# Patient Record
Sex: MALE | Race: BLACK OR AFRICAN AMERICAN | NOT HISPANIC OR LATINO | Employment: OTHER | URBAN - METROPOLITAN AREA
[De-identification: names, ages, dates, MRNs, and addresses within clinical notes are randomized per-mention and may not be internally consistent; named-entity substitution may affect disease eponyms.]

---

## 2018-07-25 ENCOUNTER — OFFICE VISIT (OUTPATIENT)
Dept: DERMATOLOGY | Facility: CLINIC | Age: 28
End: 2018-07-25
Payer: COMMERCIAL

## 2018-07-25 DIAGNOSIS — L21.9 SEBORRHEIC DERMATITIS: Primary | ICD-10-CM

## 2018-07-25 PROCEDURE — 99999 PR PBB SHADOW E&M-EST. PATIENT-LVL II: CPT | Mod: PBBFAC,,, | Performed by: DERMATOLOGY

## 2018-07-25 PROCEDURE — 99202 OFFICE O/P NEW SF 15 MIN: CPT | Mod: S$GLB,,, | Performed by: DERMATOLOGY

## 2018-07-25 RX ORDER — KETOCONAZOLE 20 MG/G
CREAM TOPICAL
Qty: 60 G | Refills: 3 | Status: SHIPPED | OUTPATIENT
Start: 2018-07-25 | End: 2020-09-15 | Stop reason: SDUPTHER

## 2018-07-25 RX ORDER — TRIAMCINOLONE ACETONIDE 0.25 MG/G
CREAM TOPICAL
Qty: 80 G | Refills: 1 | Status: SHIPPED | OUTPATIENT
Start: 2018-07-25 | End: 2020-09-15 | Stop reason: SDUPTHER

## 2018-07-25 RX ORDER — KETOCONAZOLE 20 MG/ML
SHAMPOO, SUSPENSION TOPICAL
Qty: 120 ML | Refills: 5 | Status: SHIPPED | OUTPATIENT
Start: 2018-07-25 | End: 2020-09-15 | Stop reason: SDUPTHER

## 2018-07-25 NOTE — PATIENT INSTRUCTIONS
Instructions for seborrheic dermatitis:    · Use ketoconazole cream twice a day every day to the affected areas for maintenance treatment  · For flares (scaliness, redness, itching), use triamcinolone 0.025% cream (steroid cream) twice a day to the affected areas. Mix ketoconazole cream with triamcinolone 0.025% cream and use twice a day as needed for flares only.  Triamcinolone 0.025% cream is a mild steroid and should not be used for periods longer than 2-4 weeks at a time.  · Use ketoconazole shampoo 2 times/week on scalp, lather, let sit 5-10 minutes, then rinse. Use daily on face and beard as face wash.

## 2018-07-25 NOTE — PROGRESS NOTES
Subjective:       Patient ID:  Kate Lazo is a 28 y.o. male who presents for   Chief Complaint   Patient presents with    Dry Skin     c/o dry skin to body x several months,,,tx with t-tree oil,,little relief,,,dry and itchy    Hair/Scalp Problem     dry scalp x several months,,tx with selsum blue,,head and shoulders,,,no relief,,itchy     History of Present Illness: The patient presents with chief complaint of rash.  Location: scalp  Duration: several years  Signs/Symptoms: pruritus    Prior treatments: tea tree oil, head and shoulders, selsen blue          Review of Systems   Constitutional: Negative for fever and chills.   Gastrointestinal: Negative for nausea and vomiting.   Skin: Positive for itching and rash. Negative for daily sunscreen use, activity-related sunscreen use and recent sunburn.   Hematologic/Lymphatic: Does not bruise/bleed easily.        Objective:    Physical Exam   Constitutional: He appears well-developed and well-nourished. No distress.   Neurological: He is alert and oriented to person, place, and time. He is not disoriented.   Psychiatric: He has a normal mood and affect.   Skin:   Areas Examined (abnormalities noted in diagram):   Scalp / Hair Palpated and Inspected  Head / Face Inspection Performed  Neck Inspection Performed  Chest / Axilla Inspection Performed  Abdomen Inspection Performed  RUE Inspected  LUE Inspection Performed  Nails and Digits Inspection Performed                Assessment / Plan:        Seborrheic dermatitis  -     ketoconazole (NIZORAL) 2 % cream; AAA bid. Non-steroid cream.  Dispense: 60 g; Refill: 3  -     triamcinolone acetonide 0.025% (KENALOG) 0.025 % cream; AAA bid as needed for flares.  Mild steroid.  Dispense: 80 g; Refill: 1  -     ketoconazole (NIZORAL) 2 % shampoo; Wash hair with medicated shampoo at least 2x/week - let sit on scalp at least 5 minutes prior to rinsing. Use daily as face wash.  Dispense: 120 mL; Refill: 5  -     Discussed  diagnosis. Will start ketoconazole cream BID for maintenance with TAC 0.025% cream BID prn flares for seb derm of face, will add ketoconazole shampoo 2 times/week for seb derm of scalp.                 Follow-up in about 2 months (around 9/25/2018) for THEE Castellano.

## 2018-09-10 ENCOUNTER — LAB VISIT (OUTPATIENT)
Dept: LAB | Facility: HOSPITAL | Age: 28
End: 2018-09-10
Attending: STUDENT IN AN ORGANIZED HEALTH CARE EDUCATION/TRAINING PROGRAM
Payer: COMMERCIAL

## 2018-09-10 DIAGNOSIS — N39.0 URINARY TRACT INFECTION, SITE NOT SPECIFIED: ICD-10-CM

## 2018-09-10 DIAGNOSIS — T81.44XA POSTOPERATIVE SEPTICEMIA: Primary | ICD-10-CM

## 2018-09-10 LAB
BACTERIA #/AREA URNS HPF: ABNORMAL /HPF
BASOPHILS # BLD AUTO: 0.01 K/UL
BASOPHILS NFR BLD: 0.1 %
BILIRUB UR QL STRIP: NEGATIVE
CLARITY UR: CLEAR
COLOR UR: YELLOW
DIFFERENTIAL METHOD: ABNORMAL
EOSINOPHIL # BLD AUTO: 0.1 K/UL
EOSINOPHIL NFR BLD: 1.9 %
ERYTHROCYTE [DISTWIDTH] IN BLOOD BY AUTOMATED COUNT: 14.3 %
GLUCOSE UR QL STRIP: NEGATIVE
HCT VFR BLD AUTO: 41 %
HGB BLD-MCNC: 13.8 G/DL
HGB UR QL STRIP: ABNORMAL
HYALINE CASTS #/AREA URNS LPF: 0 /LPF
KETONES UR QL STRIP: NEGATIVE
LEUKOCYTE ESTERASE UR QL STRIP: ABNORMAL
LYMPHOCYTES # BLD AUTO: 2.5 K/UL
LYMPHOCYTES NFR BLD: 33.5 %
MCH RBC QN AUTO: 29.1 PG
MCHC RBC AUTO-ENTMCNC: 33.7 G/DL
MCV RBC AUTO: 86 FL
MICROSCOPIC COMMENT: ABNORMAL
MONOCYTES # BLD AUTO: 0.7 K/UL
MONOCYTES NFR BLD: 9.7 %
NEUTROPHILS # BLD AUTO: 4.1 K/UL
NEUTROPHILS NFR BLD: 54.8 %
NITRITE UR QL STRIP: POSITIVE
PH UR STRIP: 6.5 [PH] (ref 5–8)
PLATELET # BLD AUTO: 264 K/UL
PMV BLD AUTO: 11.3 FL
PROT UR QL STRIP: ABNORMAL
RBC # BLD AUTO: 4.75 M/UL
RBC #/AREA URNS HPF: 40 /HPF (ref 0–4)
SP GR UR STRIP: 1.02 (ref 1–1.03)
URN SPEC COLLECT METH UR: ABNORMAL
UROBILINOGEN UR STRIP-ACNC: NEGATIVE EU/DL
WBC # BLD AUTO: 7.53 K/UL
WBC #/AREA URNS HPF: 50 /HPF (ref 0–5)
WBC CLUMPS URNS QL MICRO: ABNORMAL

## 2018-09-10 PROCEDURE — 87186 SC STD MICRODIL/AGAR DIL: CPT | Mod: 59

## 2018-09-10 PROCEDURE — 85025 COMPLETE CBC W/AUTO DIFF WBC: CPT

## 2018-09-10 PROCEDURE — 81000 URINALYSIS NONAUTO W/SCOPE: CPT

## 2018-09-10 PROCEDURE — 87040 BLOOD CULTURE FOR BACTERIA: CPT

## 2018-09-10 PROCEDURE — 87088 URINE BACTERIA CULTURE: CPT

## 2018-09-10 PROCEDURE — 87086 URINE CULTURE/COLONY COUNT: CPT

## 2018-09-10 PROCEDURE — 87077 CULTURE AEROBIC IDENTIFY: CPT

## 2018-09-15 LAB
BACTERIA BLD CULT: NORMAL
BACTERIA UR CULT: NORMAL
BACTERIA UR CULT: NORMAL

## 2019-01-03 ENCOUNTER — LAB VISIT (OUTPATIENT)
Dept: LAB | Facility: HOSPITAL | Age: 29
End: 2019-01-03
Attending: FAMILY MEDICINE
Payer: COMMERCIAL

## 2019-01-03 DIAGNOSIS — N39.0 URINARY TRACT INFECTION, SITE NOT SPECIFIED: Primary | ICD-10-CM

## 2019-01-03 LAB
BACTERIA #/AREA URNS HPF: ABNORMAL /HPF
BILIRUB UR QL STRIP: NEGATIVE
CLARITY UR: CLEAR
COLOR UR: YELLOW
GLUCOSE UR QL STRIP: NEGATIVE
HGB UR QL STRIP: ABNORMAL
KETONES UR QL STRIP: ABNORMAL
LEUKOCYTE ESTERASE UR QL STRIP: ABNORMAL
MICROSCOPIC COMMENT: ABNORMAL
NITRITE UR QL STRIP: NEGATIVE
PH UR STRIP: 8 [PH] (ref 5–8)
PROT UR QL STRIP: ABNORMAL
RBC #/AREA URNS HPF: >100 /HPF (ref 0–4)
SP GR UR STRIP: 1.02 (ref 1–1.03)
URN SPEC COLLECT METH UR: ABNORMAL
UROBILINOGEN UR STRIP-ACNC: NEGATIVE EU/DL
WBC #/AREA URNS HPF: 5 /HPF (ref 0–5)

## 2019-01-03 PROCEDURE — 81000 URINALYSIS NONAUTO W/SCOPE: CPT

## 2019-01-03 PROCEDURE — 87086 URINE CULTURE/COLONY COUNT: CPT

## 2019-01-05 LAB
BACTERIA UR CULT: NORMAL
BACTERIA UR CULT: NORMAL

## 2019-11-21 LAB — HIV 1+2 AB+HIV1 P24 AG SERPL QL IA: NON REACTIVE

## 2020-09-15 ENCOUNTER — OFFICE VISIT (OUTPATIENT)
Dept: INTERNAL MEDICINE | Facility: CLINIC | Age: 30
End: 2020-09-15
Payer: MEDICARE

## 2020-09-15 VITALS
DIASTOLIC BLOOD PRESSURE: 84 MMHG | SYSTOLIC BLOOD PRESSURE: 112 MMHG | WEIGHT: 200 LBS | RESPIRATION RATE: 18 BRPM | HEART RATE: 64 BPM | TEMPERATURE: 97 F | OXYGEN SATURATION: 97 %

## 2020-09-15 DIAGNOSIS — K21.9 GASTROESOPHAGEAL REFLUX DISEASE, ESOPHAGITIS PRESENCE NOT SPECIFIED: ICD-10-CM

## 2020-09-15 DIAGNOSIS — R21 SKIN RASH: Primary | ICD-10-CM

## 2020-09-15 DIAGNOSIS — R10.30 LOWER ABDOMINAL PAIN: ICD-10-CM

## 2020-09-15 DIAGNOSIS — L21.9 SEBORRHEIC DERMATITIS: ICD-10-CM

## 2020-09-15 DIAGNOSIS — G89.29 OTHER CHRONIC PAIN: ICD-10-CM

## 2020-09-15 PROCEDURE — 99999 PR PBB SHADOW E&M-EST. PATIENT-LVL III: CPT | Mod: PBBFAC,,, | Performed by: FAMILY MEDICINE

## 2020-09-15 PROCEDURE — 99203 PR OFFICE/OUTPT VISIT, NEW, LEVL III, 30-44 MIN: ICD-10-PCS | Mod: S$PBB,,, | Performed by: FAMILY MEDICINE

## 2020-09-15 PROCEDURE — 99203 OFFICE O/P NEW LOW 30 MIN: CPT | Mod: S$PBB,,, | Performed by: FAMILY MEDICINE

## 2020-09-15 PROCEDURE — 99213 OFFICE O/P EST LOW 20 MIN: CPT | Mod: PBBFAC | Performed by: FAMILY MEDICINE

## 2020-09-15 PROCEDURE — 99999 PR PBB SHADOW E&M-EST. PATIENT-LVL III: ICD-10-PCS | Mod: PBBFAC,,, | Performed by: FAMILY MEDICINE

## 2020-09-15 RX ORDER — KETOCONAZOLE 20 MG/G
CREAM TOPICAL
Qty: 60 G | Refills: 3 | Status: SHIPPED | OUTPATIENT
Start: 2020-09-15 | End: 2021-03-11

## 2020-09-15 RX ORDER — KETOCONAZOLE 20 MG/ML
SHAMPOO, SUSPENSION TOPICAL
Qty: 120 ML | Refills: 5 | Status: SHIPPED | OUTPATIENT
Start: 2020-09-15 | End: 2021-03-11 | Stop reason: SDUPTHER

## 2020-09-15 RX ORDER — FUROSEMIDE 20 MG/1
TABLET ORAL
COMMUNITY
Start: 2020-08-08 | End: 2021-03-11 | Stop reason: SDUPTHER

## 2020-09-15 RX ORDER — TRIAMCINOLONE ACETONIDE 0.25 MG/G
CREAM TOPICAL
Qty: 80 G | Refills: 1 | Status: SHIPPED | OUTPATIENT
Start: 2020-09-15 | End: 2021-12-15

## 2020-09-15 RX ORDER — DULOXETIN HYDROCHLORIDE 30 MG/1
30 CAPSULE, DELAYED RELEASE ORAL DAILY
Qty: 90 CAPSULE | Refills: 1 | Status: SHIPPED | OUTPATIENT
Start: 2020-09-15 | End: 2020-10-15

## 2020-09-15 RX ORDER — PANTOPRAZOLE SODIUM 40 MG/1
40 TABLET, DELAYED RELEASE ORAL DAILY
Qty: 90 TABLET | Refills: 1 | Status: SHIPPED | OUTPATIENT
Start: 2020-09-15 | End: 2021-03-11

## 2020-09-15 RX ORDER — SENNOSIDES 8.6 MG/1
1 TABLET ORAL
COMMUNITY

## 2020-09-15 RX ORDER — OMEPRAZOLE 40 MG/1
40 CAPSULE, DELAYED RELEASE ORAL
COMMUNITY
End: 2020-09-15
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
## 2020-09-21 NOTE — TELEPHONE ENCOUNTER
Patient c/o have withdrawals from stopping the amitriptyline.  He stated that he has upper body pain, insomnia, and sweats.  Want to know can he restart the medication.

## 2020-09-21 NOTE — TELEPHONE ENCOUNTER
----- Message from Beth Lazo sent at 9/21/2020 10:27 AM CDT -----  Regarding: pt  Would like a call back in regards to medication that was prescribed by  . Please call back at [phone].       Thank you,   Beth Lazo

## 2020-09-21 NOTE — TELEPHONE ENCOUNTER
----- Message from Michelle Dunbar sent at 9/21/2020  9:03 AM CDT -----  Pt calling regarding his medication he want to find out about a medication that he was taken off of. Please give him a call to discuss. [phone]        Thanks   Michelle Dunbar

## 2020-09-21 NOTE — TELEPHONE ENCOUNTER
Patient called in stating he would like to be put back on amitriptyline 25 mg he was on it before and the medication was working for him. He believe the medication that he's on now(cymbalta) is giving him so problems. Please call the pt to assist in his medication request.

## 2020-09-22 RX ORDER — AMITRIPTYLINE HYDROCHLORIDE 25 MG/1
25 TABLET, FILM COATED ORAL NIGHTLY PRN
Qty: 7 TABLET | Refills: 0 | Status: SHIPPED | OUTPATIENT
Start: 2020-09-22 | End: 2020-09-23 | Stop reason: SDUPTHER

## 2020-09-22 NOTE — TELEPHONE ENCOUNTER
Gideon Burton wants the patient to restart this medication can you please refill for the patient.  I spoke with Dr Traore on yesterday.

## 2020-09-22 NOTE — TELEPHONE ENCOUNTER
----- Message from Dorene Gan sent at 9/22/2020  2:17 PM CDT -----  Regarding: Medication  Contact: pt mother  Pt mother stated she has been calling for the past few days about pt medication (Amitriptyline) being filled, she can be reached at [phone] Thanks      [facility] [place] - [address]  [address] [address]  Phone: [phone] Fax: [phone]

## 2020-09-22 NOTE — TELEPHONE ENCOUNTER
----- Message from Dorene Gan sent at 9/22/2020  2:17 PM CDT -----  Regarding: Medication  Contact: pt mother  Pt mother stated she has been calling for the past few days about pt medication (Amitriptyline) being filled, she can be reached at [phone] Thanks      [facility] - [place] - [address]  [address] [place] [address]  Phone: [phone] Fax: [phone]

## 2020-09-22 NOTE — TELEPHONE ENCOUNTER
I do not see any notes regarding Elavil from previous clinic visit.  I'm not sure who was prescribing previously.  Sent 1wk supply and can discuss with Dr. Burton his recommendations upon his return tomorrow and send additional Rx.  Hold cymbalta for now.

## 2020-09-22 NOTE — TELEPHONE ENCOUNTER
----- Message from Dorene Gan sent at 9/22/2020  2:17 PM CDT -----  Regarding: Medication  Contact: pt mother  Pt mother stated she has been calling for the past few days about pt medication (Amitriptyline) being filled, she can be reached at [phone] Thanks      [facility] - [place] - [address]  [address] [address]  Phone: [phone] Fax: [phone]

## 2020-09-22 NOTE — TELEPHONE ENCOUNTER
----- Message from Courtney Sigala sent at 9/21/2020  5:22 PM CDT -----  Regarding: Refill  Contact: [phone]  Pt mother calling to see if Amitriptyline 25MG can be called into the pharmacy. Please call the pt mother regarding the refill .           [facility] [address] [place] [address] - [address]   [phone] (Phone)  [phone] (Fax)

## 2020-09-22 NOTE — TELEPHONE ENCOUNTER
Pt is having withdraw and need medication called in. Pt had only been off of medication 4 day prior to visit.

## 2020-09-22 NOTE — PROGRESS NOTES
Refill Routing Note   Medication(s) are not appropriate for processing by [facility]:       - Pt requesting to restart medication         Medication Therapy Plan: Pt requesting to restart. Defer to PCP   Medication reconciliation completed: No      Automatic Epic Generated Protocol Data:        Requested Prescriptions   Pending Prescriptions Disp Refills    amitriptyline (ELAVIL) 25 MG tablet 30 tablet 1     Sig: Take 1 tablet (25 mg total) by mouth nightly as needed for Insomnia.       Tricyclic Antidepressants Failed - 9/22/2020  2:30 PM        Failed - Medication is active on med list        Passed - No positive pregnancy test in past 12 months         Passed - Patient has a recent visit in past 12 months or next 90 days              Appointments  past 12m or future 3m with PCP    Date Provider   Last Visit   9/15/2020 Troy Burton MD   Next Visit   10/5/2020 Troy Burton MD   ED visits in past 90 days: 0     Note composed:2:44 PM 09/22/2020

## 2020-09-23 RX ORDER — AMITRIPTYLINE HYDROCHLORIDE 25 MG/1
25 TABLET, FILM COATED ORAL NIGHTLY PRN
Qty: 90 TABLET | Refills: 0 | Status: SHIPPED | OUTPATIENT
Start: 2020-09-23 | End: 2020-12-23

## 2020-09-24 ENCOUNTER — PATIENT MESSAGE (OUTPATIENT)
Dept: INTERNAL MEDICINE | Facility: CLINIC | Age: 30
End: 2020-09-24

## 2020-09-24 DIAGNOSIS — Z79.899 ENCOUNTER FOR LONG-TERM CURRENT USE OF MEDICATION: Primary | ICD-10-CM

## 2020-09-24 DIAGNOSIS — N39.0 URINARY TRACT INFECTION WITHOUT HEMATURIA, SITE UNSPECIFIED: Primary | ICD-10-CM

## 2020-09-25 ENCOUNTER — LAB VISIT (OUTPATIENT)
Dept: LAB | Facility: HOSPITAL | Age: 30
End: 2020-09-25
Attending: FAMILY MEDICINE
Payer: MEDICARE

## 2020-09-25 DIAGNOSIS — Z79.899 ENCOUNTER FOR LONG-TERM CURRENT USE OF MEDICATION: ICD-10-CM

## 2020-09-25 DIAGNOSIS — N39.0 URINARY TRACT INFECTION WITHOUT HEMATURIA, SITE UNSPECIFIED: ICD-10-CM

## 2020-09-25 LAB
BACTERIA #/AREA URNS HPF: ABNORMAL /HPF
BILIRUB UR QL STRIP: NEGATIVE
CLARITY UR: ABNORMAL
COLOR UR: YELLOW
GLUCOSE UR QL STRIP: NEGATIVE
HGB UR QL STRIP: ABNORMAL
KETONES UR QL STRIP: NEGATIVE
LEUKOCYTE ESTERASE UR QL STRIP: ABNORMAL
MICROSCOPIC COMMENT: ABNORMAL
NITRITE UR QL STRIP: NEGATIVE
NON-SQ EPI CELLS #/AREA URNS HPF: <1 /HPF
PH UR STRIP: 7 [PH] (ref 5–8)
PROT UR QL STRIP: NEGATIVE
RBC #/AREA URNS HPF: 30 /HPF (ref 0–4)
SP GR UR STRIP: 1.01 (ref 1–1.03)
SQUAMOUS #/AREA URNS HPF: 1 /HPF
URN SPEC COLLECT METH UR: ABNORMAL
WBC #/AREA URNS HPF: 45 /HPF (ref 0–5)

## 2020-09-25 PROCEDURE — 84443 ASSAY THYROID STIM HORMONE: CPT

## 2020-09-25 PROCEDURE — 85025 COMPLETE CBC W/AUTO DIFF WBC: CPT

## 2020-09-25 PROCEDURE — 80053 COMPREHEN METABOLIC PANEL: CPT

## 2020-09-25 PROCEDURE — 36415 COLL VENOUS BLD VENIPUNCTURE: CPT

## 2020-09-25 PROCEDURE — 80061 LIPID PANEL: CPT

## 2020-09-25 PROCEDURE — 81000 URINALYSIS NONAUTO W/SCOPE: CPT

## 2020-09-25 PROCEDURE — 83036 HEMOGLOBIN GLYCOSYLATED A1C: CPT

## 2020-09-25 PROCEDURE — 82306 VITAMIN D 25 HYDROXY: CPT

## 2020-09-25 PROCEDURE — 87086 URINE CULTURE/COLONY COUNT: CPT

## 2020-09-25 PROCEDURE — 87088 URINE BACTERIA CULTURE: CPT

## 2020-09-25 PROCEDURE — 87077 CULTURE AEROBIC IDENTIFY: CPT

## 2020-09-25 PROCEDURE — 87186 SC STD MICRODIL/AGAR DIL: CPT

## 2020-09-25 RX ORDER — CIPROFLOXACIN 500 MG/1
500 TABLET ORAL EVERY 12 HOURS
Qty: 14 TABLET | Refills: 0 | Status: SHIPPED | OUTPATIENT
Start: 2020-09-25 | End: 2020-09-28

## 2020-09-26 LAB
25(OH)D3+25(OH)D2 SERPL-MCNC: 14 NG/ML (ref 30–96)
ALBUMIN SERPL BCP-MCNC: 3.9 G/DL (ref 3.5–5.2)
ALP SERPL-CCNC: 100 U/L (ref 55–135)
ALT SERPL W/O P-5'-P-CCNC: 24 U/L (ref 10–44)
ANION GAP SERPL CALC-SCNC: 11 MMOL/L (ref 8–16)
AST SERPL-CCNC: 15 U/L (ref 10–40)
BASOPHILS # BLD AUTO: 0.02 K/UL (ref 0–0.2)
BASOPHILS NFR BLD: 0.4 % (ref 0–1.9)
BILIRUB SERPL-MCNC: 0.5 MG/DL (ref 0.1–1)
BUN SERPL-MCNC: 10 MG/DL (ref 6–20)
CALCIUM SERPL-MCNC: 9.2 MG/DL (ref 8.7–10.5)
CHLORIDE SERPL-SCNC: 106 MMOL/L (ref 95–110)
CHOLEST SERPL-MCNC: 122 MG/DL (ref 120–199)
CHOLEST/HDLC SERPL: 2.8 {RATIO} (ref 2–5)
CO2 SERPL-SCNC: 26 MMOL/L (ref 23–29)
CREAT SERPL-MCNC: 0.9 MG/DL (ref 0.5–1.4)
DIFFERENTIAL METHOD: ABNORMAL
EOSINOPHIL # BLD AUTO: 0.1 K/UL (ref 0–0.5)
EOSINOPHIL NFR BLD: 2 % (ref 0–8)
ERYTHROCYTE [DISTWIDTH] IN BLOOD BY AUTOMATED COUNT: 13.2 % (ref 11.5–14.5)
EST. GFR  (AFRICAN AMERICAN): >60 ML/MIN/1.73 M^2
EST. GFR  (NON AFRICAN AMERICAN): >60 ML/MIN/1.73 M^2
ESTIMATED AVG GLUCOSE: 100 MG/DL (ref 68–131)
GLUCOSE SERPL-MCNC: 70 MG/DL (ref 70–110)
HBA1C MFR BLD HPLC: 5.1 % (ref 4–5.6)
HCT VFR BLD AUTO: 46.6 % (ref 40–54)
HDLC SERPL-MCNC: 44 MG/DL (ref 40–75)
HDLC SERPL: 36.1 % (ref 20–50)
HGB BLD-MCNC: 14.7 G/DL (ref 14–18)
IMM GRANULOCYTES # BLD AUTO: 0.03 K/UL (ref 0–0.04)
IMM GRANULOCYTES NFR BLD AUTO: 0.6 % (ref 0–0.5)
LDLC SERPL CALC-MCNC: 66.4 MG/DL (ref 63–159)
LYMPHOCYTES # BLD AUTO: 2.3 K/UL (ref 1–4.8)
LYMPHOCYTES NFR BLD: 46.5 % (ref 18–48)
MCH RBC QN AUTO: 28.2 PG (ref 27–31)
MCHC RBC AUTO-ENTMCNC: 31.5 G/DL (ref 32–36)
MCV RBC AUTO: 89 FL (ref 82–98)
MONOCYTES # BLD AUTO: 0.3 K/UL (ref 0.3–1)
MONOCYTES NFR BLD: 6.6 % (ref 4–15)
NEUTROPHILS # BLD AUTO: 2.2 K/UL (ref 1.8–7.7)
NEUTROPHILS NFR BLD: 43.9 % (ref 38–73)
NONHDLC SERPL-MCNC: 78 MG/DL
NRBC BLD-RTO: 0 /100 WBC
PLATELET # BLD AUTO: 235 K/UL (ref 150–350)
PMV BLD AUTO: 11.7 FL (ref 9.2–12.9)
POTASSIUM SERPL-SCNC: 4 MMOL/L (ref 3.5–5.1)
PROT SERPL-MCNC: 7.2 G/DL (ref 6–8.4)
RBC # BLD AUTO: 5.21 M/UL (ref 4.6–6.2)
SODIUM SERPL-SCNC: 143 MMOL/L (ref 136–145)
TRIGL SERPL-MCNC: 58 MG/DL (ref 30–150)
TSH SERPL DL<=0.005 MIU/L-ACNC: 0.59 UIU/ML (ref 0.4–4)
WBC # BLD AUTO: 4.97 K/UL (ref 3.9–12.7)

## 2020-09-28 ENCOUNTER — TELEPHONE (OUTPATIENT)
Dept: INTERNAL MEDICINE | Facility: CLINIC | Age: 30
End: 2020-09-28

## 2020-09-28 LAB — BACTERIA UR CULT: ABNORMAL

## 2020-09-28 RX ORDER — NITROFURANTOIN (MACROCRYSTALS) 100 MG/1
100 CAPSULE ORAL EVERY 12 HOURS
Qty: 16 CAPSULE | Refills: 0 | Status: SHIPPED | OUTPATIENT
Start: 2020-09-28 | End: 2020-10-06

## 2020-09-28 NOTE — TELEPHONE ENCOUNTER
Pt called to informed that pt will stop medication given 09/21/2020 and will start news antibiotics.

## 2020-10-11 ENCOUNTER — PATIENT MESSAGE (OUTPATIENT)
Dept: INTERNAL MEDICINE | Facility: CLINIC | Age: 30
End: 2020-10-11

## 2020-10-12 ENCOUNTER — PATIENT MESSAGE (OUTPATIENT)
Dept: INTERNAL MEDICINE | Facility: CLINIC | Age: 30
End: 2020-10-12

## 2020-10-15 ENCOUNTER — OFFICE VISIT (OUTPATIENT)
Dept: INTERNAL MEDICINE | Facility: CLINIC | Age: 30
End: 2020-10-15
Payer: MEDICARE

## 2020-10-15 DIAGNOSIS — T83.511D URINARY TRACT INFECTION ASSOCIATED WITH CATHETERIZATION OF URINARY TRACT, UNSPECIFIED INDWELLING URINARY CATHETER TYPE, SUBSEQUENT ENCOUNTER: ICD-10-CM

## 2020-10-15 DIAGNOSIS — N39.0 URINARY TRACT INFECTION ASSOCIATED WITH CATHETERIZATION OF URINARY TRACT, UNSPECIFIED INDWELLING URINARY CATHETER TYPE, SUBSEQUENT ENCOUNTER: ICD-10-CM

## 2020-10-15 DIAGNOSIS — K59.00 CONSTIPATION, UNSPECIFIED CONSTIPATION TYPE: Primary | ICD-10-CM

## 2020-10-15 PROCEDURE — 99213 OFFICE O/P EST LOW 20 MIN: CPT | Mod: 95,,, | Performed by: FAMILY MEDICINE

## 2020-10-15 PROCEDURE — 99213 PR OFFICE/OUTPT VISIT, EST, LEVL III, 20-29 MIN: ICD-10-PCS | Mod: 95,,, | Performed by: FAMILY MEDICINE

## 2020-10-15 RX ORDER — NITROFURANTOIN (MACROCRYSTALS) 100 MG/1
100 CAPSULE ORAL EVERY 12 HOURS
Qty: 28 CAPSULE | Refills: 0 | Status: SHIPPED | OUTPATIENT
Start: 2020-10-15 | End: 2020-12-11
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
## 2020-10-21 ENCOUNTER — PATIENT MESSAGE (OUTPATIENT)
Dept: INTERNAL MEDICINE | Facility: CLINIC | Age: 30
End: 2020-10-21

## 2020-10-22 ENCOUNTER — OFFICE VISIT (OUTPATIENT)
Dept: INTERNAL MEDICINE | Facility: CLINIC | Age: 30
End: 2020-10-22
Payer: MEDICARE

## 2020-10-22 DIAGNOSIS — R10.9 ABDOMINAL DISCOMFORT: Primary | ICD-10-CM

## 2020-10-22 DIAGNOSIS — K59.00 CONSTIPATION, UNSPECIFIED CONSTIPATION TYPE: ICD-10-CM

## 2020-10-22 DIAGNOSIS — R14.0 BLOATING: ICD-10-CM

## 2020-10-22 PROCEDURE — 99213 OFFICE O/P EST LOW 20 MIN: CPT | Mod: 95,,, | Performed by: FAMILY MEDICINE

## 2020-10-22 PROCEDURE — 99213 PR OFFICE/OUTPT VISIT, EST, LEVL III, 20-29 MIN: ICD-10-PCS | Mod: 95,,, | Performed by: FAMILY MEDICINE

## 2020-10-22 RX ORDER — DICYCLOMINE HYDROCHLORIDE 20 MG/1
20 TABLET ORAL 3 TIMES DAILY
Qty: 60 TABLET | Refills: 0 | Status: SHIPPED | OUTPATIENT
Start: 2020-10-22 | End: 2020-11-21
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
## 2020-12-01 ENCOUNTER — PATIENT MESSAGE (OUTPATIENT)
Dept: INTERNAL MEDICINE | Facility: CLINIC | Age: 30
End: 2020-12-01

## 2020-12-01 NOTE — TELEPHONE ENCOUNTER
The patient has been diagnosed with an infection that he was being treated by Dr Raymond.  Has a PICC line that is due to be removed today.  Patient was referred to infectious disease, but wanted to know if it eas something that Dr Burton could treat while he is waiting to be seen.  Advised the patient that we will have to request his medical records as let Dr Burton review them and get back with him /sl/

## 2020-12-05 ENCOUNTER — DOCUMENTATION ONLY (OUTPATIENT)
Dept: ADMINISTRATIVE | Facility: HOSPITAL | Age: 30
End: 2020-12-05

## 2020-12-07 ENCOUNTER — OFFICE VISIT (OUTPATIENT)
Dept: INTERNAL MEDICINE | Facility: CLINIC | Age: 30
End: 2020-12-07
Payer: COMMERCIAL

## 2020-12-07 VITALS
OXYGEN SATURATION: 99 % | SYSTOLIC BLOOD PRESSURE: 114 MMHG | DIASTOLIC BLOOD PRESSURE: 78 MMHG | HEART RATE: 93 BPM | TEMPERATURE: 97 F

## 2020-12-07 DIAGNOSIS — G82.50 QUADRIPLEGIA, POST-TRAUMATIC: ICD-10-CM

## 2020-12-07 DIAGNOSIS — I10 ESSENTIAL HYPERTENSION: ICD-10-CM

## 2020-12-07 DIAGNOSIS — S14.109S QUADRIPLEGIA, POST-TRAUMATIC: ICD-10-CM

## 2020-12-07 DIAGNOSIS — S00.93XA CONTUSION OF HEAD, UNSPECIFIED PART OF HEAD, INITIAL ENCOUNTER: Primary | ICD-10-CM

## 2020-12-07 PROCEDURE — 99999 PR PBB SHADOW E&M-EST. PATIENT-LVL III: CPT | Mod: PBBFAC,,, | Performed by: PHYSICIAN ASSISTANT

## 2020-12-07 PROCEDURE — 99213 OFFICE O/P EST LOW 20 MIN: CPT | Mod: PBBFAC | Performed by: PHYSICIAN ASSISTANT

## 2020-12-07 PROCEDURE — 99213 PR OFFICE/OUTPT VISIT, EST, LEVL III, 20-29 MIN: ICD-10-PCS | Mod: S$PBB,,, | Performed by: PHYSICIAN ASSISTANT

## 2020-12-07 PROCEDURE — 99213 OFFICE O/P EST LOW 20 MIN: CPT | Mod: S$PBB,,, | Performed by: PHYSICIAN ASSISTANT

## 2020-12-07 PROCEDURE — 99999 PR PBB SHADOW E&M-EST. PATIENT-LVL III: ICD-10-PCS | Mod: PBBFAC,,, | Performed by: PHYSICIAN ASSISTANT
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
## 2020-12-10 ENCOUNTER — PATIENT MESSAGE (OUTPATIENT)
Dept: INTERNAL MEDICINE | Facility: CLINIC | Age: 30
End: 2020-12-10

## 2020-12-10 NOTE — TELEPHONE ENCOUNTER
Patient would like another round of antibiotics until he see his infectious disease doctor on the 17th.  The patient states that he finished with the abx and still having fever and chills and strong smelling urine. /s

## 2020-12-11 ENCOUNTER — TELEPHONE (OUTPATIENT)
Dept: INTERNAL MEDICINE | Facility: CLINIC | Age: 30
End: 2020-12-11

## 2020-12-11 ENCOUNTER — LAB VISIT (OUTPATIENT)
Dept: LAB | Facility: HOSPITAL | Age: 30
End: 2020-12-11
Attending: FAMILY MEDICINE
Payer: COMMERCIAL

## 2020-12-11 DIAGNOSIS — N39.0 URINARY TRACT INFECTION WITHOUT HEMATURIA, SITE UNSPECIFIED: Primary | ICD-10-CM

## 2020-12-11 DIAGNOSIS — R82.90 BAD ODOR OF URINE: ICD-10-CM

## 2020-12-11 LAB
BACTERIA #/AREA URNS HPF: ABNORMAL /HPF
BILIRUB UR QL STRIP: NEGATIVE
CLARITY UR: ABNORMAL
COLOR UR: ABNORMAL
GLUCOSE UR QL STRIP: NEGATIVE
HGB UR QL STRIP: ABNORMAL
KETONES UR QL STRIP: NEGATIVE
LEUKOCYTE ESTERASE UR QL STRIP: ABNORMAL
MICROSCOPIC COMMENT: ABNORMAL
NITRITE UR QL STRIP: POSITIVE
PH UR STRIP: 7 [PH] (ref 5–8)
PROT UR QL STRIP: NEGATIVE
RBC #/AREA URNS HPF: 4 /HPF (ref 0–4)
SP GR UR STRIP: 1 (ref 1–1.03)
SQUAMOUS #/AREA URNS HPF: 2 /HPF
URN SPEC COLLECT METH UR: ABNORMAL
WBC #/AREA URNS HPF: 27 /HPF (ref 0–5)

## 2020-12-11 PROCEDURE — 81000 URINALYSIS NONAUTO W/SCOPE: CPT

## 2020-12-11 PROCEDURE — 87088 URINE BACTERIA CULTURE: CPT

## 2020-12-11 PROCEDURE — 87186 SC STD MICRODIL/AGAR DIL: CPT | Mod: 59

## 2020-12-11 PROCEDURE — 87077 CULTURE AEROBIC IDENTIFY: CPT | Mod: 59

## 2020-12-11 PROCEDURE — 87086 URINE CULTURE/COLONY COUNT: CPT

## 2020-12-11 RX ORDER — NITROFURANTOIN (MACROCRYSTALS) 100 MG/1
100 CAPSULE ORAL 2 TIMES DAILY
Qty: 14 CAPSULE | Refills: 0 | Status: SHIPPED | OUTPATIENT
Start: 2020-12-11 | End: 2020-12-18

## 2020-12-11 NOTE — TELEPHONE ENCOUNTER
The patient dropped off urine of orders are needed.  Patient wanted ABX until he can see infectious disease.  Which he was referred to by his urologist.  Patient is having chills with strong smelling urine.  He is not sure if he has fever never checked his temp.

## 2020-12-11 NOTE — TELEPHONE ENCOUNTER
Pt called stated that he's having cold sweats on and off and smell of urine. Pt wants to know do you think he should go to the er to get iv antibodies or what do you suggest.

## 2020-12-11 NOTE — TELEPHONE ENCOUNTER
----- Message from Isaura Henriquez sent at 12/11/2020  3:37 PM CST -----  Contact: Kate Mcbride is requesting a call. Please call him back at [phone].      Thanks  DD

## 2020-12-15 ENCOUNTER — PATIENT MESSAGE (OUTPATIENT)
Dept: INTERNAL MEDICINE | Facility: CLINIC | Age: 30
End: 2020-12-15

## 2020-12-15 ENCOUNTER — HOSPITAL ENCOUNTER (EMERGENCY)
Facility: HOSPITAL | Age: 30
Discharge: HOME | End: 2020-12-15
Attending: EMERGENCY MEDICINE
Payer: COMMERCIAL

## 2020-12-15 ENCOUNTER — TELEPHONE (OUTPATIENT)
Dept: INTERNAL MEDICINE | Facility: CLINIC | Age: 30
End: 2020-12-15

## 2020-12-15 VITALS
RESPIRATION RATE: 18 BRPM | BODY MASS INDEX: 29.53 KG/M2 | OXYGEN SATURATION: 99 % | HEART RATE: 60 BPM | DIASTOLIC BLOOD PRESSURE: 90 MMHG | TEMPERATURE: 98 F | SYSTOLIC BLOOD PRESSURE: 131 MMHG | HEIGHT: 69 IN

## 2020-12-15 DIAGNOSIS — R82.71 BACTERIURIA: ICD-10-CM

## 2020-12-15 DIAGNOSIS — N39.0 LOWER URINARY TRACT INFECTIOUS DISEASE: Primary | ICD-10-CM

## 2020-12-15 LAB
ALBUMIN SERPL BCP-MCNC: 3.6 G/DL (ref 3.5–5.2)
ALP SERPL-CCNC: 151 U/L (ref 55–135)
ALT SERPL W/O P-5'-P-CCNC: 43 U/L (ref 10–44)
ANION GAP SERPL CALC-SCNC: 12 MMOL/L (ref 8–16)
AST SERPL-CCNC: 19 U/L (ref 10–40)
BACTERIA #/AREA URNS HPF: ABNORMAL /HPF
BACTERIA UR CULT: ABNORMAL
BACTERIA UR CULT: ABNORMAL
BASOPHILS # BLD AUTO: 0.02 K/UL (ref 0–0.2)
BASOPHILS NFR BLD: 0.3 % (ref 0–1.9)
BILIRUB SERPL-MCNC: 0.5 MG/DL (ref 0.1–1)
BILIRUB UR QL STRIP: NEGATIVE
BUN SERPL-MCNC: 5 MG/DL (ref 6–20)
CALCIUM SERPL-MCNC: 8.6 MG/DL (ref 8.7–10.5)
CHLORIDE SERPL-SCNC: 104 MMOL/L (ref 95–110)
CLARITY UR: CLEAR
CO2 SERPL-SCNC: 25 MMOL/L (ref 23–29)
COLOR UR: YELLOW
CREAT SERPL-MCNC: 0.8 MG/DL (ref 0.5–1.4)
DIFFERENTIAL METHOD: ABNORMAL
EOSINOPHIL # BLD AUTO: 0.2 K/UL (ref 0–0.5)
EOSINOPHIL NFR BLD: 3.3 % (ref 0–8)
ERYTHROCYTE [DISTWIDTH] IN BLOOD BY AUTOMATED COUNT: 13.2 % (ref 11.5–14.5)
EST. GFR  (AFRICAN AMERICAN): >60 ML/MIN/1.73 M^2
EST. GFR  (NON AFRICAN AMERICAN): >60 ML/MIN/1.73 M^2
GLUCOSE SERPL-MCNC: 116 MG/DL (ref 70–110)
GLUCOSE UR QL STRIP: NEGATIVE
HCT VFR BLD AUTO: 41.9 % (ref 40–54)
HGB BLD-MCNC: 13.4 G/DL (ref 14–18)
HGB UR QL STRIP: ABNORMAL
IMM GRANULOCYTES # BLD AUTO: 0.01 K/UL (ref 0–0.04)
IMM GRANULOCYTES NFR BLD AUTO: 0.2 % (ref 0–0.5)
KETONES UR QL STRIP: NEGATIVE
LACTATE SERPL-SCNC: 1.1 MMOL/L (ref 0.5–2.2)
LEUKOCYTE ESTERASE UR QL STRIP: ABNORMAL
LYMPHOCYTES # BLD AUTO: 2 K/UL (ref 1–4.8)
LYMPHOCYTES NFR BLD: 31.3 % (ref 18–48)
MCH RBC QN AUTO: 28.2 PG (ref 27–31)
MCHC RBC AUTO-ENTMCNC: 32 G/DL (ref 32–36)
MCV RBC AUTO: 88 FL (ref 82–98)
MICROSCOPIC COMMENT: ABNORMAL
MONOCYTES # BLD AUTO: 0.5 K/UL (ref 0.3–1)
MONOCYTES NFR BLD: 7.4 % (ref 4–15)
NEUTROPHILS # BLD AUTO: 3.7 K/UL (ref 1.8–7.7)
NEUTROPHILS NFR BLD: 57.5 % (ref 38–73)
NITRITE UR QL STRIP: NEGATIVE
NRBC BLD-RTO: 0 /100 WBC
PH UR STRIP: 8 [PH] (ref 5–8)
PLATELET # BLD AUTO: 246 K/UL (ref 150–350)
PMV BLD AUTO: 11.3 FL (ref 9.2–12.9)
POTASSIUM SERPL-SCNC: 4.2 MMOL/L (ref 3.5–5.1)
PROT SERPL-MCNC: 7.1 G/DL (ref 6–8.4)
PROT UR QL STRIP: NEGATIVE
RBC # BLD AUTO: 4.75 M/UL (ref 4.6–6.2)
RBC #/AREA URNS HPF: 0 /HPF (ref 0–4)
SARS-COV-2 RDRP RESP QL NAA+PROBE: NEGATIVE
SODIUM SERPL-SCNC: 141 MMOL/L (ref 136–145)
SP GR UR STRIP: 1.01 (ref 1–1.03)
URN SPEC COLLECT METH UR: ABNORMAL
UROBILINOGEN UR STRIP-ACNC: NEGATIVE EU/DL
WBC # BLD AUTO: 6.36 K/UL (ref 3.9–12.7)
WBC #/AREA URNS HPF: 8 /HPF (ref 0–5)

## 2020-12-15 PROCEDURE — P9612 CATHETERIZE FOR URINE SPEC: HCPCS

## 2020-12-15 PROCEDURE — 83605 ASSAY OF LACTIC ACID: CPT

## 2020-12-15 PROCEDURE — 99284 EMERGENCY DEPT VISIT MOD MDM: CPT | Mod: 25

## 2020-12-15 PROCEDURE — 63600175 PHARM REV CODE 636 W HCPCS: Performed by: EMERGENCY MEDICINE

## 2020-12-15 PROCEDURE — 87040 BLOOD CULTURE FOR BACTERIA: CPT | Mod: 59

## 2020-12-15 PROCEDURE — U0002 COVID-19 LAB TEST NON-CDC: HCPCS

## 2020-12-15 PROCEDURE — 36415 COLL VENOUS BLD VENIPUNCTURE: CPT

## 2020-12-15 PROCEDURE — 80053 COMPREHEN METABOLIC PANEL: CPT

## 2020-12-15 PROCEDURE — 25000003 PHARM REV CODE 250: Performed by: EMERGENCY MEDICINE

## 2020-12-15 PROCEDURE — 81000 URINALYSIS NONAUTO W/SCOPE: CPT

## 2020-12-15 PROCEDURE — 85025 COMPLETE CBC W/AUTO DIFF WBC: CPT

## 2020-12-15 PROCEDURE — 96365 THER/PROPH/DIAG IV INF INIT: CPT

## 2020-12-15 RX ORDER — NITROFURANTOIN 25; 75 MG/1; MG/1
100 CAPSULE ORAL 2 TIMES DAILY
Qty: 14 CAPSULE | Refills: 0 | Status: SHIPPED | OUTPATIENT
Start: 2020-12-15 | End: 2020-12-22

## 2020-12-15 RX ORDER — MEROPENEM AND SODIUM CHLORIDE 500 MG/50ML
500 INJECTION, SOLUTION INTRAVENOUS
Status: COMPLETED | OUTPATIENT
Start: 2020-12-15 | End: 2020-12-15

## 2020-12-15 RX ADMIN — SODIUM CHLORIDE 500 ML: 0.9 INJECTION, SOLUTION INTRAVENOUS at 06:12

## 2020-12-15 RX ADMIN — MEROPENEM AND SODIUM CHLORIDE 500 MG: 500 INJECTION, SOLUTION INTRAVENOUS at 06:12
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
## 2020-12-15 NOTE — TELEPHONE ENCOUNTER
Called the patient to let him know that he needs to go to the hospital to get started on IV antibiotics for his urine infection.  Patient verbalized understanding. /adeola/

## 2020-12-15 NOTE — TELEPHONE ENCOUNTER
Called the patient's mom to let her know the advice that was given for the patient to go to the ER to start IV antibiotics. /sl/

## 2020-12-15 NOTE — TELEPHONE ENCOUNTER
----- Message from Adriana Gunn sent at 12/15/2020  1:27 PM CST -----  Mom called in regards getting a call back. She stated someone called Kate but he wasn't able to explain what was told to him so she asked if someone would call her at [phone]

## 2020-12-16 NOTE — ED NOTES
Care assumed of patient. Patient in wheelchair at this time. Patient asked if he would like to get in the bed. Patient states he would like to stay in wheelchair.

## 2020-12-16 NOTE — DISCHARGE INSTRUCTIONS
There has bacteria in her urine however this does not appear to be infecting today.  I did discuss the case with hospital medicine who recommended discharge home to continue the Macrobid and follow up with your infectious disease and primary care doctor.  Return as needed for any worsening symptoms, problems, questions or concerns.

## 2020-12-21 LAB
BACTERIA BLD CULT: NORMAL
BACTERIA BLD CULT: NORMAL

## 2020-12-21 NOTE — TELEPHONE ENCOUNTER
No new care gaps identified.  Powered by [employer]. Reference number: [id number]. 12/21/2020 8:02:27 AM   CST

## 2020-12-23 RX ORDER — AMITRIPTYLINE HYDROCHLORIDE 25 MG/1
TABLET, FILM COATED ORAL
Qty: 90 TABLET | Refills: 0 | Status: SHIPPED | OUTPATIENT
Start: 2020-12-23 | End: 2021-03-11 | Stop reason: SDUPTHER

## 2020-12-23 NOTE — PROGRESS NOTES
Refill Routing Note   Medication(s) are not appropriate for processing by [facility] for the following reason(s):     - Outside of protocol  [facility] action(s):  Route        Medication reconciliation completed: No   Automatic Epic Generated Protocol Data:        Requested Prescriptions   Pending Prescriptions Disp Refills    amitriptyline (ELAVIL) 25 MG tablet [Pharmacy Med Name: Amitriptyline HCl 25 MG Oral Tablet] 90 tablet 0     Sig: TAKE 1 TABLET BY MOUTH NIGHTLY AS NEEDED FOR INSOMNIA FOR PAIN       Psychiatry:  Antidepressants - Heterocyclics (TCAs) Passed - 12/21/2020  8:02 AM        Passed - Patient is at least 18 years old        Passed - Office visit in past 12 months or future 90 days     Recent Outpatient Visits            2 weeks ago Contusion of head, unspecified part of head, initial encounter    [facility] Elda Thomas PA-C    2 months ago Abdominal discomfort    [facility] Troy Burton MD    2 months ago Constipation, unspecified constipation type    [facility] Troy Burton MD    3 months ago Skin rash    [facility] Troy Burton MD    2 years ago Seborrheic dermatitis    [facility] Diana Delarosa MD                          Appointments  past 12m or future 3m with PCP    Date Provider   Last Visit   10/22/2020 Troy Burton MD   Next Visit   Visit date not found Troy Burton MD   ED visits in past 90 days: 1        Note composed:12:29 PM 12/23/2020

## 2020-12-30 ENCOUNTER — TELEPHONE (OUTPATIENT)
Dept: INTERNAL MEDICINE | Facility: CLINIC | Age: 30
End: 2020-12-30

## 2020-12-30 NOTE — TELEPHONE ENCOUNTER
Infections lukasz parekh No medication needed from him, the antibodies that was given by PCP will keep infections from getting out of hand. Infectious diease provider stated that pt body will always have some type of infection.

## 2020-12-30 NOTE — TELEPHONE ENCOUNTER
----- Message from David Beasley sent at 12/30/2020 11:57 AM CST -----  Pt would like return call; pt is needing to provide information given to him by infectious disease provider. Please call back at [phone].  Leti Caban

## 2021-01-06 ENCOUNTER — TELEPHONE (OUTPATIENT)
Dept: INTERNAL MEDICINE | Facility: CLINIC | Age: 31
End: 2021-01-06

## 2021-01-08 ENCOUNTER — TELEPHONE (OUTPATIENT)
Dept: INTERNAL MEDICINE | Facility: CLINIC | Age: 31
End: 2021-01-08

## 2021-01-08 ENCOUNTER — PATIENT MESSAGE (OUTPATIENT)
Dept: INTERNAL MEDICINE | Facility: CLINIC | Age: 31
End: 2021-01-08

## 2021-01-12 ENCOUNTER — OFFICE VISIT (OUTPATIENT)
Dept: INTERNAL MEDICINE | Facility: CLINIC | Age: 31
End: 2021-01-12
Payer: COMMERCIAL

## 2021-01-12 DIAGNOSIS — K59.00 CONSTIPATION, UNSPECIFIED CONSTIPATION TYPE: ICD-10-CM

## 2021-01-12 DIAGNOSIS — Z87.440 HISTORY OF RECURRENT UTIS: Primary | ICD-10-CM

## 2021-01-12 PROCEDURE — 99213 PR OFFICE/OUTPT VISIT, EST, LEVL III, 20-29 MIN: ICD-10-PCS | Mod: 95,,, | Performed by: FAMILY MEDICINE

## 2021-01-12 PROCEDURE — 99213 OFFICE O/P EST LOW 20 MIN: CPT | Mod: 95,,, | Performed by: FAMILY MEDICINE

## 2021-01-14 ENCOUNTER — HOSPITAL ENCOUNTER (EMERGENCY)
Facility: HOSPITAL | Age: 31
Discharge: HOME | End: 2021-01-14
Attending: EMERGENCY MEDICINE
Payer: MEDICARE

## 2021-01-14 VITALS
HEART RATE: 79 BPM | TEMPERATURE: 99 F | RESPIRATION RATE: 18 BRPM | OXYGEN SATURATION: 97 % | DIASTOLIC BLOOD PRESSURE: 71 MMHG | SYSTOLIC BLOOD PRESSURE: 139 MMHG

## 2021-01-14 DIAGNOSIS — R30.0 DYSURIA: ICD-10-CM

## 2021-01-14 DIAGNOSIS — G82.50 QUADRIPLEGIA: ICD-10-CM

## 2021-01-14 DIAGNOSIS — N39.0 CHRONIC UTI: Primary | ICD-10-CM

## 2021-01-14 DIAGNOSIS — Z93.59 CHRONIC SUPRAPUBIC CATHETER: ICD-10-CM

## 2021-01-14 LAB
ALBUMIN SERPL BCP-MCNC: 3.9 G/DL (ref 3.5–5.2)
ALP SERPL-CCNC: 112 U/L (ref 55–135)
ALT SERPL W/O P-5'-P-CCNC: 14 U/L (ref 10–44)
ANION GAP SERPL CALC-SCNC: 8 MMOL/L (ref 8–16)
AST SERPL-CCNC: 13 U/L (ref 10–40)
BACTERIA #/AREA URNS HPF: ABNORMAL /HPF
BASOPHILS # BLD AUTO: 0.03 K/UL (ref 0–0.2)
BASOPHILS NFR BLD: 0.5 % (ref 0–1.9)
BILIRUB SERPL-MCNC: 0.4 MG/DL (ref 0.1–1)
BILIRUB UR QL STRIP: NEGATIVE
BUN SERPL-MCNC: 6 MG/DL (ref 6–20)
CALCIUM SERPL-MCNC: 9.1 MG/DL (ref 8.7–10.5)
CHLORIDE SERPL-SCNC: 107 MMOL/L (ref 95–110)
CLARITY UR: CLEAR
CO2 SERPL-SCNC: 27 MMOL/L (ref 23–29)
COLOR UR: YELLOW
CREAT SERPL-MCNC: 0.9 MG/DL (ref 0.5–1.4)
DIFFERENTIAL METHOD: ABNORMAL
EOSINOPHIL # BLD AUTO: 0.2 K/UL (ref 0–0.5)
EOSINOPHIL NFR BLD: 3 % (ref 0–8)
ERYTHROCYTE [DISTWIDTH] IN BLOOD BY AUTOMATED COUNT: 13.2 % (ref 11.5–14.5)
EST. GFR  (AFRICAN AMERICAN): >60 ML/MIN/1.73 M^2
EST. GFR  (NON AFRICAN AMERICAN): >60 ML/MIN/1.73 M^2
GLUCOSE SERPL-MCNC: 94 MG/DL (ref 70–110)
GLUCOSE UR QL STRIP: NEGATIVE
HCT VFR BLD AUTO: 45.7 % (ref 40–54)
HCV AB SERPL QL IA: NEGATIVE
HGB BLD-MCNC: 14.7 G/DL (ref 14–18)
HGB UR QL STRIP: ABNORMAL
HIV 1+2 AB+HIV1 P24 AG SERPL QL IA: NEGATIVE
HYALINE CASTS #/AREA URNS LPF: 1 /LPF
IMM GRANULOCYTES # BLD AUTO: 0.02 K/UL (ref 0–0.04)
IMM GRANULOCYTES NFR BLD AUTO: 0.4 % (ref 0–0.5)
KETONES UR QL STRIP: NEGATIVE
LEUKOCYTE ESTERASE UR QL STRIP: ABNORMAL
LYMPHOCYTES # BLD AUTO: 2.8 K/UL (ref 1–4.8)
LYMPHOCYTES NFR BLD: 49 % (ref 18–48)
MCH RBC QN AUTO: 28.4 PG (ref 27–31)
MCHC RBC AUTO-ENTMCNC: 32.2 G/DL (ref 32–36)
MCV RBC AUTO: 88 FL (ref 82–98)
MICROSCOPIC COMMENT: ABNORMAL
MONOCYTES # BLD AUTO: 0.5 K/UL (ref 0.3–1)
MONOCYTES NFR BLD: 8.9 % (ref 4–15)
NEUTROPHILS # BLD AUTO: 2.2 K/UL (ref 1.8–7.7)
NEUTROPHILS NFR BLD: 38.2 % (ref 38–73)
NITRITE UR QL STRIP: NEGATIVE
NRBC BLD-RTO: 0 /100 WBC
PH UR STRIP: 8 [PH] (ref 5–8)
PLATELET # BLD AUTO: 251 K/UL (ref 150–350)
PMV BLD AUTO: 10.7 FL (ref 9.2–12.9)
POTASSIUM SERPL-SCNC: 3.8 MMOL/L (ref 3.5–5.1)
PROT SERPL-MCNC: 7.2 G/DL (ref 6–8.4)
PROT UR QL STRIP: NEGATIVE
RBC # BLD AUTO: 5.17 M/UL (ref 4.6–6.2)
RBC #/AREA URNS HPF: 5 /HPF (ref 0–4)
SODIUM SERPL-SCNC: 142 MMOL/L (ref 136–145)
SP GR UR STRIP: 1.02 (ref 1–1.03)
SQUAMOUS #/AREA URNS HPF: 5 /HPF
URN SPEC COLLECT METH UR: ABNORMAL
UROBILINOGEN UR STRIP-ACNC: NEGATIVE EU/DL
WBC # BLD AUTO: 5.63 K/UL (ref 3.9–12.7)
WBC #/AREA URNS HPF: 35 /HPF (ref 0–5)

## 2021-01-14 PROCEDURE — 80053 COMPREHEN METABOLIC PANEL: CPT

## 2021-01-14 PROCEDURE — 81000 URINALYSIS NONAUTO W/SCOPE: CPT

## 2021-01-14 PROCEDURE — 85025 COMPLETE CBC W/AUTO DIFF WBC: CPT

## 2021-01-14 PROCEDURE — 87088 URINE BACTERIA CULTURE: CPT

## 2021-01-14 PROCEDURE — 87077 CULTURE AEROBIC IDENTIFY: CPT

## 2021-01-14 PROCEDURE — 63600175 PHARM REV CODE 636 W HCPCS: Performed by: EMERGENCY MEDICINE

## 2021-01-14 PROCEDURE — 99284 EMERGENCY DEPT VISIT MOD MDM: CPT | Mod: 25

## 2021-01-14 PROCEDURE — 86803 HEPATITIS C AB TEST: CPT

## 2021-01-14 PROCEDURE — 87186 SC STD MICRODIL/AGAR DIL: CPT

## 2021-01-14 PROCEDURE — 86703 HIV-1/HIV-2 1 RESULT ANTBDY: CPT

## 2021-01-14 PROCEDURE — 96365 THER/PROPH/DIAG IV INF INIT: CPT

## 2021-01-14 PROCEDURE — 87086 URINE CULTURE/COLONY COUNT: CPT

## 2021-01-14 RX ORDER — CEFUROXIME AXETIL 500 MG/1
500 TABLET ORAL 2 TIMES DAILY
Qty: 14 TABLET | Refills: 0 | Status: SHIPPED | OUTPATIENT
Start: 2021-01-14 | End: 2021-01-21

## 2021-01-14 RX ORDER — MEROPENEM AND SODIUM CHLORIDE 500 MG/50ML
500 INJECTION, SOLUTION INTRAVENOUS
Status: DISCONTINUED | OUTPATIENT
Start: 2021-01-14 | End: 2021-01-14 | Stop reason: HOSPADM

## 2021-01-14 RX ADMIN — MEROPENEM AND SODIUM CHLORIDE 500 MG: 500 INJECTION, SOLUTION INTRAVENOUS at 07:01

## 2021-01-18 LAB — BACTERIA UR CULT: ABNORMAL

## 2021-01-19 ENCOUNTER — TELEPHONE (OUTPATIENT)
Dept: EMERGENCY MEDICINE | Facility: HOSPITAL | Age: 31
End: 2021-01-19

## 2021-01-22 ENCOUNTER — TELEPHONE (OUTPATIENT)
Dept: INTERNAL MEDICINE | Facility: CLINIC | Age: 31
End: 2021-01-22

## 2021-02-17 ENCOUNTER — PATIENT MESSAGE (OUTPATIENT)
Dept: INTERNAL MEDICINE | Facility: CLINIC | Age: 31
End: 2021-02-17

## 2021-02-18 RX ORDER — NITROFURANTOIN 25; 75 MG/1; MG/1
100 CAPSULE ORAL 2 TIMES DAILY
Qty: 16 CAPSULE | Refills: 0 | Status: SHIPPED | OUTPATIENT
Start: 2021-02-18 | End: 2021-03-11 | Stop reason: ALTCHOICE

## 2021-02-24 ENCOUNTER — PATIENT MESSAGE (OUTPATIENT)
Dept: INTERNAL MEDICINE | Facility: CLINIC | Age: 31
End: 2021-02-24

## 2021-02-24 RX ORDER — OXYBUTYNIN CHLORIDE 5 MG/1
5 TABLET ORAL 2 TIMES DAILY
Qty: 180 TABLET | Refills: 1 | Status: SHIPPED | OUTPATIENT
Start: 2021-02-24 | End: 2021-09-27 | Stop reason: SDUPTHER

## 2021-03-03 ENCOUNTER — PATIENT MESSAGE (OUTPATIENT)
Dept: INTERNAL MEDICINE | Facility: CLINIC | Age: 31
End: 2021-03-03

## 2021-03-03 ENCOUNTER — TELEPHONE (OUTPATIENT)
Dept: INTERNAL MEDICINE | Facility: CLINIC | Age: 31
End: 2021-03-03

## 2021-03-08 ENCOUNTER — PATIENT MESSAGE (OUTPATIENT)
Dept: INTERNAL MEDICINE | Facility: CLINIC | Age: 31
End: 2021-03-08

## 2021-03-10 ENCOUNTER — TELEPHONE (OUTPATIENT)
Dept: INTERNAL MEDICINE | Facility: CLINIC | Age: 31
End: 2021-03-10

## 2021-03-11 ENCOUNTER — OFFICE VISIT (OUTPATIENT)
Dept: INTERNAL MEDICINE | Facility: CLINIC | Age: 31
End: 2021-03-11
Payer: COMMERCIAL

## 2021-03-11 DIAGNOSIS — L08.9 RECURRENT INFECTION OF SKIN: ICD-10-CM

## 2021-03-11 DIAGNOSIS — S14.109S QUADRIPLEGIA, POST-TRAUMATIC: ICD-10-CM

## 2021-03-11 DIAGNOSIS — L21.9 SEBORRHEIC DERMATITIS: ICD-10-CM

## 2021-03-11 DIAGNOSIS — G82.50 QUADRIPLEGIA, POST-TRAUMATIC: ICD-10-CM

## 2021-03-11 DIAGNOSIS — M79.89 LEG SWELLING: ICD-10-CM

## 2021-03-11 DIAGNOSIS — K21.9 GASTROESOPHAGEAL REFLUX DISEASE, UNSPECIFIED WHETHER ESOPHAGITIS PRESENT: Primary | ICD-10-CM

## 2021-03-11 DIAGNOSIS — F41.9 ANXIETY: ICD-10-CM

## 2021-03-11 PROCEDURE — 99214 PR OFFICE/OUTPT VISIT, EST, LEVL IV, 30-39 MIN: ICD-10-PCS | Mod: 95,,, | Performed by: FAMILY MEDICINE

## 2021-03-11 PROCEDURE — 99214 OFFICE O/P EST MOD 30 MIN: CPT | Mod: 95,,, | Performed by: FAMILY MEDICINE

## 2021-03-11 RX ORDER — FUROSEMIDE 20 MG/1
20 TABLET ORAL DAILY
Qty: 90 TABLET | Refills: 1 | Status: SHIPPED | OUTPATIENT
Start: 2021-03-11 | End: 2021-09-27 | Stop reason: SDUPTHER

## 2021-03-11 RX ORDER — HYDROXYZINE HYDROCHLORIDE 25 MG/1
25 TABLET, FILM COATED ORAL 3 TIMES DAILY
Qty: 90 TABLET | Refills: 1 | Status: SHIPPED | OUTPATIENT
Start: 2021-03-11 | End: 2021-07-27

## 2021-03-11 RX ORDER — AMITRIPTYLINE HYDROCHLORIDE 25 MG/1
TABLET, FILM COATED ORAL
Qty: 90 TABLET | Refills: 1 | Status: SHIPPED | OUTPATIENT
Start: 2021-03-11 | End: 2021-07-27 | Stop reason: SDUPTHER

## 2021-03-11 RX ORDER — KETOCONAZOLE 20 MG/ML
SHAMPOO, SUSPENSION TOPICAL
Qty: 120 ML | Refills: 5 | Status: SHIPPED | OUTPATIENT
Start: 2021-03-11 | End: 2022-03-02 | Stop reason: SDUPTHER

## 2021-03-11 RX ORDER — OMEPRAZOLE 20 MG/1
20 CAPSULE, DELAYED RELEASE ORAL DAILY
Qty: 90 CAPSULE | Refills: 1 | Status: SHIPPED | OUTPATIENT
Start: 2021-03-11 | End: 2021-08-27 | Stop reason: SDUPTHER

## 2021-03-11 RX ORDER — NITROFURANTOIN (MACROCRYSTALS) 100 MG/1
100 CAPSULE ORAL EVERY 12 HOURS
Qty: 14 CAPSULE | Refills: 0 | Status: SHIPPED | OUTPATIENT
Start: 2021-03-11 | End: 2021-04-13 | Stop reason: ALTCHOICE

## 2021-03-22 ENCOUNTER — PATIENT MESSAGE (OUTPATIENT)
Dept: INTERNAL MEDICINE | Facility: CLINIC | Age: 31
End: 2021-03-22

## 2021-03-31 ENCOUNTER — TELEPHONE (OUTPATIENT)
Dept: INTERNAL MEDICINE | Facility: CLINIC | Age: 31
End: 2021-03-31

## 2021-04-05 ENCOUNTER — TELEPHONE (OUTPATIENT)
Dept: INTERNAL MEDICINE | Facility: CLINIC | Age: 31
End: 2021-04-05

## 2021-04-05 DIAGNOSIS — N39.0 RECURRENT UTI: Primary | ICD-10-CM

## 2021-04-05 RX ORDER — NITROFURANTOIN 25; 75 MG/1; MG/1
100 CAPSULE ORAL 2 TIMES DAILY
Qty: 20 CAPSULE | Refills: 0 | Status: SHIPPED | OUTPATIENT
Start: 2021-04-05 | End: 2021-04-13 | Stop reason: ALTCHOICE

## 2021-04-06 ENCOUNTER — TELEPHONE (OUTPATIENT)
Dept: INTERNAL MEDICINE | Facility: CLINIC | Age: 31
End: 2021-04-06

## 2021-04-06 ENCOUNTER — PATIENT MESSAGE (OUTPATIENT)
Dept: INTERNAL MEDICINE | Facility: CLINIC | Age: 31
End: 2021-04-06

## 2021-04-07 ENCOUNTER — TELEPHONE (OUTPATIENT)
Dept: INTERNAL MEDICINE | Facility: CLINIC | Age: 31
End: 2021-04-07

## 2021-04-08 ENCOUNTER — LAB VISIT (OUTPATIENT)
Dept: LAB | Facility: HOSPITAL | Age: 31
End: 2021-04-08
Payer: MEDICARE

## 2021-04-08 ENCOUNTER — TELEPHONE (OUTPATIENT)
Dept: INTERNAL MEDICINE | Facility: CLINIC | Age: 31
End: 2021-04-08

## 2021-04-08 DIAGNOSIS — R30.0 DYSURIA: Primary | ICD-10-CM

## 2021-04-08 DIAGNOSIS — R30.0 DYSURIA: ICD-10-CM

## 2021-04-08 PROBLEM — Z93.59 SUPRAPUBIC CATHETER: Status: ACTIVE | Noted: 2019-11-22

## 2021-04-08 PROBLEM — Z99.3 WHEELCHAIR BOUND: Status: ACTIVE | Noted: 2021-04-08

## 2021-04-08 PROBLEM — M24.549 CONTRACTURE OF JOINT OF HAND: Status: ACTIVE | Noted: 2021-04-08

## 2021-04-08 PROBLEM — F41.9 ANXIETY: Status: ACTIVE | Noted: 2021-04-08

## 2021-04-08 PROBLEM — F33.0 MILD RECURRENT MAJOR DEPRESSION: Status: ACTIVE | Noted: 2021-04-08

## 2021-04-08 PROBLEM — K21.9 GASTRO-ESOPHAGEAL REFLUX DISEASE WITHOUT ESOPHAGITIS: Status: ACTIVE | Noted: 2017-04-21

## 2021-04-08 PROBLEM — R32 URINARY INCONTINENCE: Status: ACTIVE | Noted: 2021-04-08

## 2021-04-08 PROBLEM — K59.03 DRUG-INDUCED CONSTIPATION: Status: ACTIVE | Noted: 2021-04-08

## 2021-04-08 PROBLEM — R53.2 FUNCTIONAL QUADRIPLEGIA: Status: ACTIVE | Noted: 2021-04-08

## 2021-04-08 LAB
BACTERIA #/AREA URNS HPF: ABNORMAL /HPF
BILIRUB UR QL STRIP: NEGATIVE
CLARITY UR: ABNORMAL
COLOR UR: YELLOW
GLUCOSE UR QL STRIP: NEGATIVE
HGB UR QL STRIP: ABNORMAL
KETONES UR QL STRIP: NEGATIVE
LEUKOCYTE ESTERASE UR QL STRIP: ABNORMAL
MICROSCOPIC COMMENT: ABNORMAL
NITRITE UR QL STRIP: NEGATIVE
NON-SQ EPI CELLS #/AREA URNS HPF: 2 /HPF
PH UR STRIP: 6 [PH] (ref 5–8)
PROT UR QL STRIP: ABNORMAL
RBC #/AREA URNS HPF: 8 /HPF (ref 0–4)
SP GR UR STRIP: 1.02 (ref 1–1.03)
SQUAMOUS #/AREA URNS HPF: 7 /HPF
URN SPEC COLLECT METH UR: ABNORMAL
WBC #/AREA URNS HPF: 45 /HPF (ref 0–5)

## 2021-04-08 PROCEDURE — 87077 CULTURE AEROBIC IDENTIFY: CPT | Performed by: FAMILY MEDICINE

## 2021-04-08 PROCEDURE — 87186 SC STD MICRODIL/AGAR DIL: CPT | Performed by: FAMILY MEDICINE

## 2021-04-08 PROCEDURE — 87086 URINE CULTURE/COLONY COUNT: CPT | Performed by: FAMILY MEDICINE

## 2021-04-08 PROCEDURE — 87088 URINE BACTERIA CULTURE: CPT | Performed by: FAMILY MEDICINE

## 2021-04-08 PROCEDURE — 81000 URINALYSIS NONAUTO W/SCOPE: CPT | Performed by: FAMILY MEDICINE

## 2021-04-12 ENCOUNTER — OFFICE VISIT (OUTPATIENT)
Dept: INTERNAL MEDICINE | Facility: CLINIC | Age: 31
End: 2021-04-12
Payer: COMMERCIAL

## 2021-04-12 DIAGNOSIS — G82.50 QUADRIPLEGIA, POST-TRAUMATIC: Primary | ICD-10-CM

## 2021-04-12 DIAGNOSIS — S14.109S QUADRIPLEGIA, POST-TRAUMATIC: Primary | ICD-10-CM

## 2021-04-12 DIAGNOSIS — N39.0 RECURRENT UTI: ICD-10-CM

## 2021-04-12 LAB — BACTERIA UR CULT: ABNORMAL

## 2021-04-12 PROCEDURE — 99214 OFFICE O/P EST MOD 30 MIN: CPT | Mod: 95,,, | Performed by: FAMILY MEDICINE

## 2021-04-12 PROCEDURE — 99214 PR OFFICE/OUTPT VISIT, EST, LEVL IV, 30-39 MIN: ICD-10-PCS | Mod: 95,,, | Performed by: FAMILY MEDICINE

## 2021-04-12 RX ORDER — CEFDINIR 300 MG/1
300 CAPSULE ORAL DAILY
COMMUNITY
Start: 2021-04-03 | End: 2021-07-27

## 2021-04-13 ENCOUNTER — TELEPHONE (OUTPATIENT)
Dept: INTERNAL MEDICINE | Facility: CLINIC | Age: 31
End: 2021-04-13

## 2021-04-13 ENCOUNTER — PATIENT MESSAGE (OUTPATIENT)
Dept: INTERNAL MEDICINE | Facility: CLINIC | Age: 31
End: 2021-04-13

## 2021-04-13 DIAGNOSIS — R82.79 POSITIVE URINE CULTURE: Primary | ICD-10-CM

## 2021-04-13 RX ORDER — GRANULES FOR ORAL 3 G/1
3 POWDER ORAL
Qty: 6 G | Refills: 0 | Status: SHIPPED | OUTPATIENT
Start: 2021-04-13 | End: 2021-04-17

## 2021-04-14 ENCOUNTER — TELEPHONE (OUTPATIENT)
Dept: INTERNAL MEDICINE | Facility: CLINIC | Age: 31
End: 2021-04-14

## 2021-04-19 ENCOUNTER — PATIENT MESSAGE (OUTPATIENT)
Dept: INTERNAL MEDICINE | Facility: CLINIC | Age: 31
End: 2021-04-19

## 2021-04-20 ENCOUNTER — PATIENT OUTREACH (OUTPATIENT)
Dept: ADMINISTRATIVE | Facility: OTHER | Age: 31
End: 2021-04-20

## 2021-04-21 ENCOUNTER — OFFICE VISIT (OUTPATIENT)
Dept: INFECTIOUS DISEASES | Facility: CLINIC | Age: 31
End: 2021-04-21
Payer: COMMERCIAL

## 2021-04-21 VITALS — SYSTOLIC BLOOD PRESSURE: 117 MMHG | HEART RATE: 78 BPM | DIASTOLIC BLOOD PRESSURE: 83 MMHG | TEMPERATURE: 98 F

## 2021-04-21 DIAGNOSIS — G82.50 QUADRIPLEGIA, POST-TRAUMATIC: ICD-10-CM

## 2021-04-21 DIAGNOSIS — Z93.59 SUPRAPUBIC CATHETER: ICD-10-CM

## 2021-04-21 DIAGNOSIS — S14.109S QUADRIPLEGIA, POST-TRAUMATIC: ICD-10-CM

## 2021-04-21 DIAGNOSIS — L08.9 RECURRENT INFECTION OF SKIN: ICD-10-CM

## 2021-04-21 DIAGNOSIS — N39.0 COMPLICATED UTI (URINARY TRACT INFECTION): ICD-10-CM

## 2021-04-21 DIAGNOSIS — I10 ESSENTIAL HYPERTENSION: ICD-10-CM

## 2021-04-21 DIAGNOSIS — S41.139A: ICD-10-CM

## 2021-04-21 PROCEDURE — 99213 OFFICE O/P EST LOW 20 MIN: CPT | Mod: PBBFAC | Performed by: INTERNAL MEDICINE

## 2021-04-21 PROCEDURE — 99999 PR PBB SHADOW E&M-EST. PATIENT-LVL III: ICD-10-PCS | Mod: PBBFAC,,, | Performed by: INTERNAL MEDICINE

## 2021-04-21 PROCEDURE — 99999 PR PBB SHADOW E&M-EST. PATIENT-LVL III: CPT | Mod: PBBFAC,,, | Performed by: INTERNAL MEDICINE

## 2021-04-21 PROCEDURE — 99204 PR OFFICE/OUTPT VISIT, NEW, LEVL IV, 45-59 MIN: ICD-10-PCS | Mod: S$PBB,,, | Performed by: INTERNAL MEDICINE

## 2021-04-21 PROCEDURE — 99204 OFFICE O/P NEW MOD 45 MIN: CPT | Mod: S$PBB,,, | Performed by: INTERNAL MEDICINE

## 2021-04-26 ENCOUNTER — TELEPHONE (OUTPATIENT)
Dept: INTERNAL MEDICINE | Facility: CLINIC | Age: 31
End: 2021-04-26

## 2021-04-26 ENCOUNTER — OFFICE VISIT (OUTPATIENT)
Dept: INTERNAL MEDICINE | Facility: CLINIC | Age: 31
End: 2021-04-26
Payer: COMMERCIAL

## 2021-04-26 ENCOUNTER — PATIENT MESSAGE (OUTPATIENT)
Dept: INTERNAL MEDICINE | Facility: CLINIC | Age: 31
End: 2021-04-26

## 2021-04-26 DIAGNOSIS — R20.0 NUMBNESS OF ARM: Primary | ICD-10-CM

## 2021-04-26 DIAGNOSIS — N39.0 RECURRENT UTI: ICD-10-CM

## 2021-04-26 PROCEDURE — 99213 PR OFFICE/OUTPT VISIT, EST, LEVL III, 20-29 MIN: ICD-10-PCS | Mod: 95,,, | Performed by: FAMILY MEDICINE

## 2021-04-26 PROCEDURE — 99213 OFFICE O/P EST LOW 20 MIN: CPT | Mod: 95,,, | Performed by: FAMILY MEDICINE

## 2021-04-26 RX ORDER — GABAPENTIN 300 MG/1
300 CAPSULE ORAL 3 TIMES DAILY
Qty: 90 CAPSULE | Refills: 0 | Status: SHIPPED | OUTPATIENT
Start: 2021-04-26 | End: 2021-07-27

## 2021-04-28 ENCOUNTER — PATIENT MESSAGE (OUTPATIENT)
Dept: RESEARCH | Facility: HOSPITAL | Age: 31
End: 2021-04-28

## 2021-05-06 ENCOUNTER — TELEPHONE (OUTPATIENT)
Dept: INTERNAL MEDICINE | Facility: CLINIC | Age: 31
End: 2021-05-06

## 2021-05-11 ENCOUNTER — PATIENT MESSAGE (OUTPATIENT)
Dept: INTERNAL MEDICINE | Facility: CLINIC | Age: 31
End: 2021-05-11

## 2021-05-11 RX ORDER — BACLOFEN 20 MG/1
20 TABLET ORAL 4 TIMES DAILY
Qty: 90 TABLET | Refills: 1 | Status: SHIPPED | OUTPATIENT
Start: 2021-05-11 | End: 2021-05-17 | Stop reason: SDUPTHER

## 2021-05-13 ENCOUNTER — NURSE TRIAGE (OUTPATIENT)
Dept: ADMINISTRATIVE | Facility: CLINIC | Age: 31
End: 2021-05-13

## 2021-05-13 ENCOUNTER — TELEPHONE (OUTPATIENT)
Dept: INTERNAL MEDICINE | Facility: CLINIC | Age: 31
End: 2021-05-13

## 2021-05-13 RX ORDER — GRANULES FOR ORAL 3 G/1
POWDER ORAL
Qty: 9 G | Refills: 0 | Status: SHIPPED | OUTPATIENT
Start: 2021-05-13 | End: 2021-07-27

## 2021-05-14 RX ORDER — GRANULES FOR ORAL 3 G/1
POWDER ORAL
Qty: 9 G | Refills: 0 | Status: CANCELLED | OUTPATIENT
Start: 2021-05-14

## 2021-05-17 ENCOUNTER — OFFICE VISIT (OUTPATIENT)
Dept: INTERNAL MEDICINE | Facility: CLINIC | Age: 31
End: 2021-05-17
Payer: COMMERCIAL

## 2021-05-17 VITALS
OXYGEN SATURATION: 99 % | BODY MASS INDEX: 29.53 KG/M2 | DIASTOLIC BLOOD PRESSURE: 86 MMHG | TEMPERATURE: 96 F | HEART RATE: 66 BPM | HEIGHT: 69 IN | SYSTOLIC BLOOD PRESSURE: 100 MMHG

## 2021-05-17 DIAGNOSIS — S14.109S QUADRIPLEGIA, POST-TRAUMATIC: ICD-10-CM

## 2021-05-17 DIAGNOSIS — N39.0 RECURRENT UTI: ICD-10-CM

## 2021-05-17 DIAGNOSIS — G82.50 QUADRIPLEGIA, POST-TRAUMATIC: ICD-10-CM

## 2021-05-17 DIAGNOSIS — F41.9 ANXIETY: Primary | ICD-10-CM

## 2021-05-17 DIAGNOSIS — K21.9 GASTROESOPHAGEAL REFLUX DISEASE, UNSPECIFIED WHETHER ESOPHAGITIS PRESENT: ICD-10-CM

## 2021-05-17 PROCEDURE — 99214 PR OFFICE/OUTPT VISIT, EST, LEVL IV, 30-39 MIN: ICD-10-PCS | Mod: S$GLB,,, | Performed by: FAMILY MEDICINE

## 2021-05-17 PROCEDURE — 99214 OFFICE O/P EST MOD 30 MIN: CPT | Mod: S$GLB,,, | Performed by: FAMILY MEDICINE

## 2021-05-17 PROCEDURE — 99999 PR PBB SHADOW E&M-EST. PATIENT-LVL III: CPT | Mod: PBBFAC,,, | Performed by: FAMILY MEDICINE

## 2021-05-17 PROCEDURE — 99213 OFFICE O/P EST LOW 20 MIN: CPT | Mod: PBBFAC | Performed by: FAMILY MEDICINE

## 2021-05-17 PROCEDURE — 99999 PR PBB SHADOW E&M-EST. PATIENT-LVL III: ICD-10-PCS | Mod: PBBFAC,,, | Performed by: FAMILY MEDICINE

## 2021-05-17 RX ORDER — BACLOFEN 20 MG/1
20 TABLET ORAL 3 TIMES DAILY
Qty: 270 TABLET | Refills: 1 | Status: SHIPPED | OUTPATIENT
Start: 2021-05-17 | End: 2021-08-27 | Stop reason: SDUPTHER

## 2021-05-17 RX ORDER — BACLOFEN 10 MG/1
TABLET ORAL
Qty: 90 TABLET | Refills: 1 | Status: SHIPPED | OUTPATIENT
Start: 2021-05-17 | End: 2021-07-27 | Stop reason: SDUPTHER

## 2021-05-22 ENCOUNTER — PATIENT MESSAGE (OUTPATIENT)
Dept: INTERNAL MEDICINE | Facility: CLINIC | Age: 31
End: 2021-05-22

## 2021-07-09 ENCOUNTER — PATIENT MESSAGE (OUTPATIENT)
Dept: INTERNAL MEDICINE | Facility: CLINIC | Age: 31
End: 2021-07-09

## 2021-07-26 ENCOUNTER — TELEPHONE (OUTPATIENT)
Dept: INTERNAL MEDICINE | Facility: CLINIC | Age: 31
End: 2021-07-26

## 2021-07-27 ENCOUNTER — OFFICE VISIT (OUTPATIENT)
Dept: INTERNAL MEDICINE | Facility: CLINIC | Age: 31
End: 2021-07-27
Payer: MEDICARE

## 2021-07-27 DIAGNOSIS — N39.0 URINARY TRACT INFECTION ASSOCIATED WITH CATHETERIZATION OF URINARY TRACT, UNSPECIFIED INDWELLING URINARY CATHETER TYPE, SUBSEQUENT ENCOUNTER: Primary | ICD-10-CM

## 2021-07-27 DIAGNOSIS — G82.50 QUADRIPLEGIA, POST-TRAUMATIC: ICD-10-CM

## 2021-07-27 DIAGNOSIS — S14.109S QUADRIPLEGIA, POST-TRAUMATIC: ICD-10-CM

## 2021-07-27 DIAGNOSIS — F41.9 ANXIETY: ICD-10-CM

## 2021-07-27 DIAGNOSIS — T83.511D URINARY TRACT INFECTION ASSOCIATED WITH CATHETERIZATION OF URINARY TRACT, UNSPECIFIED INDWELLING URINARY CATHETER TYPE, SUBSEQUENT ENCOUNTER: Primary | ICD-10-CM

## 2021-07-27 PROCEDURE — 99214 PR OFFICE/OUTPT VISIT, EST, LEVL IV, 30-39 MIN: ICD-10-PCS | Mod: 95,,, | Performed by: FAMILY MEDICINE

## 2021-07-27 PROCEDURE — 99214 OFFICE O/P EST MOD 30 MIN: CPT | Mod: 95,,, | Performed by: FAMILY MEDICINE

## 2021-07-27 RX ORDER — BUSPIRONE HYDROCHLORIDE 7.5 MG/1
7.5 TABLET ORAL 2 TIMES DAILY
Qty: 90 TABLET | Refills: 0 | Status: SHIPPED | OUTPATIENT
Start: 2021-07-27 | End: 2022-01-18

## 2021-07-27 RX ORDER — BACLOFEN 10 MG/1
TABLET ORAL
Qty: 270 TABLET | Refills: 1 | Status: SHIPPED | OUTPATIENT
Start: 2021-07-27 | End: 2022-01-14 | Stop reason: SDUPTHER

## 2021-07-27 RX ORDER — AMITRIPTYLINE HYDROCHLORIDE 25 MG/1
TABLET, FILM COATED ORAL
Qty: 90 TABLET | Refills: 1 | Status: SHIPPED | OUTPATIENT
Start: 2021-07-27 | End: 2022-04-11 | Stop reason: SDUPTHER

## 2021-08-03 ENCOUNTER — PATIENT MESSAGE (OUTPATIENT)
Dept: INTERNAL MEDICINE | Facility: CLINIC | Age: 31
End: 2021-08-03

## 2021-08-04 ENCOUNTER — PATIENT MESSAGE (OUTPATIENT)
Dept: INTERNAL MEDICINE | Facility: CLINIC | Age: 31
End: 2021-08-04

## 2021-08-04 ENCOUNTER — TELEPHONE (OUTPATIENT)
Dept: INTERNAL MEDICINE | Facility: CLINIC | Age: 31
End: 2021-08-04

## 2021-08-27 ENCOUNTER — OFFICE VISIT (OUTPATIENT)
Dept: INTERNAL MEDICINE | Facility: CLINIC | Age: 31
End: 2021-08-27
Payer: MEDICARE

## 2021-08-27 DIAGNOSIS — G82.50 QUADRIPLEGIA, POST-TRAUMATIC: Primary | ICD-10-CM

## 2021-08-27 DIAGNOSIS — K59.00 CONSTIPATION, UNSPECIFIED CONSTIPATION TYPE: ICD-10-CM

## 2021-08-27 DIAGNOSIS — F41.9 ANXIETY: ICD-10-CM

## 2021-08-27 DIAGNOSIS — S14.109S QUADRIPLEGIA, POST-TRAUMATIC: Primary | ICD-10-CM

## 2021-08-27 DIAGNOSIS — N39.0 RECURRENT UTI: ICD-10-CM

## 2021-08-27 DIAGNOSIS — K21.9 GASTROESOPHAGEAL REFLUX DISEASE, UNSPECIFIED WHETHER ESOPHAGITIS PRESENT: ICD-10-CM

## 2021-08-27 PROCEDURE — 99214 OFFICE O/P EST MOD 30 MIN: CPT | Mod: 95,,, | Performed by: FAMILY MEDICINE

## 2021-08-27 PROCEDURE — 99214 PR OFFICE/OUTPT VISIT, EST, LEVL IV, 30-39 MIN: ICD-10-PCS | Mod: 95,,, | Performed by: FAMILY MEDICINE

## 2021-08-27 RX ORDER — ALPRAZOLAM 0.25 MG/1
0.25 TABLET ORAL NIGHTLY PRN
Qty: 30 TABLET | Refills: 0 | Status: SHIPPED | OUTPATIENT
Start: 2021-08-27 | End: 2022-01-14 | Stop reason: SDUPTHER

## 2021-08-27 RX ORDER — BACLOFEN 20 MG/1
20 TABLET ORAL 3 TIMES DAILY
Qty: 270 TABLET | Refills: 1 | Status: SHIPPED | OUTPATIENT
Start: 2021-08-27 | End: 2022-01-14 | Stop reason: SDUPTHER

## 2021-08-27 RX ORDER — OMEPRAZOLE 20 MG/1
20 CAPSULE, DELAYED RELEASE ORAL DAILY
Qty: 90 CAPSULE | Refills: 1 | Status: SHIPPED | OUTPATIENT
Start: 2021-08-27 | End: 2022-05-11 | Stop reason: SDUPTHER

## 2021-08-30 ENCOUNTER — PATIENT MESSAGE (OUTPATIENT)
Dept: INTERNAL MEDICINE | Facility: CLINIC | Age: 31
End: 2021-08-30

## 2021-08-31 ENCOUNTER — TELEPHONE (OUTPATIENT)
Dept: PRIMARY CARE CLINIC | Facility: CLINIC | Age: 31
End: 2021-08-31

## 2021-08-31 ENCOUNTER — PATIENT MESSAGE (OUTPATIENT)
Dept: INTERNAL MEDICINE | Facility: CLINIC | Age: 31
End: 2021-08-31

## 2021-08-31 DIAGNOSIS — N39.0 URINARY TRACT INFECTION WITHOUT HEMATURIA, SITE UNSPECIFIED: Primary | ICD-10-CM

## 2021-09-03 ENCOUNTER — PATIENT MESSAGE (OUTPATIENT)
Dept: INTERNAL MEDICINE | Facility: CLINIC | Age: 31
End: 2021-09-03

## 2021-09-07 RX ORDER — NITROFURANTOIN (MACROCRYSTALS) 100 MG/1
100 CAPSULE ORAL EVERY 12 HOURS
Qty: 14 CAPSULE | Refills: 0 | Status: SHIPPED | OUTPATIENT
Start: 2021-09-07 | End: 2021-09-14

## 2021-09-16 ENCOUNTER — PATIENT MESSAGE (OUTPATIENT)
Dept: INTERNAL MEDICINE | Facility: CLINIC | Age: 31
End: 2021-09-16

## 2021-09-20 ENCOUNTER — TELEPHONE (OUTPATIENT)
Dept: INTERNAL MEDICINE | Facility: CLINIC | Age: 31
End: 2021-09-20

## 2021-09-22 ENCOUNTER — OFFICE VISIT (OUTPATIENT)
Dept: INTERNAL MEDICINE | Facility: CLINIC | Age: 31
End: 2021-09-22
Payer: COMMERCIAL

## 2021-09-22 ENCOUNTER — PATIENT MESSAGE (OUTPATIENT)
Dept: INTERNAL MEDICINE | Facility: CLINIC | Age: 31
End: 2021-09-22

## 2021-09-22 VITALS
HEART RATE: 74 BPM | BODY MASS INDEX: 29.53 KG/M2 | TEMPERATURE: 97 F | HEIGHT: 69 IN | OXYGEN SATURATION: 99 % | DIASTOLIC BLOOD PRESSURE: 68 MMHG | SYSTOLIC BLOOD PRESSURE: 98 MMHG

## 2021-09-22 DIAGNOSIS — Z87.440 HISTORY OF RECURRENT UTIS: ICD-10-CM

## 2021-09-22 DIAGNOSIS — K62.5 BRIGHT RED BLOOD PER RECTUM: Primary | ICD-10-CM

## 2021-09-22 DIAGNOSIS — Z93.59 SUPRAPUBIC CATHETER: ICD-10-CM

## 2021-09-22 LAB
BACTERIA #/AREA URNS HPF: ABNORMAL /HPF
BILIRUB UR QL STRIP: NEGATIVE
CLARITY UR: ABNORMAL
COLOR UR: YELLOW
GLUCOSE UR QL STRIP: NEGATIVE
HGB UR QL STRIP: ABNORMAL
KETONES UR QL STRIP: NEGATIVE
LEUKOCYTE ESTERASE UR QL STRIP: ABNORMAL
MICROSCOPIC COMMENT: ABNORMAL
NITRITE UR QL STRIP: NEGATIVE
NON-SQ EPI CELLS #/AREA URNS HPF: 1 /HPF
PH UR STRIP: 6.5 [PH] (ref 5–8)
PROT UR QL STRIP: NEGATIVE
RBC #/AREA URNS HPF: 3 /HPF (ref 0–4)
SP GR UR STRIP: 1.01 (ref 1–1.03)
SQUAMOUS #/AREA URNS HPF: 2 /HPF
URN SPEC COLLECT METH UR: ABNORMAL
UROBILINOGEN UR STRIP-ACNC: NEGATIVE EU/DL
WBC #/AREA URNS HPF: 30 /HPF (ref 0–5)
WBC CLUMPS URNS QL MICRO: ABNORMAL

## 2021-09-22 PROCEDURE — 99214 OFFICE O/P EST MOD 30 MIN: CPT | Mod: S$GLB,,, | Performed by: INTERNAL MEDICINE

## 2021-09-22 PROCEDURE — 87086 URINE CULTURE/COLONY COUNT: CPT | Performed by: INTERNAL MEDICINE

## 2021-09-22 PROCEDURE — 99999 PR PBB SHADOW E&M-EST. PATIENT-LVL III: ICD-10-PCS | Mod: PBBFAC,,, | Performed by: INTERNAL MEDICINE

## 2021-09-22 PROCEDURE — 99214 PR OFFICE/OUTPT VISIT, EST, LEVL IV, 30-39 MIN: ICD-10-PCS | Mod: S$GLB,,, | Performed by: INTERNAL MEDICINE

## 2021-09-22 PROCEDURE — 87077 CULTURE AEROBIC IDENTIFY: CPT | Performed by: INTERNAL MEDICINE

## 2021-09-22 PROCEDURE — 99999 PR PBB SHADOW E&M-EST. PATIENT-LVL III: CPT | Mod: PBBFAC,,, | Performed by: INTERNAL MEDICINE

## 2021-09-22 PROCEDURE — 87088 URINE BACTERIA CULTURE: CPT | Performed by: INTERNAL MEDICINE

## 2021-09-22 PROCEDURE — 87186 SC STD MICRODIL/AGAR DIL: CPT | Performed by: INTERNAL MEDICINE

## 2021-09-22 PROCEDURE — 81000 URINALYSIS NONAUTO W/SCOPE: CPT | Performed by: INTERNAL MEDICINE

## 2021-09-22 PROCEDURE — 99213 OFFICE O/P EST LOW 20 MIN: CPT | Mod: PBBFAC | Performed by: INTERNAL MEDICINE

## 2021-09-22 RX ORDER — OMEPRAZOLE 40 MG/1
40 CAPSULE, DELAYED RELEASE ORAL EVERY MORNING
COMMUNITY
End: 2022-01-14 | Stop reason: SDUPTHER

## 2021-09-23 ENCOUNTER — TELEPHONE (OUTPATIENT)
Dept: INTERNAL MEDICINE | Facility: CLINIC | Age: 31
End: 2021-09-23

## 2021-09-23 DIAGNOSIS — K62.5 RECTAL BLEED: Primary | ICD-10-CM

## 2021-09-24 ENCOUNTER — TELEPHONE (OUTPATIENT)
Dept: INTERNAL MEDICINE | Facility: CLINIC | Age: 31
End: 2021-09-24

## 2021-09-24 RX ORDER — GRANULES FOR ORAL 3 G/1
POWDER ORAL
Qty: 2 PACKET | Refills: 0 | Status: SHIPPED | OUTPATIENT
Start: 2021-09-24 | End: 2022-09-08

## 2021-09-25 LAB — BACTERIA UR CULT: ABNORMAL

## 2021-09-26 ENCOUNTER — PATIENT MESSAGE (OUTPATIENT)
Dept: INFECTIOUS DISEASES | Facility: CLINIC | Age: 31
End: 2021-09-26

## 2021-09-27 ENCOUNTER — PATIENT MESSAGE (OUTPATIENT)
Dept: GASTROENTEROLOGY | Facility: CLINIC | Age: 31
End: 2021-09-27

## 2021-09-27 ENCOUNTER — PATIENT MESSAGE (OUTPATIENT)
Dept: INTERNAL MEDICINE | Facility: CLINIC | Age: 31
End: 2021-09-27

## 2021-09-27 ENCOUNTER — PATIENT OUTREACH (OUTPATIENT)
Dept: ADMINISTRATIVE | Facility: OTHER | Age: 31
End: 2021-09-27

## 2021-09-27 ENCOUNTER — TELEPHONE (OUTPATIENT)
Dept: UROLOGY | Facility: CLINIC | Age: 31
End: 2021-09-27
Payer: COMMERCIAL

## 2021-09-27 ENCOUNTER — TELEPHONE (OUTPATIENT)
Dept: INTERNAL MEDICINE | Facility: CLINIC | Age: 31
End: 2021-09-27

## 2021-09-27 DIAGNOSIS — M79.89 LEG SWELLING: ICD-10-CM

## 2021-09-27 RX ORDER — OXYBUTYNIN CHLORIDE 5 MG/1
5 TABLET ORAL 2 TIMES DAILY
Qty: 180 TABLET | Refills: 1 | Status: SHIPPED | OUTPATIENT
Start: 2021-09-27 | End: 2022-05-11 | Stop reason: SDUPTHER

## 2021-09-27 RX ORDER — FUROSEMIDE 20 MG/1
20 TABLET ORAL DAILY
Qty: 90 TABLET | Refills: 1 | Status: SHIPPED | OUTPATIENT
Start: 2021-09-27 | End: 2022-03-15 | Stop reason: SDUPTHER

## 2021-09-28 ENCOUNTER — OFFICE VISIT (OUTPATIENT)
Dept: GASTROENTEROLOGY | Facility: CLINIC | Age: 31
End: 2021-09-28
Payer: MEDICARE

## 2021-09-28 DIAGNOSIS — K59.04 CHRONIC IDIOPATHIC CONSTIPATION: ICD-10-CM

## 2021-09-28 DIAGNOSIS — R14.0 ABDOMINAL BLOATING: ICD-10-CM

## 2021-09-28 DIAGNOSIS — K62.5 RECTAL BLEED: ICD-10-CM

## 2021-09-28 DIAGNOSIS — K62.5 RECTAL BLEEDING: ICD-10-CM

## 2021-09-28 DIAGNOSIS — K59.00 CONSTIPATION, UNSPECIFIED CONSTIPATION TYPE: Primary | ICD-10-CM

## 2021-09-28 PROCEDURE — 99204 PR OFFICE/OUTPT VISIT, NEW, LEVL IV, 45-59 MIN: ICD-10-PCS | Mod: 95,,, | Performed by: INTERNAL MEDICINE

## 2021-09-28 PROCEDURE — 99204 OFFICE O/P NEW MOD 45 MIN: CPT | Mod: 95,,, | Performed by: INTERNAL MEDICINE

## 2021-09-28 RX ORDER — SIMETHICONE 125 MG
125 CAPSULE ORAL 4 TIMES DAILY PRN
Qty: 100 EACH | Refills: 0 | Status: SHIPPED | OUTPATIENT
Start: 2021-09-28 | End: 2021-12-27

## 2021-09-28 RX ORDER — POLYETHYLENE GLYCOL 3350 17 G/17G
17 POWDER, FOR SOLUTION ORAL 2 TIMES DAILY
Qty: 3060 G | Refills: 0 | Status: SHIPPED | OUTPATIENT
Start: 2021-09-28 | End: 2021-12-27

## 2021-09-28 RX ORDER — OXYBUTYNIN CHLORIDE 5 MG/1
TABLET ORAL
Qty: 180 TABLET | Refills: 0 | Status: SHIPPED | OUTPATIENT
Start: 2021-09-28 | End: 2022-05-04 | Stop reason: SDUPTHER

## 2021-09-30 ENCOUNTER — PATIENT MESSAGE (OUTPATIENT)
Dept: GASTROENTEROLOGY | Facility: CLINIC | Age: 31
End: 2021-09-30

## 2021-09-30 ENCOUNTER — PATIENT MESSAGE (OUTPATIENT)
Dept: INTERNAL MEDICINE | Facility: CLINIC | Age: 31
End: 2021-09-30

## 2021-10-04 ENCOUNTER — PATIENT MESSAGE (OUTPATIENT)
Dept: INFECTIOUS DISEASES | Facility: CLINIC | Age: 31
End: 2021-10-04

## 2021-10-04 PROBLEM — K59.04 CHRONIC IDIOPATHIC CONSTIPATION: Status: ACTIVE | Noted: 2021-10-04

## 2021-10-04 PROBLEM — R14.0 ABDOMINAL BLOATING: Status: ACTIVE | Noted: 2021-10-04

## 2021-10-04 PROBLEM — K59.09 OTHER CONSTIPATION: Status: ACTIVE | Noted: 2021-04-08

## 2021-10-04 PROBLEM — K62.5 RECTAL BLEEDING: Status: ACTIVE | Noted: 2021-10-04

## 2021-10-12 ENCOUNTER — TELEPHONE (OUTPATIENT)
Dept: INFECTIOUS DISEASES | Facility: CLINIC | Age: 31
End: 2021-10-12

## 2021-10-13 ENCOUNTER — TELEPHONE (OUTPATIENT)
Dept: INTERNAL MEDICINE | Facility: CLINIC | Age: 31
End: 2021-10-13

## 2021-10-13 ENCOUNTER — TREATMENT PLANNING (OUTPATIENT)
Dept: INFECTIOUS DISEASES | Facility: HOSPITAL | Age: 31
End: 2021-10-13

## 2021-10-13 RX ORDER — GABAPENTIN 300 MG/1
300 CAPSULE ORAL 3 TIMES DAILY
Qty: 270 CAPSULE | Refills: 1 | Status: SHIPPED | OUTPATIENT
Start: 2021-10-13 | End: 2022-04-25 | Stop reason: SDUPTHER

## 2021-10-21 ENCOUNTER — TELEPHONE (OUTPATIENT)
Dept: UROLOGY | Facility: CLINIC | Age: 31
End: 2021-10-21

## 2021-10-21 ENCOUNTER — LAB VISIT (OUTPATIENT)
Dept: LAB | Facility: HOSPITAL | Age: 31
End: 2021-10-21
Attending: INTERNAL MEDICINE
Payer: COMMERCIAL

## 2021-10-21 DIAGNOSIS — A41.9: Primary | ICD-10-CM

## 2021-10-21 LAB
ANION GAP SERPL CALC-SCNC: 7 MMOL/L (ref 8–16)
BASOPHILS # BLD AUTO: 0.03 K/UL (ref 0–0.2)
BASOPHILS NFR BLD: 0.5 % (ref 0–1.9)
BUN SERPL-MCNC: 4 MG/DL (ref 6–20)
CALCIUM SERPL-MCNC: 8.4 MG/DL (ref 8.7–10.5)
CHLORIDE SERPL-SCNC: 109 MMOL/L (ref 95–110)
CO2 SERPL-SCNC: 26 MMOL/L (ref 23–29)
CREAT SERPL-MCNC: 0.7 MG/DL (ref 0.5–1.4)
DIFFERENTIAL METHOD: ABNORMAL
EOSINOPHIL # BLD AUTO: 0.4 K/UL (ref 0–0.5)
EOSINOPHIL NFR BLD: 6.9 % (ref 0–8)
ERYTHROCYTE [DISTWIDTH] IN BLOOD BY AUTOMATED COUNT: 13.7 % (ref 11.5–14.5)
EST. GFR  (AFRICAN AMERICAN): >60 ML/MIN/1.73 M^2
EST. GFR  (NON AFRICAN AMERICAN): >60 ML/MIN/1.73 M^2
GLUCOSE SERPL-MCNC: 81 MG/DL (ref 70–110)
HCT VFR BLD AUTO: 41.4 % (ref 40–54)
HGB BLD-MCNC: 12.9 G/DL (ref 14–18)
IMM GRANULOCYTES # BLD AUTO: 0.02 K/UL (ref 0–0.04)
IMM GRANULOCYTES NFR BLD AUTO: 0.3 % (ref 0–0.5)
LYMPHOCYTES # BLD AUTO: 2.1 K/UL (ref 1–4.8)
LYMPHOCYTES NFR BLD: 34.9 % (ref 18–48)
MCH RBC QN AUTO: 28.1 PG (ref 27–31)
MCHC RBC AUTO-ENTMCNC: 31.2 G/DL (ref 32–36)
MCV RBC AUTO: 90 FL (ref 82–98)
MONOCYTES # BLD AUTO: 0.4 K/UL (ref 0.3–1)
MONOCYTES NFR BLD: 7.4 % (ref 4–15)
NEUTROPHILS # BLD AUTO: 3 K/UL (ref 1.8–7.7)
NEUTROPHILS NFR BLD: 50 % (ref 38–73)
NRBC BLD-RTO: 0 /100 WBC
PLATELET # BLD AUTO: 223 K/UL (ref 150–450)
PMV BLD AUTO: 10.8 FL (ref 9.2–12.9)
POTASSIUM SERPL-SCNC: 3.9 MMOL/L (ref 3.5–5.1)
RBC # BLD AUTO: 4.59 M/UL (ref 4.6–6.2)
SODIUM SERPL-SCNC: 142 MMOL/L (ref 136–145)
WBC # BLD AUTO: 5.91 K/UL (ref 3.9–12.7)

## 2021-10-21 PROCEDURE — 80048 BASIC METABOLIC PNL TOTAL CA: CPT | Performed by: INTERNAL MEDICINE

## 2021-10-21 PROCEDURE — 36415 COLL VENOUS BLD VENIPUNCTURE: CPT | Performed by: INTERNAL MEDICINE

## 2021-10-21 PROCEDURE — 85025 COMPLETE CBC W/AUTO DIFF WBC: CPT | Performed by: INTERNAL MEDICINE

## 2021-11-03 ENCOUNTER — TELEPHONE (OUTPATIENT)
Dept: INFECTIOUS DISEASES | Facility: CLINIC | Age: 31
End: 2021-11-03
Payer: COMMERCIAL

## 2021-11-04 ENCOUNTER — TELEPHONE (OUTPATIENT)
Dept: INTERNAL MEDICINE | Facility: CLINIC | Age: 31
End: 2021-11-04
Payer: COMMERCIAL

## 2021-11-04 DIAGNOSIS — Z93.59 SUPRAPUBIC CATHETER: ICD-10-CM

## 2021-11-04 DIAGNOSIS — R32 URINARY INCONTINENCE, UNSPECIFIED TYPE: Primary | ICD-10-CM

## 2021-11-08 ENCOUNTER — OFFICE VISIT (OUTPATIENT)
Dept: INTERNAL MEDICINE | Facility: CLINIC | Age: 31
End: 2021-11-08
Payer: COMMERCIAL

## 2021-11-08 DIAGNOSIS — G82.50 QUADRIPLEGIA, POST-TRAUMATIC: Primary | ICD-10-CM

## 2021-11-08 DIAGNOSIS — S14.109S QUADRIPLEGIA, POST-TRAUMATIC: Primary | ICD-10-CM

## 2021-11-08 DIAGNOSIS — N39.0 RECURRENT UTI: ICD-10-CM

## 2021-11-08 DIAGNOSIS — K59.00 CONSTIPATION, UNSPECIFIED CONSTIPATION TYPE: ICD-10-CM

## 2021-11-08 PROCEDURE — 99214 PR OFFICE/OUTPT VISIT, EST, LEVL IV, 30-39 MIN: ICD-10-PCS | Mod: 95,,, | Performed by: FAMILY MEDICINE

## 2021-11-08 PROCEDURE — 99214 OFFICE O/P EST MOD 30 MIN: CPT | Mod: 95,,, | Performed by: FAMILY MEDICINE

## 2021-11-11 ENCOUNTER — TELEPHONE (OUTPATIENT)
Dept: INFECTIOUS DISEASES | Facility: CLINIC | Age: 31
End: 2021-11-11
Payer: COMMERCIAL

## 2021-11-11 ENCOUNTER — PATIENT MESSAGE (OUTPATIENT)
Dept: INFECTIOUS DISEASES | Facility: CLINIC | Age: 31
End: 2021-11-11
Payer: COMMERCIAL

## 2021-11-11 RX ORDER — AMOXICILLIN AND CLAVULANATE POTASSIUM 875; 125 MG/1; MG/1
1 TABLET, FILM COATED ORAL EVERY 12 HOURS
Qty: 60 EACH | Refills: 0 | Status: SHIPPED | OUTPATIENT
Start: 2021-11-11 | End: 2021-12-11

## 2021-11-24 ENCOUNTER — PATIENT OUTREACH (OUTPATIENT)
Dept: ADMINISTRATIVE | Facility: OTHER | Age: 31
End: 2021-11-24
Payer: COMMERCIAL

## 2021-11-30 ENCOUNTER — OFFICE VISIT (OUTPATIENT)
Dept: INFECTIOUS DISEASES | Facility: CLINIC | Age: 31
End: 2021-11-30
Payer: COMMERCIAL

## 2021-11-30 DIAGNOSIS — R53.2 FUNCTIONAL QUADRIPLEGIA: ICD-10-CM

## 2021-11-30 DIAGNOSIS — N39.0 COMPLICATED UTI (URINARY TRACT INFECTION): ICD-10-CM

## 2021-11-30 DIAGNOSIS — I10 PRIMARY HYPERTENSION: ICD-10-CM

## 2021-11-30 DIAGNOSIS — Z93.59 SUPRAPUBIC CATHETER: ICD-10-CM

## 2021-11-30 PROCEDURE — 99213 PR OFFICE/OUTPT VISIT, EST, LEVL III, 20-29 MIN: ICD-10-PCS | Mod: 95,,, | Performed by: INTERNAL MEDICINE

## 2021-11-30 PROCEDURE — 99213 OFFICE O/P EST LOW 20 MIN: CPT | Mod: 95,,, | Performed by: INTERNAL MEDICINE

## 2021-12-06 ENCOUNTER — PATIENT MESSAGE (OUTPATIENT)
Dept: INFECTIOUS DISEASES | Facility: CLINIC | Age: 31
End: 2021-12-06
Payer: COMMERCIAL

## 2021-12-07 ENCOUNTER — LAB VISIT (OUTPATIENT)
Dept: LAB | Facility: HOSPITAL | Age: 31
End: 2021-12-07
Attending: INTERNAL MEDICINE
Payer: COMMERCIAL

## 2021-12-07 ENCOUNTER — PATIENT MESSAGE (OUTPATIENT)
Dept: GASTROENTEROLOGY | Facility: CLINIC | Age: 31
End: 2021-12-07
Payer: COMMERCIAL

## 2021-12-07 DIAGNOSIS — N39.0 COMPLICATED UTI (URINARY TRACT INFECTION): ICD-10-CM

## 2021-12-07 DIAGNOSIS — N39.0 COMPLICATED UTI (URINARY TRACT INFECTION): Primary | ICD-10-CM

## 2021-12-07 LAB
BACTERIA #/AREA URNS HPF: ABNORMAL /HPF
BILIRUB UR QL STRIP: NEGATIVE
CLARITY UR: CLEAR
COLOR UR: ABNORMAL
GLUCOSE UR QL STRIP: NEGATIVE
HGB UR QL STRIP: NEGATIVE
KETONES UR QL STRIP: NEGATIVE
LEUKOCYTE ESTERASE UR QL STRIP: ABNORMAL
MICROSCOPIC COMMENT: ABNORMAL
NITRITE UR QL STRIP: POSITIVE
PH UR STRIP: 6 [PH] (ref 5–8)
PROT UR QL STRIP: NEGATIVE
SP GR UR STRIP: <1.005 (ref 1–1.03)
SQUAMOUS #/AREA URNS HPF: 3 /HPF
URN SPEC COLLECT METH UR: ABNORMAL
UROBILINOGEN UR STRIP-ACNC: NEGATIVE EU/DL
WBC #/AREA URNS HPF: 13 /HPF (ref 0–5)

## 2021-12-07 PROCEDURE — 87077 CULTURE AEROBIC IDENTIFY: CPT | Performed by: INTERNAL MEDICINE

## 2021-12-07 PROCEDURE — 87186 SC STD MICRODIL/AGAR DIL: CPT | Performed by: INTERNAL MEDICINE

## 2021-12-07 PROCEDURE — 87088 URINE BACTERIA CULTURE: CPT | Performed by: INTERNAL MEDICINE

## 2021-12-07 PROCEDURE — 87086 URINE CULTURE/COLONY COUNT: CPT | Performed by: INTERNAL MEDICINE

## 2021-12-07 PROCEDURE — 81000 URINALYSIS NONAUTO W/SCOPE: CPT | Performed by: INTERNAL MEDICINE

## 2021-12-08 RX ORDER — SULFAMETHOXAZOLE AND TRIMETHOPRIM 800; 160 MG/1; MG/1
1 TABLET ORAL 2 TIMES DAILY
Qty: 10 TABLET | Refills: 0 | Status: SHIPPED | OUTPATIENT
Start: 2021-12-08 | End: 2021-12-13

## 2021-12-10 ENCOUNTER — PATIENT MESSAGE (OUTPATIENT)
Dept: INFECTIOUS DISEASES | Facility: CLINIC | Age: 31
End: 2021-12-10
Payer: COMMERCIAL

## 2021-12-10 LAB — BACTERIA UR CULT: ABNORMAL

## 2021-12-13 ENCOUNTER — TELEPHONE (OUTPATIENT)
Dept: PULMONOLOGY | Facility: CLINIC | Age: 31
End: 2021-12-13
Payer: COMMERCIAL

## 2021-12-13 ENCOUNTER — TREATMENT PLANNING (OUTPATIENT)
Dept: INFECTIOUS DISEASES | Facility: HOSPITAL | Age: 31
End: 2021-12-13
Payer: COMMERCIAL

## 2021-12-13 DIAGNOSIS — L21.9 SEBORRHEIC DERMATITIS: ICD-10-CM

## 2021-12-14 ENCOUNTER — LAB VISIT (OUTPATIENT)
Dept: LAB | Facility: HOSPITAL | Age: 31
End: 2021-12-14
Attending: INTERNAL MEDICINE
Payer: MEDICAID

## 2021-12-14 DIAGNOSIS — N39.0 URINARY TRACT INFECTION, SITE NOT SPECIFIED: Primary | ICD-10-CM

## 2021-12-14 DIAGNOSIS — A41.9 SEPTICEMIA DURING LABOR: ICD-10-CM

## 2021-12-14 LAB
BASOPHILS # BLD AUTO: 0.04 K/UL (ref 0–0.2)
BASOPHILS NFR BLD: 0.8 % (ref 0–1.9)
DIFFERENTIAL METHOD: NORMAL
EOSINOPHIL # BLD AUTO: 0.2 K/UL (ref 0–0.5)
EOSINOPHIL NFR BLD: 3.9 % (ref 0–8)
ERYTHROCYTE [DISTWIDTH] IN BLOOD BY AUTOMATED COUNT: 13.2 % (ref 11.5–14.5)
HCT VFR BLD AUTO: 45.9 % (ref 40–54)
HGB BLD-MCNC: 14.9 G/DL (ref 14–18)
IMM GRANULOCYTES # BLD AUTO: 0.01 K/UL (ref 0–0.04)
IMM GRANULOCYTES NFR BLD AUTO: 0.2 % (ref 0–0.5)
LYMPHOCYTES # BLD AUTO: 2.3 K/UL (ref 1–4.8)
LYMPHOCYTES NFR BLD: 43.6 % (ref 18–48)
MCH RBC QN AUTO: 28.5 PG (ref 27–31)
MCHC RBC AUTO-ENTMCNC: 32.5 G/DL (ref 32–36)
MCV RBC AUTO: 88 FL (ref 82–98)
MONOCYTES # BLD AUTO: 0.5 K/UL (ref 0.3–1)
MONOCYTES NFR BLD: 8.9 % (ref 4–15)
NEUTROPHILS # BLD AUTO: 2.2 K/UL (ref 1.8–7.7)
NEUTROPHILS NFR BLD: 42.6 % (ref 38–73)
NRBC BLD-RTO: 0 /100 WBC
PLATELET # BLD AUTO: 226 K/UL (ref 150–450)
PMV BLD AUTO: 12.9 FL (ref 9.2–12.9)
RBC # BLD AUTO: 5.23 M/UL (ref 4.6–6.2)
WBC # BLD AUTO: 5.18 K/UL (ref 3.9–12.7)

## 2021-12-14 PROCEDURE — 85025 COMPLETE CBC W/AUTO DIFF WBC: CPT | Performed by: INTERNAL MEDICINE

## 2021-12-14 PROCEDURE — 36415 COLL VENOUS BLD VENIPUNCTURE: CPT | Performed by: INTERNAL MEDICINE

## 2021-12-15 RX ORDER — TRIAMCINOLONE ACETONIDE 0.25 MG/G
CREAM TOPICAL
Qty: 80 G | Refills: 3 | Status: SHIPPED | OUTPATIENT
Start: 2021-12-15 | End: 2023-03-23

## 2021-12-30 ENCOUNTER — PATIENT MESSAGE (OUTPATIENT)
Dept: INTERNAL MEDICINE | Facility: CLINIC | Age: 31
End: 2021-12-30
Payer: COMMERCIAL

## 2021-12-30 NOTE — TELEPHONE ENCOUNTER
Patient want to know can we set up a joint visit With you him and Dr Her to discuss his ongoing issues?

## 2022-01-11 ENCOUNTER — PATIENT MESSAGE (OUTPATIENT)
Dept: INFECTIOUS DISEASES | Facility: CLINIC | Age: 32
End: 2022-01-11
Payer: COMMERCIAL

## 2022-01-11 ENCOUNTER — LAB VISIT (OUTPATIENT)
Dept: LAB | Facility: HOSPITAL | Age: 32
End: 2022-01-11
Attending: INTERNAL MEDICINE
Payer: MEDICARE

## 2022-01-11 DIAGNOSIS — N39.0 URINARY TRACT INFECTION ASSOCIATED WITH INDWELLING URETHRAL CATHETER, SUBSEQUENT ENCOUNTER: Primary | ICD-10-CM

## 2022-01-11 DIAGNOSIS — T83.511D URINARY TRACT INFECTION ASSOCIATED WITH INDWELLING URETHRAL CATHETER, SUBSEQUENT ENCOUNTER: ICD-10-CM

## 2022-01-11 DIAGNOSIS — N39.0 URINARY TRACT INFECTION ASSOCIATED WITH INDWELLING URETHRAL CATHETER, SUBSEQUENT ENCOUNTER: ICD-10-CM

## 2022-01-11 DIAGNOSIS — T83.511D URINARY TRACT INFECTION ASSOCIATED WITH INDWELLING URETHRAL CATHETER, SUBSEQUENT ENCOUNTER: Primary | ICD-10-CM

## 2022-01-11 LAB
BACTERIA #/AREA URNS HPF: ABNORMAL /HPF
BILIRUB UR QL STRIP: NEGATIVE
CLARITY UR: CLEAR
COLOR UR: ABNORMAL
GLUCOSE UR QL STRIP: NEGATIVE
HGB UR QL STRIP: ABNORMAL
KETONES UR QL STRIP: NEGATIVE
LEUKOCYTE ESTERASE UR QL STRIP: ABNORMAL
MICROSCOPIC COMMENT: ABNORMAL
NITRITE UR QL STRIP: NEGATIVE
PH UR STRIP: 6.5 [PH] (ref 5–8)
PROT UR QL STRIP: NEGATIVE
RBC #/AREA URNS HPF: 6 /HPF (ref 0–4)
SP GR UR STRIP: 1.01 (ref 1–1.03)
SQUAMOUS #/AREA URNS HPF: 1 /HPF
URN SPEC COLLECT METH UR: ABNORMAL
UROBILINOGEN UR STRIP-ACNC: NEGATIVE EU/DL
WBC #/AREA URNS HPF: 15 /HPF (ref 0–5)
WBC CLUMPS URNS QL MICRO: ABNORMAL

## 2022-01-11 PROCEDURE — 81000 URINALYSIS NONAUTO W/SCOPE: CPT | Performed by: INTERNAL MEDICINE

## 2022-01-11 PROCEDURE — 87086 URINE CULTURE/COLONY COUNT: CPT | Performed by: INTERNAL MEDICINE

## 2022-01-11 PROCEDURE — 87186 SC STD MICRODIL/AGAR DIL: CPT | Performed by: INTERNAL MEDICINE

## 2022-01-11 PROCEDURE — 87088 URINE BACTERIA CULTURE: CPT | Performed by: INTERNAL MEDICINE

## 2022-01-11 PROCEDURE — 87077 CULTURE AEROBIC IDENTIFY: CPT | Performed by: INTERNAL MEDICINE

## 2022-01-14 ENCOUNTER — PATIENT MESSAGE (OUTPATIENT)
Dept: INFECTIOUS DISEASES | Facility: CLINIC | Age: 32
End: 2022-01-14
Payer: COMMERCIAL

## 2022-01-14 ENCOUNTER — TELEPHONE (OUTPATIENT)
Dept: INFECTIOUS DISEASES | Facility: CLINIC | Age: 32
End: 2022-01-14
Payer: COMMERCIAL

## 2022-01-14 ENCOUNTER — PATIENT MESSAGE (OUTPATIENT)
Dept: INTERNAL MEDICINE | Facility: CLINIC | Age: 32
End: 2022-01-14
Payer: COMMERCIAL

## 2022-01-14 LAB — BACTERIA UR CULT: ABNORMAL

## 2022-01-14 RX ORDER — ALPRAZOLAM 0.25 MG/1
0.25 TABLET ORAL NIGHTLY PRN
Qty: 30 TABLET | Refills: 0 | Status: SHIPPED | OUTPATIENT
Start: 2022-01-14 | End: 2023-12-21 | Stop reason: SDUPTHER

## 2022-01-14 RX ORDER — BACLOFEN 20 MG/1
20 TABLET ORAL 3 TIMES DAILY
Qty: 270 TABLET | Refills: 1 | Status: SHIPPED | OUTPATIENT
Start: 2022-01-14 | End: 2022-03-15 | Stop reason: SDUPTHER

## 2022-01-14 RX ORDER — BACLOFEN 10 MG/1
TABLET ORAL
Qty: 270 TABLET | Refills: 1 | Status: SHIPPED | OUTPATIENT
Start: 2022-01-14 | End: 2022-05-11 | Stop reason: SDUPTHER

## 2022-01-14 RX ORDER — OMEPRAZOLE 20 MG/1
20 CAPSULE, DELAYED RELEASE ORAL DAILY
Qty: 90 CAPSULE | Refills: 1 | OUTPATIENT
Start: 2022-01-14 | End: 2023-01-14

## 2022-01-14 RX ORDER — GRANULES FOR ORAL 3 G/1
3 POWDER ORAL
Qty: 9 G | Refills: 0 | Status: SHIPPED | OUTPATIENT
Start: 2022-01-14 | End: 2022-01-21

## 2022-01-14 RX ORDER — OMEPRAZOLE 40 MG/1
40 CAPSULE, DELAYED RELEASE ORAL EVERY MORNING
Qty: 30 CAPSULE | Refills: 0 | Status: SHIPPED | OUTPATIENT
Start: 2022-01-14 | End: 2022-05-04 | Stop reason: SDUPTHER

## 2022-01-14 NOTE — TELEPHONE ENCOUNTER
06/10/20 0829   Post-Acute Status   Post-Acute Authorization Placement   Post-Acute Placement Status Referrals Sent   Patient choice form signed by patient/caregiver List with quality metrics by geographic area provided   Discharge Plan   Discharge Plan A Skilled Nursing Facility   Discharge Plan B Skilled Nursing Facility   Referral sent via [employer] fax to manuel [facility] and  [facility], [facility], [facility], [facility] and [facility]  to follow    14:38  LOCET COMPLETED AND [facility] FAXED AND SCANNED INTO MEDIA.    [facility] ASKED FOR MORE CLINICAL AND HAVE THE PATIENT IN REVIEW CM TO FOLLOW.   LOV  11/08/2021

## 2022-01-14 NOTE — TELEPHONE ENCOUNTER
No new care gaps identified.  Powered by [employer]. Reference number: [id number].   1/14/2022 10:09:29 AM CST

## 2022-01-14 NOTE — TELEPHONE ENCOUNTER
----- Message from Jillian Lazo sent at 1/14/2022  3:39 PM CST -----  Contact: [phone]  Type:  Patient Returning Call    Who Called:Kate  Who Left Message for Patient:nurse  Does the patient know what this is regarding?:medication  Would the patient rather a call back or a response via [facility]? Call back  Best Call Back Number:[phone]  Additional Information:

## 2022-01-14 NOTE — TELEPHONE ENCOUNTER
----- Message from Leobardo Renee sent at 1/14/2022 10:23 AM CST -----  Contact: pt  States he's calling needing a PIC line or Mid line placed for the UTI that the pt has /pt states that [facility] is closed on the weekends and closes today at 5 pm and pt is wanting to discuss the PIC and Mid and meds so that it can be done today and can be reached at [phone] and through the portal//thanks/dbw

## 2022-01-14 NOTE — TELEPHONE ENCOUNTER
No new care gaps identified.  Powered by [employer]. Reference number: [id number].   1/14/2022 3:16:23 PM CST

## 2022-01-14 NOTE — TELEPHONE ENCOUNTER
----- Message from Lucille Spears sent at 1/14/2022  2:58 PM CST -----  Contact: Kate  .Type:  RX Refill Request for three medications    Who Called:  Kate   Refill or New Rx: refill  RX Name and Strength: baclofen (LIORESAL) 20 MG tablet  omeprazole (PRILOSEC) 20 MG capsule  ALPRAZolam (XANAX) 0.25 MG tablet  How is the patient currently taking it? (ex. 1XDay):  Is this a 30 day or 90 day RX: 90  Preferred Pharmacy with phone number: .  [facility] [address] [place] - [address] [facility]  [address] [facility] [address]  Phone: [phone] Fax: [phone]  Local or Mail Order: local   Ordering Provider: Dr Burton   Would the patient rather a call back or a response via [facility]? call  Best Call Back Number:  [phone]  Additional Information: patient is requesting this sent today and a call back to be notified.   Thanks,  RP

## 2022-01-15 ENCOUNTER — PATIENT MESSAGE (OUTPATIENT)
Dept: INTERNAL MEDICINE | Facility: CLINIC | Age: 32
End: 2022-01-15
Payer: COMMERCIAL

## 2022-01-18 ENCOUNTER — OFFICE VISIT (OUTPATIENT)
Dept: INTERNAL MEDICINE | Facility: CLINIC | Age: 32
End: 2022-01-18
Payer: MEDICARE

## 2022-01-18 ENCOUNTER — PATIENT MESSAGE (OUTPATIENT)
Dept: INFECTIOUS DISEASES | Facility: CLINIC | Age: 32
End: 2022-01-18
Payer: COMMERCIAL

## 2022-01-18 DIAGNOSIS — K59.00 CONSTIPATION, UNSPECIFIED CONSTIPATION TYPE: ICD-10-CM

## 2022-01-18 DIAGNOSIS — G82.50 QUADRIPLEGIA, POST-TRAUMATIC: Primary | ICD-10-CM

## 2022-01-18 DIAGNOSIS — S14.109S QUADRIPLEGIA, POST-TRAUMATIC: Primary | ICD-10-CM

## 2022-01-18 DIAGNOSIS — F41.9 ANXIETY: ICD-10-CM

## 2022-01-18 DIAGNOSIS — N39.0 RECURRENT UTI: ICD-10-CM

## 2022-01-18 PROCEDURE — 99214 PR OFFICE/OUTPT VISIT, EST, LEVL IV, 30-39 MIN: ICD-10-PCS | Mod: 95,,, | Performed by: FAMILY MEDICINE

## 2022-01-18 PROCEDURE — 99214 OFFICE O/P EST MOD 30 MIN: CPT | Mod: 95,,, | Performed by: FAMILY MEDICINE
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
## 2022-01-19 ENCOUNTER — DOCUMENTATION ONLY (OUTPATIENT)
Dept: INFECTIOUS DISEASES | Facility: HOSPITAL | Age: 32
End: 2022-01-19
Payer: COMMERCIAL

## 2022-01-19 NOTE — PROGRESS NOTES
Will contact [facility] .  IV Meropenem 2 gram every 8 hours for 10 days .  Insert PICC line.  Weekly CBC

## 2022-01-21 ENCOUNTER — PATIENT MESSAGE (OUTPATIENT)
Dept: INTERNAL MEDICINE | Facility: CLINIC | Age: 32
End: 2022-01-21
Payer: COMMERCIAL

## 2022-01-21 ENCOUNTER — LAB VISIT (OUTPATIENT)
Dept: LAB | Facility: HOSPITAL | Age: 32
End: 2022-01-21
Attending: INTERNAL MEDICINE
Payer: MEDICARE

## 2022-01-21 DIAGNOSIS — N39.0 URINARY TRACT INFECTION, SITE NOT SPECIFIED: Primary | ICD-10-CM

## 2022-01-21 DIAGNOSIS — A41.9 SEPSIS, UNSPECIFIED ORGANISM: ICD-10-CM

## 2022-01-21 LAB
ANION GAP SERPL CALC-SCNC: 11 MMOL/L (ref 8–16)
BASOPHILS # BLD AUTO: 0.02 K/UL (ref 0–0.2)
BASOPHILS NFR BLD: 0.4 % (ref 0–1.9)
BUN SERPL-MCNC: 6 MG/DL (ref 6–20)
CALCIUM SERPL-MCNC: 9.2 MG/DL (ref 8.7–10.5)
CHLORIDE SERPL-SCNC: 105 MMOL/L (ref 95–110)
CO2 SERPL-SCNC: 24 MMOL/L (ref 23–29)
CREAT SERPL-MCNC: 0.7 MG/DL (ref 0.5–1.4)
DIFFERENTIAL METHOD: NORMAL
EOSINOPHIL # BLD AUTO: 0.2 K/UL (ref 0–0.5)
EOSINOPHIL NFR BLD: 3.7 % (ref 0–8)
ERYTHROCYTE [DISTWIDTH] IN BLOOD BY AUTOMATED COUNT: 12.7 % (ref 11.5–14.5)
EST. GFR  (AFRICAN AMERICAN): >60 ML/MIN/1.73 M^2
EST. GFR  (NON AFRICAN AMERICAN): >60 ML/MIN/1.73 M^2
GLUCOSE SERPL-MCNC: 96 MG/DL (ref 70–110)
HCT VFR BLD AUTO: 44.5 % (ref 40–54)
HGB BLD-MCNC: 14.4 G/DL (ref 14–18)
IMM GRANULOCYTES # BLD AUTO: 0.01 K/UL (ref 0–0.04)
IMM GRANULOCYTES NFR BLD AUTO: 0.2 % (ref 0–0.5)
LYMPHOCYTES # BLD AUTO: 1.9 K/UL (ref 1–4.8)
LYMPHOCYTES NFR BLD: 40.7 % (ref 18–48)
MCH RBC QN AUTO: 28.3 PG (ref 27–31)
MCHC RBC AUTO-ENTMCNC: 32.4 G/DL (ref 32–36)
MCV RBC AUTO: 87 FL (ref 82–98)
MONOCYTES # BLD AUTO: 0.4 K/UL (ref 0.3–1)
MONOCYTES NFR BLD: 8.1 % (ref 4–15)
NEUTROPHILS # BLD AUTO: 2.2 K/UL (ref 1.8–7.7)
NEUTROPHILS NFR BLD: 46.9 % (ref 38–73)
NRBC BLD-RTO: 0 /100 WBC
PLATELET # BLD AUTO: 230 K/UL (ref 150–450)
PMV BLD AUTO: 11.1 FL (ref 9.2–12.9)
POTASSIUM SERPL-SCNC: 3.9 MMOL/L (ref 3.5–5.1)
RBC # BLD AUTO: 5.09 M/UL (ref 4.6–6.2)
SODIUM SERPL-SCNC: 140 MMOL/L (ref 136–145)
WBC # BLD AUTO: 4.59 K/UL (ref 3.9–12.7)

## 2022-01-21 PROCEDURE — 85025 COMPLETE CBC W/AUTO DIFF WBC: CPT | Performed by: INTERNAL MEDICINE

## 2022-01-21 PROCEDURE — 80048 BASIC METABOLIC PNL TOTAL CA: CPT | Performed by: INTERNAL MEDICINE

## 2022-01-21 PROCEDURE — 36415 COLL VENOUS BLD VENIPUNCTURE: CPT | Performed by: INTERNAL MEDICINE

## 2022-01-27 ENCOUNTER — LAB VISIT (OUTPATIENT)
Dept: LAB | Facility: HOSPITAL | Age: 32
End: 2022-01-27
Attending: INTERNAL MEDICINE
Payer: MEDICARE

## 2022-01-27 DIAGNOSIS — N39.0 URINARY TRACT INFECTION, SITE NOT SPECIFIED: Primary | ICD-10-CM

## 2022-01-27 DIAGNOSIS — A41.9 SEPTICEMIA DURING LABOR: ICD-10-CM

## 2022-01-27 LAB
ALBUMIN SERPL BCP-MCNC: 3.9 G/DL (ref 3.5–5.2)
ALP SERPL-CCNC: 95 U/L (ref 55–135)
ALT SERPL W/O P-5'-P-CCNC: 26 U/L (ref 10–44)
ANION GAP SERPL CALC-SCNC: 11 MMOL/L (ref 8–16)
AST SERPL-CCNC: 29 U/L (ref 10–40)
BASOPHILS # BLD AUTO: 0.04 K/UL (ref 0–0.2)
BASOPHILS NFR BLD: 0.6 % (ref 0–1.9)
BILIRUB SERPL-MCNC: 0.4 MG/DL (ref 0.1–1)
BUN SERPL-MCNC: 6 MG/DL (ref 6–20)
CALCIUM SERPL-MCNC: 9.1 MG/DL (ref 8.7–10.5)
CHLORIDE SERPL-SCNC: 101 MMOL/L (ref 95–110)
CO2 SERPL-SCNC: 27 MMOL/L (ref 23–29)
CREAT SERPL-MCNC: 0.9 MG/DL (ref 0.5–1.4)
DIFFERENTIAL METHOD: ABNORMAL
EOSINOPHIL # BLD AUTO: 0.3 K/UL (ref 0–0.5)
EOSINOPHIL NFR BLD: 4.7 % (ref 0–8)
ERYTHROCYTE [DISTWIDTH] IN BLOOD BY AUTOMATED COUNT: 13.2 % (ref 11.5–14.5)
EST. GFR  (AFRICAN AMERICAN): >60 ML/MIN/1.73 M^2
EST. GFR  (NON AFRICAN AMERICAN): >60 ML/MIN/1.73 M^2
GLUCOSE SERPL-MCNC: 79 MG/DL (ref 70–110)
HCT VFR BLD AUTO: 45.9 % (ref 40–54)
HGB BLD-MCNC: 14.5 G/DL (ref 14–18)
IMM GRANULOCYTES # BLD AUTO: 0.01 K/UL (ref 0–0.04)
IMM GRANULOCYTES NFR BLD AUTO: 0.1 % (ref 0–0.5)
LYMPHOCYTES # BLD AUTO: 2.4 K/UL (ref 1–4.8)
LYMPHOCYTES NFR BLD: 34.1 % (ref 18–48)
MCH RBC QN AUTO: 28.5 PG (ref 27–31)
MCHC RBC AUTO-ENTMCNC: 31.6 G/DL (ref 32–36)
MCV RBC AUTO: 90 FL (ref 82–98)
MONOCYTES # BLD AUTO: 0.5 K/UL (ref 0.3–1)
MONOCYTES NFR BLD: 6.9 % (ref 4–15)
NEUTROPHILS # BLD AUTO: 3.8 K/UL (ref 1.8–7.7)
NEUTROPHILS NFR BLD: 53.6 % (ref 38–73)
NRBC BLD-RTO: 0 /100 WBC
PLATELET # BLD AUTO: 252 K/UL (ref 150–450)
PMV BLD AUTO: 12 FL (ref 9.2–12.9)
POTASSIUM SERPL-SCNC: 4.9 MMOL/L (ref 3.5–5.1)
PROT SERPL-MCNC: 7.5 G/DL (ref 6–8.4)
RBC # BLD AUTO: 5.08 M/UL (ref 4.6–6.2)
SODIUM SERPL-SCNC: 139 MMOL/L (ref 136–145)
WBC # BLD AUTO: 7.09 K/UL (ref 3.9–12.7)

## 2022-01-27 PROCEDURE — 85025 COMPLETE CBC W/AUTO DIFF WBC: CPT | Performed by: INTERNAL MEDICINE

## 2022-01-27 PROCEDURE — 36415 COLL VENOUS BLD VENIPUNCTURE: CPT | Performed by: INTERNAL MEDICINE

## 2022-01-27 PROCEDURE — 80053 COMPREHEN METABOLIC PANEL: CPT | Performed by: INTERNAL MEDICINE

## 2022-02-02 ENCOUNTER — PATIENT MESSAGE (OUTPATIENT)
Dept: INFECTIOUS DISEASES | Facility: CLINIC | Age: 32
End: 2022-02-02
Payer: COMMERCIAL

## 2022-02-03 DIAGNOSIS — R10.9 ABDOMINAL PAIN, UNSPECIFIED ABDOMINAL LOCATION: ICD-10-CM

## 2022-02-10 ENCOUNTER — HOSPITAL ENCOUNTER (OUTPATIENT)
Dept: RADIOLOGY | Facility: HOSPITAL | Age: 32
Discharge: HOME | End: 2022-02-10
Attending: INTERNAL MEDICINE
Payer: COMMERCIAL

## 2022-02-10 DIAGNOSIS — R10.9 ABDOMINAL PAIN, UNSPECIFIED ABDOMINAL LOCATION: ICD-10-CM

## 2022-02-10 PROCEDURE — A9698 NON-RAD CONTRAST MATERIALNOC: HCPCS | Performed by: INTERNAL MEDICINE

## 2022-02-10 PROCEDURE — 74177 CT ABD & PELVIS W/CONTRAST: CPT | Mod: TC

## 2022-02-10 PROCEDURE — 25500020 PHARM REV CODE 255: Performed by: INTERNAL MEDICINE

## 2022-02-10 RX ADMIN — IOHEXOL 1000 ML: 9 SOLUTION ORAL at 06:02

## 2022-02-10 RX ADMIN — IOHEXOL 100 ML: 350 INJECTION, SOLUTION INTRAVENOUS at 06:02

## 2022-02-15 ENCOUNTER — PATIENT MESSAGE (OUTPATIENT)
Dept: INFECTIOUS DISEASES | Facility: CLINIC | Age: 32
End: 2022-02-15
Payer: COMMERCIAL

## 2022-02-15 ENCOUNTER — PATIENT MESSAGE (OUTPATIENT)
Dept: GASTROENTEROLOGY | Facility: CLINIC | Age: 32
End: 2022-02-15
Payer: COMMERCIAL

## 2022-02-15 RX ORDER — SYRING-NEEDL,DISP,INSUL,0.3 ML 29 G X1/2"
296 SYRINGE, EMPTY DISPOSABLE MISCELLANEOUS ONCE
Qty: 296 ML | Refills: 0 | Status: SHIPPED | OUTPATIENT
Start: 2022-02-15 | End: 2022-02-15

## 2022-02-17 ENCOUNTER — PATIENT MESSAGE (OUTPATIENT)
Dept: INTERNAL MEDICINE | Facility: CLINIC | Age: 32
End: 2022-02-17
Payer: COMMERCIAL

## 2022-02-17 DIAGNOSIS — N39.0 URINARY TRACT INFECTION WITHOUT HEMATURIA, SITE UNSPECIFIED: Primary | ICD-10-CM

## 2022-02-17 NOTE — TELEPHONE ENCOUNTER
"Patient c/o "hurting while urinating, smelly urine and cold sweats.  Would like orders place for a urine test    "

## 2022-02-21 ENCOUNTER — LAB VISIT (OUTPATIENT)
Dept: LAB | Facility: HOSPITAL | Age: 32
End: 2022-02-21
Attending: FAMILY MEDICINE
Payer: COMMERCIAL

## 2022-02-21 DIAGNOSIS — N39.0 URINARY TRACT INFECTION WITHOUT HEMATURIA, SITE UNSPECIFIED: ICD-10-CM

## 2022-02-21 LAB
BACTERIA #/AREA URNS HPF: ABNORMAL /HPF
BILIRUB UR QL STRIP: NEGATIVE
CLARITY UR: CLEAR
COLOR UR: ABNORMAL
GLUCOSE UR QL STRIP: NEGATIVE
HGB UR QL STRIP: NEGATIVE
KETONES UR QL STRIP: NEGATIVE
LEUKOCYTE ESTERASE UR QL STRIP: ABNORMAL
MICROSCOPIC COMMENT: ABNORMAL
NITRITE UR QL STRIP: NEGATIVE
PH UR STRIP: 6 [PH] (ref 5–8)
PROT UR QL STRIP: NEGATIVE
RBC #/AREA URNS HPF: 1 /HPF (ref 0–4)
SP GR UR STRIP: <1.005 (ref 1–1.03)
SQUAMOUS #/AREA URNS HPF: ABNORMAL /HPF
URN SPEC COLLECT METH UR: ABNORMAL
UROBILINOGEN UR STRIP-ACNC: NEGATIVE EU/DL
WBC #/AREA URNS HPF: 7 /HPF (ref 0–5)

## 2022-02-21 PROCEDURE — 87088 URINE BACTERIA CULTURE: CPT | Performed by: FAMILY MEDICINE

## 2022-02-21 PROCEDURE — 87086 URINE CULTURE/COLONY COUNT: CPT | Performed by: FAMILY MEDICINE

## 2022-02-21 PROCEDURE — 87077 CULTURE AEROBIC IDENTIFY: CPT | Performed by: FAMILY MEDICINE

## 2022-02-21 PROCEDURE — 81000 URINALYSIS NONAUTO W/SCOPE: CPT | Performed by: FAMILY MEDICINE

## 2022-02-21 PROCEDURE — 87186 SC STD MICRODIL/AGAR DIL: CPT | Performed by: FAMILY MEDICINE

## 2022-02-23 ENCOUNTER — TELEPHONE (OUTPATIENT)
Dept: INTERNAL MEDICINE | Facility: CLINIC | Age: 32
End: 2022-02-23
Payer: COMMERCIAL

## 2022-02-23 NOTE — TELEPHONE ENCOUNTER
"Urine culture pending    Some abdominal / pelvic pain and foul odor.     No fever  No chills    "upper" body symptoms    Cold sweats.  Culture  Pending sensitivities    Will inquire w ID  "

## 2022-02-24 ENCOUNTER — PATIENT MESSAGE (OUTPATIENT)
Dept: INFECTIOUS DISEASES | Facility: CLINIC | Age: 32
End: 2022-02-24
Payer: COMMERCIAL

## 2022-02-24 LAB — BACTERIA UR CULT: ABNORMAL

## 2022-02-25 RX ORDER — GRANULES FOR ORAL 3 G/1
3 POWDER ORAL ONCE
Qty: 3 G | Refills: 0 | Status: SHIPPED | OUTPATIENT
Start: 2022-02-25 | End: 2022-02-25

## 2022-03-02 ENCOUNTER — TELEPHONE (OUTPATIENT)
Dept: PULMONOLOGY | Facility: CLINIC | Age: 32
End: 2022-03-02
Payer: MEDICARE

## 2022-03-02 DIAGNOSIS — L21.9 SEBORRHEIC DERMATITIS: ICD-10-CM

## 2022-03-02 NOTE — TELEPHONE ENCOUNTER
----- Message from Tomasa Harris sent at 3/2/2022  3:50 PM CST -----  .Type:  Needs Medical Advice    Who Called: GORDO AC [[id number]]  Symptoms (please be specific):   How long has patient had these symptoms:   Pharmacy name and phone #:   Would the patient rather a call back or a response via [facility]? Both   Best Call Back Number:  Additional Information: pt is requesting a call from office regarding having an Iv or Pic Line set up. Please call before the end of the day.         Additional Safety/Bands:

## 2022-03-02 NOTE — TELEPHONE ENCOUNTER
----- Message from Marta Smallwood sent at 3/2/2022  9:44 AM CST -----  Regarding: refill  Contact: pt  What is the name of the medication you are requesting? Ketoconazole  What is the dose? 2% sha elyse  How do you take the medication? Orally, Topically, etc?  How often do you take this medication?  Do you need a 30 day or 90 day supply?  How many refills are you requesting?  What is your preferred pharmacy and location of pharmacy? [facility]  Who can we contact with further questions? Pt at [phone]

## 2022-03-03 ENCOUNTER — TREATMENT PLANNING (OUTPATIENT)
Dept: INFECTIOUS DISEASES | Facility: HOSPITAL | Age: 32
End: 2022-03-03
Payer: MEDICARE

## 2022-03-03 RX ORDER — KETOCONAZOLE 20 MG/ML
SHAMPOO, SUSPENSION TOPICAL
Qty: 120 ML | Refills: 5 | Status: SHIPPED | OUTPATIENT
Start: 2022-03-03 | End: 2023-12-29

## 2022-03-03 NOTE — PROGRESS NOTES
Will insert PICC line.  Contact [facility]   Will start IV Meropenem 2 gram every 8 hours for 14 days .  Will do cbc ,bmp weekly

## 2022-03-07 ENCOUNTER — TELEPHONE (OUTPATIENT)
Dept: INTERNAL MEDICINE | Facility: CLINIC | Age: 32
End: 2022-03-07
Payer: MEDICARE

## 2022-03-07 NOTE — TELEPHONE ENCOUNTER
----- Message from Tierra Gomez sent at 3/7/2022 10:47 AM CST -----  Regarding: refill  Contact: pateint  Patient requesting a refill on his medicated shampoo- Ketoconazole- [facility], please call him back at [phone]

## 2022-03-08 ENCOUNTER — OFFICE VISIT (OUTPATIENT)
Dept: INFECTIOUS DISEASES | Facility: CLINIC | Age: 32
End: 2022-03-08
Payer: COMMERCIAL

## 2022-03-08 DIAGNOSIS — S14.109S QUADRIPLEGIA, POST-TRAUMATIC: ICD-10-CM

## 2022-03-08 DIAGNOSIS — K59.04 CHRONIC IDIOPATHIC CONSTIPATION: ICD-10-CM

## 2022-03-08 DIAGNOSIS — G82.50 QUADRIPLEGIA, POST-TRAUMATIC: ICD-10-CM

## 2022-03-08 DIAGNOSIS — R53.2 FUNCTIONAL QUADRIPLEGIA: ICD-10-CM

## 2022-03-08 DIAGNOSIS — N39.0 COMPLICATED UTI (URINARY TRACT INFECTION): ICD-10-CM

## 2022-03-08 PROCEDURE — 99214 OFFICE O/P EST MOD 30 MIN: CPT | Mod: 95,,, | Performed by: INTERNAL MEDICINE

## 2022-03-08 PROCEDURE — 99214 PR OFFICE/OUTPT VISIT, EST, LEVL IV, 30-39 MIN: ICD-10-PCS | Mod: 95,,, | Performed by: INTERNAL MEDICINE

## 2022-03-08 RX ORDER — GRANULES FOR ORAL 3 G/1
3 POWDER ORAL
Qty: 9 G | Refills: 0 | Status: SHIPPED | OUTPATIENT
Start: 2022-03-08 | End: 2022-03-15

## 2022-03-08 NOTE — ASSESSMENT & PLAN NOTE
It is unclear if what he has is a true UTI or colonization .  He is colonized and less likely has a true infection.   He reports that the urine looks better after fosfomycin .  Will give fosfomycin 3gram every 72 hoursX3 doses.  Will cancel plan for PICC line now.

## 2022-03-08 NOTE — ASSESSMENT & PLAN NOTE
Follow GI/PCP-this can also contribute to the abdominal pain that he is always complaining about
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
## 2022-03-13 ENCOUNTER — PATIENT MESSAGE (OUTPATIENT)
Dept: INFECTIOUS DISEASES | Facility: CLINIC | Age: 32
End: 2022-03-13
Payer: MEDICARE

## 2022-03-14 ENCOUNTER — PATIENT MESSAGE (OUTPATIENT)
Dept: INTERNAL MEDICINE | Facility: CLINIC | Age: 32
End: 2022-03-14
Payer: MEDICARE

## 2022-03-14 DIAGNOSIS — M79.89 LEG SWELLING: ICD-10-CM

## 2022-03-15 ENCOUNTER — LAB VISIT (OUTPATIENT)
Dept: LAB | Facility: HOSPITAL | Age: 32
End: 2022-03-15
Attending: INTERNAL MEDICINE
Payer: MEDICARE

## 2022-03-15 DIAGNOSIS — N39.0 COMPLICATED UTI (URINARY TRACT INFECTION): ICD-10-CM

## 2022-03-15 DIAGNOSIS — N39.0 COMPLICATED UTI (URINARY TRACT INFECTION): Primary | ICD-10-CM

## 2022-03-15 LAB
BACTERIA #/AREA URNS HPF: ABNORMAL /HPF
BILIRUB UR QL STRIP: NEGATIVE
CLARITY UR: CLEAR
COLOR UR: YELLOW
GLUCOSE UR QL STRIP: NEGATIVE
HGB UR QL STRIP: NEGATIVE
KETONES UR QL STRIP: NEGATIVE
LEUKOCYTE ESTERASE UR QL STRIP: ABNORMAL
MICROSCOPIC COMMENT: ABNORMAL
NITRITE UR QL STRIP: POSITIVE
PH UR STRIP: 7.5 [PH] (ref 5–8)
PROT UR QL STRIP: NEGATIVE
SP GR UR STRIP: 1.01 (ref 1–1.03)
SQUAMOUS #/AREA URNS HPF: 2 /HPF
URN SPEC COLLECT METH UR: ABNORMAL
UROBILINOGEN UR STRIP-ACNC: NEGATIVE EU/DL
WBC #/AREA URNS HPF: 18 /HPF (ref 0–5)

## 2022-03-15 PROCEDURE — 81000 URINALYSIS NONAUTO W/SCOPE: CPT | Performed by: INTERNAL MEDICINE

## 2022-03-15 PROCEDURE — 87086 URINE CULTURE/COLONY COUNT: CPT | Performed by: INTERNAL MEDICINE

## 2022-03-15 PROCEDURE — 87186 SC STD MICRODIL/AGAR DIL: CPT | Performed by: INTERNAL MEDICINE

## 2022-03-15 PROCEDURE — 87077 CULTURE AEROBIC IDENTIFY: CPT | Performed by: INTERNAL MEDICINE

## 2022-03-15 PROCEDURE — 87088 URINE BACTERIA CULTURE: CPT | Performed by: INTERNAL MEDICINE

## 2022-03-15 RX ORDER — BACLOFEN 20 MG/1
20 TABLET ORAL 3 TIMES DAILY
Qty: 270 TABLET | Refills: 1 | Status: SHIPPED | OUTPATIENT
Start: 2022-03-15 | End: 2022-05-11 | Stop reason: SDUPTHER

## 2022-03-15 RX ORDER — FUROSEMIDE 20 MG/1
20 TABLET ORAL DAILY
Qty: 90 TABLET | Refills: 1 | Status: SHIPPED | OUTPATIENT
Start: 2022-03-15 | End: 2022-09-13 | Stop reason: SDUPTHER

## 2022-03-15 NOTE — TELEPHONE ENCOUNTER
No new care gaps identified.  Powered by [employer]. Reference number: [id number].   3/15/2022 7:58:44 AM CDT

## 2022-03-16 ENCOUNTER — TELEPHONE (OUTPATIENT)
Dept: INFECTIOUS DISEASES | Facility: CLINIC | Age: 32
End: 2022-03-16
Payer: MEDICARE

## 2022-03-16 DIAGNOSIS — N39.0 COMPLICATED UTI (URINARY TRACT INFECTION): Primary | ICD-10-CM

## 2022-03-16 NOTE — TELEPHONE ENCOUNTER
----- Message from Jaden Castro MD sent at 3/16/2022  7:13 AM CDT -----  Order placed for PICC line, will start IV meropenem

## 2022-03-17 LAB — BACTERIA UR CULT: ABNORMAL

## 2022-03-24 ENCOUNTER — LAB VISIT (OUTPATIENT)
Dept: LAB | Facility: HOSPITAL | Age: 32
End: 2022-03-24
Attending: INTERNAL MEDICINE
Payer: MEDICARE

## 2022-03-24 DIAGNOSIS — B96.5 PSEUDOMONAL ARTHRITIS: Primary | ICD-10-CM

## 2022-03-24 DIAGNOSIS — M00.80 PSEUDOMONAL ARTHRITIS: Primary | ICD-10-CM

## 2022-03-24 LAB
ANION GAP SERPL CALC-SCNC: 8 MMOL/L (ref 8–16)
BASOPHILS # BLD AUTO: 0.03 K/UL (ref 0–0.2)
BASOPHILS NFR BLD: 0.4 % (ref 0–1.9)
BUN SERPL-MCNC: 7 MG/DL (ref 6–20)
CALCIUM SERPL-MCNC: 9.4 MG/DL (ref 8.7–10.5)
CHLORIDE SERPL-SCNC: 105 MMOL/L (ref 95–110)
CO2 SERPL-SCNC: 26 MMOL/L (ref 23–29)
CREAT SERPL-MCNC: 0.7 MG/DL (ref 0.5–1.4)
DIFFERENTIAL METHOD: NORMAL
EOSINOPHIL # BLD AUTO: 0.3 K/UL (ref 0–0.5)
EOSINOPHIL NFR BLD: 4.1 % (ref 0–8)
ERYTHROCYTE [DISTWIDTH] IN BLOOD BY AUTOMATED COUNT: 13.1 % (ref 11.5–14.5)
EST. GFR  (AFRICAN AMERICAN): >60 ML/MIN/1.73 M^2
EST. GFR  (NON AFRICAN AMERICAN): >60 ML/MIN/1.73 M^2
GLUCOSE SERPL-MCNC: 99 MG/DL (ref 70–110)
HCT VFR BLD AUTO: 43.9 % (ref 40–54)
HGB BLD-MCNC: 14.4 G/DL (ref 14–18)
IMM GRANULOCYTES # BLD AUTO: 0.03 K/UL (ref 0–0.04)
IMM GRANULOCYTES NFR BLD AUTO: 0.4 % (ref 0–0.5)
LYMPHOCYTES # BLD AUTO: 2.2 K/UL (ref 1–4.8)
LYMPHOCYTES NFR BLD: 28.9 % (ref 18–48)
MCH RBC QN AUTO: 28.6 PG (ref 27–31)
MCHC RBC AUTO-ENTMCNC: 32.8 G/DL (ref 32–36)
MCV RBC AUTO: 87 FL (ref 82–98)
MONOCYTES # BLD AUTO: 0.5 K/UL (ref 0.3–1)
MONOCYTES NFR BLD: 6.8 % (ref 4–15)
NEUTROPHILS # BLD AUTO: 4.5 K/UL (ref 1.8–7.7)
NEUTROPHILS NFR BLD: 59.4 % (ref 38–73)
NRBC BLD-RTO: 0 /100 WBC
PLATELET # BLD AUTO: 210 K/UL (ref 150–450)
PMV BLD AUTO: 11.9 FL (ref 9.2–12.9)
POTASSIUM SERPL-SCNC: 4.7 MMOL/L (ref 3.5–5.1)
RBC # BLD AUTO: 5.04 M/UL (ref 4.6–6.2)
SODIUM SERPL-SCNC: 139 MMOL/L (ref 136–145)
WBC # BLD AUTO: 7.52 K/UL (ref 3.9–12.7)

## 2022-03-24 PROCEDURE — 80048 BASIC METABOLIC PNL TOTAL CA: CPT | Performed by: INTERNAL MEDICINE

## 2022-03-24 PROCEDURE — 36415 COLL VENOUS BLD VENIPUNCTURE: CPT | Performed by: INTERNAL MEDICINE

## 2022-03-24 PROCEDURE — 85025 COMPLETE CBC W/AUTO DIFF WBC: CPT | Performed by: INTERNAL MEDICINE

## 2022-03-28 ENCOUNTER — PATIENT MESSAGE (OUTPATIENT)
Dept: INTERNAL MEDICINE | Facility: CLINIC | Age: 32
End: 2022-03-28
Payer: MEDICARE

## 2022-03-28 NOTE — TELEPHONE ENCOUNTER
Spoke with the patient's mother on the phone about the patient's medication need and the form that needs to be filled out.

## 2022-04-04 ENCOUNTER — TELEPHONE (OUTPATIENT)
Dept: INTERNAL MEDICINE | Facility: CLINIC | Age: 32
End: 2022-04-04
Payer: MEDICARE

## 2022-04-04 ENCOUNTER — PATIENT MESSAGE (OUTPATIENT)
Dept: INTERNAL MEDICINE | Facility: CLINIC | Age: 32
End: 2022-04-04
Payer: MEDICARE

## 2022-04-11 DIAGNOSIS — S14.109S QUADRIPLEGIA, POST-TRAUMATIC: ICD-10-CM

## 2022-04-11 DIAGNOSIS — G82.50 QUADRIPLEGIA, POST-TRAUMATIC: ICD-10-CM

## 2022-04-11 RX ORDER — AMITRIPTYLINE HYDROCHLORIDE 25 MG/1
TABLET, FILM COATED ORAL
Qty: 90 TABLET | Refills: 1 | Status: SHIPPED | OUTPATIENT
Start: 2022-04-11 | End: 2022-05-27 | Stop reason: SDUPTHER

## 2022-04-11 NOTE — TELEPHONE ENCOUNTER
No new care gaps identified.  Powered by [employer] by "[employer]. Reference number: [id number].   4/11/2022 1:35:29 PM CDT

## 2022-04-11 NOTE — TELEPHONE ENCOUNTER
----- Message from Earline Colby sent at 4/11/2022 12:07 PM CDT -----  Contact: patient  Patient stated that he is out of medication ands that he have to call it in. The medication he is wanting refilled is amitriptyline (ELAVIL) 25 MG tablet

## 2022-04-17 ENCOUNTER — PATIENT MESSAGE (OUTPATIENT)
Dept: INFECTIOUS DISEASES | Facility: CLINIC | Age: 32
End: 2022-04-17
Payer: MEDICARE

## 2022-04-19 ENCOUNTER — LAB VISIT (OUTPATIENT)
Dept: LAB | Facility: HOSPITAL | Age: 32
End: 2022-04-19
Attending: INTERNAL MEDICINE
Payer: COMMERCIAL

## 2022-04-19 DIAGNOSIS — I10 PRIMARY HYPERTENSION: ICD-10-CM

## 2022-04-19 DIAGNOSIS — I10 PRIMARY HYPERTENSION: Primary | ICD-10-CM

## 2022-04-19 LAB
BACTERIA #/AREA URNS HPF: ABNORMAL /HPF
BILIRUB UR QL STRIP: NEGATIVE
CLARITY UR: CLEAR
COLOR UR: YELLOW
GLUCOSE UR QL STRIP: NEGATIVE
HGB UR QL STRIP: ABNORMAL
KETONES UR QL STRIP: NEGATIVE
LEUKOCYTE ESTERASE UR QL STRIP: ABNORMAL
MICROSCOPIC COMMENT: ABNORMAL
NITRITE UR QL STRIP: NEGATIVE
PH UR STRIP: 6.5 [PH] (ref 5–8)
PROT UR QL STRIP: NEGATIVE
RBC #/AREA URNS HPF: 3 /HPF (ref 0–4)
SP GR UR STRIP: <1.005 (ref 1–1.03)
SQUAMOUS #/AREA URNS HPF: 1 /HPF
URN SPEC COLLECT METH UR: ABNORMAL
UROBILINOGEN UR STRIP-ACNC: NEGATIVE EU/DL
WBC #/AREA URNS HPF: 11 /HPF (ref 0–5)

## 2022-04-19 PROCEDURE — 87086 URINE CULTURE/COLONY COUNT: CPT | Performed by: INTERNAL MEDICINE

## 2022-04-19 PROCEDURE — 87088 URINE BACTERIA CULTURE: CPT | Performed by: INTERNAL MEDICINE

## 2022-04-19 PROCEDURE — 87186 SC STD MICRODIL/AGAR DIL: CPT | Performed by: INTERNAL MEDICINE

## 2022-04-19 PROCEDURE — 87077 CULTURE AEROBIC IDENTIFY: CPT | Performed by: INTERNAL MEDICINE

## 2022-04-19 PROCEDURE — 81000 URINALYSIS NONAUTO W/SCOPE: CPT | Performed by: INTERNAL MEDICINE

## 2022-04-21 LAB — BACTERIA UR CULT: ABNORMAL

## 2022-04-22 RX ORDER — LEVOFLOXACIN 500 MG/1
500 TABLET, FILM COATED ORAL DAILY
Qty: 7 TABLET | Refills: 0 | Status: SHIPPED | OUTPATIENT
Start: 2022-04-22 | End: 2022-04-29

## 2022-04-25 NOTE — TELEPHONE ENCOUNTER
No new care gaps identified.  Powered by [employer]. Reference number: [id number].   4/25/2022 4:14:38 PM CDT

## 2022-04-25 NOTE — TELEPHONE ENCOUNTER
----- Message from Francoise Gonzales sent at 4/25/2022 12:26 PM CDT -----  Regarding: Refill  Contact: Patient  .Type:  RX Refill Request    Who Called: Patient  Refill or New Rx: Refill  RX Name and Strength: gabapentin (NEURONTIN) 300 MG capsule  How is the patient currently taking it? (ex. 1XDay): 3X day  Is this a 30 day or 90 day RX:  Preferred Pharmacy with phone number: .    [facility] [address]  [address] [facility]  [address] [address] [place] [address]  Phone: [phone] Fax: [phone]      Local or Mail Order: Local  Ordering Provider: Dr Burton  Would the patient rather a call back or a response via [facility]?   Best Call Back Number:  Additional Information:

## 2022-04-26 RX ORDER — GABAPENTIN 300 MG/1
300 CAPSULE ORAL 3 TIMES DAILY
Qty: 270 CAPSULE | Refills: 1 | Status: SHIPPED | OUTPATIENT
Start: 2022-04-26 | End: 2022-11-28 | Stop reason: SDUPTHER

## 2022-05-04 ENCOUNTER — PATIENT MESSAGE (OUTPATIENT)
Dept: INTERNAL MEDICINE | Facility: CLINIC | Age: 32
End: 2022-05-04

## 2022-05-04 ENCOUNTER — LAB VISIT (OUTPATIENT)
Dept: LAB | Facility: HOSPITAL | Age: 32
End: 2022-05-04
Attending: FAMILY MEDICINE
Payer: MEDICARE

## 2022-05-04 ENCOUNTER — OFFICE VISIT (OUTPATIENT)
Dept: INTERNAL MEDICINE | Facility: CLINIC | Age: 32
End: 2022-05-04
Payer: COMMERCIAL

## 2022-05-04 ENCOUNTER — TELEPHONE (OUTPATIENT)
Dept: INFECTIOUS DISEASES | Facility: CLINIC | Age: 32
End: 2022-05-04
Payer: MEDICARE

## 2022-05-04 VITALS
DIASTOLIC BLOOD PRESSURE: 72 MMHG | TEMPERATURE: 99 F | OXYGEN SATURATION: 96 % | HEART RATE: 106 BPM | HEIGHT: 69 IN | BODY MASS INDEX: 29.53 KG/M2 | SYSTOLIC BLOOD PRESSURE: 124 MMHG

## 2022-05-04 DIAGNOSIS — I10 ESSENTIAL HYPERTENSION: ICD-10-CM

## 2022-05-04 DIAGNOSIS — R50.9 FEVER, UNSPECIFIED FEVER CAUSE: Primary | ICD-10-CM

## 2022-05-04 DIAGNOSIS — Z87.440 HISTORY OF RECURRENT UTIS: ICD-10-CM

## 2022-05-04 DIAGNOSIS — Z93.59 SUPRAPUBIC CATHETER: ICD-10-CM

## 2022-05-04 DIAGNOSIS — R50.9 FEVER, UNSPECIFIED FEVER CAUSE: ICD-10-CM

## 2022-05-04 DIAGNOSIS — R52 BODY ACHES: ICD-10-CM

## 2022-05-04 DIAGNOSIS — Z99.3 WHEELCHAIR BOUND: ICD-10-CM

## 2022-05-04 DIAGNOSIS — S14.109S QUADRIPLEGIA, POST-TRAUMATIC: ICD-10-CM

## 2022-05-04 DIAGNOSIS — G82.50 QUADRIPLEGIA, POST-TRAUMATIC: ICD-10-CM

## 2022-05-04 DIAGNOSIS — K59.00 CONSTIPATION, UNSPECIFIED CONSTIPATION TYPE: ICD-10-CM

## 2022-05-04 DIAGNOSIS — R53.2 FUNCTIONAL QUADRIPLEGIA: ICD-10-CM

## 2022-05-04 LAB
ANION GAP SERPL CALC-SCNC: 8 MMOL/L (ref 8–16)
BASOPHILS # BLD AUTO: 0.02 K/UL (ref 0–0.2)
BASOPHILS NFR BLD: 0.2 % (ref 0–1.9)
BUN SERPL-MCNC: 8 MG/DL (ref 6–20)
CALCIUM SERPL-MCNC: 9.6 MG/DL (ref 8.7–10.5)
CHLORIDE SERPL-SCNC: 102 MMOL/L (ref 95–110)
CO2 SERPL-SCNC: 27 MMOL/L (ref 23–29)
CREAT SERPL-MCNC: 0.7 MG/DL (ref 0.5–1.4)
CTP QC/QA: YES
CTP QC/QA: YES
DIFFERENTIAL METHOD: NORMAL
EOSINOPHIL # BLD AUTO: 0.1 K/UL (ref 0–0.5)
EOSINOPHIL NFR BLD: 1.4 % (ref 0–8)
ERYTHROCYTE [DISTWIDTH] IN BLOOD BY AUTOMATED COUNT: 12.5 % (ref 11.5–14.5)
EST. GFR  (AFRICAN AMERICAN): >60 ML/MIN/1.73 M^2
EST. GFR  (NON AFRICAN AMERICAN): >60 ML/MIN/1.73 M^2
FLUAV AG NPH QL: NEGATIVE
FLUBV AG NPH QL: NEGATIVE
GLUCOSE SERPL-MCNC: 88 MG/DL (ref 70–110)
HCT VFR BLD AUTO: 44.2 % (ref 40–54)
HGB BLD-MCNC: 14.6 G/DL (ref 14–18)
IMM GRANULOCYTES # BLD AUTO: 0.01 K/UL (ref 0–0.04)
IMM GRANULOCYTES NFR BLD AUTO: 0.1 % (ref 0–0.5)
LYMPHOCYTES # BLD AUTO: 2 K/UL (ref 1–4.8)
LYMPHOCYTES NFR BLD: 25 % (ref 18–48)
MCH RBC QN AUTO: 28.5 PG (ref 27–31)
MCHC RBC AUTO-ENTMCNC: 33 G/DL (ref 32–36)
MCV RBC AUTO: 86 FL (ref 82–98)
MONOCYTES # BLD AUTO: 0.9 K/UL (ref 0.3–1)
MONOCYTES NFR BLD: 11 % (ref 4–15)
NEUTROPHILS # BLD AUTO: 5 K/UL (ref 1.8–7.7)
NEUTROPHILS NFR BLD: 62.3 % (ref 38–73)
NRBC BLD-RTO: 0 /100 WBC
PLATELET # BLD AUTO: 255 K/UL (ref 150–450)
PMV BLD AUTO: 11.4 FL (ref 9.2–12.9)
POTASSIUM SERPL-SCNC: 3.9 MMOL/L (ref 3.5–5.1)
RBC # BLD AUTO: 5.12 M/UL (ref 4.6–6.2)
SARS-COV-2 RDRP RESP QL NAA+PROBE: NEGATIVE
SODIUM SERPL-SCNC: 137 MMOL/L (ref 136–145)
WBC # BLD AUTO: 8.01 K/UL (ref 3.9–12.7)

## 2022-05-04 PROCEDURE — 80048 BASIC METABOLIC PNL TOTAL CA: CPT

## 2022-05-04 PROCEDURE — 87804 INFLUENZA ASSAY W/OPTIC: CPT | Mod: PBBFAC

## 2022-05-04 PROCEDURE — 99214 PR OFFICE/OUTPT VISIT, EST, LEVL IV, 30-39 MIN: ICD-10-PCS | Mod: S$GLB,,,

## 2022-05-04 PROCEDURE — 99999 PR PBB SHADOW E&M-EST. PATIENT-LVL III: ICD-10-PCS | Mod: PBBFAC,,,

## 2022-05-04 PROCEDURE — 85025 COMPLETE CBC W/AUTO DIFF WBC: CPT

## 2022-05-04 PROCEDURE — 99213 OFFICE O/P EST LOW 20 MIN: CPT | Mod: PBBFAC

## 2022-05-04 PROCEDURE — 99999 PR PBB SHADOW E&M-EST. PATIENT-LVL III: CPT | Mod: PBBFAC,,,

## 2022-05-04 PROCEDURE — 36415 COLL VENOUS BLD VENIPUNCTURE: CPT

## 2022-05-04 PROCEDURE — U0002 COVID-19 LAB TEST NON-CDC: HCPCS | Mod: PBBFAC

## 2022-05-04 PROCEDURE — 99214 OFFICE O/P EST MOD 30 MIN: CPT | Mod: S$GLB,,,
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
## 2022-05-05 ENCOUNTER — LAB VISIT (OUTPATIENT)
Dept: LAB | Facility: HOSPITAL | Age: 32
End: 2022-05-05
Attending: FAMILY MEDICINE
Payer: MEDICARE

## 2022-05-05 ENCOUNTER — PATIENT MESSAGE (OUTPATIENT)
Dept: INFECTIOUS DISEASES | Facility: CLINIC | Age: 32
End: 2022-05-05

## 2022-05-05 ENCOUNTER — OFFICE VISIT (OUTPATIENT)
Dept: INFECTIOUS DISEASES | Facility: CLINIC | Age: 32
End: 2022-05-05
Payer: MEDICARE

## 2022-05-05 DIAGNOSIS — R50.9 FEVER, UNSPECIFIED FEVER CAUSE: ICD-10-CM

## 2022-05-05 DIAGNOSIS — S14.109S QUADRIPLEGIA, POST-TRAUMATIC: ICD-10-CM

## 2022-05-05 DIAGNOSIS — K59.04 CHRONIC IDIOPATHIC CONSTIPATION: ICD-10-CM

## 2022-05-05 DIAGNOSIS — G82.50 QUADRIPLEGIA, POST-TRAUMATIC: ICD-10-CM

## 2022-05-05 DIAGNOSIS — Z87.440 HISTORY OF RECURRENT UTIS: ICD-10-CM

## 2022-05-05 DIAGNOSIS — N39.0 COMPLICATED UTI (URINARY TRACT INFECTION): ICD-10-CM

## 2022-05-05 DIAGNOSIS — Z93.59 SUPRAPUBIC CATHETER: ICD-10-CM

## 2022-05-05 DIAGNOSIS — R53.2 FUNCTIONAL QUADRIPLEGIA: ICD-10-CM

## 2022-05-05 LAB
BACTERIA #/AREA URNS HPF: NORMAL /HPF
BILIRUB UR QL STRIP: NEGATIVE
CLARITY UR: ABNORMAL
COLOR UR: YELLOW
GLUCOSE UR QL STRIP: NEGATIVE
HGB UR QL STRIP: NEGATIVE
KETONES UR QL STRIP: NEGATIVE
LEUKOCYTE ESTERASE UR QL STRIP: ABNORMAL
MICROSCOPIC COMMENT: NORMAL
NITRITE UR QL STRIP: NEGATIVE
PH UR STRIP: >8 [PH] (ref 5–8)
PROT UR QL STRIP: NEGATIVE
SP GR UR STRIP: <1.005 (ref 1–1.03)
TRI-PHOS CRY URNS QL MICRO: NORMAL
URN SPEC COLLECT METH UR: ABNORMAL
WBC #/AREA URNS HPF: 3 /HPF (ref 0–5)

## 2022-05-05 PROCEDURE — 99214 OFFICE O/P EST MOD 30 MIN: CPT | Mod: 95,,, | Performed by: INTERNAL MEDICINE

## 2022-05-05 PROCEDURE — 99214 PR OFFICE/OUTPT VISIT, EST, LEVL IV, 30-39 MIN: ICD-10-PCS | Mod: 95,,, | Performed by: INTERNAL MEDICINE

## 2022-05-05 PROCEDURE — 99499 UNLISTED E&M SERVICE: CPT | Mod: 95,,, | Performed by: INTERNAL MEDICINE

## 2022-05-05 PROCEDURE — 99499 RISK ADDL DX/OHS AUDIT: ICD-10-PCS | Mod: 95,,, | Performed by: INTERNAL MEDICINE

## 2022-05-05 PROCEDURE — 81000 URINALYSIS NONAUTO W/SCOPE: CPT

## 2022-05-05 RX ORDER — METHENAMINE HIPPURATE 1000 MG/1
1 TABLET ORAL 2 TIMES DAILY
Qty: 60 TABLET | Refills: 4 | Status: SHIPPED | OUTPATIENT
Start: 2022-05-05 | End: 2022-06-04

## 2022-05-05 NOTE — ASSESSMENT & PLAN NOTE
Will add Hipprex, he was advised that he is likely colonized and we will plan to do symptomatic therapy .  Will follow repeat UA

## 2022-05-05 NOTE — PROGRESS NOTES
Subjective:    This is a virtual visit .    Location -[place]   Patient ID: Kate Lazo is a 32 y.o. male.    Chief Complaint: Follow up UTI  HPI   Last clinic note-  31 year old man with PMH as listed below. He has history of quadriparesis and has chronic suprapubic alonso hospital. He is in a wheelchair with his step Dad    Cultures reviewed-  04/08-Urine culture-pseudomonas  1/14- Pseudomonas   12/11/20- pseudomonas/morganella  09/2020-E coli -ESBL  Case was discussed with Dr Loaiza and Fosfomycin was sent to the pharmacy.-04/13- FOSFOMYCIN po 3gram every 72 hours X2 doses  He is in a wheelchair.  11/30/21-  Latest urine culture-      11/8/21-proteus and was given Augmentin 875 mg for 7 days.   03/08/202-  Since the last clinic visit ,   Ct scan of the abdomen -02/10/2022-  Suprapubic pelvic catheter and bladder wall thickening, somewhat nonspecific in the setting of a nondistended bladder.   Moderate stool burden in the distal colon.  Correlation for constipation.   Cholelithiasis.   Questionable early sacral decubitus ulcer.  Correlation is advised     02.21/22- Urine culture-  PSEUDOMONAS AERUGINOSA   50,000 - 99,999 cfu/ml   He took the fosfomycin without clinical relief -his major symptoms are abdominal pain.  He reports that the urine smell gets better and he feels better .  He denies fever at this time.  The plan was made to start -IV meropenem for 2 weeks but he has not yet started PICC line.      05/05/22  Since the last visit ,he had another episode of UTI-04/19/22  KLEBSIELLA PNEUMONIAE-he was given Levaquin   He was seen by PCP yesterday for malaise.  Cbc WAS NORMAL          Review of Systems   Constitutional: Positive for activity change. Negative for appetite change and chills.   Respiratory: Negative for apnea and chest tightness.    Neurological: Negative for disturbances in coordination and coordination difficulties.         Objective:      Physical Exam    Assessment:         1.  Quadriplegia, post-traumatic     2. Complicated UTI (urinary tract infection)     3. Chronic idiopathic constipation     4. Functional quadriplegia         Plan:       Problem List Items Addressed This Visit     Quadriplegia, post-traumatic     Continue supportive care            Functional quadriplegia     Continue supportive care            Complicated UTI (urinary tract infection)     Will add Hipprex, he was advised that he is likely colonized and we will plan to do symptomatic therapy .  Will follow repeat UA           Chronic idiopathic constipation     Follow PCP

## 2022-05-11 DIAGNOSIS — S14.109S QUADRIPLEGIA, POST-TRAUMATIC: ICD-10-CM

## 2022-05-11 DIAGNOSIS — G82.50 QUADRIPLEGIA, POST-TRAUMATIC: ICD-10-CM

## 2022-05-11 NOTE — TELEPHONE ENCOUNTER
No new care gaps identified.  [facility] Embedded Care Gaps. Reference number: [id number]. 5/11/2022   3:35:20 PM CDT

## 2022-05-12 RX ORDER — OXYBUTYNIN CHLORIDE 5 MG/1
5 TABLET ORAL 2 TIMES DAILY
Qty: 180 TABLET | Refills: 1 | Status: SHIPPED | OUTPATIENT
Start: 2022-05-12 | End: 2022-05-27 | Stop reason: SDUPTHER

## 2022-05-12 RX ORDER — BACLOFEN 10 MG/1
TABLET ORAL
Qty: 270 TABLET | Refills: 1 | Status: SHIPPED | OUTPATIENT
Start: 2022-05-12 | End: 2022-07-07 | Stop reason: SDUPTHER

## 2022-05-12 RX ORDER — OMEPRAZOLE 20 MG/1
20 CAPSULE, DELAYED RELEASE ORAL DAILY
Qty: 90 CAPSULE | Refills: 1 | Status: SHIPPED | OUTPATIENT
Start: 2022-05-12 | End: 2022-07-07 | Stop reason: SDUPTHER

## 2022-05-12 RX ORDER — BACLOFEN 20 MG/1
20 TABLET ORAL 3 TIMES DAILY
Qty: 270 TABLET | Refills: 1 | Status: SHIPPED | OUTPATIENT
Start: 2022-05-12 | End: 2022-07-07 | Stop reason: SDUPTHER

## 2022-05-15 ENCOUNTER — PATIENT MESSAGE (OUTPATIENT)
Dept: INFECTIOUS DISEASES | Facility: CLINIC | Age: 32
End: 2022-05-15
Payer: MEDICARE

## 2022-05-17 ENCOUNTER — LAB VISIT (OUTPATIENT)
Dept: LAB | Facility: HOSPITAL | Age: 32
End: 2022-05-17
Attending: INTERNAL MEDICINE
Payer: COMMERCIAL

## 2022-05-17 DIAGNOSIS — T83.511A URINARY TRACT INFECTION ASSOCIATED WITH INDWELLING URETHRAL CATHETER, INITIAL ENCOUNTER: Primary | ICD-10-CM

## 2022-05-17 DIAGNOSIS — T83.511A URINARY TRACT INFECTION ASSOCIATED WITH INDWELLING URETHRAL CATHETER, INITIAL ENCOUNTER: ICD-10-CM

## 2022-05-17 DIAGNOSIS — N39.0 URINARY TRACT INFECTION ASSOCIATED WITH INDWELLING URETHRAL CATHETER, INITIAL ENCOUNTER: Primary | ICD-10-CM

## 2022-05-17 DIAGNOSIS — N39.0 URINARY TRACT INFECTION ASSOCIATED WITH INDWELLING URETHRAL CATHETER, INITIAL ENCOUNTER: ICD-10-CM

## 2022-05-17 LAB
BACTERIA #/AREA URNS HPF: ABNORMAL /HPF
BILIRUB UR QL STRIP: NEGATIVE
CLARITY UR: ABNORMAL
COLOR UR: YELLOW
GLUCOSE UR QL STRIP: NEGATIVE
HGB UR QL STRIP: ABNORMAL
KETONES UR QL STRIP: NEGATIVE
LEUKOCYTE ESTERASE UR QL STRIP: ABNORMAL
MICROSCOPIC COMMENT: ABNORMAL
NITRITE UR QL STRIP: NEGATIVE
PH UR STRIP: 8 [PH] (ref 5–8)
PROT UR QL STRIP: ABNORMAL
RBC #/AREA URNS HPF: 7 /HPF (ref 0–4)
SP GR UR STRIP: 1.01 (ref 1–1.03)
SQUAMOUS #/AREA URNS HPF: 3 /HPF
URN SPEC COLLECT METH UR: ABNORMAL
UROBILINOGEN UR STRIP-ACNC: NEGATIVE EU/DL
WBC #/AREA URNS HPF: 16 /HPF (ref 0–5)

## 2022-05-17 PROCEDURE — 81000 URINALYSIS NONAUTO W/SCOPE: CPT | Performed by: INTERNAL MEDICINE

## 2022-05-17 PROCEDURE — 87086 URINE CULTURE/COLONY COUNT: CPT | Performed by: INTERNAL MEDICINE

## 2022-05-17 PROCEDURE — 87184 SC STD DISK METHOD PER PLATE: CPT | Performed by: INTERNAL MEDICINE

## 2022-05-17 PROCEDURE — 87186 SC STD MICRODIL/AGAR DIL: CPT | Mod: 59 | Performed by: INTERNAL MEDICINE

## 2022-05-17 PROCEDURE — 87077 CULTURE AEROBIC IDENTIFY: CPT | Performed by: INTERNAL MEDICINE

## 2022-05-17 PROCEDURE — 87088 URINE BACTERIA CULTURE: CPT | Performed by: INTERNAL MEDICINE

## 2022-05-18 RX ORDER — GRANULES FOR ORAL 3 G/1
3 POWDER ORAL
Qty: 9 G | Refills: 0 | Status: SHIPPED | OUTPATIENT
Start: 2022-05-18 | End: 2022-05-25

## 2022-05-21 LAB — BACTERIA UR CULT: ABNORMAL

## 2022-05-23 RX ORDER — AMOXICILLIN AND CLAVULANATE POTASSIUM 875; 125 MG/1; MG/1
1 TABLET, FILM COATED ORAL EVERY 12 HOURS
Qty: 20 TABLET | Refills: 0 | Status: SHIPPED | OUTPATIENT
Start: 2022-05-23 | End: 2022-06-02

## 2022-05-27 ENCOUNTER — PATIENT MESSAGE (OUTPATIENT)
Dept: INTERNAL MEDICINE | Facility: CLINIC | Age: 32
End: 2022-05-27
Payer: MEDICARE

## 2022-05-27 DIAGNOSIS — S14.109S QUADRIPLEGIA, POST-TRAUMATIC: ICD-10-CM

## 2022-05-27 DIAGNOSIS — G82.50 QUADRIPLEGIA, POST-TRAUMATIC: ICD-10-CM

## 2022-05-27 RX ORDER — OXYBUTYNIN CHLORIDE 5 MG/1
5 TABLET ORAL 2 TIMES DAILY
Qty: 7 TABLET | Refills: 0 | Status: SHIPPED | OUTPATIENT
Start: 2022-05-27 | End: 2022-06-20 | Stop reason: SDUPTHER

## 2022-05-27 RX ORDER — AMITRIPTYLINE HYDROCHLORIDE 25 MG/1
TABLET, FILM COATED ORAL
Qty: 7 TABLET | Refills: 0 | Status: SHIPPED | OUTPATIENT
Start: 2022-05-27 | End: 2022-06-21 | Stop reason: SDUPTHER

## 2022-05-27 NOTE — TELEPHONE ENCOUNTER
No new care gaps identified.  [facility] Embedded Care Gaps. Reference number: [id number]. 5/27/2022   2:19:43 PM CDT

## 2022-06-02 ENCOUNTER — PATIENT MESSAGE (OUTPATIENT)
Dept: INFECTIOUS DISEASES | Facility: CLINIC | Age: 32
End: 2022-06-02
Payer: MEDICARE

## 2022-06-03 DIAGNOSIS — N39.0 COMPLICATED UTI (URINARY TRACT INFECTION): Primary | ICD-10-CM

## 2022-06-09 ENCOUNTER — LAB VISIT (OUTPATIENT)
Dept: LAB | Facility: HOSPITAL | Age: 32
End: 2022-06-09
Attending: FAMILY MEDICINE
Payer: MEDICARE

## 2022-06-09 DIAGNOSIS — N39.0 COMPLICATED UTI (URINARY TRACT INFECTION): ICD-10-CM

## 2022-06-09 LAB
BACTERIA #/AREA URNS HPF: ABNORMAL /HPF
BILIRUB UR QL STRIP: NEGATIVE
CLARITY UR: ABNORMAL
COLOR UR: YELLOW
GLUCOSE UR QL STRIP: NEGATIVE
HGB UR QL STRIP: ABNORMAL
KETONES UR QL STRIP: NEGATIVE
LEUKOCYTE ESTERASE UR QL STRIP: ABNORMAL
MICROSCOPIC COMMENT: ABNORMAL
NITRITE UR QL STRIP: NEGATIVE
PH UR STRIP: 6.5 [PH] (ref 5–8)
PROT UR QL STRIP: NEGATIVE
RBC #/AREA URNS HPF: 5 /HPF (ref 0–4)
SP GR UR STRIP: 1.01 (ref 1–1.03)
SQUAMOUS #/AREA URNS HPF: 3 /HPF
URN SPEC COLLECT METH UR: ABNORMAL
UROBILINOGEN UR STRIP-ACNC: NEGATIVE EU/DL
WBC #/AREA URNS HPF: 28 /HPF (ref 0–5)

## 2022-06-09 PROCEDURE — 87186 SC STD MICRODIL/AGAR DIL: CPT | Performed by: INTERNAL MEDICINE

## 2022-06-09 PROCEDURE — 87086 URINE CULTURE/COLONY COUNT: CPT | Performed by: INTERNAL MEDICINE

## 2022-06-09 PROCEDURE — 81000 URINALYSIS NONAUTO W/SCOPE: CPT | Performed by: INTERNAL MEDICINE

## 2022-06-09 PROCEDURE — 87077 CULTURE AEROBIC IDENTIFY: CPT | Performed by: INTERNAL MEDICINE

## 2022-06-09 PROCEDURE — 87088 URINE BACTERIA CULTURE: CPT | Performed by: INTERNAL MEDICINE

## 2022-06-12 LAB — BACTERIA UR CULT: ABNORMAL

## 2022-06-13 ENCOUNTER — PATIENT MESSAGE (OUTPATIENT)
Dept: INFECTIOUS DISEASES | Facility: CLINIC | Age: 32
End: 2022-06-13
Payer: MEDICARE

## 2022-06-13 ENCOUNTER — TELEPHONE (OUTPATIENT)
Dept: INTERNAL MEDICINE | Facility: CLINIC | Age: 32
End: 2022-06-13
Payer: MEDICARE

## 2022-06-13 ENCOUNTER — HOSPITAL ENCOUNTER (EMERGENCY)
Facility: HOSPITAL | Age: 32
Discharge: HOME | End: 2022-06-13
Attending: EMERGENCY MEDICINE
Payer: COMMERCIAL

## 2022-06-13 ENCOUNTER — TELEPHONE (OUTPATIENT)
Dept: INFECTIOUS DISEASES | Facility: CLINIC | Age: 32
End: 2022-06-13
Payer: MEDICARE

## 2022-06-13 VITALS
HEIGHT: 71 IN | SYSTOLIC BLOOD PRESSURE: 148 MMHG | TEMPERATURE: 99 F | RESPIRATION RATE: 18 BRPM | DIASTOLIC BLOOD PRESSURE: 96 MMHG | BODY MASS INDEX: 27.89 KG/M2 | OXYGEN SATURATION: 100 % | HEART RATE: 73 BPM

## 2022-06-13 DIAGNOSIS — M25.511 RIGHT SHOULDER PAIN: ICD-10-CM

## 2022-06-13 PROCEDURE — 99283 EMERGENCY DEPT VISIT LOW MDM: CPT

## 2022-06-13 RX ORDER — HYDROCODONE BITARTRATE AND ACETAMINOPHEN 5; 325 MG/1; MG/1
1 TABLET ORAL EVERY 6 HOURS PRN
Qty: 12 TABLET | Refills: 0 | Status: SHIPPED | OUTPATIENT
Start: 2022-06-13 | End: 2022-09-08

## 2022-06-13 NOTE — TELEPHONE ENCOUNTER
----- Message from Jaden Castro MD sent at 6/13/2022  7:03 AM CDT -----  We need to set up IV antibiotics for 7 days with IV Ertapenem .  We can use a midline   Contact [facility]

## 2022-06-13 NOTE — TELEPHONE ENCOUNTER
Tried calling Rossy back about the patient's paperwork placed on hold for extended periods of time.

## 2022-06-13 NOTE — TELEPHONE ENCOUNTER
----- Message from Lurdes Tom sent at 6/13/2022  1:39 PM CDT -----  Contact: Rossy/ national Queenie Blair would like a call back at [phone] [phone], in regards to if paperwork that she faxed over has been received.
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
## 2022-06-13 NOTE — TELEPHONE ENCOUNTER
----- Message from Lurdes Tom sent at 6/13/2022  1:39 PM CDT -----  Contact: Rossy/ national [employer]ie Blair would like a call back at [phone], in regards to if paperwork that she faxed over has been received.

## 2022-06-16 ENCOUNTER — LAB VISIT (OUTPATIENT)
Dept: LAB | Facility: HOSPITAL | Age: 32
End: 2022-06-16
Attending: INTERNAL MEDICINE
Payer: MEDICARE

## 2022-06-16 DIAGNOSIS — N39.0 URINARY TRACT INFECTION, SITE NOT SPECIFIED: Primary | ICD-10-CM

## 2022-06-16 LAB
ANION GAP SERPL CALC-SCNC: 11 MMOL/L (ref 8–16)
BASOPHILS # BLD AUTO: 0.03 K/UL (ref 0–0.2)
BASOPHILS NFR BLD: 0.5 % (ref 0–1.9)
BUN SERPL-MCNC: 9 MG/DL (ref 6–20)
CALCIUM SERPL-MCNC: 9 MG/DL (ref 8.7–10.5)
CHLORIDE SERPL-SCNC: 102 MMOL/L (ref 95–110)
CO2 SERPL-SCNC: 24 MMOL/L (ref 23–29)
CREAT SERPL-MCNC: 0.8 MG/DL (ref 0.5–1.4)
CRP SERPL-MCNC: 3.6 MG/L (ref 0–8.2)
DIFFERENTIAL METHOD: ABNORMAL
EOSINOPHIL # BLD AUTO: 0.5 K/UL (ref 0–0.5)
EOSINOPHIL NFR BLD: 8.1 % (ref 0–8)
ERYTHROCYTE [DISTWIDTH] IN BLOOD BY AUTOMATED COUNT: 13.2 % (ref 11.5–14.5)
ERYTHROCYTE [SEDIMENTATION RATE] IN BLOOD BY WESTERGREN METHOD: 9 MM/HR (ref 0–10)
EST. GFR  (AFRICAN AMERICAN): >60 ML/MIN/1.73 M^2
EST. GFR  (NON AFRICAN AMERICAN): >60 ML/MIN/1.73 M^2
GLUCOSE SERPL-MCNC: 116 MG/DL (ref 70–110)
HCT VFR BLD AUTO: 43.4 % (ref 40–54)
HGB BLD-MCNC: 14.3 G/DL (ref 14–18)
IMM GRANULOCYTES # BLD AUTO: 0.01 K/UL (ref 0–0.04)
IMM GRANULOCYTES NFR BLD AUTO: 0.2 % (ref 0–0.5)
LYMPHOCYTES # BLD AUTO: 1.8 K/UL (ref 1–4.8)
LYMPHOCYTES NFR BLD: 32.1 % (ref 18–48)
MCH RBC QN AUTO: 28.7 PG (ref 27–31)
MCHC RBC AUTO-ENTMCNC: 32.9 G/DL (ref 32–36)
MCV RBC AUTO: 87 FL (ref 82–98)
MONOCYTES # BLD AUTO: 0.4 K/UL (ref 0.3–1)
MONOCYTES NFR BLD: 7 % (ref 4–15)
NEUTROPHILS # BLD AUTO: 2.9 K/UL (ref 1.8–7.7)
NEUTROPHILS NFR BLD: 52.1 % (ref 38–73)
NRBC BLD-RTO: 0 /100 WBC
PLATELET # BLD AUTO: 217 K/UL (ref 150–450)
PMV BLD AUTO: 11.2 FL (ref 9.2–12.9)
POTASSIUM SERPL-SCNC: 4.4 MMOL/L (ref 3.5–5.1)
RBC # BLD AUTO: 4.98 M/UL (ref 4.6–6.2)
SODIUM SERPL-SCNC: 137 MMOL/L (ref 136–145)
WBC # BLD AUTO: 5.57 K/UL (ref 3.9–12.7)

## 2022-06-16 PROCEDURE — 80048 BASIC METABOLIC PNL TOTAL CA: CPT | Performed by: INTERNAL MEDICINE

## 2022-06-16 PROCEDURE — 36415 COLL VENOUS BLD VENIPUNCTURE: CPT | Performed by: INTERNAL MEDICINE

## 2022-06-16 PROCEDURE — 86140 C-REACTIVE PROTEIN: CPT | Performed by: INTERNAL MEDICINE

## 2022-06-16 PROCEDURE — 85651 RBC SED RATE NONAUTOMATED: CPT | Performed by: INTERNAL MEDICINE

## 2022-06-16 PROCEDURE — 85025 COMPLETE CBC W/AUTO DIFF WBC: CPT | Performed by: INTERNAL MEDICINE

## 2022-06-20 RX ORDER — BACLOFEN 20 MG/1
20 TABLET ORAL 3 TIMES DAILY
Qty: 270 TABLET | Refills: 1 | OUTPATIENT
Start: 2022-06-20

## 2022-06-20 RX ORDER — BACLOFEN 10 MG/1
TABLET ORAL
Qty: 270 TABLET | Refills: 1 | OUTPATIENT
Start: 2022-06-20

## 2022-06-20 RX ORDER — OXYBUTYNIN CHLORIDE 5 MG/1
5 TABLET ORAL 2 TIMES DAILY
Qty: 7 TABLET | Refills: 0 | Status: SHIPPED | OUTPATIENT
Start: 2022-06-20 | End: 2022-07-01 | Stop reason: SDUPTHER

## 2022-06-21 ENCOUNTER — PATIENT MESSAGE (OUTPATIENT)
Dept: INFECTIOUS DISEASES | Facility: CLINIC | Age: 32
End: 2022-06-21
Payer: MEDICARE

## 2022-06-21 DIAGNOSIS — S14.109S QUADRIPLEGIA, POST-TRAUMATIC: ICD-10-CM

## 2022-06-21 DIAGNOSIS — G82.50 QUADRIPLEGIA, POST-TRAUMATIC: ICD-10-CM

## 2022-06-21 RX ORDER — AMITRIPTYLINE HYDROCHLORIDE 25 MG/1
TABLET, FILM COATED ORAL
Qty: 7 TABLET | Refills: 0 | Status: SHIPPED | OUTPATIENT
Start: 2022-06-21 | End: 2022-07-01 | Stop reason: SDUPTHER

## 2022-06-21 NOTE — TELEPHONE ENCOUNTER
----- Message from Isaura Henriquez sent at 6/21/2022 10:47 AM CDT -----  Contact: brandon  Type:  RX Refill Request    Who Called: brandon  Refill or New Rx:refill   RX Name and Strength:amitriptyline  How is the patient currently taking it? (ex. 1XDay): once a day   Is this a 30 day or 90 day RX:90  Preferred Pharmacy with phone number:  [facility] [address] [facility] [address] [address] [facility]  [address] [facility] [address]  Phone: [phone] Fax: [phone]  Local or Mail Order:Local  Ordering Provider:DR Burton  Would the patient rather a call back or a response via [employer]? [facility]   Best Call Back Number: N/A  Additional Information: n/a      Thanks  Dd

## 2022-06-21 NOTE — TELEPHONE ENCOUNTER
No new care gaps identified.  [facility] Embedded Care Gaps. Reference number: [id number]. 6/21/2022   12:22:31 PM CDT

## 2022-06-29 RX ORDER — METHENAMINE HIPPURATE 1000 MG/1
1 TABLET ORAL 2 TIMES DAILY
Status: CANCELLED | OUTPATIENT
Start: 2022-06-29

## 2022-07-01 ENCOUNTER — PATIENT MESSAGE (OUTPATIENT)
Dept: INFECTIOUS DISEASES | Facility: CLINIC | Age: 32
End: 2022-07-01
Payer: MEDICARE

## 2022-07-01 DIAGNOSIS — G82.50 QUADRIPLEGIA, POST-TRAUMATIC: ICD-10-CM

## 2022-07-01 DIAGNOSIS — S14.109S QUADRIPLEGIA, POST-TRAUMATIC: ICD-10-CM

## 2022-07-01 NOTE — TELEPHONE ENCOUNTER
----- Message from Dale Lawrence sent at 7/1/2022  1:56 PM CDT -----  Contact: Kate  Type:  RX Refill Request    Who Called: Jaderrick  Refill or New Rx:Refill  RX Name and Strength:amitriptyline  How is the patient currently taking it? (ex. 1XDay)  Is this a 30 day or 90 day RX:  Preferred Pharm[facility] with phone number:  [facility] [address]  [place], [place] - [address] [address]  [address]  [facility] [address]  Phone: [phone] Fax: [phone]  Local or Mail Order:Local  Ordering Provider:  Would the patient rather a call back or a response via [email or web address]? Call back   Best Call Back Number:[phone]    Type:  RX Refill Request    Who Called: Kate  Refill or New Rx:Refill  RX Name and Strength:oxybutynin  How is the patient currently taking it? (ex. 1XDay)  Is this a 30 day or 90 day RX:  Preferred Pharm[facility] with phone number:  [facility] [address]  [place], [place] - [address] [address]  [address]  [place] [address]  Phone: [phone] Fax: [phone]  Local or Mail Order:Local  Ordering Provider:  Would the patient rather a call back or a response via [email or web address]? Call back   Best Call Back Number:[phone]  Additional Information:

## 2022-07-02 ENCOUNTER — PATIENT MESSAGE (OUTPATIENT)
Dept: INFECTIOUS DISEASES | Facility: CLINIC | Age: 32
End: 2022-07-02
Payer: MEDICARE

## 2022-07-05 ENCOUNTER — TELEPHONE (OUTPATIENT)
Dept: INTERNAL MEDICINE | Facility: CLINIC | Age: 32
End: 2022-07-05
Payer: MEDICARE

## 2022-07-05 RX ORDER — OXYBUTYNIN CHLORIDE 5 MG/1
5 TABLET ORAL 2 TIMES DAILY
Qty: 180 TABLET | Refills: 1 | Status: SHIPPED | OUTPATIENT
Start: 2022-07-05 | End: 2023-01-06 | Stop reason: SDUPTHER

## 2022-07-05 RX ORDER — AMITRIPTYLINE HYDROCHLORIDE 25 MG/1
TABLET, FILM COATED ORAL
Qty: 90 TABLET | Refills: 1 | Status: SHIPPED | OUTPATIENT
Start: 2022-07-05 | End: 2023-01-06 | Stop reason: SDUPTHER

## 2022-07-05 NOTE — TELEPHONE ENCOUNTER
----- Message from Mariella Abbott sent at 7/5/2022 11:52 AM CDT -----  Contact: Patient [phone]  Requesting an RX refill or new RX.  Is this a refill or new RX:   RX name and strength amitriptyline (ELAVIL) 25 MG tablet  Is this a 30 day or 90 day RX:   Pharmacy name and phone # [facility] [address] [facility], [place] - [address] [address]  The doctors have asked that we provide their patients with the following 2 reminders -- prescription refills can take up to 72 hours, and a friendly reminder that in the future you can use your [facility] account to request refills: yes        Requesting an RX refill or new RX.  Is this a refill or new RX:   RX name and strength oxybutynin (DITROPAN) 5 MG Tab  Is this a 30 day or 90 day RX:   [facility] [address] [facility] [place] [place] [facility] [address] [address]  The doctors have asked that we provide their patients with the following 2 reminders -- prescription refills can take up to 72 hours, and a friendly reminder that in the future you can use your [facility] account to request refills: yes

## 2022-07-07 NOTE — TELEPHONE ENCOUNTER
No new care gaps identified.  [facility] Embedded Care Gaps. Reference number: [id number]. 7/07/2022   10:53:34 AM BHAVANAT

## 2022-07-08 RX ORDER — BACLOFEN 20 MG/1
20 TABLET ORAL 3 TIMES DAILY
Qty: 270 TABLET | Refills: 1 | Status: SHIPPED | OUTPATIENT
Start: 2022-07-08 | End: 2023-03-09 | Stop reason: SDUPTHER

## 2022-07-08 RX ORDER — OMEPRAZOLE 20 MG/1
20 CAPSULE, DELAYED RELEASE ORAL DAILY
Qty: 90 CAPSULE | Refills: 1 | Status: SHIPPED | OUTPATIENT
Start: 2022-07-08 | End: 2022-11-28 | Stop reason: SDUPTHER

## 2022-07-08 RX ORDER — BACLOFEN 10 MG/1
TABLET ORAL
Qty: 270 TABLET | Refills: 1 | Status: SHIPPED | OUTPATIENT
Start: 2022-07-08 | End: 2023-01-06 | Stop reason: SDUPTHER

## 2022-07-13 ENCOUNTER — OFFICE VISIT (OUTPATIENT)
Dept: INFECTIOUS DISEASES | Facility: CLINIC | Age: 32
End: 2022-07-13
Payer: MEDICARE

## 2022-07-13 DIAGNOSIS — S41.139A: ICD-10-CM

## 2022-07-13 DIAGNOSIS — N39.0 COMPLICATED UTI (URINARY TRACT INFECTION): Primary | ICD-10-CM

## 2022-07-13 DIAGNOSIS — R53.2 FUNCTIONAL QUADRIPLEGIA: ICD-10-CM

## 2022-07-13 DIAGNOSIS — I10 PRIMARY HYPERTENSION: ICD-10-CM

## 2022-07-13 PROCEDURE — 99214 OFFICE O/P EST MOD 30 MIN: CPT | Mod: 95,,, | Performed by: INTERNAL MEDICINE

## 2022-07-13 PROCEDURE — 99499 UNLISTED E&M SERVICE: CPT | Mod: 95,,, | Performed by: INTERNAL MEDICINE

## 2022-07-13 PROCEDURE — 99499 RISK ADDL DX/OHS AUDIT: ICD-10-PCS | Mod: 95,,, | Performed by: INTERNAL MEDICINE

## 2022-07-13 PROCEDURE — 99214 PR OFFICE/OUTPT VISIT, EST, LEVL IV, 30-39 MIN: ICD-10-PCS | Mod: 95,,, | Performed by: INTERNAL MEDICINE

## 2022-07-13 RX ORDER — PHENAZOPYRIDINE HYDROCHLORIDE 99.5 MG/1
1 TABLET ORAL 3 TIMES DAILY PRN
Qty: 30 TABLET | Refills: 1 | Status: SHIPPED | OUTPATIENT
Start: 2022-07-13 | End: 2023-07-21

## 2022-07-13 NOTE — ASSESSMENT & PLAN NOTE
He was advised to reduce frequent monthly testing .  Over the last 6-7 months, he has taken monthly medications/antibiiotics .  Hipprex cannot be used as it requires urine acidification  And not effective in catheter associated UTIS because sufficient  concentration of formaldehyde cannot be maintained in catheterized bladder     It is metabolized to formaldehyde and ammonia . The active agent is formaldehyde and this needs acid environment to occur .  Will try azo dye and see .     Will change alonso  every 2 weeks
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
## 2022-07-14 ENCOUNTER — TELEPHONE (OUTPATIENT)
Dept: INFECTIOUS DISEASES | Facility: CLINIC | Age: 32
End: 2022-07-14
Payer: MEDICARE

## 2022-07-14 ENCOUNTER — TELEPHONE (OUTPATIENT)
Dept: INTERNAL MEDICINE | Facility: CLINIC | Age: 32
End: 2022-07-14
Payer: MEDICARE

## 2022-07-14 NOTE — TELEPHONE ENCOUNTER
----- Message from Luh Waddell sent at 7/14/2022  4:02 PM CDT -----  Contact: Patient  Patient called to consult with nurse or staff regarding some documentation that was faxed from the patients urologist. He was calling to see if the office had received the information and would like to know what's next. Patient would like a call back and can be reached at [phone]. Thanks/MR

## 2022-07-14 NOTE — TELEPHONE ENCOUNTER
----- Message from Jillian Lazo sent at 7/14/2022 12:31 PM CDT -----  Contact: [phone]  Calling requesting the status of pt wheelchair referral . Please call back at [phone] ext [phone] . James/kaylen

## 2022-07-14 NOTE — TELEPHONE ENCOUNTER
Left a message to [employer] to return a message to the clinic about the wheelchair for this patient.

## 2022-07-15 ENCOUNTER — TELEPHONE (OUTPATIENT)
Dept: INFECTIOUS DISEASES | Facility: CLINIC | Age: 32
End: 2022-07-15
Payer: MEDICARE

## 2022-07-15 ENCOUNTER — TELEPHONE (OUTPATIENT)
Dept: INTERNAL MEDICINE | Facility: CLINIC | Age: 32
End: 2022-07-15
Payer: MEDICARE

## 2022-07-15 NOTE — TELEPHONE ENCOUNTER
----- Message from Courtney Huston sent at 7/15/2022 10:05 AM CDT -----  Contact: Self [phone]  Would like to receive medical advice.    Would they like a call back or a response via [facility]:  call back    Additional information:  Calling to follow up on a antibiotic iv medication status.

## 2022-07-15 NOTE — TELEPHONE ENCOUNTER
----- Message from Ekaterina Huston sent at 7/15/2022 12:48 PM CDT -----  Contact: Rossy/ [facility]/ [phone] ex [phone]  Patient is returning a phone call.  Who left a message for the patient: Rosario Justin LPN  Does patient know what this is regarding:    Would you like a call back, or a response through your [facility] portal?:   call back  Comments:

## 2022-07-18 ENCOUNTER — TELEPHONE (OUTPATIENT)
Dept: INTERNAL MEDICINE | Facility: CLINIC | Age: 32
End: 2022-07-18
Payer: MEDICARE

## 2022-07-18 NOTE — TELEPHONE ENCOUNTER
----- Message from Gracie Quintero sent at 7/18/2022  3:00 PM CDT -----  Contact: Rossy/ [employer]  Rossy would like a call back at  [phone] ext [phone] in regards to knowing if the paper work she sent on Friday 7/15/2022 was received.      Thanks

## 2022-07-28 ENCOUNTER — PATIENT MESSAGE (OUTPATIENT)
Dept: INFECTIOUS DISEASES | Facility: CLINIC | Age: 32
End: 2022-07-28
Payer: MEDICARE

## 2022-08-01 ENCOUNTER — PATIENT MESSAGE (OUTPATIENT)
Dept: INFECTIOUS DISEASES | Facility: CLINIC | Age: 32
End: 2022-08-01
Payer: MEDICARE

## 2022-08-02 ENCOUNTER — PATIENT MESSAGE (OUTPATIENT)
Dept: INTERNAL MEDICINE | Facility: CLINIC | Age: 32
End: 2022-08-02
Payer: MEDICARE

## 2022-08-05 ENCOUNTER — PATIENT MESSAGE (OUTPATIENT)
Dept: INFECTIOUS DISEASES | Facility: CLINIC | Age: 32
End: 2022-08-05
Payer: MEDICARE

## 2022-08-05 ENCOUNTER — OFFICE VISIT (OUTPATIENT)
Dept: INTERNAL MEDICINE | Facility: CLINIC | Age: 32
End: 2022-08-05
Payer: MEDICAID

## 2022-08-05 ENCOUNTER — TELEPHONE (OUTPATIENT)
Dept: INTERNAL MEDICINE | Facility: CLINIC | Age: 32
End: 2022-08-05
Payer: MEDICARE

## 2022-08-05 DIAGNOSIS — F41.9 ANXIETY: Primary | ICD-10-CM

## 2022-08-05 DIAGNOSIS — K59.04 CHRONIC IDIOPATHIC CONSTIPATION: ICD-10-CM

## 2022-08-05 DIAGNOSIS — Z87.440 HISTORY OF RECURRENT UTIS: ICD-10-CM

## 2022-08-05 PROCEDURE — 99214 PR OFFICE/OUTPT VISIT, EST, LEVL IV, 30-39 MIN: ICD-10-PCS | Mod: 95,,, | Performed by: FAMILY MEDICINE

## 2022-08-05 PROCEDURE — 99214 OFFICE O/P EST MOD 30 MIN: CPT | Mod: 95,,, | Performed by: FAMILY MEDICINE

## 2022-08-05 RX ORDER — BUSPIRONE HYDROCHLORIDE 10 MG/1
10 TABLET ORAL 2 TIMES DAILY
Qty: 180 TABLET | Refills: 0 | Status: SHIPPED | OUTPATIENT
Start: 2022-08-05 | End: 2022-09-08

## 2022-08-05 RX ORDER — NITROFURANTOIN 25; 75 MG/1; MG/1
100 CAPSULE ORAL DAILY
Qty: 30 CAPSULE | Refills: 2 | Status: SHIPPED | OUTPATIENT
Start: 2022-08-05 | End: 2022-09-04

## 2022-08-05 NOTE — TELEPHONE ENCOUNTER
----- Message from Dale Lawrence sent at 8/5/2022  4:28 PM CDT -----  Contact: Jaquelin Mcbride is unable to join virtual appt, miguel isn't allowing him to access. Please give him a call at [phone]
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
## 2022-08-25 ENCOUNTER — TELEPHONE (OUTPATIENT)
Dept: INFECTIOUS DISEASES | Facility: CLINIC | Age: 32
End: 2022-08-25
Payer: MEDICARE

## 2022-08-25 NOTE — TELEPHONE ENCOUNTER
----- Message from Ruba Irizarry sent at 8/25/2022 12:27 PM CDT -----  Contact: Clif[place]  Patient is calling to speak to the nurse regarding symptoms. Reports symptoms has gotten worsen. Also states may need IV antibiotics. Please give patient a call back at .[phone]

## 2022-08-29 ENCOUNTER — PATIENT MESSAGE (OUTPATIENT)
Dept: INFECTIOUS DISEASES | Facility: CLINIC | Age: 32
End: 2022-08-29
Payer: MEDICARE

## 2022-08-31 ENCOUNTER — LAB VISIT (OUTPATIENT)
Dept: LAB | Facility: HOSPITAL | Age: 32
End: 2022-08-31
Attending: INTERNAL MEDICINE
Payer: MEDICARE

## 2022-08-31 DIAGNOSIS — T83.511D URINARY TRACT INFECTION ASSOCIATED WITH INDWELLING URETHRAL CATHETER, SUBSEQUENT ENCOUNTER: Primary | ICD-10-CM

## 2022-08-31 DIAGNOSIS — N39.0 URINARY TRACT INFECTION ASSOCIATED WITH INDWELLING URETHRAL CATHETER, SUBSEQUENT ENCOUNTER: Primary | ICD-10-CM

## 2022-08-31 DIAGNOSIS — N39.0 URINARY TRACT INFECTION ASSOCIATED WITH INDWELLING URETHRAL CATHETER, SUBSEQUENT ENCOUNTER: ICD-10-CM

## 2022-08-31 DIAGNOSIS — T83.511D URINARY TRACT INFECTION ASSOCIATED WITH INDWELLING URETHRAL CATHETER, SUBSEQUENT ENCOUNTER: ICD-10-CM

## 2022-08-31 LAB
BACTERIA #/AREA URNS HPF: NORMAL /HPF
BILIRUB UR QL STRIP: NEGATIVE
CLARITY UR: CLEAR
COLOR UR: ABNORMAL
GLUCOSE UR QL STRIP: NEGATIVE
HGB UR QL STRIP: NEGATIVE
KETONES UR QL STRIP: NEGATIVE
LEUKOCYTE ESTERASE UR QL STRIP: ABNORMAL
MICROSCOPIC COMMENT: NORMAL
NITRITE UR QL STRIP: NEGATIVE
PH UR STRIP: 6 [PH] (ref 5–8)
PROT UR QL STRIP: NEGATIVE
SP GR UR STRIP: <1.005 (ref 1–1.03)
SQUAMOUS #/AREA URNS HPF: 2 /HPF
URN SPEC COLLECT METH UR: ABNORMAL
UROBILINOGEN UR STRIP-ACNC: NEGATIVE EU/DL
WBC #/AREA URNS HPF: 5 /HPF (ref 0–5)

## 2022-08-31 PROCEDURE — 81000 URINALYSIS NONAUTO W/SCOPE: CPT | Performed by: INTERNAL MEDICINE

## 2022-08-31 RX ORDER — PHENAZOPYRIDINE HYDROCHLORIDE 95 MG/1
95 TABLET ORAL 3 TIMES DAILY
Qty: 9 TABLET | Refills: 0 | Status: SHIPPED | OUTPATIENT
Start: 2022-08-31 | End: 2022-09-03

## 2022-09-01 ENCOUNTER — TELEPHONE (OUTPATIENT)
Dept: INTERNAL MEDICINE | Facility: CLINIC | Age: 32
End: 2022-09-01
Payer: MEDICARE

## 2022-09-01 NOTE — TELEPHONE ENCOUNTER
----- Message from Aissatou Grant sent at 9/1/2022  1:08 PM CDT -----  Contact: Kate  .Type:  RX Refill Request    Who Called: Kate  Refill or New Rx:refill  RX Name and Strength:baclofen (LIORESAL) 10 MG tablet  How is the patient currently taking it? (ex. 1XDay):as prescribed  Is this a 30 day or 90 day RX:90  Preferred Pharmacy with phone number:.  [facility] [address] [facility] [address] [facility]  [address] [facility] [address]  Phone: [phone] Fax: [phone]  Local or Mail Order:local  Ordering Provider:Dr. Burton  Would the patient rather a call back or a response via [facility]? call  Best Call Back Number:[phone]  Additional Information:   Thanks  LR

## 2022-09-08 ENCOUNTER — OFFICE VISIT (OUTPATIENT)
Dept: INTERNAL MEDICINE | Facility: CLINIC | Age: 32
End: 2022-09-08
Payer: MEDICAID

## 2022-09-08 VITALS
OXYGEN SATURATION: 97 % | SYSTOLIC BLOOD PRESSURE: 94 MMHG | TEMPERATURE: 97 F | DIASTOLIC BLOOD PRESSURE: 70 MMHG | HEART RATE: 63 BPM | BODY MASS INDEX: 27.89 KG/M2 | HEIGHT: 71 IN

## 2022-09-08 DIAGNOSIS — F41.9 ANXIETY: Primary | ICD-10-CM

## 2022-09-08 DIAGNOSIS — F33.0 MILD RECURRENT MAJOR DEPRESSION: ICD-10-CM

## 2022-09-08 DIAGNOSIS — Z99.3 WHEELCHAIR BOUND: ICD-10-CM

## 2022-09-08 DIAGNOSIS — K59.00 CONSTIPATION, UNSPECIFIED CONSTIPATION TYPE: ICD-10-CM

## 2022-09-08 DIAGNOSIS — K21.9 GASTRO-ESOPHAGEAL REFLUX DISEASE WITHOUT ESOPHAGITIS: ICD-10-CM

## 2022-09-08 DIAGNOSIS — S14.109S QUADRIPLEGIA, POST-TRAUMATIC: ICD-10-CM

## 2022-09-08 DIAGNOSIS — G82.50 QUADRIPLEGIA, POST-TRAUMATIC: ICD-10-CM

## 2022-09-08 PROCEDURE — 99214 OFFICE O/P EST MOD 30 MIN: CPT | Mod: S$GLB,,, | Performed by: FAMILY MEDICINE

## 2022-09-08 PROCEDURE — 3074F SYST BP LT 130 MM HG: CPT | Mod: CPTII,S$GLB,, | Performed by: FAMILY MEDICINE

## 2022-09-08 PROCEDURE — 3008F PR BODY MASS INDEX (BMI) DOCUMENTED: ICD-10-PCS | Mod: CPTII,S$GLB,, | Performed by: FAMILY MEDICINE

## 2022-09-08 PROCEDURE — 99499 RISK ADDL DX/OHS AUDIT: ICD-10-PCS | Mod: S$GLB,,, | Performed by: FAMILY MEDICINE

## 2022-09-08 PROCEDURE — 99499 UNLISTED E&M SERVICE: CPT | Mod: S$GLB,,, | Performed by: FAMILY MEDICINE

## 2022-09-08 PROCEDURE — 99213 OFFICE O/P EST LOW 20 MIN: CPT | Mod: PBBFAC | Performed by: FAMILY MEDICINE

## 2022-09-08 PROCEDURE — 99999 PR PBB SHADOW E&M-EST. PATIENT-LVL III: CPT | Mod: PBBFAC,,, | Performed by: FAMILY MEDICINE

## 2022-09-08 PROCEDURE — 99999 PR PBB SHADOW E&M-EST. PATIENT-LVL III: ICD-10-PCS | Mod: PBBFAC,,, | Performed by: FAMILY MEDICINE

## 2022-09-08 PROCEDURE — 3008F BODY MASS INDEX DOCD: CPT | Mod: CPTII,S$GLB,, | Performed by: FAMILY MEDICINE

## 2022-09-08 PROCEDURE — 3074F PR MOST RECENT SYSTOLIC BLOOD PRESSURE < 130 MM HG: ICD-10-PCS | Mod: CPTII,S$GLB,, | Performed by: FAMILY MEDICINE

## 2022-09-08 PROCEDURE — 99214 PR OFFICE/OUTPT VISIT, EST, LEVL IV, 30-39 MIN: ICD-10-PCS | Mod: S$GLB,,, | Performed by: FAMILY MEDICINE

## 2022-09-08 PROCEDURE — 1159F PR MEDICATION LIST DOCUMENTED IN MEDICAL RECORD: ICD-10-PCS | Mod: CPTII,S$GLB,, | Performed by: FAMILY MEDICINE

## 2022-09-08 PROCEDURE — 3078F DIAST BP <80 MM HG: CPT | Mod: CPTII,S$GLB,, | Performed by: FAMILY MEDICINE

## 2022-09-08 PROCEDURE — 3078F PR MOST RECENT DIASTOLIC BLOOD PRESSURE < 80 MM HG: ICD-10-PCS | Mod: CPTII,S$GLB,, | Performed by: FAMILY MEDICINE

## 2022-09-08 PROCEDURE — 1159F MED LIST DOCD IN RCRD: CPT | Mod: CPTII,S$GLB,, | Performed by: FAMILY MEDICINE

## 2022-09-08 RX ORDER — AZITHROMYCIN 250 MG/1
TABLET, FILM COATED ORAL
COMMUNITY
Start: 2022-08-25 | End: 2022-09-08

## 2022-09-08 RX ORDER — ESCITALOPRAM OXALATE 10 MG/1
10 TABLET ORAL DAILY
Qty: 90 TABLET | Refills: 1 | Status: SHIPPED | OUTPATIENT
Start: 2022-09-08 | End: 2022-11-11
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
## 2022-09-12 ENCOUNTER — PATIENT MESSAGE (OUTPATIENT)
Dept: INFECTIOUS DISEASES | Facility: CLINIC | Age: 32
End: 2022-09-12
Payer: MEDICARE

## 2022-09-13 ENCOUNTER — TELEPHONE (OUTPATIENT)
Dept: INFECTIOUS DISEASES | Facility: CLINIC | Age: 32
End: 2022-09-13
Payer: MEDICARE

## 2022-09-13 DIAGNOSIS — M79.89 LEG SWELLING: ICD-10-CM

## 2022-09-13 RX ORDER — FUROSEMIDE 20 MG/1
20 TABLET ORAL DAILY
Qty: 90 TABLET | Refills: 1 | Status: SHIPPED | OUTPATIENT
Start: 2022-09-13 | End: 2023-04-12 | Stop reason: SDUPTHER

## 2022-09-13 NOTE — TELEPHONE ENCOUNTER
No new care gaps identified.  [facility] Embedded Care Gaps. Reference number: [id number]. 9/13/2022   10:47:05 AM OMEGA

## 2022-09-13 NOTE — TELEPHONE ENCOUNTER
----- Message from Jillian Lazo sent at 9/13/2022  9:33 AM CDT -----  Contact: [phone]  Type:  RX Refill Request    Who Called: Kate  Refill or New Rx:new  RX Name and Strength:furosemide (LASIX) 20 MG tablet  How is the patient currently taking it? (ex. 1XDay):1 a day   Is this a 30 day or 90 day RX:90  Preferred Pharmacy with phone number:  [facility] [address]  [place] - [address] [facility]  [address] [facility]  [place] [address]  Phone: [phone] Fax: [phone]  Local or Mail Order:local   Ordering Provider:Dr Burton   Would the patient rather a call back or a response via [facility]? Call back   Best Call Back Number:[phone]  Additional Information:

## 2022-09-13 NOTE — TELEPHONE ENCOUNTER
----- Message from Mamie Pena sent at 9/13/2022 10:49 AM CDT -----  Contact: Kate  Patient is calling to speak with a nurse regarding message. Patient reports sending message via [facility] to regarding antibiotics and request a call back at [phone]  when possible.  Thank you,  GH

## 2022-09-14 DIAGNOSIS — T83.511D URINARY TRACT INFECTION ASSOCIATED WITH INDWELLING URETHRAL CATHETER, SUBSEQUENT ENCOUNTER: Primary | ICD-10-CM

## 2022-09-14 DIAGNOSIS — N39.0 URINARY TRACT INFECTION ASSOCIATED WITH INDWELLING URETHRAL CATHETER, SUBSEQUENT ENCOUNTER: Primary | ICD-10-CM

## 2022-09-29 ENCOUNTER — LAB VISIT (OUTPATIENT)
Dept: LAB | Facility: HOSPITAL | Age: 32
End: 2022-09-29
Attending: INTERNAL MEDICINE
Payer: COMMERCIAL

## 2022-09-29 ENCOUNTER — TELEPHONE (OUTPATIENT)
Dept: INTERNAL MEDICINE | Facility: CLINIC | Age: 32
End: 2022-09-29
Payer: MEDICARE

## 2022-09-29 ENCOUNTER — PATIENT MESSAGE (OUTPATIENT)
Dept: INTERNAL MEDICINE | Facility: CLINIC | Age: 32
End: 2022-09-29
Payer: MEDICARE

## 2022-09-29 DIAGNOSIS — T83.511D URINARY TRACT INFECTION ASSOCIATED WITH INDWELLING URETHRAL CATHETER, SUBSEQUENT ENCOUNTER: ICD-10-CM

## 2022-09-29 DIAGNOSIS — N39.0 URINARY TRACT INFECTION ASSOCIATED WITH INDWELLING URETHRAL CATHETER, SUBSEQUENT ENCOUNTER: ICD-10-CM

## 2022-09-29 LAB
BACTERIA #/AREA URNS HPF: ABNORMAL /HPF
BILIRUB UR QL STRIP: NEGATIVE
CLARITY UR: CLEAR
COLOR UR: YELLOW
GLUCOSE UR QL STRIP: NEGATIVE
HGB UR QL STRIP: NEGATIVE
KETONES UR QL STRIP: NEGATIVE
LEUKOCYTE ESTERASE UR QL STRIP: ABNORMAL
MICROSCOPIC COMMENT: ABNORMAL
NITRITE UR QL STRIP: POSITIVE
PH UR STRIP: 6.5 [PH] (ref 5–8)
PROT UR QL STRIP: NEGATIVE
RBC #/AREA URNS HPF: 2 /HPF (ref 0–4)
SP GR UR STRIP: 1.01 (ref 1–1.03)
SQUAMOUS #/AREA URNS HPF: 1 /HPF
URN SPEC COLLECT METH UR: ABNORMAL
UROBILINOGEN UR STRIP-ACNC: NEGATIVE EU/DL
WBC #/AREA URNS HPF: 20 /HPF (ref 0–5)

## 2022-09-29 PROCEDURE — 87077 CULTURE AEROBIC IDENTIFY: CPT | Performed by: INTERNAL MEDICINE

## 2022-09-29 PROCEDURE — 81000 URINALYSIS NONAUTO W/SCOPE: CPT | Performed by: INTERNAL MEDICINE

## 2022-09-29 PROCEDURE — 87086 URINE CULTURE/COLONY COUNT: CPT | Performed by: INTERNAL MEDICINE

## 2022-09-29 PROCEDURE — 87186 SC STD MICRODIL/AGAR DIL: CPT | Performed by: INTERNAL MEDICINE

## 2022-09-29 PROCEDURE — 87088 URINE BACTERIA CULTURE: CPT | Performed by: INTERNAL MEDICINE

## 2022-09-29 NOTE — TELEPHONE ENCOUNTER
----- Message from Karishma Mancini sent at 9/29/2022  9:32 AM CDT -----  Please call pt @ [phone] regarding a specimen, pt need to drop specimen, pt is having some issues.

## 2022-09-30 ENCOUNTER — TELEPHONE (OUTPATIENT)
Dept: ADMINISTRATIVE | Facility: HOSPITAL | Age: 32
End: 2022-09-30
Payer: MEDICARE

## 2022-10-01 RX ORDER — GRANULES FOR ORAL 3 G/1
3 POWDER ORAL ONCE
Qty: 3 G | Refills: 0 | Status: SHIPPED | OUTPATIENT
Start: 2022-10-01 | End: 2022-10-01

## 2022-10-03 ENCOUNTER — PATIENT MESSAGE (OUTPATIENT)
Dept: INFECTIOUS DISEASES | Facility: CLINIC | Age: 32
End: 2022-10-03
Payer: MEDICARE

## 2022-10-03 LAB — BACTERIA UR CULT: ABNORMAL

## 2022-10-27 ENCOUNTER — TELEPHONE (OUTPATIENT)
Dept: INTERNAL MEDICINE | Facility: CLINIC | Age: 32
End: 2022-10-27
Payer: MEDICARE

## 2022-10-27 NOTE — TELEPHONE ENCOUNTER
----- Message from Nereida Frey sent at 10/27/2022  1:22 PM CDT -----  Contact: [phone]  Patient would like to consult with a nurse in regards to his scheduled virtual visit. He currently scheduled today 10/27 at 1:20. And he is having trouble logging in to visit. The patient would like to office to reach out to him please give a call back at [phone]. Thanks susana

## 2022-10-27 NOTE — TELEPHONE ENCOUNTER
Patient missed the visit. Informed will schedule it to the next spot open. Patient verbally understood.

## 2022-10-30 ENCOUNTER — PATIENT MESSAGE (OUTPATIENT)
Dept: INFECTIOUS DISEASES | Facility: CLINIC | Age: 32
End: 2022-10-30
Payer: MEDICARE

## 2022-11-01 DIAGNOSIS — N39.0 URINARY TRACT INFECTION ASSOCIATED WITH INDWELLING URETHRAL CATHETER, SUBSEQUENT ENCOUNTER: Primary | ICD-10-CM

## 2022-11-01 DIAGNOSIS — T83.511D URINARY TRACT INFECTION ASSOCIATED WITH INDWELLING URETHRAL CATHETER, SUBSEQUENT ENCOUNTER: Primary | ICD-10-CM

## 2022-11-07 DIAGNOSIS — N39.0 COMPLICATED UTI (URINARY TRACT INFECTION): Primary | ICD-10-CM

## 2022-11-10 ENCOUNTER — LAB VISIT (OUTPATIENT)
Dept: LAB | Facility: HOSPITAL | Age: 32
End: 2022-11-10
Attending: INTERNAL MEDICINE
Payer: COMMERCIAL

## 2022-11-10 DIAGNOSIS — N39.0 COMPLICATED UTI (URINARY TRACT INFECTION): ICD-10-CM

## 2022-11-10 DIAGNOSIS — N39.0 URINARY TRACT INFECTION ASSOCIATED WITH INDWELLING URETHRAL CATHETER, SUBSEQUENT ENCOUNTER: ICD-10-CM

## 2022-11-10 DIAGNOSIS — T83.511D URINARY TRACT INFECTION ASSOCIATED WITH INDWELLING URETHRAL CATHETER, SUBSEQUENT ENCOUNTER: ICD-10-CM

## 2022-11-10 LAB
BACTERIA #/AREA URNS HPF: ABNORMAL /HPF
BILIRUB UR QL STRIP: NEGATIVE
BILIRUB UR QL STRIP: NEGATIVE
CLARITY UR: CLEAR
CLARITY UR: CLEAR
COLOR UR: YELLOW
COLOR UR: YELLOW
GLUCOSE UR QL STRIP: NEGATIVE
GLUCOSE UR QL STRIP: NEGATIVE
HGB UR QL STRIP: NEGATIVE
HGB UR QL STRIP: NEGATIVE
KETONES UR QL STRIP: NEGATIVE
KETONES UR QL STRIP: NEGATIVE
LEUKOCYTE ESTERASE UR QL STRIP: ABNORMAL
LEUKOCYTE ESTERASE UR QL STRIP: ABNORMAL
MICROSCOPIC COMMENT: ABNORMAL
NITRITE UR QL STRIP: POSITIVE
NITRITE UR QL STRIP: POSITIVE
PH UR STRIP: 7 [PH] (ref 5–8)
PH UR STRIP: 7 [PH] (ref 5–8)
PROT UR QL STRIP: NEGATIVE
PROT UR QL STRIP: NEGATIVE
RBC #/AREA URNS HPF: 3 /HPF (ref 0–4)
SP GR UR STRIP: 1.01 (ref 1–1.03)
SP GR UR STRIP: 1.01 (ref 1–1.03)
SQUAMOUS #/AREA URNS HPF: 2 /HPF
URN SPEC COLLECT METH UR: ABNORMAL
URN SPEC COLLECT METH UR: ABNORMAL
UROBILINOGEN UR STRIP-ACNC: NEGATIVE EU/DL
UROBILINOGEN UR STRIP-ACNC: NEGATIVE EU/DL
WBC #/AREA URNS HPF: 12 /HPF (ref 0–5)
WBC CLUMPS URNS QL MICRO: ABNORMAL

## 2022-11-10 PROCEDURE — 87186 SC STD MICRODIL/AGAR DIL: CPT | Performed by: INTERNAL MEDICINE

## 2022-11-10 PROCEDURE — 87086 URINE CULTURE/COLONY COUNT: CPT | Performed by: INTERNAL MEDICINE

## 2022-11-10 PROCEDURE — 87077 CULTURE AEROBIC IDENTIFY: CPT | Performed by: INTERNAL MEDICINE

## 2022-11-10 PROCEDURE — 87088 URINE BACTERIA CULTURE: CPT | Performed by: INTERNAL MEDICINE

## 2022-11-10 PROCEDURE — 81000 URINALYSIS NONAUTO W/SCOPE: CPT | Performed by: INTERNAL MEDICINE

## 2022-11-11 ENCOUNTER — OFFICE VISIT (OUTPATIENT)
Dept: INTERNAL MEDICINE | Facility: CLINIC | Age: 32
End: 2022-11-11
Payer: MEDICARE

## 2022-11-11 DIAGNOSIS — K80.20 GALLSTONES: Primary | ICD-10-CM

## 2022-11-11 DIAGNOSIS — R10.9 ABDOMINAL PAIN, UNSPECIFIED ABDOMINAL LOCATION: ICD-10-CM

## 2022-11-11 PROCEDURE — 99214 OFFICE O/P EST MOD 30 MIN: CPT | Mod: 95,,, | Performed by: FAMILY MEDICINE

## 2022-11-11 PROCEDURE — 99214 PR OFFICE/OUTPT VISIT, EST, LEVL IV, 30-39 MIN: ICD-10-PCS | Mod: 95,,, | Performed by: FAMILY MEDICINE

## 2022-11-11 RX ORDER — ESCITALOPRAM OXALATE 10 MG/1
20 TABLET ORAL DAILY
Qty: 180 TABLET | Refills: 1 | Status: SHIPPED | OUTPATIENT
Start: 2022-11-11 | End: 2023-12-21
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
## 2022-11-13 LAB — BACTERIA UR CULT: ABNORMAL

## 2022-11-14 ENCOUNTER — PATIENT MESSAGE (OUTPATIENT)
Dept: INFECTIOUS DISEASES | Facility: CLINIC | Age: 32
End: 2022-11-14
Payer: MEDICARE

## 2022-11-15 RX ORDER — GRANULES FOR ORAL 3 G/1
3 POWDER ORAL
Qty: 9 G | Refills: 0 | Status: SHIPPED | OUTPATIENT
Start: 2022-11-15 | End: 2022-11-22

## 2022-11-16 ENCOUNTER — PATIENT MESSAGE (OUTPATIENT)
Dept: INTERNAL MEDICINE | Facility: CLINIC | Age: 32
End: 2022-11-16
Payer: MEDICARE

## 2022-11-28 NOTE — TELEPHONE ENCOUNTER
----- Message from Christine Reich sent at 11/28/2022  3:40 PM CST -----  Type:  RX Refill Request    Who Called:  Patient  Refill or New Rx: Refill  RX Name and Strength: gabapentin (NEURONTIN) 300 MG capsule, omeprazole (PRILOSEC) 20 MG capsule  How is the patient currently taking it? (ex. 1XDay): Once and three times a day  Is this a 30 day or 90 day RX: 90 day  Preferred Pharmacy with phone number:  [facility] [place] [facility] [address] [facility]  [address] [facility]  [place] [address]  Phone: [phone] Fax: [phone]  Local or Mail Order: Local  Ordering Provider: Dr. Troy Burton  Would the patient rather a call back or a response via [facility]?  Call back  Best Call Back Number: [phone]  Additional Information:

## 2022-11-28 NOTE — TELEPHONE ENCOUNTER
No new care gaps identified.  [facility] Embedded Care Gaps. Reference number: [id number]. 11/28/2022   3:55:48 PM CST

## 2022-11-29 ENCOUNTER — LAB VISIT (OUTPATIENT)
Dept: LAB | Facility: HOSPITAL | Age: 32
End: 2022-11-29
Attending: SURGERY
Payer: COMMERCIAL

## 2022-11-29 ENCOUNTER — TELEPHONE (OUTPATIENT)
Dept: PREADMISSION TESTING | Facility: HOSPITAL | Age: 32
End: 2022-11-29
Payer: MEDICARE

## 2022-11-29 ENCOUNTER — OFFICE VISIT (OUTPATIENT)
Dept: SURGERY | Facility: CLINIC | Age: 32
End: 2022-11-29
Payer: MEDICARE

## 2022-11-29 VITALS — DIASTOLIC BLOOD PRESSURE: 66 MMHG | SYSTOLIC BLOOD PRESSURE: 90 MMHG | HEART RATE: 77 BPM | TEMPERATURE: 98 F

## 2022-11-29 DIAGNOSIS — K80.20 SYMPTOMATIC CHOLELITHIASIS: Primary | ICD-10-CM

## 2022-11-29 DIAGNOSIS — K80.20 SYMPTOMATIC CHOLELITHIASIS: ICD-10-CM

## 2022-11-29 DIAGNOSIS — N39.0 URINARY TRACT INFECTION ASSOCIATED WITH CYSTOSTOMY CATHETER, SUBSEQUENT ENCOUNTER: ICD-10-CM

## 2022-11-29 DIAGNOSIS — Z01.818 PRE-OP TESTING: Primary | ICD-10-CM

## 2022-11-29 DIAGNOSIS — T83.510D URINARY TRACT INFECTION ASSOCIATED WITH CYSTOSTOMY CATHETER, SUBSEQUENT ENCOUNTER: ICD-10-CM

## 2022-11-29 DIAGNOSIS — K80.20 GALLSTONES: ICD-10-CM

## 2022-11-29 LAB
ALBUMIN SERPL BCP-MCNC: 3.8 G/DL (ref 3.5–5.2)
ALP SERPL-CCNC: 104 U/L (ref 55–135)
ALT SERPL W/O P-5'-P-CCNC: 13 U/L (ref 10–44)
ANION GAP SERPL CALC-SCNC: 11 MMOL/L (ref 8–16)
AST SERPL-CCNC: 14 U/L (ref 10–40)
BASOPHILS # BLD AUTO: 0.03 K/UL (ref 0–0.2)
BASOPHILS NFR BLD: 0.5 % (ref 0–1.9)
BILIRUB SERPL-MCNC: 0.4 MG/DL (ref 0.1–1)
BUN SERPL-MCNC: 6 MG/DL (ref 6–20)
CALCIUM SERPL-MCNC: 9.4 MG/DL (ref 8.7–10.5)
CHLORIDE SERPL-SCNC: 105 MMOL/L (ref 95–110)
CO2 SERPL-SCNC: 23 MMOL/L (ref 23–29)
CREAT SERPL-MCNC: 0.6 MG/DL (ref 0.5–1.4)
DIFFERENTIAL METHOD: NORMAL
EOSINOPHIL # BLD AUTO: 0.2 K/UL (ref 0–0.5)
EOSINOPHIL NFR BLD: 3.6 % (ref 0–8)
ERYTHROCYTE [DISTWIDTH] IN BLOOD BY AUTOMATED COUNT: 13.2 % (ref 11.5–14.5)
EST. GFR  (NO RACE VARIABLE): >60 ML/MIN/1.73 M^2
GLUCOSE SERPL-MCNC: 83 MG/DL (ref 70–110)
HCT VFR BLD AUTO: 44.7 % (ref 40–54)
HGB BLD-MCNC: 14.3 G/DL (ref 14–18)
IMM GRANULOCYTES # BLD AUTO: 0.01 K/UL (ref 0–0.04)
IMM GRANULOCYTES NFR BLD AUTO: 0.2 % (ref 0–0.5)
LYMPHOCYTES # BLD AUTO: 1.8 K/UL (ref 1–4.8)
LYMPHOCYTES NFR BLD: 31.6 % (ref 18–48)
MCH RBC QN AUTO: 28.4 PG (ref 27–31)
MCHC RBC AUTO-ENTMCNC: 32 G/DL (ref 32–36)
MCV RBC AUTO: 89 FL (ref 82–98)
MONOCYTES # BLD AUTO: 0.4 K/UL (ref 0.3–1)
MONOCYTES NFR BLD: 6.9 % (ref 4–15)
NEUTROPHILS # BLD AUTO: 3.2 K/UL (ref 1.8–7.7)
NEUTROPHILS NFR BLD: 57.2 % (ref 38–73)
NRBC BLD-RTO: 0 /100 WBC
PLATELET # BLD AUTO: 252 K/UL (ref 150–450)
PMV BLD AUTO: 11.8 FL (ref 9.2–12.9)
POTASSIUM SERPL-SCNC: 4 MMOL/L (ref 3.5–5.1)
PROT SERPL-MCNC: 7.3 G/DL (ref 6–8.4)
RBC # BLD AUTO: 5.03 M/UL (ref 4.6–6.2)
SODIUM SERPL-SCNC: 139 MMOL/L (ref 136–145)
WBC # BLD AUTO: 5.53 K/UL (ref 3.9–12.7)

## 2022-11-29 PROCEDURE — 3078F DIAST BP <80 MM HG: CPT | Mod: CPTII,S$GLB,, | Performed by: SURGERY

## 2022-11-29 PROCEDURE — 99499 RISK ADDL DX/OHS AUDIT: ICD-10-PCS | Mod: S$GLB,,, | Performed by: SURGERY

## 2022-11-29 PROCEDURE — 80053 COMPREHEN METABOLIC PANEL: CPT | Performed by: SURGERY

## 2022-11-29 PROCEDURE — 99204 PR OFFICE/OUTPT VISIT, NEW, LEVL IV, 45-59 MIN: ICD-10-PCS | Mod: S$GLB,,, | Performed by: SURGERY

## 2022-11-29 PROCEDURE — 99999 PR PBB SHADOW E&M-EST. PATIENT-LVL V: ICD-10-PCS | Mod: PBBFAC,,, | Performed by: SURGERY

## 2022-11-29 PROCEDURE — 3074F SYST BP LT 130 MM HG: CPT | Mod: CPTII,S$GLB,, | Performed by: SURGERY

## 2022-11-29 PROCEDURE — 99999 PR PBB SHADOW E&M-EST. PATIENT-LVL V: CPT | Mod: PBBFAC,,, | Performed by: SURGERY

## 2022-11-29 PROCEDURE — 99204 OFFICE O/P NEW MOD 45 MIN: CPT | Mod: S$GLB,,, | Performed by: SURGERY

## 2022-11-29 PROCEDURE — 3074F PR MOST RECENT SYSTOLIC BLOOD PRESSURE < 130 MM HG: ICD-10-PCS | Mod: CPTII,S$GLB,, | Performed by: SURGERY

## 2022-11-29 PROCEDURE — 36415 COLL VENOUS BLD VENIPUNCTURE: CPT | Performed by: SURGERY

## 2022-11-29 PROCEDURE — 85025 COMPLETE CBC W/AUTO DIFF WBC: CPT | Performed by: SURGERY

## 2022-11-29 PROCEDURE — 99499 UNLISTED E&M SERVICE: CPT | Mod: S$GLB,,, | Performed by: SURGERY

## 2022-11-29 PROCEDURE — 3078F PR MOST RECENT DIASTOLIC BLOOD PRESSURE < 80 MM HG: ICD-10-PCS | Mod: CPTII,S$GLB,, | Performed by: SURGERY

## 2022-11-29 RX ORDER — OMEPRAZOLE 20 MG/1
20 CAPSULE, DELAYED RELEASE ORAL DAILY
Qty: 90 CAPSULE | Refills: 1 | Status: SHIPPED | OUTPATIENT
Start: 2022-11-29 | End: 2023-06-12 | Stop reason: SDUPTHER

## 2022-11-29 RX ORDER — GABAPENTIN 300 MG/1
300 CAPSULE ORAL 3 TIMES DAILY
Qty: 270 CAPSULE | Refills: 1 | Status: SHIPPED | OUTPATIENT
Start: 2022-11-29 | End: 2023-05-22

## 2022-11-29 RX ORDER — LIDOCAINE HYDROCHLORIDE 10 MG/ML
1 INJECTION, SOLUTION EPIDURAL; INFILTRATION; INTRACAUDAL; PERINEURAL ONCE
Status: DISCONTINUED | OUTPATIENT
Start: 2022-11-29 | End: 2023-06-28

## 2022-11-29 NOTE — TELEPHONE ENCOUNTER
Pre op instructions reviewed with Pt per phone: Spoke about pre op process and surgery instructions, verbalized understanding.    To confirm, Surgery is scheduled on 11/30/22.   PLEASE ARRIVE AT 6:30AM  Please report to the Main Hospital (1st Floor) at [facility] located off of [facility] (2nd Entrance/Building on the left, in front of the flag pole).  Address: [address] [place] [address] [place]. [address]    INSTRUCTIONS IMPORTANT!!!  Do Not Eat, Drink, or Smoke after 12 midnight unless instructed otherwise by your Surgeon. OK to brush teeth, no gum, candy or mints!      *Take Only these medications with a small sip of water Morning of Surgery:  Gabapentin  Omeprazole    ____  HOLD all vitamins, herbal supplements, aspirin products and NSAIDS 7 days prior to surgery, as these can thin the blood too much.  ____  NO Acrylic/fake nails or nail polish worn day of surgery (specifically hand/arm & foot surgeries).  ____  NO powder, lotions, deodorants, oils or cream on body.  ____  Remove all jewelry & piercings prior to surgery.  ____  Dentures, Hearing Aids & Contact Lens will need to be removed prior to the       start of surgery.  ____  Bring photo ID and insurance information to hospital (Leave Valuables at Home).  ____  If going home the same day, arrange for a ride home. You will not be able to drive 24 hrs if Anesthesia was used.   ____  Females (ages 11-60) may need to give a urine sample the morning of surgery; please see Pre op Nurse prior to using the restroom.  ____  Wear clean, loose fitting clothing to allow for dressings/ bandages.            Diabetic Patients: If you take diabetic medication, do NOT take morning of surgery unless instructed by Doctor. Metformin to be stopped 24 hrs prior to surgery time. DO NOT take long-acting insulin the evening before surgery. Blood sugars will be checked in pre-op by Nurse.    Bathing Instructions:    -Shower with anti-bacterial Soap (Hibiclens or Dial) the night  before surgery and the morning of.   -Do not use Hibiclens on your face or genitals.   -Apply clean clothes after shower.  -Do not shave your face or body 2 days prior to surgery unless instructed otherwise by your Surgeon.  -Do not shave pubic hair 7 days prior to surgery (gyn pt's).    [facility] Visitor/Ride Policy:  Only 2 adults allowed (over the age of 18) to accompany you to the Hospital. You Must have a ride home from a responsible adult that you know and trust. Medical Transport, Uber or Lyft can only be used if patient has a responsible adult to accompany them during ride home.    Discharge Instructions: You will receive Post-op/Discharge instructions by your Discharge Nurse prior to going home. Please call your Surgeon's office with any post-surgery questions/concerns @ [phone].    *If you are running late or have questions the morning of surgery, please call the Surgery Dept @ [phone]  *Insurance/ Financial Questions, please call [phone]    Thank you,  -[facility] Pre Admit Testing Nurse  [phone] or [phone]  M-F 7:30 am-4 pm
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
## 2022-11-30 ENCOUNTER — ANESTHESIA (OUTPATIENT)
Dept: SURGERY | Facility: HOSPITAL | Age: 32
End: 2022-11-30
Payer: COMMERCIAL

## 2022-11-30 ENCOUNTER — ANESTHESIA EVENT (OUTPATIENT)
Dept: SURGERY | Facility: HOSPITAL | Age: 32
End: 2022-11-30
Payer: COMMERCIAL

## 2022-11-30 ENCOUNTER — HOSPITAL ENCOUNTER (OUTPATIENT)
Facility: HOSPITAL | Age: 32
Discharge: HOME | End: 2022-11-30
Attending: SURGERY | Admitting: SURGERY
Payer: COMMERCIAL

## 2022-11-30 DIAGNOSIS — K80.20 GALLSTONES: ICD-10-CM

## 2022-11-30 DIAGNOSIS — Z01.818 PRE-OP TESTING: ICD-10-CM

## 2022-11-30 PROCEDURE — 47562 PR LAP,CHOLECYSTECTOMY: ICD-10-PCS | Mod: ,,, | Performed by: SURGERY

## 2022-11-30 PROCEDURE — 93005 ELECTROCARDIOGRAM TRACING: CPT

## 2022-11-30 PROCEDURE — 63600175 PHARM REV CODE 636 W HCPCS: Performed by: NURSE ANESTHETIST, CERTIFIED REGISTERED

## 2022-11-30 PROCEDURE — 27201423 OPTIME MED/SURG SUP & DEVICES STERILE SUPPLY: Performed by: SURGERY

## 2022-11-30 PROCEDURE — 63600175 PHARM REV CODE 636 W HCPCS: Performed by: SURGERY

## 2022-11-30 PROCEDURE — 93010 EKG 12-LEAD: ICD-10-PCS | Mod: ,,, | Performed by: STUDENT IN AN ORGANIZED HEALTH CARE EDUCATION/TRAINING PROGRAM

## 2022-11-30 PROCEDURE — 88304 TISSUE EXAM BY PATHOLOGIST: CPT | Mod: 26,HCNC,, | Performed by: PATHOLOGY

## 2022-11-30 PROCEDURE — 88304 TISSUE EXAM BY PATHOLOGIST: CPT | Mod: HCNC | Performed by: PATHOLOGY

## 2022-11-30 PROCEDURE — 93010 ELECTROCARDIOGRAM REPORT: CPT | Mod: ,,, | Performed by: STUDENT IN AN ORGANIZED HEALTH CARE EDUCATION/TRAINING PROGRAM

## 2022-11-30 PROCEDURE — 36000711: Performed by: SURGERY

## 2022-11-30 PROCEDURE — 25000003 PHARM REV CODE 250: Performed by: NURSE ANESTHETIST, CERTIFIED REGISTERED

## 2022-11-30 PROCEDURE — 71000033 HC RECOVERY, INTIAL HOUR: Performed by: SURGERY

## 2022-11-30 PROCEDURE — 37000008 HC ANESTHESIA 1ST 15 MINUTES: Performed by: SURGERY

## 2022-11-30 PROCEDURE — 47562 LAPAROSCOPIC CHOLECYSTECTOMY: CPT | Mod: ,,, | Performed by: SURGERY

## 2022-11-30 PROCEDURE — 37000009 HC ANESTHESIA EA ADD 15 MINS: Performed by: SURGERY

## 2022-11-30 PROCEDURE — 88304 PR  SURG PATH,LEVEL III: ICD-10-PCS | Mod: 26,HCNC,, | Performed by: PATHOLOGY

## 2022-11-30 PROCEDURE — 71000015 HC POSTOP RECOV 1ST HR: Performed by: SURGERY

## 2022-11-30 PROCEDURE — 25000003 PHARM REV CODE 250: Performed by: SURGERY

## 2022-11-30 PROCEDURE — 36000710: Performed by: SURGERY

## 2022-11-30 RX ORDER — HYDRALAZINE HYDROCHLORIDE 20 MG/ML
10 INJECTION INTRAMUSCULAR; INTRAVENOUS EVERY 10 MIN PRN
Status: DISCONTINUED | OUTPATIENT
Start: 2022-11-30 | End: 2022-11-30 | Stop reason: HOSPADM

## 2022-11-30 RX ORDER — MIDAZOLAM HYDROCHLORIDE 1 MG/ML
INJECTION INTRAMUSCULAR; INTRAVENOUS
Status: DISCONTINUED | OUTPATIENT
Start: 2022-11-30 | End: 2022-11-30

## 2022-11-30 RX ORDER — MEPERIDINE HYDROCHLORIDE 25 MG/ML
12.5 INJECTION INTRAMUSCULAR; INTRAVENOUS; SUBCUTANEOUS ONCE
Status: DISCONTINUED | OUTPATIENT
Start: 2022-11-30 | End: 2022-11-30 | Stop reason: HOSPADM

## 2022-11-30 RX ORDER — LIDOCAINE HYDROCHLORIDE 10 MG/ML
INJECTION, SOLUTION EPIDURAL; INFILTRATION; INTRACAUDAL; PERINEURAL
Status: DISCONTINUED | OUTPATIENT
Start: 2022-11-30 | End: 2022-11-30

## 2022-11-30 RX ORDER — HYDROMORPHONE HYDROCHLORIDE 2 MG/ML
0.2 INJECTION, SOLUTION INTRAMUSCULAR; INTRAVENOUS; SUBCUTANEOUS EVERY 5 MIN PRN
Status: DISCONTINUED | OUTPATIENT
Start: 2022-11-30 | End: 2022-11-30 | Stop reason: HOSPADM

## 2022-11-30 RX ORDER — NEOSTIGMINE METHYLSULFATE 1 MG/ML
INJECTION, SOLUTION INTRAVENOUS
Status: DISCONTINUED | OUTPATIENT
Start: 2022-11-30 | End: 2022-11-30

## 2022-11-30 RX ORDER — CEFAZOLIN SODIUM 2 G/50ML
2 SOLUTION INTRAVENOUS
Status: COMPLETED | OUTPATIENT
Start: 2022-11-30 | End: 2022-11-30

## 2022-11-30 RX ORDER — ROCURONIUM BROMIDE 10 MG/ML
INJECTION, SOLUTION INTRAVENOUS
Status: DISCONTINUED | OUTPATIENT
Start: 2022-11-30 | End: 2022-11-30

## 2022-11-30 RX ORDER — SODIUM CHLORIDE, SODIUM LACTATE, POTASSIUM CHLORIDE, CALCIUM CHLORIDE 600; 310; 30; 20 MG/100ML; MG/100ML; MG/100ML; MG/100ML
INJECTION, SOLUTION INTRAVENOUS CONTINUOUS PRN
Status: DISCONTINUED | OUTPATIENT
Start: 2022-11-30 | End: 2022-11-30

## 2022-11-30 RX ORDER — SODIUM CHLORIDE 0.9 % (FLUSH) 0.9 %
3 SYRINGE (ML) INJECTION
Status: DISCONTINUED | OUTPATIENT
Start: 2022-11-30 | End: 2022-11-30 | Stop reason: HOSPADM

## 2022-11-30 RX ORDER — OXYCODONE HYDROCHLORIDE 5 MG/1
5 TABLET ORAL
Status: DISCONTINUED | OUTPATIENT
Start: 2022-11-30 | End: 2022-11-30 | Stop reason: HOSPADM

## 2022-11-30 RX ORDER — DIPHENHYDRAMINE HYDROCHLORIDE 50 MG/ML
25 INJECTION INTRAMUSCULAR; INTRAVENOUS EVERY 6 HOURS PRN
Status: DISCONTINUED | OUTPATIENT
Start: 2022-11-30 | End: 2022-11-30 | Stop reason: HOSPADM

## 2022-11-30 RX ORDER — KETOROLAC TROMETHAMINE 30 MG/ML
INJECTION, SOLUTION INTRAMUSCULAR; INTRAVENOUS
Status: DISCONTINUED | OUTPATIENT
Start: 2022-11-30 | End: 2022-11-30

## 2022-11-30 RX ORDER — ONDANSETRON 4 MG/1
4 TABLET, FILM COATED ORAL EVERY 6 HOURS PRN
Qty: 10 TABLET | Refills: 1 | Status: SHIPPED | OUTPATIENT
Start: 2022-11-30 | End: 2023-12-21 | Stop reason: SDUPTHER

## 2022-11-30 RX ORDER — BUPIVACAINE HYDROCHLORIDE 2.5 MG/ML
INJECTION, SOLUTION EPIDURAL; INFILTRATION; INTRACAUDAL
Status: DISCONTINUED | OUTPATIENT
Start: 2022-11-30 | End: 2022-11-30 | Stop reason: HOSPADM

## 2022-11-30 RX ORDER — ONDANSETRON 2 MG/ML
INJECTION INTRAMUSCULAR; INTRAVENOUS
Status: DISCONTINUED | OUTPATIENT
Start: 2022-11-30 | End: 2022-11-30

## 2022-11-30 RX ORDER — KETOROLAC TROMETHAMINE 30 MG/ML
15 INJECTION, SOLUTION INTRAMUSCULAR; INTRAVENOUS EVERY 8 HOURS PRN
Status: DISCONTINUED | OUTPATIENT
Start: 2022-11-30 | End: 2022-11-30 | Stop reason: HOSPADM

## 2022-11-30 RX ORDER — ALBUTEROL SULFATE 0.83 MG/ML
2.5 SOLUTION RESPIRATORY (INHALATION) EVERY 4 HOURS PRN
Status: DISCONTINUED | OUTPATIENT
Start: 2022-11-30 | End: 2022-11-30 | Stop reason: HOSPADM

## 2022-11-30 RX ORDER — ONDANSETRON 2 MG/ML
4 INJECTION INTRAMUSCULAR; INTRAVENOUS DAILY PRN
Status: DISCONTINUED | OUTPATIENT
Start: 2022-11-30 | End: 2022-11-30 | Stop reason: HOSPADM

## 2022-11-30 RX ORDER — FENTANYL CITRATE 50 UG/ML
INJECTION, SOLUTION INTRAMUSCULAR; INTRAVENOUS
Status: DISCONTINUED | OUTPATIENT
Start: 2022-11-30 | End: 2022-11-30

## 2022-11-30 RX ORDER — HYDROCODONE BITARTRATE AND ACETAMINOPHEN 7.5; 325 MG/1; MG/1
1 TABLET ORAL
Qty: 25 TABLET | Refills: 0 | Status: SHIPPED | OUTPATIENT
Start: 2022-11-30 | End: 2022-12-19 | Stop reason: ALTCHOICE

## 2022-11-30 RX ORDER — INDOCYANINE GREEN AND WATER 25 MG
2.5 KIT INJECTION ONCE
Status: COMPLETED | OUTPATIENT
Start: 2022-11-30 | End: 2022-11-30

## 2022-11-30 RX ORDER — PROPOFOL 10 MG/ML
VIAL (ML) INTRAVENOUS
Status: DISCONTINUED | OUTPATIENT
Start: 2022-11-30 | End: 2022-11-30

## 2022-11-30 RX ORDER — PROCHLORPERAZINE EDISYLATE 5 MG/ML
5 INJECTION INTRAMUSCULAR; INTRAVENOUS EVERY 30 MIN PRN
Status: DISCONTINUED | OUTPATIENT
Start: 2022-11-30 | End: 2022-11-30 | Stop reason: HOSPADM

## 2022-11-30 RX ADMIN — SODIUM CHLORIDE, SODIUM LACTATE, POTASSIUM CHLORIDE, AND CALCIUM CHLORIDE: 600; 310; 30; 20 INJECTION, SOLUTION INTRAVENOUS at 10:11

## 2022-11-30 RX ADMIN — FENTANYL CITRATE 100 MCG: 50 INJECTION, SOLUTION INTRAMUSCULAR; INTRAVENOUS at 10:11

## 2022-11-30 RX ADMIN — ROCURONIUM BROMIDE 50 MG: 10 INJECTION, SOLUTION INTRAVENOUS at 10:11

## 2022-11-30 RX ADMIN — CEFAZOLIN SODIUM 2 G: 2 SOLUTION INTRAVENOUS at 10:11

## 2022-11-30 RX ADMIN — PROPOFOL 50 MG: 10 INJECTION, EMULSION INTRAVENOUS at 11:11

## 2022-11-30 RX ADMIN — NEOSTIGMINE METHYLSULFATE 5 MG: 1 INJECTION INTRAVENOUS at 11:11

## 2022-11-30 RX ADMIN — ONDANSETRON 4 MG: 2 INJECTION, SOLUTION INTRAMUSCULAR; INTRAVENOUS at 11:11

## 2022-11-30 RX ADMIN — PROPOFOL 200 MG: 10 INJECTION, EMULSION INTRAVENOUS at 10:11

## 2022-11-30 RX ADMIN — KETOROLAC TROMETHAMINE 30 MG: 30 INJECTION, SOLUTION INTRAMUSCULAR at 11:11

## 2022-11-30 RX ADMIN — LIDOCAINE HYDROCHLORIDE 50 MG: 10 INJECTION, SOLUTION EPIDURAL; INFILTRATION; INTRACAUDAL; PERINEURAL at 10:11

## 2022-11-30 RX ADMIN — GLYCOPYRROLATE 0.8 MG: 0.2 INJECTION, SOLUTION INTRAMUSCULAR; INTRAVENOUS at 11:11

## 2022-11-30 RX ADMIN — MIDAZOLAM HYDROCHLORIDE 2 MG: 1 INJECTION, SOLUTION INTRAMUSCULAR; INTRAVENOUS at 10:11

## 2022-11-30 RX ADMIN — INDOCYANINE GREEN 2.5 MG: KIT INTRAVENOUS at 10:11
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
## 2022-11-30 NOTE — PATIENT INSTRUCTIONS
Discharge Instructions    Hygiene and incision care:   You may shower tomorrow. Your incisions are closed with absorbable sutures and there is surgical glue on top. The glue will eventually flake off on its own. Do not scrub your incisions, just allow warm soapy water to run over them.   If you develop fevers, worsening redness and/or pus-like drainage, call the office immediately.    Pain control:   You may take Tylenol (650 mg every 4 hours) and ibuprofen (600 mg every 6 hours) as well as alternate heat and cold packs for pain and swelling. Take ibuprofen with food as it can cause stomach upset and ulcers. Do not take ibuprofen if you have an increased risk of bleeding, history of stomach ulcers, are already taking an NSAID, or have kidney issues.   If taking narcotic pain medication, such as Norco (hydrocodone-acetaminophen) or Percocet (oxycodone-acetaminophen), do not drink alcohol or drive. Each Norco and Percocet tablet contains 325 mg of Tylenol; do not take more than 4000 mg of Tylenol per day. Narcotic pain medications can be constipating so be sure to drink plenty of water and take an over the counter stool softener such as colace (100 mg twice a day) or miralax (17 g once a day).      Diet:   You may resume your regular diet. Some people find it best to stick to soft, bland, and easily digestible foods for the first couple of days while the anesthesia is leaving their system or if they're taking narcotic pain medicine to avoid nausea and vomiting. Be sure to eat good, nutritious foods such as vegetables and lean proteins to give your body the nutrients it needs to heal. I recommend also taking vitamin C as this can aid with wound healing.    For any questions or concerns, please do not hesitate to contact the office any time at [phone] or send me a [facility] message.

## 2022-11-30 NOTE — ANESTHESIA PROCEDURE NOTES
Intubation    Date/Time: 11/30/2022 10:14 AM  Performed by: Hua Carrillo CRNA  Authorized by: Manolo Gotti MD     Intubation:     Induction:  Intravenous    Intubated:  Postinduction    Mask Ventilation:  Unsatisfactory mask ventilation - proceeded to laryngoscopy    Attempts:  1    Attempted By:  CRNA    Method of Intubation:  Video laryngoscopy    Blade:  Nunez 4    Laryngeal View Grade: Grade IIA - cords partially seen      Difficult Airway Encountered?: No      Complications:  None    Airway Device:  Oral endotracheal tube    Airway Device Size:  7.5    Style/Cuff Inflation:  Cuffed (inflated to minimal occlusive pressure)    Inflation Amount (mL):  6    Tube secured:  23    Secured at:  The lips    Placement Verified By:  Capnometry    Complicating Factors:  None    Findings Post-Intubation:  BS equal bilateral and atraumatic/condition of teeth unchanged

## 2022-11-30 NOTE — TRANSFER OF CARE
"Anesthesia Transfer of Care Note    Patient: Kate Lazo    Procedure(s) Performed: Procedure(s) (LRB):  XI ROBOTIC CHOLECYSTECTOMY (N/A)    Patient location: PACU    Anesthesia Type: general    Transport from OR: Transported from OR on room air with adequate spontaneous ventilation    Post pain: adequate analgesia    Post assessment: no apparent anesthetic complications and tolerated procedure well    Post vital signs: stable    Level of consciousness: awake    Nausea/Vomiting: no nausea/vomiting    Complications: none    Transfer of care protocol was followed      Last vitals:   Visit Vitals  /64   Pulse 65   Temp 36.8 °C (98.2 °F) (Temporal)   Resp 18   Ht 5' 10" (1.778 m)   Wt 99.8 kg (220 lb)   SpO2 100%   BMI 31.57 kg/m²     "

## 2022-11-30 NOTE — OP NOTE
Kate Lazo  : 1990, MRN: [id number]  Date of procedure: 2022        Procedure: DaVinci-assisted laparoscopic cholecystectomy with indocyanine green cholangiography    Pre-procedure diagnosis: Symptomatic cholelithiasis  Post-procedure diagnosis: Same  Surgeon: Antoinette Arreguin DO  Assistant: None  EBL: 10 mL  Specimen: Gallbladder  Drains: None  Complications: None apparent    Significant findings: Gallbladder full of large stones    Indications for procedure: See H&P    Description of procedure: The patient was brought to the OR and placed in the supine position. SCDs, safety straps, and a footboard were applied. After general anesthesia was induced by the Anesthesia Department, the abdomen was prepped and draped in usual sterile fashion. A time out was taken to identify the correct patient, correct procedure, and correct anatomical site; all parties were in agreement.  Local anesthetic was injected into the skin. An 8 mm supraumbilical incision was made and, using a kocher, the fascia was grasped and elevated. A Veress needle was inserted and the abdomen was insufflated to 15 mm Hg. An 8 mm robotic trochar was inserted; no apparent damage to underlying structures had occurred upon entry. Additional 8 mm robotic ports were placed horizontal to the first port: two along the right abdomen and one on the left. The patient was placed in reverse Trendelenburg and rotated to the left. The [employer] robot was docked.   The gallbladder was approached by first grasping the fundus and elevating it cephalad. There were omental attachments that were were taken down both bluntly and using careful electrocautery. Using careful blunt dissection, a window was created beneath the gallbladder neck to expose the cystic duct and artery. Using electrocautery, I then began taking down the lateral peritoneal attachments to the gallbladder so that a clear view of the liver bed was visualized behind the cystic  structures. The critical view of safety was achieved with an obvious, single duct and artery exiting and entering the gallbladder. ICG cholangiography confirmed the cystic duct branching from the common bile duct. The cystic duct and artery were clipped with 2 clips proximally, 1 distally, and transected in the middle using endoshears. The gallbladder was dissected off of the liver bed using electrocautery and placed into an endocatch bag. The area was irrigated with saline. The liver bed was inspected and good hemostasis was observed. The clips on the cystic duct and artery were inspected and found to be intact. The [employer] robot was undocked. The gallbladder was removed from the abdomen via the right most lateral port site under direct visualization. The fascia of this incision was then closed with 0 vicryl sutures using the suture passer under direct visualization.  The abdomen was desufflated. All incisions were closed with 4-0 monocryl in the subcuticular layer and dermabond was applied on top.    All sponge and instrument counts were deemed correct. The patient appeared to tolerate the procedure well and there were no apparent complications. The patient was transported to PACU in stable condition.    Antoinette Arreguin,   General Surgery  [facility]

## 2022-11-30 NOTE — ANESTHESIA POSTPROCEDURE EVALUATION
Anesthesia Post Evaluation    Patient: Kate Lazo    Procedure(s) Performed: Procedure(s) (LRB):  XI ROBOTIC CHOLECYSTECTOMY (N/A)    Final Anesthesia Type: general      Patient location during evaluation: PACU  Patient participation: Yes- Able to Participate  Level of consciousness: awake  Post-procedure vital signs: reviewed and stable  Pain management: adequate  Airway patency: patent    PONV status at discharge: No PONV  Anesthetic complications: no      Cardiovascular status: hemodynamically stable  Respiratory status: unassisted  Hydration status: euvolemic  Follow-up not needed.          Vitals Value Taken Time   /94 11/30/22 1230   Temp 36.9 °C (98.5 °F) 11/30/22 1150   Pulse 56 11/30/22 1232   Resp 43 11/30/22 1231   SpO2 100 % 11/30/22 1231   Vitals shown include unvalidated device data.      Event Time   Out of Recovery 12:33:37         Pain/Nettie Score: Nettie Score: 8 (11/30/2022 12:30 PM)

## 2022-12-01 ENCOUNTER — PATIENT MESSAGE (OUTPATIENT)
Dept: INTERNAL MEDICINE | Facility: CLINIC | Age: 32
End: 2022-12-01
Payer: MEDICARE

## 2022-12-01 ENCOUNTER — TELEPHONE (OUTPATIENT)
Dept: SURGERY | Facility: CLINIC | Age: 32
End: 2022-12-01
Payer: MEDICARE

## 2022-12-01 ENCOUNTER — TELEPHONE (OUTPATIENT)
Dept: INTERNAL MEDICINE | Facility: CLINIC | Age: 32
End: 2022-12-01
Payer: MEDICARE

## 2022-12-01 NOTE — TELEPHONE ENCOUNTER
----- Message from Migue Gage sent at 12/1/2022  9:39 AM CST -----  Contact: Christine  Type:  RX Refill Request    Who Called: Christine  Refill or New Rx: Refill  RX Name and Strength: Furosemide (LASIX) 20 MG tablet  How is the patient currently taking it? (ex. 1XDay): 1XDay  Is this a 30 day or 90 day RX:  Preferred Pharmacy with phone number:   [facility] [address] [facility] [place] [place] [facility] [address] [facility]  [address] [address] [place] [address]  Phone: [phone] Fax: [phone]  Local or Mail Order: Local  Ordering Provider: Dr. Burton  Would the patient rather a call back or a response via [facility]? Callback  Best Call Back Number: [phone]  Additional Information:

## 2022-12-01 NOTE — TELEPHONE ENCOUNTER
Called and spoke with patient regarding post operative questions. Patient asked if he would experience long term diarrhea after surgery. Informed patient I spoke with Dr. Arreguin and she stated post operative diarrhea is different for all patients and its hard to say if he would experience it and for how long. Patient verbalized understanding.     ----- Message from Mavis Palma MA sent at 11/29/2022  3:54 PM CST -----  Regarding: FW: surgery 11/30  Can you please contact this patient, RE: post operative questions/  Thanks  ----- Message -----  From: Sakina Jacobson RN  Sent: 11/29/2022   3:39 PM CST  To: [facility]  Subject: surgery 11/30                                    Pt is requesting a call back. Pt has post operative questions for Dr. Arreguin.    Thank you,    SUE

## 2022-12-05 VITALS
RESPIRATION RATE: 17 BRPM | DIASTOLIC BLOOD PRESSURE: 94 MMHG | HEART RATE: 54 BPM | SYSTOLIC BLOOD PRESSURE: 160 MMHG | TEMPERATURE: 99 F | OXYGEN SATURATION: 100 % | HEIGHT: 70 IN | WEIGHT: 220 LBS | BODY MASS INDEX: 31.5 KG/M2

## 2022-12-06 ENCOUNTER — TELEPHONE (OUTPATIENT)
Dept: SURGERY | Facility: CLINIC | Age: 32
End: 2022-12-06
Payer: MEDICARE

## 2022-12-06 NOTE — TELEPHONE ENCOUNTER
----- Message from Ben Angel sent at 12/6/2022  9:35 AM CST -----  Contact: self  Pt is calling to consult with a nurse in regards to his recent surgery . Please call him back at [phone]   Thank you      Bright

## 2022-12-09 LAB
FINAL PATHOLOGIC DIAGNOSIS: NORMAL
GROSS: NORMAL
Lab: NORMAL

## 2022-12-12 ENCOUNTER — PATIENT MESSAGE (OUTPATIENT)
Dept: INTERNAL MEDICINE | Facility: CLINIC | Age: 32
End: 2022-12-12
Payer: MEDICARE

## 2022-12-13 DIAGNOSIS — N30.00 ACUTE CYSTITIS WITHOUT HEMATURIA: Primary | ICD-10-CM

## 2022-12-15 ENCOUNTER — TELEPHONE (OUTPATIENT)
Dept: SURGERY | Facility: CLINIC | Age: 32
End: 2022-12-15
Payer: MEDICARE

## 2022-12-15 NOTE — TELEPHONE ENCOUNTER
----- Message from Nereida Frey sent at 12/15/2022  2:09 PM CST -----  Patient would like to consult with a nurse in regards  to his scheduled post op appt. He is requesting to reschedule at this time. Please call back at [phone]. Thanks susana

## 2022-12-15 NOTE — TELEPHONE ENCOUNTER
Returned call to pt and rescheduled appt to 12/19 @ 115. Verbally verified rescheduled time date and location with pt

## 2022-12-19 ENCOUNTER — OFFICE VISIT (OUTPATIENT)
Dept: SURGERY | Facility: CLINIC | Age: 32
End: 2022-12-19
Payer: MEDICARE

## 2022-12-19 VITALS — SYSTOLIC BLOOD PRESSURE: 80 MMHG | HEART RATE: 73 BPM | DIASTOLIC BLOOD PRESSURE: 57 MMHG | TEMPERATURE: 98 F

## 2022-12-19 DIAGNOSIS — K80.20 SYMPTOMATIC CHOLELITHIASIS: Primary | ICD-10-CM

## 2022-12-19 PROCEDURE — 99999 PR PBB SHADOW E&M-EST. PATIENT-LVL IV: CPT | Mod: PBBFAC,HCNC,,

## 2022-12-19 PROCEDURE — 99024 PR POST-OP FOLLOW-UP VISIT: ICD-10-PCS | Mod: HCNC,S$GLB,,

## 2022-12-19 PROCEDURE — 3074F SYST BP LT 130 MM HG: CPT | Mod: HCNC,CPTII,S$GLB,

## 2022-12-19 PROCEDURE — 99024 POSTOP FOLLOW-UP VISIT: CPT | Mod: HCNC,S$GLB,,

## 2022-12-19 PROCEDURE — 1159F PR MEDICATION LIST DOCUMENTED IN MEDICAL RECORD: ICD-10-PCS | Mod: HCNC,CPTII,S$GLB,

## 2022-12-19 PROCEDURE — 3078F DIAST BP <80 MM HG: CPT | Mod: HCNC,CPTII,S$GLB,

## 2022-12-19 PROCEDURE — 99999 PR PBB SHADOW E&M-EST. PATIENT-LVL IV: ICD-10-PCS | Mod: PBBFAC,HCNC,,

## 2022-12-19 PROCEDURE — 3078F PR MOST RECENT DIASTOLIC BLOOD PRESSURE < 80 MM HG: ICD-10-PCS | Mod: HCNC,CPTII,S$GLB,

## 2022-12-19 PROCEDURE — 99499 RISK ADDL DX/OHS AUDIT: ICD-10-PCS | Mod: HCNC,S$GLB,,

## 2022-12-19 PROCEDURE — 3074F PR MOST RECENT SYSTOLIC BLOOD PRESSURE < 130 MM HG: ICD-10-PCS | Mod: HCNC,CPTII,S$GLB,

## 2022-12-19 PROCEDURE — 99499 UNLISTED E&M SERVICE: CPT | Mod: HCNC,S$GLB,,

## 2022-12-19 PROCEDURE — 1159F MED LIST DOCD IN RCRD: CPT | Mod: HCNC,CPTII,S$GLB,
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
## 2022-12-21 ENCOUNTER — LAB VISIT (OUTPATIENT)
Dept: LAB | Facility: HOSPITAL | Age: 32
End: 2022-12-21
Attending: FAMILY MEDICINE
Payer: COMMERCIAL

## 2022-12-21 DIAGNOSIS — N30.00 ACUTE CYSTITIS WITHOUT HEMATURIA: ICD-10-CM

## 2022-12-21 LAB
BACTERIA #/AREA URNS HPF: ABNORMAL /HPF
BILIRUB UR QL STRIP: NEGATIVE
CLARITY UR: CLEAR
COLOR UR: YELLOW
GLUCOSE UR QL STRIP: NEGATIVE
HGB UR QL STRIP: NEGATIVE
KETONES UR QL STRIP: NEGATIVE
LEUKOCYTE ESTERASE UR QL STRIP: ABNORMAL
MICROSCOPIC COMMENT: ABNORMAL
NITRITE UR QL STRIP: NEGATIVE
PH UR STRIP: 7 [PH] (ref 5–8)
PROT UR QL STRIP: NEGATIVE
SP GR UR STRIP: <=1.005 (ref 1–1.03)
SQUAMOUS #/AREA URNS HPF: 1 /HPF
URN SPEC COLLECT METH UR: ABNORMAL
UROBILINOGEN UR STRIP-ACNC: NEGATIVE EU/DL
WBC #/AREA URNS HPF: 11 /HPF (ref 0–5)

## 2022-12-21 PROCEDURE — 81000 URINALYSIS NONAUTO W/SCOPE: CPT | Mod: HCNC | Performed by: FAMILY MEDICINE

## 2022-12-21 PROCEDURE — 87086 URINE CULTURE/COLONY COUNT: CPT | Mod: HCNC | Performed by: FAMILY MEDICINE

## 2022-12-21 PROCEDURE — 87088 URINE BACTERIA CULTURE: CPT | Mod: HCNC | Performed by: FAMILY MEDICINE

## 2022-12-21 PROCEDURE — 87077 CULTURE AEROBIC IDENTIFY: CPT | Mod: 59,HCNC | Performed by: FAMILY MEDICINE

## 2022-12-21 PROCEDURE — 87186 SC STD MICRODIL/AGAR DIL: CPT | Mod: 59,HCNC | Performed by: FAMILY MEDICINE

## 2022-12-24 LAB
BACTERIA UR CULT: ABNORMAL
BACTERIA UR CULT: ABNORMAL

## 2022-12-27 ENCOUNTER — PATIENT MESSAGE (OUTPATIENT)
Dept: INFECTIOUS DISEASES | Facility: CLINIC | Age: 32
End: 2022-12-27
Payer: MEDICARE

## 2022-12-28 DIAGNOSIS — T83.511A URINARY TRACT INFECTION ASSOCIATED WITH INDWELLING URETHRAL CATHETER, INITIAL ENCOUNTER: Primary | ICD-10-CM

## 2022-12-28 DIAGNOSIS — N39.0 COMPLICATED UTI (URINARY TRACT INFECTION): Primary | ICD-10-CM

## 2022-12-28 DIAGNOSIS — N39.0 URINARY TRACT INFECTION ASSOCIATED WITH INDWELLING URETHRAL CATHETER, INITIAL ENCOUNTER: Primary | ICD-10-CM

## 2022-12-28 RX ORDER — CEFDINIR 300 MG/1
300 CAPSULE ORAL 2 TIMES DAILY
Qty: 20 CAPSULE | Refills: 0 | Status: SHIPPED | OUTPATIENT
Start: 2022-12-28 | End: 2023-01-07

## 2023-01-01 NOTE — DISCHARGE SUMMARY
[facility]  Discharge Note  Short Stay    Procedure(s) (LRB):  XI ROBOTIC CHOLECYSTECTOMY (N/A)      OUTCOME: Patient tolerated treatment/procedure well without complication and is now ready for discharge.    DISPOSITION: Home or Self Care    FINAL DIAGNOSIS:  <principal problem not specified>    FOLLOWUP: In clinic    DISCHARGE INSTRUCTIONS:  No discharge procedures on file.     TIME SPENT ON DISCHARGE: 5 minutes   None

## 2023-01-06 DIAGNOSIS — S14.109S QUADRIPLEGIA, POST-TRAUMATIC: ICD-10-CM

## 2023-01-06 DIAGNOSIS — G82.50 QUADRIPLEGIA, POST-TRAUMATIC: ICD-10-CM

## 2023-01-06 RX ORDER — AMITRIPTYLINE HYDROCHLORIDE 25 MG/1
TABLET, FILM COATED ORAL
Qty: 90 TABLET | Refills: 3 | Status: SHIPPED | OUTPATIENT
Start: 2023-01-06 | End: 2023-06-06 | Stop reason: SDUPTHER

## 2023-01-06 RX ORDER — BACLOFEN 10 MG/1
TABLET ORAL
Qty: 270 TABLET | Refills: 1 | Status: SHIPPED | OUTPATIENT
Start: 2023-01-06 | End: 2023-03-27 | Stop reason: SDUPTHER

## 2023-01-06 RX ORDER — OXYBUTYNIN CHLORIDE 5 MG/1
5 TABLET ORAL 2 TIMES DAILY
Qty: 180 TABLET | Refills: 3 | Status: SHIPPED | OUTPATIENT
Start: 2023-01-06 | End: 2023-12-29 | Stop reason: SDUPTHER

## 2023-01-06 NOTE — TELEPHONE ENCOUNTER
----- Message from Tiana Finn sent at 1/6/2023 10:05 AM CST -----  Pt need a refill on the following       baclofen (LIORESAL) 20 MG tablet    amitriptyline (ELAVIL) 25 MG tablet    oxybutynin (DITROPAN) 5 MG Tab        [address] [place]  [address] [facility] [place]  [address] [facility] [place] [address]  Phone: [phone] Fax: [phone]      Pt can be reached at [phone] (home)

## 2023-01-06 NOTE — TELEPHONE ENCOUNTER
Refill Routing Note   Medication(s) are not appropriate for processing by [facility] for the following reason(s):      - Outside of protocol: baclofen  - Drug-Disease Interaction (amitriptyline and Chronic idiopathic constipation)    [facility] action(s):  Defer  Route  Approve Medication-related problems identified: Drug-disease interaction        Medication reconciliation completed: No     Appointments  past 12m or future 3m with PCP    Date Provider   Last Visit   11/11/2022 Troy Burton MD   Next Visit   4/24/2023 Troy Burton MD   ED visits in past 90 days: 0        Note composed:11:29 AM 01/06/2023

## 2023-01-06 NOTE — TELEPHONE ENCOUNTER
No new care gaps identified.  [facility] Embedded Care Gaps. Reference number: [id number]. 1/06/2023   10:13:32 AM CST

## 2023-01-09 ENCOUNTER — PATIENT MESSAGE (OUTPATIENT)
Dept: INFECTIOUS DISEASES | Facility: CLINIC | Age: 33
End: 2023-01-09
Payer: MEDICAID

## 2023-01-23 ENCOUNTER — PATIENT MESSAGE (OUTPATIENT)
Dept: INFECTIOUS DISEASES | Facility: CLINIC | Age: 33
End: 2023-01-23
Payer: MEDICARE

## 2023-01-30 DIAGNOSIS — Z93.59 SUPRAPUBIC CATHETER: Primary | ICD-10-CM

## 2023-01-31 ENCOUNTER — LAB VISIT (OUTPATIENT)
Dept: LAB | Facility: HOSPITAL | Age: 33
End: 2023-01-31
Attending: INTERNAL MEDICINE
Payer: MEDICARE

## 2023-01-31 DIAGNOSIS — Z93.59 SUPRAPUBIC CATHETER: ICD-10-CM

## 2023-01-31 LAB
BACTERIA #/AREA URNS HPF: ABNORMAL /HPF
BILIRUB UR QL STRIP: NEGATIVE
CLARITY UR: CLEAR
COLOR UR: YELLOW
GLUCOSE UR QL STRIP: NEGATIVE
HGB UR QL STRIP: NEGATIVE
KETONES UR QL STRIP: NEGATIVE
LEUKOCYTE ESTERASE UR QL STRIP: ABNORMAL
MICROSCOPIC COMMENT: ABNORMAL
NITRITE UR QL STRIP: POSITIVE
PH UR STRIP: 8 [PH] (ref 5–8)
PROT UR QL STRIP: NEGATIVE
SP GR UR STRIP: 1.01 (ref 1–1.03)
SQUAMOUS #/AREA URNS HPF: 3 /HPF
URN SPEC COLLECT METH UR: ABNORMAL
UROBILINOGEN UR STRIP-ACNC: NEGATIVE EU/DL
WBC #/AREA URNS HPF: 45 /HPF (ref 0–5)
WBC CLUMPS URNS QL MICRO: ABNORMAL

## 2023-01-31 PROCEDURE — 81000 URINALYSIS NONAUTO W/SCOPE: CPT | Mod: HCNC | Performed by: INTERNAL MEDICINE

## 2023-01-31 PROCEDURE — 87086 URINE CULTURE/COLONY COUNT: CPT | Mod: HCNC | Performed by: INTERNAL MEDICINE

## 2023-02-02 LAB
BACTERIA UR CULT: NORMAL
BACTERIA UR CULT: NORMAL

## 2023-02-03 RX ORDER — PHENAZOPYRIDINE HYDROCHLORIDE 95 MG/1
95 TABLET ORAL 3 TIMES DAILY PRN
Qty: 30 TABLET | Refills: 0 | Status: SHIPPED | OUTPATIENT
Start: 2023-02-03 | End: 2023-02-23

## 2023-02-07 DIAGNOSIS — Z00.00 ENCOUNTER FOR MEDICARE ANNUAL WELLNESS EXAM: ICD-10-CM

## 2023-02-09 DIAGNOSIS — Z00.00 ENCOUNTER FOR MEDICARE ANNUAL WELLNESS EXAM: ICD-10-CM

## 2023-02-22 ENCOUNTER — TELEPHONE (OUTPATIENT)
Dept: INFECTIOUS DISEASES | Facility: CLINIC | Age: 33
End: 2023-02-22
Payer: MEDICARE

## 2023-02-22 NOTE — TELEPHONE ENCOUNTER
----- Message from Frida Washington sent at 2/22/2023 12:42 PM CST -----  Contact: Kate  Patient is calling to speak with nurse regarding documents. Reports outside provider Dr Raymond sent over some paperwork and the patient just wanted to confirm the office has received it. Please igve patient a call back at .[phone].

## 2023-02-24 ENCOUNTER — TELEPHONE (OUTPATIENT)
Dept: INFECTIOUS DISEASES | Facility: CLINIC | Age: 33
End: 2023-02-24
Payer: MEDICARE

## 2023-02-24 ENCOUNTER — PATIENT MESSAGE (OUTPATIENT)
Dept: INFECTIOUS DISEASES | Facility: CLINIC | Age: 33
End: 2023-02-24
Payer: MEDICARE

## 2023-02-24 NOTE — TELEPHONE ENCOUNTER
----- Message from Erika Villareal sent at 2/24/2023  9:24 AM CST -----  Patient is requesting a call back in regards to information from another provider,Please call back at [phone].thanks

## 2023-02-27 ENCOUNTER — PATIENT MESSAGE (OUTPATIENT)
Dept: INFECTIOUS DISEASES | Facility: CLINIC | Age: 33
End: 2023-02-27
Payer: MEDICARE

## 2023-02-27 DIAGNOSIS — T83.511A URINARY TRACT INFECTION ASSOCIATED WITH INDWELLING URETHRAL CATHETER, INITIAL ENCOUNTER: Primary | ICD-10-CM

## 2023-02-27 DIAGNOSIS — N39.0 URINARY TRACT INFECTION ASSOCIATED WITH INDWELLING URETHRAL CATHETER, INITIAL ENCOUNTER: Primary | ICD-10-CM

## 2023-02-28 ENCOUNTER — LAB VISIT (OUTPATIENT)
Dept: LAB | Facility: HOSPITAL | Age: 33
End: 2023-02-28
Attending: INTERNAL MEDICINE
Payer: COMMERCIAL

## 2023-02-28 DIAGNOSIS — T83.511A URINARY TRACT INFECTION ASSOCIATED WITH INDWELLING URETHRAL CATHETER, INITIAL ENCOUNTER: ICD-10-CM

## 2023-02-28 DIAGNOSIS — N39.0 URINARY TRACT INFECTION ASSOCIATED WITH INDWELLING URETHRAL CATHETER, INITIAL ENCOUNTER: ICD-10-CM

## 2023-02-28 LAB
BACTERIA #/AREA URNS HPF: ABNORMAL /HPF
BILIRUB UR QL STRIP: NEGATIVE
CLARITY UR: ABNORMAL
COLOR UR: ABNORMAL
GLUCOSE UR QL STRIP: NEGATIVE
HGB UR QL STRIP: ABNORMAL
KETONES UR QL STRIP: NEGATIVE
LEUKOCYTE ESTERASE UR QL STRIP: ABNORMAL
MICROSCOPIC COMMENT: ABNORMAL
NITRITE UR QL STRIP: POSITIVE
PH UR STRIP: 7 [PH] (ref 5–8)
PROT UR QL STRIP: NEGATIVE
RBC #/AREA URNS HPF: 4 /HPF (ref 0–4)
SP GR UR STRIP: <=1.005 (ref 1–1.03)
SQUAMOUS #/AREA URNS HPF: 1 /HPF
URN SPEC COLLECT METH UR: ABNORMAL
UROBILINOGEN UR STRIP-ACNC: NEGATIVE EU/DL
WBC #/AREA URNS HPF: 13 /HPF (ref 0–5)

## 2023-02-28 PROCEDURE — 87077 CULTURE AEROBIC IDENTIFY: CPT | Mod: HCNC | Performed by: INTERNAL MEDICINE

## 2023-02-28 PROCEDURE — 87086 URINE CULTURE/COLONY COUNT: CPT | Mod: HCNC | Performed by: INTERNAL MEDICINE

## 2023-02-28 PROCEDURE — 87088 URINE BACTERIA CULTURE: CPT | Mod: HCNC | Performed by: INTERNAL MEDICINE

## 2023-02-28 PROCEDURE — 87186 SC STD MICRODIL/AGAR DIL: CPT | Mod: HCNC | Performed by: INTERNAL MEDICINE

## 2023-02-28 PROCEDURE — 81000 URINALYSIS NONAUTO W/SCOPE: CPT | Mod: HCNC | Performed by: INTERNAL MEDICINE

## 2023-03-01 ENCOUNTER — PATIENT MESSAGE (OUTPATIENT)
Dept: INTERNAL MEDICINE | Facility: CLINIC | Age: 33
End: 2023-03-01
Payer: MEDICARE

## 2023-03-01 RX ORDER — GRANULES FOR ORAL 3 G/1
3 POWDER ORAL ONCE
Qty: 3 G | Refills: 0 | Status: SHIPPED | OUTPATIENT
Start: 2023-03-01 | End: 2023-03-01

## 2023-03-02 LAB — BACTERIA UR CULT: ABNORMAL

## 2023-03-09 RX ORDER — BACLOFEN 10 MG/1
TABLET ORAL
Qty: 270 TABLET | Refills: 1 | Status: CANCELLED | OUTPATIENT
Start: 2023-03-09

## 2023-03-09 RX ORDER — BACLOFEN 20 MG/1
20 TABLET ORAL 3 TIMES DAILY
Qty: 270 TABLET | Refills: 1 | Status: SHIPPED | OUTPATIENT
Start: 2023-03-09 | End: 2023-03-27 | Stop reason: SDUPTHER

## 2023-03-09 NOTE — TELEPHONE ENCOUNTER
----- Message from Elizabeth Ward sent at 3/9/2023  9:14 AM CST -----  Contact: self [phone]  Patient called in this morning requesting a refill on baclofen (LIORESAL) 10 MG tablet. Please call back [phone]. Thanks bernice        [address] [place]  [address] [place]  [address] [place] [address]  Phone: [phone] Fax: [phone]

## 2023-03-14 ENCOUNTER — PATIENT MESSAGE (OUTPATIENT)
Dept: INFECTIOUS DISEASES | Facility: CLINIC | Age: 33
End: 2023-03-14
Payer: MEDICARE

## 2023-03-15 ENCOUNTER — OFFICE VISIT (OUTPATIENT)
Dept: INFECTIOUS DISEASES | Facility: CLINIC | Age: 33
End: 2023-03-15
Payer: MEDICARE

## 2023-03-15 ENCOUNTER — LAB VISIT (OUTPATIENT)
Dept: LAB | Facility: HOSPITAL | Age: 33
End: 2023-03-15
Attending: INTERNAL MEDICINE
Payer: MEDICARE

## 2023-03-15 VITALS
HEART RATE: 90 BPM | BODY MASS INDEX: 31.57 KG/M2 | SYSTOLIC BLOOD PRESSURE: 96 MMHG | DIASTOLIC BLOOD PRESSURE: 67 MMHG | HEIGHT: 70 IN

## 2023-03-15 DIAGNOSIS — G82.50 QUADRIPLEGIA, POST-TRAUMATIC: ICD-10-CM

## 2023-03-15 DIAGNOSIS — N39.0 COMPLICATED UTI (URINARY TRACT INFECTION): ICD-10-CM

## 2023-03-15 DIAGNOSIS — S14.109S QUADRIPLEGIA, POST-TRAUMATIC: ICD-10-CM

## 2023-03-15 DIAGNOSIS — R53.2 FUNCTIONAL QUADRIPLEGIA: ICD-10-CM

## 2023-03-15 DIAGNOSIS — I10 PRIMARY HYPERTENSION: ICD-10-CM

## 2023-03-15 PROCEDURE — 3008F PR BODY MASS INDEX (BMI) DOCUMENTED: ICD-10-PCS | Mod: HCNC,CPTII,S$GLB, | Performed by: INTERNAL MEDICINE

## 2023-03-15 PROCEDURE — 3008F BODY MASS INDEX DOCD: CPT | Mod: HCNC,CPTII,S$GLB, | Performed by: INTERNAL MEDICINE

## 2023-03-15 PROCEDURE — 1159F PR MEDICATION LIST DOCUMENTED IN MEDICAL RECORD: ICD-10-PCS | Mod: HCNC,CPTII,S$GLB, | Performed by: INTERNAL MEDICINE

## 2023-03-15 PROCEDURE — 99999 PR PBB SHADOW E&M-EST. PATIENT-LVL III: ICD-10-PCS | Mod: PBBFAC,HCNC,, | Performed by: INTERNAL MEDICINE

## 2023-03-15 PROCEDURE — 80053 COMPREHEN METABOLIC PANEL: CPT | Mod: HCNC | Performed by: INTERNAL MEDICINE

## 2023-03-15 PROCEDURE — 3074F PR MOST RECENT SYSTOLIC BLOOD PRESSURE < 130 MM HG: ICD-10-PCS | Mod: HCNC,CPTII,S$GLB, | Performed by: INTERNAL MEDICINE

## 2023-03-15 PROCEDURE — 99214 OFFICE O/P EST MOD 30 MIN: CPT | Mod: HCNC,S$GLB,, | Performed by: INTERNAL MEDICINE

## 2023-03-15 PROCEDURE — 3078F PR MOST RECENT DIASTOLIC BLOOD PRESSURE < 80 MM HG: ICD-10-PCS | Mod: HCNC,CPTII,S$GLB, | Performed by: INTERNAL MEDICINE

## 2023-03-15 PROCEDURE — 99999 PR PBB SHADOW E&M-EST. PATIENT-LVL III: CPT | Mod: PBBFAC,HCNC,, | Performed by: INTERNAL MEDICINE

## 2023-03-15 PROCEDURE — 3078F DIAST BP <80 MM HG: CPT | Mod: HCNC,CPTII,S$GLB, | Performed by: INTERNAL MEDICINE

## 2023-03-15 PROCEDURE — 36415 COLL VENOUS BLD VENIPUNCTURE: CPT | Mod: HCNC | Performed by: INTERNAL MEDICINE

## 2023-03-15 PROCEDURE — 99214 PR OFFICE/OUTPT VISIT, EST, LEVL IV, 30-39 MIN: ICD-10-PCS | Mod: HCNC,S$GLB,, | Performed by: INTERNAL MEDICINE

## 2023-03-15 PROCEDURE — 1159F MED LIST DOCD IN RCRD: CPT | Mod: HCNC,CPTII,S$GLB, | Performed by: INTERNAL MEDICINE

## 2023-03-15 PROCEDURE — 3074F SYST BP LT 130 MM HG: CPT | Mod: HCNC,CPTII,S$GLB, | Performed by: INTERNAL MEDICINE

## 2023-03-15 RX ORDER — ACETOHYDROXAMIC ACID 250 MG/1
1 TABLET ORAL 3 TIMES DAILY
COMMUNITY
Start: 2023-02-07

## 2023-03-15 RX ORDER — GRANULES FOR ORAL 3 G/1
3 POWDER ORAL ONCE
Qty: 3 G | Refills: 0 | Status: SHIPPED | OUTPATIENT
Start: 2023-03-15 | End: 2023-03-15

## 2023-03-15 NOTE — ASSESSMENT & PLAN NOTE
"Last ID plan - during 07/22 clinic visit   "He was advised to reduce frequent monthly testing .  Over the last 6-7 months, he has taken monthly medications/antibiiotics .  Hipprex cannot be used as it requires urine acidification  And not effective in catheter associated UTIS because sufficient  concentration of formaldehyde cannot be maintained in catheterized bladder     It is metabolized to formaldehyde and ammonia . The active agent is formaldehyde and this needs acid environment to occur .  Will try azo dye and see ."      3/15- we had an extensive discussion about the true symptoms of UTI and the need to avoid treating colonization.  The mother was in the room,  Fever , confusion,dysuria  are likely true symptoms-he has a suprapubic .  the     Caregiver was also  in the room.  Will send a dose of fosfomycin to keep at home  I have also contacted  Dr Raymond some weeks cassi  -his urologist and we discussed his care-he also calls his office too with symptoms.     Will do CT scan of abdomen /pelvis to rule out any other pathology and will avoid testing the urine in the bag as that will have bacteria    Will use lithostat and azo dye   "

## 2023-03-15 NOTE — PROGRESS NOTES
Subjective:       Patient ID: Kate Lazo is a 33 y.o. male.    Chief Complaint: Follow-up    HPI    Last clinic note- 07/2012  Last clinic note-  31 year old man with PMH as listed below. He has history of quadriparesis and has chronic suprapubic [facility]. He is in a wheelchair with his step Dad    Cultures reviewed-  04/08-Urine culture-pseudomonas  1/14- Pseudomonas   12/11/20- pseudomonas/morganella  09/2020-E coli -ESBL  Case was discussed with Dr Loaiza and Fosfomycin was sent to the pharmacy.-04/13- FOSFOMYCIN po 3gram every 72 hours X2 doses  He is in a wheelchair.  11/30/21-  Latest urine culture-      11/8/21-proteus and was given Augmentin 875 mg for 7 days.   03/08/202-  Since the last clinic visit ,   Ct scan of the abdomen -02/10/2022-  Suprapubic pelvic catheter and bladder wall thickening, somewhat nonspecific in the setting of a nondistended bladder.   Moderate stool burden in the distal colon.  Correlation for constipation.   Cholelithiasis.   Questionable early sacral decubitus ulcer.  Correlation is advised     02.21/22- Urine culture-  PSEUDOMONAS AERUGINOSA   50,000 - 99,999 cfu/ml   He took the fosfomycin without clinical relief -his major symptoms are abdominal pain.  He reports that the urine smell gets better and he feels better .  He denies fever at this time.  The plan was made to start -IV meropenem for 2 weeks but he has not yet started PICC line.      05/05/22  Since the last visit ,he had another episode of UTI-04/19/22  KLEBSIELLA PNEUMONIAE-he was given Levaquin   He was seen by PCP yesterday for malaise.  Cbc WAS NORMAL       07/13/22 -since his last visit, he had recurrent positive urine culture-  Latest urine culture- 06/09/22- Morganella   05/2022- Proteus   04/22- Klebsiella  03/22- Morganella  02/22- Klebsiella  01/22-pseudomoas     12/2021- Morgabella  11/21- Proteus   He reports intermittent dysuria . Denies fever .  He changes the alonso every 2  weeks.  CT scan of the abdomen/pelvis- 02/22-Suprapubic pelvic catheter and bladder wall thickening, somewhat nonspecific in the setting of a nondistended bladder.   Moderate stool burden in the distal colon.  Correlation for constipation.   Cholelithiasis.   Questionable early sacral decubitus ulcer.  Correlation is advised.     Over the last 6 months - he did monthly Urine cultures whenever he contacts the office about dysuria      03/15- the patient  comes for follow up.    Last urine culture- 02/28- Klebsiella.  He was given Fosfomycin .     Component      Latest Ref Rng & Units 2/28/2023 1/31/2023            1:49 PM   Urine Culture, Routine       KLEBSIELLA PNEUMONIAE (A) . . . clinically necessary.     Component      Latest Ref Rng & Units 1/31/2023           1:49 PM   Urine Culture, Routine       Multiple organisms isolated. None in predominance.  Repeat if     Component      Latest Ref Rng & Units 12/21/2022           2:20 PM   Urine Culture, Routine       PROTEUS MIRABILIS (A) . . .     Component      Latest Ref Rng & Units 12/21/2022           2:20 PM   Urine Culture, Routine       KLEBSIELLA PNEUMONIAE (A) . . .     Component      Latest Ref Rng & Units 11/10/2022             Urine Culture, Routine       PSEUDOMONAS AERUGINOSA (A) . . .     Component      Latest Ref Rng & Units 9/29/2022             Urine Culture, Routine       PSEUDOMONAS AERUGINOSA (A) . . .     Component      Latest Ref Rng & Units 6/9/2022             Urine Culture, Routine       MORGANELLA MORGANII (A) . . .     Review of Systems   Constitutional:  Negative for activity change and appetite change.   HENT:  Negative for dental problem.    Respiratory:  Negative for apnea and chest tightness.    Gastrointestinal:  Negative for abdominal distention.   Genitourinary:  Positive for dysuria.   Musculoskeletal:  Negative for arthralgias.       Objective:      Physical Exam  Vitals and nursing note reviewed.   HENT:      Head: Normocephalic.  "  Cardiovascular:      Rate and Rhythm: Normal rate.   Pulmonary:      Effort: Pulmonary effort is normal.   Abdominal:      General: Abdomen is flat.   Musculoskeletal:      Comments: Has a power chair    Neurological:      Mental Status: He is oriented to person, place, and time.       Assessment:       1. Quadriplegia, post-traumatic        2. Complicated UTI (urinary tract infection)  fosfomycin (MONUROL) 3 gram Pack      3. Primary hypertension        4. Functional quadriplegia              Plan:       Problem List Items Addressed This Visit       Quadriplegia, post-traumatic     Continue supportive care          Relevant Orders    CT Abdomen Pelvis With Contrast    Comprehensive Metabolic Panel    Hypertension     Follow up PCP         Relevant Orders    CT Abdomen Pelvis With Contrast    Comprehensive Metabolic Panel    Functional quadriplegia     Continue supportive care         Relevant Orders    CT Abdomen Pelvis With Contrast    Comprehensive Metabolic Panel    Complicated UTI (urinary tract infection)     Last ID plan - during 07/22 clinic visit   "He was advised to reduce frequent monthly testing .  Over the last 6-7 months, he has taken monthly medications/antibiiotics .  Hipprex cannot be used as it requires urine acidification  And not effective in catheter associated UTIS because sufficient  concentration of formaldehyde cannot be maintained in catheterized bladder     It is metabolized to formaldehyde and ammonia . The active agent is formaldehyde and this needs acid environment to occur .  Will try azo dye and see ."      3/15- we had an extensive discussion about the true symptoms of UTI and the need to avoid treating colonization.  The mother was in the room,  Fever , confusion,dysuria  are likely true symptoms-he has a suprapubic .  the     Caregiver was also  in the room.  Will send a dose of fosfomycin to keep at home  I have also contacted  Dr Raymond some weeks agao  -his urologist and we " discussed his care-he also calls his office too with symptoms.     Will do CT scan of abdomen /pelvis to rule out any other pathology and will avoid testing the urine in the bag as that will have bacteria    Will use lithostat and azo dye          Relevant Medications    fosfomycin (MONUROL) 3 gram Pack    Other Relevant Orders    CT Abdomen Pelvis With Contrast    Comprehensive Metabolic Panel

## 2023-03-16 LAB
ALBUMIN SERPL BCP-MCNC: 3.7 G/DL (ref 3.5–5.2)
ALP SERPL-CCNC: 115 U/L (ref 55–135)
ALT SERPL W/O P-5'-P-CCNC: 13 U/L (ref 10–44)
ANION GAP SERPL CALC-SCNC: 8 MMOL/L (ref 8–16)
AST SERPL-CCNC: 12 U/L (ref 10–40)
BILIRUB SERPL-MCNC: 0.8 MG/DL (ref 0.1–1)
BUN SERPL-MCNC: 9 MG/DL (ref 6–20)
CALCIUM SERPL-MCNC: 9.2 MG/DL (ref 8.7–10.5)
CHLORIDE SERPL-SCNC: 105 MMOL/L (ref 95–110)
CO2 SERPL-SCNC: 26 MMOL/L (ref 23–29)
CREAT SERPL-MCNC: 0.7 MG/DL (ref 0.5–1.4)
EST. GFR  (NO RACE VARIABLE): >60 ML/MIN/1.73 M^2
GLUCOSE SERPL-MCNC: 96 MG/DL (ref 70–110)
POTASSIUM SERPL-SCNC: 4 MMOL/L (ref 3.5–5.1)
PROT SERPL-MCNC: 6.7 G/DL (ref 6–8.4)
SODIUM SERPL-SCNC: 139 MMOL/L (ref 136–145)

## 2023-03-21 ENCOUNTER — HOSPITAL ENCOUNTER (OUTPATIENT)
Dept: RADIOLOGY | Facility: HOSPITAL | Age: 33
Discharge: HOME | End: 2023-03-21
Attending: INTERNAL MEDICINE
Payer: COMMERCIAL

## 2023-03-21 DIAGNOSIS — I10 PRIMARY HYPERTENSION: ICD-10-CM

## 2023-03-21 DIAGNOSIS — G82.50 QUADRIPLEGIA, POST-TRAUMATIC: ICD-10-CM

## 2023-03-21 DIAGNOSIS — N39.0 COMPLICATED UTI (URINARY TRACT INFECTION): ICD-10-CM

## 2023-03-21 DIAGNOSIS — S14.109S QUADRIPLEGIA, POST-TRAUMATIC: ICD-10-CM

## 2023-03-21 DIAGNOSIS — R53.2 FUNCTIONAL QUADRIPLEGIA: ICD-10-CM

## 2023-03-21 PROCEDURE — 74177 CT ABD & PELVIS W/CONTRAST: CPT | Mod: 26,HCNC,, | Performed by: STUDENT IN AN ORGANIZED HEALTH CARE EDUCATION/TRAINING PROGRAM

## 2023-03-21 PROCEDURE — 74177 CT ABD & PELVIS W/CONTRAST: CPT | Mod: TC,HCNC

## 2023-03-21 PROCEDURE — 25500020 PHARM REV CODE 255: Mod: HCNC | Performed by: INTERNAL MEDICINE

## 2023-03-21 PROCEDURE — 74177 CT ABDOMEN PELVIS WITH CONTRAST: ICD-10-PCS | Mod: 26,HCNC,, | Performed by: STUDENT IN AN ORGANIZED HEALTH CARE EDUCATION/TRAINING PROGRAM

## 2023-03-21 RX ADMIN — IOHEXOL 100 ML: 350 INJECTION, SOLUTION INTRAVENOUS at 12:03

## 2023-03-22 ENCOUNTER — PATIENT MESSAGE (OUTPATIENT)
Dept: INFECTIOUS DISEASES | Facility: CLINIC | Age: 33
End: 2023-03-22
Payer: MEDICARE

## 2023-03-22 ENCOUNTER — PATIENT MESSAGE (OUTPATIENT)
Dept: INTERNAL MEDICINE | Facility: CLINIC | Age: 33
End: 2023-03-22
Payer: MEDICARE

## 2023-03-27 NOTE — TELEPHONE ENCOUNTER
----- Message from Katiana Cali sent at 3/27/2023 10:00 AM CDT -----  Contact: Self  .Type:  RX Refill Request    Who Called: Kate  Refill or New Rx:refill  RX Name and Strength:baclofen (LIORESAL) 10 MG tablet and baclofen (LIORESAL) 20 MG tablet  How is the patient currently taking it? (ex. 1XDay):as precribed  Is this a 30 day or 90 day RX:30  Preferred Pharmacy with phone number:   [facility] [place], [place] - [address] [facility]  [address] [facility]  [place] [address]  Phone: [phone] Fax: [phone]  Local or Mail Order:local  Ordering Provider:Dr Burton  Would the patient rather a call back or a response via [facility]? Call back   Best Call Back Number:[phone]  Additional Information:

## 2023-03-27 NOTE — TELEPHONE ENCOUNTER
----- Message from Katiana Cali sent at 3/27/2023 10:00 AM CDT -----  Contact: Self  .Type:  RX Refill Request    Who Called: Kate  Refill or New Rx:refill  RX Name and Strength:baclofen (LIORESAL) 10 MG tablet and baclofen (LIORESAL) 20 MG tablet  How is the patient currently taking it? (ex. 1XDay):as precribed  Is this a 30 day or 90 day RX:30  Preferred Pharmacy with phone number:   [facility] - [place], [place] - [address] [facility]  [address] [facility]  [place] [address]  Phone: [phone] Fax: [phone]  Local or Mail Order:local  Ordering Provider:Dr Burton  Would the patient rather a call back or a response via [facility]? Call back   Best Call Back Number:[phone]  Additional Information:

## 2023-03-28 RX ORDER — BACLOFEN 20 MG/1
20 TABLET ORAL 3 TIMES DAILY
Qty: 270 TABLET | Refills: 1 | Status: SHIPPED | OUTPATIENT
Start: 2023-03-28

## 2023-03-28 RX ORDER — BACLOFEN 10 MG/1
TABLET ORAL
Qty: 270 TABLET | Refills: 1 | Status: SHIPPED | OUTPATIENT
Start: 2023-03-28 | End: 2024-02-29

## 2023-04-03 ENCOUNTER — PATIENT MESSAGE (OUTPATIENT)
Dept: INFECTIOUS DISEASES | Facility: CLINIC | Age: 33
End: 2023-04-03
Payer: COMMERCIAL

## 2023-04-05 DIAGNOSIS — N39.0 COMPLICATED UTI (URINARY TRACT INFECTION): Primary | ICD-10-CM

## 2023-04-05 RX ORDER — GRANULES FOR ORAL 3 G/1
3 POWDER ORAL ONCE
Qty: 3 G | Refills: 0 | Status: SHIPPED | OUTPATIENT
Start: 2023-04-05 | End: 2023-04-05

## 2023-04-05 RX ORDER — AMOXICILLIN AND CLAVULANATE POTASSIUM 875; 125 MG/1; MG/1
1 TABLET, FILM COATED ORAL EVERY 12 HOURS
Qty: 14 TABLET | Refills: 0 | Status: SHIPPED | OUTPATIENT
Start: 2023-04-05 | End: 2023-04-12

## 2023-04-12 DIAGNOSIS — M79.89 LEG SWELLING: ICD-10-CM

## 2023-04-12 RX ORDER — FUROSEMIDE 20 MG/1
20 TABLET ORAL DAILY
Qty: 90 TABLET | Refills: 1 | Status: SHIPPED | OUTPATIENT
Start: 2023-04-12 | End: 2023-06-06 | Stop reason: SDUPTHER

## 2023-04-12 NOTE — TELEPHONE ENCOUNTER
----- Message from Katie Mcneal sent at 4/12/2023  9:14 AM CDT -----  Contact: [phone]  Type:  RX Refill Request    Who Called: Kate  Refill or New Rx:refill   RX Name and Strength:furosemide (LASIX) 20 MG tablet  How is the patient currently taking it? (ex. 1XDay): Take 1 tablet (20 mg total) by mouth once daily. - Oral  Is this a 30 day or 90 day RX:30  Preferred Pharmacy with phone number:  [facility] - [place], [place] - [address] [facility]  [address] [facility]  [place] [address]  Phone: [phone] Fax: [phone]    Local or Mail Order:local  Ordering Provider:Dr Burton  Would the patient rather a call back or a response via [facility]? Call back   Best Call Back Number:[phone]  Additional Information: n/a    Thanks KB

## 2023-04-12 NOTE — TELEPHONE ENCOUNTER
No new care gaps identified.  [facility] Embedded Care Gaps. Reference number: [id number]. 4/12/2023   10:39:23 AM BHAVANAT

## 2023-05-11 ENCOUNTER — PATIENT MESSAGE (OUTPATIENT)
Dept: INFECTIOUS DISEASES | Facility: CLINIC | Age: 33
End: 2023-05-11
Payer: COMMERCIAL

## 2023-05-11 ENCOUNTER — TELEPHONE (OUTPATIENT)
Dept: INTERNAL MEDICINE | Facility: CLINIC | Age: 33
End: 2023-05-11
Payer: COMMERCIAL

## 2023-05-11 NOTE — TELEPHONE ENCOUNTER
Rossy/  [employer] will fax over the order to recheck the date as written 2017 to re-correct.

## 2023-05-11 NOTE — TELEPHONE ENCOUNTER
----- Message from Lin Moncada sent at 5/11/2023  3:25 PM CDT -----  Contact: Rossy/  [employer]  Rossy is calling to speak to the nurse regarding the status of a faxed order for a wheel chair repair, she stated the date is unclear. Please if any questions give her a call at  ext [phone]    Thanks  LJ

## 2023-05-22 ENCOUNTER — PATIENT MESSAGE (OUTPATIENT)
Dept: INFECTIOUS DISEASES | Facility: CLINIC | Age: 33
End: 2023-05-22
Payer: COMMERCIAL

## 2023-05-22 ENCOUNTER — LAB VISIT (OUTPATIENT)
Dept: LAB | Facility: HOSPITAL | Age: 33
End: 2023-05-22
Attending: INTERNAL MEDICINE
Payer: COMMERCIAL

## 2023-05-22 DIAGNOSIS — N39.0 COMPLICATED UTI (URINARY TRACT INFECTION): ICD-10-CM

## 2023-05-22 LAB
AMORPH CRY URNS QL MICRO: ABNORMAL
BACTERIA #/AREA URNS HPF: ABNORMAL /HPF
BILIRUB UR QL STRIP: NEGATIVE
CLARITY UR: ABNORMAL
COLOR UR: COLORLESS
GLUCOSE UR QL STRIP: NEGATIVE
HGB UR QL STRIP: NEGATIVE
HYALINE CASTS #/AREA URNS LPF: 1 /LPF
KETONES UR QL STRIP: NEGATIVE
LEUKOCYTE ESTERASE UR QL STRIP: ABNORMAL
MICROSCOPIC COMMENT: ABNORMAL
NITRITE UR QL STRIP: NEGATIVE
PH UR STRIP: 8 [PH] (ref 5–8)
PROT UR QL STRIP: NEGATIVE
RBC #/AREA URNS HPF: 1 /HPF (ref 0–4)
SP GR UR STRIP: 1 (ref 1–1.03)
URN SPEC COLLECT METH UR: ABNORMAL
UROBILINOGEN UR STRIP-ACNC: NEGATIVE EU/DL
WBC #/AREA URNS HPF: 21 /HPF (ref 0–5)
WBC CLUMPS URNS QL MICRO: ABNORMAL
YEAST URNS QL MICRO: ABNORMAL

## 2023-05-22 PROCEDURE — 87086 URINE CULTURE/COLONY COUNT: CPT | Performed by: INTERNAL MEDICINE

## 2023-05-22 PROCEDURE — 81000 URINALYSIS NONAUTO W/SCOPE: CPT | Performed by: INTERNAL MEDICINE

## 2023-05-22 PROCEDURE — 87088 URINE BACTERIA CULTURE: CPT | Performed by: INTERNAL MEDICINE

## 2023-05-22 PROCEDURE — 87077 CULTURE AEROBIC IDENTIFY: CPT | Performed by: INTERNAL MEDICINE

## 2023-05-22 PROCEDURE — 87186 SC STD MICRODIL/AGAR DIL: CPT | Mod: 59 | Performed by: INTERNAL MEDICINE

## 2023-05-22 RX ORDER — GABAPENTIN 300 MG/1
CAPSULE ORAL
Qty: 270 CAPSULE | Refills: 0 | Status: SHIPPED | OUTPATIENT
Start: 2023-05-22 | End: 2023-08-30

## 2023-05-22 NOTE — TELEPHONE ENCOUNTER
No care due was identified.  [facility] Embedded Care Due Messages. Reference number: [id number].   5/22/2023 8:55:18 AM CDT

## 2023-05-23 ENCOUNTER — PATIENT MESSAGE (OUTPATIENT)
Dept: INFECTIOUS DISEASES | Facility: CLINIC | Age: 33
End: 2023-05-23
Payer: COMMERCIAL

## 2023-05-24 LAB
BACTERIA UR CULT: ABNORMAL
BACTERIA UR CULT: ABNORMAL

## 2023-05-24 RX ORDER — GRANULES FOR ORAL 3 G/1
3 POWDER ORAL ONCE
Qty: 3 G | Refills: 0 | Status: SHIPPED | OUTPATIENT
Start: 2023-05-24 | End: 2023-05-24

## 2023-05-25 RX ORDER — AMOXICILLIN AND CLAVULANATE POTASSIUM 875; 125 MG/1; MG/1
1 TABLET, FILM COATED ORAL EVERY 12 HOURS
Qty: 14 TABLET | Refills: 0 | Status: SHIPPED | OUTPATIENT
Start: 2023-05-25 | End: 2023-06-01

## 2023-05-25 NOTE — PROGRESS NOTES
Hi Mr. Lazo,     Your urine culture has come back and Dr. Castro has changed your treatment to send 7 days of Augmentin -hold off on Fosfomycin. Please let me know if you have any questions or concerns

## 2023-05-31 ENCOUNTER — TELEPHONE (OUTPATIENT)
Dept: PULMONOLOGY | Facility: CLINIC | Age: 33
End: 2023-05-31
Payer: COMMERCIAL

## 2023-05-31 NOTE — TELEPHONE ENCOUNTER
Received notification that Telderi message had not been read. Called pt to make sure he had picked up his new antibiotic, Augmentin and stopped fosomycin. Pt confirmed that he had and is feeling a little better now.

## 2023-06-05 ENCOUNTER — PATIENT MESSAGE (OUTPATIENT)
Dept: INFECTIOUS DISEASES | Facility: CLINIC | Age: 33
End: 2023-06-05
Payer: COMMERCIAL

## 2023-06-06 DIAGNOSIS — G82.50 QUADRIPLEGIA, POST-TRAUMATIC: ICD-10-CM

## 2023-06-06 DIAGNOSIS — M79.89 LEG SWELLING: ICD-10-CM

## 2023-06-06 DIAGNOSIS — S14.109S QUADRIPLEGIA, POST-TRAUMATIC: ICD-10-CM

## 2023-06-06 RX ORDER — FUROSEMIDE 20 MG/1
20 TABLET ORAL DAILY
Qty: 90 TABLET | Refills: 1 | Status: SHIPPED | OUTPATIENT
Start: 2023-06-06 | End: 2023-10-02

## 2023-06-06 RX ORDER — AMITRIPTYLINE HYDROCHLORIDE 25 MG/1
TABLET, FILM COATED ORAL
Qty: 90 TABLET | Refills: 3 | Status: SHIPPED | OUTPATIENT
Start: 2023-06-06 | End: 2023-07-14 | Stop reason: ALTCHOICE

## 2023-06-06 NOTE — TELEPHONE ENCOUNTER
----- Message from Elda Stone sent at 6/6/2023 11:46 AM CDT -----  Contact: pt  Type:  RX Refill Request    Who Called:  pt  Refill or New Rx:  refills   RX Name and Strength: amitriptyline (ELAVIL) 25 MG tablet and furosemide (LASIX) 20 MG tablet  How is the patient currently taking it? (ex. 1XDay):  Is this a 30 day or 90 day RX:  Preferred Pharmacy with phone number: [phone]  Local or Mail Order: local  Ordering Provider: brian  Would the patient rather a call back or a response via [facility]?   Best Call Back Number:  Additional Information:       [facility] [address]  [place] - [address] [place]  [address] [place] [address]  Phone: [phone] Fax: [phone]

## 2023-06-06 NOTE — TELEPHONE ENCOUNTER
No care due was identified.  Health [facility] Embedded Care Due Messages. Reference number: [id number].   6/06/2023 11:52:45 AM CDT

## 2023-06-12 RX ORDER — OMEPRAZOLE 20 MG/1
20 CAPSULE, DELAYED RELEASE ORAL DAILY
Qty: 90 CAPSULE | Refills: 1 | Status: SHIPPED | OUTPATIENT
Start: 2023-06-12 | End: 2023-07-14

## 2023-06-12 NOTE — TELEPHONE ENCOUNTER
Refill Decision Note   Kate Lazo  is requesting a refill authorization.  Brief Assessment and Rationale for Refill:  Approve     Medication Therapy Plan:       Medication Reconciliation Completed: No   Comments:     No Care Gaps recommended.     Note composed:9:54 AM 06/12/2023

## 2023-06-12 NOTE — TELEPHONE ENCOUNTER
No care due was identified.  Health [facility] Embedded Care Due Messages. Reference number: [id number].   6/12/2023 9:39:47 AM CDT

## 2023-06-12 NOTE — TELEPHONE ENCOUNTER
----- Message from Edwinatyron Barrera sent at 6/12/2023  9:35 AM CDT -----  .Type:  RX Refill Request    Who Called: Pt's caregiver  Refill or New Rx:refill  RX Name and Strength:omeprazole (PRILOSEC) 20 MG capsule  How is the patient currently taking it? (ex. 1XDay):mims  Is this a 30 day or 90 day RX:90    Preferred Pharmacy with phone number .  [address], [place] - [address] [facility]  [address] [address] [place] [address]  Phone: [phone] Fax: [phone]       Local or Mail Order:local  Ordering Provider:Micheal  Would the patient rather a call back or a response via [facility]? Call back  Best Call Back Number [phone]  Additional Information:

## 2023-06-26 ENCOUNTER — PATIENT MESSAGE (OUTPATIENT)
Dept: INFECTIOUS DISEASES | Facility: CLINIC | Age: 33
End: 2023-06-26
Payer: COMMERCIAL

## 2023-06-28 ENCOUNTER — OFFICE VISIT (OUTPATIENT)
Dept: INTERNAL MEDICINE | Facility: CLINIC | Age: 33
End: 2023-06-28
Payer: MEDICARE

## 2023-06-28 VITALS
HEART RATE: 79 BPM | BODY MASS INDEX: 31.5 KG/M2 | OXYGEN SATURATION: 96 % | RESPIRATION RATE: 17 BRPM | WEIGHT: 220 LBS | SYSTOLIC BLOOD PRESSURE: 92 MMHG | DIASTOLIC BLOOD PRESSURE: 60 MMHG | HEIGHT: 70 IN | TEMPERATURE: 98 F

## 2023-06-28 DIAGNOSIS — L02.211 ABSCESS OF SKIN OF ABDOMEN: Primary | ICD-10-CM

## 2023-06-28 DIAGNOSIS — I10 PRIMARY HYPERTENSION: ICD-10-CM

## 2023-06-28 PROCEDURE — 1160F PR REVIEW ALL MEDS BY PRESCRIBER/CLIN PHARMACIST DOCUMENTED: ICD-10-PCS | Mod: HCNC,CPTII,S$GLB, | Performed by: PHYSICIAN ASSISTANT

## 2023-06-28 PROCEDURE — 3008F BODY MASS INDEX DOCD: CPT | Mod: HCNC,CPTII,S$GLB, | Performed by: PHYSICIAN ASSISTANT

## 2023-06-28 PROCEDURE — 3008F PR BODY MASS INDEX (BMI) DOCUMENTED: ICD-10-PCS | Mod: HCNC,CPTII,S$GLB, | Performed by: PHYSICIAN ASSISTANT

## 2023-06-28 PROCEDURE — 1160F RVW MEDS BY RX/DR IN RCRD: CPT | Mod: HCNC,CPTII,S$GLB, | Performed by: PHYSICIAN ASSISTANT

## 2023-06-28 PROCEDURE — 3074F SYST BP LT 130 MM HG: CPT | Mod: HCNC,CPTII,S$GLB, | Performed by: PHYSICIAN ASSISTANT

## 2023-06-28 PROCEDURE — 3074F PR MOST RECENT SYSTOLIC BLOOD PRESSURE < 130 MM HG: ICD-10-PCS | Mod: HCNC,CPTII,S$GLB, | Performed by: PHYSICIAN ASSISTANT

## 2023-06-28 PROCEDURE — 99214 OFFICE O/P EST MOD 30 MIN: CPT | Mod: HCNC,S$GLB,, | Performed by: PHYSICIAN ASSISTANT

## 2023-06-28 PROCEDURE — 99999 PR PBB SHADOW E&M-EST. PATIENT-LVL V: ICD-10-PCS | Mod: PBBFAC,HCNC,, | Performed by: PHYSICIAN ASSISTANT

## 2023-06-28 PROCEDURE — 99214 PR OFFICE/OUTPT VISIT, EST, LEVL IV, 30-39 MIN: ICD-10-PCS | Mod: HCNC,S$GLB,, | Performed by: PHYSICIAN ASSISTANT

## 2023-06-28 PROCEDURE — 99999 PR PBB SHADOW E&M-EST. PATIENT-LVL V: CPT | Mod: PBBFAC,HCNC,, | Performed by: PHYSICIAN ASSISTANT

## 2023-06-28 PROCEDURE — 3078F PR MOST RECENT DIASTOLIC BLOOD PRESSURE < 80 MM HG: ICD-10-PCS | Mod: HCNC,CPTII,S$GLB, | Performed by: PHYSICIAN ASSISTANT

## 2023-06-28 PROCEDURE — 3078F DIAST BP <80 MM HG: CPT | Mod: HCNC,CPTII,S$GLB, | Performed by: PHYSICIAN ASSISTANT

## 2023-06-28 PROCEDURE — 1159F MED LIST DOCD IN RCRD: CPT | Mod: HCNC,CPTII,S$GLB, | Performed by: PHYSICIAN ASSISTANT

## 2023-06-28 PROCEDURE — 1159F PR MEDICATION LIST DOCUMENTED IN MEDICAL RECORD: ICD-10-PCS | Mod: HCNC,CPTII,S$GLB, | Performed by: PHYSICIAN ASSISTANT

## 2023-06-28 RX ORDER — HYDROCODONE BITARTRATE AND ACETAMINOPHEN 5; 325 MG/1; MG/1
1 TABLET ORAL
COMMUNITY
Start: 2023-06-27 | End: 2023-09-20

## 2023-06-28 RX ORDER — SULFAMETHOXAZOLE AND TRIMETHOPRIM 800; 160 MG/1; MG/1
1 TABLET ORAL 2 TIMES DAILY
COMMUNITY
Start: 2023-06-27 | End: 2023-08-24 | Stop reason: ALTCHOICE

## 2023-06-28 RX ORDER — MUPIROCIN 20 MG/G
OINTMENT TOPICAL 4 TIMES DAILY
COMMUNITY
Start: 2023-06-27

## 2023-06-28 NOTE — PROGRESS NOTES
"Subjective:      Patient ID: Kate Lazo is a 33 y.o. male.    Chief Complaint: Wound Infection    Patient is known to me, being seen today for Urgent Care follow up.  Abscess to abdomen x4-5 days.  Went to Urgent Care yesterday, I&D yesterday with packing.  Was told to return tomorrow to replace packing.  Rx: Bactrim and Bactroban    He has cleaned area twice, minimal drainage, persistent swelling/hardness around wound     Cholecystectomy November 2022     Last visit Nov 2022 with PCP.     Review of Systems   Constitutional:  Negative for chills, diaphoresis and fever.   HENT:  Negative for congestion, rhinorrhea and sore throat.    Respiratory:  Negative for cough, shortness of breath and wheezing.    Gastrointestinal:  Negative for abdominal pain, constipation, diarrhea, nausea and vomiting.   Skin:  Positive for wound. Negative for rash.   Neurological:  Negative for dizziness, light-headedness and headaches.     Objective:   BP 92/60   Pulse 79   Temp 97.7 °F (36.5 °C)   Resp 17   Ht 5' 10" (1.778 m)   Wt 99.8 kg (220 lb 0.3 oz)   SpO2 96%   BMI 31.57 kg/m²   Physical Exam  Constitutional:       General: He is not in acute distress.     Appearance: He is well-developed. He is not ill-appearing or diaphoretic.   HENT:      Head: Normocephalic and atraumatic.      Right Ear: External ear normal.      Left Ear: External ear normal.   Eyes:      General: Lids are normal.         Right eye: No discharge.         Left eye: No discharge.      Conjunctiva/sclera: Conjunctivae normal.      Right eye: Right conjunctiva is not injected.      Left eye: Left conjunctiva is not injected.   Pulmonary:      Effort: Pulmonary effort is normal. No respiratory distress.   Abdominal:       Skin:     General: Skin is warm and dry.      Findings: Wound present.          Neurological:      Mental Status: He is alert and oriented to person, place, and time.   Psychiatric:         Speech: Speech normal.         " Behavior: Behavior normal.         Thought Content: Thought content normal.         Judgment: Judgment normal.     Assessment:      1. Abscess of skin of abdomen    2. Primary hypertension       Plan:   Abscess of skin of abdomen  -     US Soft Tissue Abdomen; Future; Expected date: 06/28/2023    Primary hypertension   Controlled, low, at baseline     Wound care and warm compresses discussed   Offer visit for packing to be changed, he will return to Urgent Care     Routine f/u with PCP     F/u ID    Discussed worsening signs/symptoms and when to return to clinic or go to ED.   Patient expresses understanding and agrees with treatment plan.

## 2023-06-29 RX ORDER — AMOXICILLIN AND CLAVULANATE POTASSIUM 875; 125 MG/1; MG/1
1 TABLET, FILM COATED ORAL EVERY 12 HOURS
Qty: 14 TABLET | Refills: 1 | Status: SHIPPED | OUTPATIENT
Start: 2023-06-29 | End: 2023-07-14

## 2023-07-04 ENCOUNTER — HOSPITAL ENCOUNTER (EMERGENCY)
Facility: HOSPITAL | Age: 33
Discharge: HOME | End: 2023-07-04
Attending: EMERGENCY MEDICINE
Payer: COMMERCIAL

## 2023-07-04 VITALS
SYSTOLIC BLOOD PRESSURE: 110 MMHG | HEART RATE: 92 BPM | TEMPERATURE: 98 F | RESPIRATION RATE: 18 BRPM | DIASTOLIC BLOOD PRESSURE: 62 MMHG | OXYGEN SATURATION: 96 %

## 2023-07-04 DIAGNOSIS — L02.91 ABSCESS: Primary | ICD-10-CM

## 2023-07-04 DIAGNOSIS — R05.9 COUGH: ICD-10-CM

## 2023-07-04 LAB
INFLUENZA A, MOLECULAR: NEGATIVE
INFLUENZA B, MOLECULAR: NEGATIVE
SARS-COV-2 RDRP RESP QL NAA+PROBE: NEGATIVE
SPECIMEN SOURCE: NORMAL

## 2023-07-04 PROCEDURE — 87502 INFLUENZA DNA AMP PROBE: CPT | Mod: HCNC | Performed by: REGISTERED NURSE

## 2023-07-04 PROCEDURE — U0002 COVID-19 LAB TEST NON-CDC: HCPCS | Mod: HCNC | Performed by: REGISTERED NURSE

## 2023-07-04 PROCEDURE — 99283 EMERGENCY DEPT VISIT LOW MDM: CPT | Mod: 25,HCNC
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
## 2023-07-05 ENCOUNTER — PATIENT MESSAGE (OUTPATIENT)
Dept: INTERNAL MEDICINE | Facility: CLINIC | Age: 33
End: 2023-07-05

## 2023-07-14 ENCOUNTER — OFFICE VISIT (OUTPATIENT)
Dept: INTERNAL MEDICINE | Facility: CLINIC | Age: 33
End: 2023-07-14
Payer: MEDICARE

## 2023-07-14 VITALS
SYSTOLIC BLOOD PRESSURE: 106 MMHG | HEART RATE: 81 BPM | TEMPERATURE: 97 F | DIASTOLIC BLOOD PRESSURE: 78 MMHG | OXYGEN SATURATION: 96 %

## 2023-07-14 DIAGNOSIS — R09.89 CHEST CONGESTION: ICD-10-CM

## 2023-07-14 DIAGNOSIS — G47.00 INSOMNIA, UNSPECIFIED TYPE: Primary | ICD-10-CM

## 2023-07-14 DIAGNOSIS — K21.9 GASTRO-ESOPHAGEAL REFLUX DISEASE WITHOUT ESOPHAGITIS: ICD-10-CM

## 2023-07-14 PROCEDURE — 99999 PR PBB SHADOW E&M-EST. PATIENT-LVL IV: CPT | Mod: PBBFAC,HCNC,, | Performed by: PHYSICIAN ASSISTANT

## 2023-07-14 PROCEDURE — 99999 PR PBB SHADOW E&M-EST. PATIENT-LVL IV: ICD-10-PCS | Mod: PBBFAC,HCNC,, | Performed by: PHYSICIAN ASSISTANT

## 2023-07-14 PROCEDURE — 99213 OFFICE O/P EST LOW 20 MIN: CPT | Mod: HCNC,S$GLB,, | Performed by: PHYSICIAN ASSISTANT

## 2023-07-14 PROCEDURE — 1160F RVW MEDS BY RX/DR IN RCRD: CPT | Mod: HCNC,CPTII,S$GLB, | Performed by: PHYSICIAN ASSISTANT

## 2023-07-14 PROCEDURE — 1160F PR REVIEW ALL MEDS BY PRESCRIBER/CLIN PHARMACIST DOCUMENTED: ICD-10-PCS | Mod: HCNC,CPTII,S$GLB, | Performed by: PHYSICIAN ASSISTANT

## 2023-07-14 PROCEDURE — 3074F SYST BP LT 130 MM HG: CPT | Mod: HCNC,CPTII,S$GLB, | Performed by: PHYSICIAN ASSISTANT

## 2023-07-14 PROCEDURE — 1159F PR MEDICATION LIST DOCUMENTED IN MEDICAL RECORD: ICD-10-PCS | Mod: HCNC,CPTII,S$GLB, | Performed by: PHYSICIAN ASSISTANT

## 2023-07-14 PROCEDURE — 3074F PR MOST RECENT SYSTOLIC BLOOD PRESSURE < 130 MM HG: ICD-10-PCS | Mod: HCNC,CPTII,S$GLB, | Performed by: PHYSICIAN ASSISTANT

## 2023-07-14 PROCEDURE — 3078F DIAST BP <80 MM HG: CPT | Mod: HCNC,CPTII,S$GLB, | Performed by: PHYSICIAN ASSISTANT

## 2023-07-14 PROCEDURE — 1159F MED LIST DOCD IN RCRD: CPT | Mod: HCNC,CPTII,S$GLB, | Performed by: PHYSICIAN ASSISTANT

## 2023-07-14 PROCEDURE — 3078F PR MOST RECENT DIASTOLIC BLOOD PRESSURE < 80 MM HG: ICD-10-PCS | Mod: HCNC,CPTII,S$GLB, | Performed by: PHYSICIAN ASSISTANT

## 2023-07-14 PROCEDURE — 99213 PR OFFICE/OUTPT VISIT, EST, LEVL III, 20-29 MIN: ICD-10-PCS | Mod: HCNC,S$GLB,, | Performed by: PHYSICIAN ASSISTANT

## 2023-07-14 RX ORDER — OMEPRAZOLE 40 MG/1
40 CAPSULE, DELAYED RELEASE ORAL DAILY
Qty: 90 CAPSULE | Refills: 3 | Status: SHIPPED | OUTPATIENT
Start: 2023-07-14 | End: 2024-07-13

## 2023-07-14 RX ORDER — ALBUTEROL SULFATE 90 UG/1
2 AEROSOL, METERED RESPIRATORY (INHALATION) EVERY 4 HOURS PRN
Qty: 18 G | Refills: 0 | Status: SHIPPED | OUTPATIENT
Start: 2023-07-14 | End: 2023-09-20

## 2023-07-14 RX ORDER — MONTELUKAST SODIUM 10 MG/1
10 TABLET ORAL NIGHTLY
Qty: 30 TABLET | Refills: 0 | Status: SHIPPED | OUTPATIENT
Start: 2023-07-14 | End: 2023-08-13

## 2023-07-14 RX ORDER — TRAZODONE HYDROCHLORIDE 50 MG/1
25 TABLET ORAL NIGHTLY
Qty: 30 TABLET | Refills: 3 | Status: SHIPPED | OUTPATIENT
Start: 2023-07-14 | End: 2024-04-02

## 2023-07-14 NOTE — PROGRESS NOTES
Subjective:       Patient ID: Kate Lazo is a 33 y.o. male.    Chief Complaint: Neck Pain (Patient has an injury to his C4 injury and its been bothering him for about a week and a half. ) and Anxiety (Patient stated that his anxiety has been messing with him and he hasnt been able to sleep very much. By the time he gets to sleep good his helpers are there to get him up for the day. He said he might be getting 3 hours every night.)      HPI  Patient is new to me.     Patient presents today with increasing appetite changes and early satiety. He reports his GERD symptoms are increasing and omeprazole 20mg is not helping, requesting to increase. Reports chest congestion and mucus for the last 1-2 weeks with increasing muscle spasms to trunk after coughing. Taking baclofen 30mg TID currently, requesting increase. Reports anxiousness and racing thoughts at night and difficulty falling asleep. Reports elavil and lexapro not helping.     Review of Systems   Constitutional:  Positive for fatigue. Negative for chills and fever.   Respiratory:  Positive for cough and wheezing. Negative for shortness of breath.    Gastrointestinal:  Negative for vomiting.   Musculoskeletal:  Positive for neck pain.     Objective:      Physical Exam  Vitals and nursing note reviewed.   Constitutional:       General: He is not in acute distress.     Appearance: He is well-developed.      Comments: Pt in personal wheelchair with neck support   HENT:      Head: Normocephalic and atraumatic.   Eyes:      General: Lids are normal. No scleral icterus.     Extraocular Movements: Extraocular movements intact.      Conjunctiva/sclera: Conjunctivae normal.   Cardiovascular:      Rate and Rhythm: Normal rate and regular rhythm.   Pulmonary:      Effort: Pulmonary effort is normal.      Breath sounds: Rhonchi (lower lung fields) present. No decreased breath sounds, wheezing or rales.   Neurological:      Mental Status: He is alert.      Cranial  Nerves: No cranial nerve deficit.   Psychiatric:         Mood and Affect: Mood and affect normal.       Assessment:       1. Insomnia, unspecified type    2. Chest congestion    3. Gastro-esophageal reflux disease without esophagitis        Plan:   1. Insomnia, unspecified type  -     traZODone (DESYREL) 50 MG tablet; Take 0.5 tablets (25 mg total) by mouth every evening.  Dispense: 30 tablet; Refill: 3    2. Chest congestion  -     montelukast (SINGULAIR) 10 mg tablet; Take 1 tablet (10 mg total) by mouth every evening.  Dispense: 30 tablet; Refill: 0  -     albuterol (VENTOLIN HFA) 90 mcg/actuation inhaler; Inhale 2 puffs into the lungs every 4 (four) hours as needed for Wheezing or Shortness of Breath.  Dispense: 18 g; Refill: 0    3. Gastro-esophageal reflux disease without esophagitis  -     omeprazole (PRILOSEC) 40 MG capsule; Take 1 capsule (40 mg total) by mouth once daily.  Dispense: 90 capsule; Refill: 3      Obtained spirometer and spacer from pulmonology and gave to patient, instructions given.   Increase omeprazole for short period of time, suspicious of mucus causing upset and increased acidity, GERD precautions given  Will leave baclofen as is as well as lexapro, stop elavil and try trazodone  Recommended counseling  Labs from recent UC visit viewed, CBC looks fine, he was treated for abscess to abdominal wall, Bactrim, healed

## 2023-07-19 ENCOUNTER — PATIENT MESSAGE (OUTPATIENT)
Dept: INFECTIOUS DISEASES | Facility: CLINIC | Age: 33
End: 2023-07-19
Payer: COMMERCIAL

## 2023-07-20 ENCOUNTER — OFFICE VISIT (OUTPATIENT)
Dept: INTERNAL MEDICINE | Facility: CLINIC | Age: 33
End: 2023-07-20
Payer: COMMERCIAL

## 2023-07-20 ENCOUNTER — PATIENT MESSAGE (OUTPATIENT)
Dept: INTERNAL MEDICINE | Facility: CLINIC | Age: 33
End: 2023-07-20

## 2023-07-20 DIAGNOSIS — M79.89 LEG SWELLING: Primary | ICD-10-CM

## 2023-07-20 DIAGNOSIS — K59.04 CHRONIC IDIOPATHIC CONSTIPATION: ICD-10-CM

## 2023-07-20 DIAGNOSIS — G47.00 INSOMNIA, UNSPECIFIED TYPE: ICD-10-CM

## 2023-07-20 DIAGNOSIS — N39.0 COMPLICATED UTI (URINARY TRACT INFECTION): Primary | ICD-10-CM

## 2023-07-20 PROCEDURE — 1159F PR MEDICATION LIST DOCUMENTED IN MEDICAL RECORD: ICD-10-PCS | Mod: HCNC,CPTII,95, | Performed by: PHYSICIAN ASSISTANT

## 2023-07-20 PROCEDURE — 99213 OFFICE O/P EST LOW 20 MIN: CPT | Mod: HCNC,95,, | Performed by: PHYSICIAN ASSISTANT

## 2023-07-20 PROCEDURE — 1160F RVW MEDS BY RX/DR IN RCRD: CPT | Mod: HCNC,CPTII,95, | Performed by: PHYSICIAN ASSISTANT

## 2023-07-20 PROCEDURE — 1160F PR REVIEW ALL MEDS BY PRESCRIBER/CLIN PHARMACIST DOCUMENTED: ICD-10-PCS | Mod: HCNC,CPTII,95, | Performed by: PHYSICIAN ASSISTANT

## 2023-07-20 PROCEDURE — 99213 PR OFFICE/OUTPT VISIT, EST, LEVL III, 20-29 MIN: ICD-10-PCS | Mod: HCNC,95,, | Performed by: PHYSICIAN ASSISTANT

## 2023-07-20 PROCEDURE — 1159F MED LIST DOCD IN RCRD: CPT | Mod: HCNC,CPTII,95, | Performed by: PHYSICIAN ASSISTANT

## 2023-07-20 NOTE — PATIENT INSTRUCTIONS
1. Drink plenty of fluids  2. Get plenty of rest.  3. Take Tylenol as directed on package for pain/fever. Do not take more than package suggests to take.   4. Go to Emergency Department if your symptoms worsen.  5. Follow up with your PCP in 1 week if symptoms persist.     Detail Level: Zone Sunscreen Recommendations: Discussed importance of continual photo protection with sun screen and photo protective clothing Sunscreen Recommendations: Discussed importance of sun protection with sun screen and photo protective clothing. Detail Level: Generalized Bleaching Agents Recommendations: Discussed that over the counter hydroquinone can be used once daily to help lighten discoloration Sunscreen Recommendations: Discussed importance of regular sun protection with sun screen and photo protective clothing. Detail Level: Detailed

## 2023-07-20 NOTE — PROGRESS NOTES
Subjective:       Patient ID: Kate Lazo is a 33 y.o. male.    Chief Complaint: Leg Swelling      The patient location is: [place]  The chief complaint leading to consultation is: leg swelling    Visit type: audiovisual    Face to Face time with patient: 16 minutes (chart review started 1608; video started 1608; video ended 1624)  18 minutes of total time spent on the encounter, which includes face to face time and non-face to face time preparing to see the patient (eg, review of tests), Obtaining and/or reviewing separately obtained history, Documenting clinical information in the electronic or other health record, Independently interpreting results (not separately reported) and communicating results to the patient/family/caregiver, or Care coordination (not separately reported).     Each patient to whom he or she provides medical services by telemedicine is:  (1) informed of the relationship between the physician and patient and the respective role of any other health care provider with respect to management of the patient; and (2) notified that he or she may decline to receive medical services by telemedicine and may withdraw from such care at any time.    Patient reports BL lower leg swelling over the last few days. He reports takes Lasix 20mg daily. Reports was sleeping elevated for the last week due to cough which may have influenced his leg swelling.   Trazodone 25-50mg did not help with sleeping.   Cough and chest congestion has resolved.   Reports constipation, making diet changes to help increase BMs.     Review of Systems   Constitutional:  Positive for unexpected weight change. Negative for activity change.   HENT:  Negative for hearing loss, rhinorrhea and trouble swallowing.    Eyes:  Negative for discharge and visual disturbance.   Respiratory:  Positive for chest tightness and wheezing.    Cardiovascular:  Negative for chest pain and palpitations.   Gastrointestinal:  Positive for  constipation. Negative for blood in stool, diarrhea and vomiting.   Endocrine: Negative for polydipsia.   Genitourinary:  Positive for difficulty urinating. Negative for hematuria and urgency.   Musculoskeletal:  Positive for arthralgias, joint swelling and neck pain.   Neurological:  Negative for weakness and headaches.   Psychiatric/Behavioral:  Negative for confusion and dysphoric mood.        Objective:        Wt Readings from Last 3 Encounters:   06/28/23 99.8 kg (220 lb 0.3 oz)   11/30/22 99.8 kg (220 lb)   09/15/20 90.7 kg (200 lb)     Temp Readings from Last 3 Encounters:   07/14/23 96.5 °F (35.8 °C) (Tympanic)   07/04/23 97.9 °F (36.6 °C) (Oral)   06/28/23 97.7 °F (36.5 °C)     BP Readings from Last 3 Encounters:   07/14/23 106/78   07/04/23 110/62   06/28/23 92/60     Pulse Readings from Last 3 Encounters:   07/14/23 81   07/04/23 92   06/28/23 79     There is no height or weight on file to calculate BMI.    Physical Exam  Constitutional:       General: He is not in acute distress.     Appearance: He is well-developed.   HENT:      Head: Normocephalic and atraumatic.   Eyes:      General: Lids are normal. No scleral icterus.     Extraocular Movements: Extraocular movements intact.      Conjunctiva/sclera: Conjunctivae normal.   Pulmonary:      Effort: Pulmonary effort is normal.   Neurological:      Mental Status: He is alert.   Psychiatric:         Mood and Affect: Mood and affect normal.       Assessment:       1. Leg swelling    2. Insomnia, unspecified type    3. Chronic idiopathic constipation        Plan:   1. Leg swelling    2. Insomnia, unspecified type    3. Chronic idiopathic constipation      Continue Lasix and elevate legs, low salt, increase water intake, massages from family

## 2023-07-21 ENCOUNTER — LAB VISIT (OUTPATIENT)
Dept: LAB | Facility: HOSPITAL | Age: 33
End: 2023-07-21
Attending: INTERNAL MEDICINE
Payer: COMMERCIAL

## 2023-07-21 ENCOUNTER — PATIENT MESSAGE (OUTPATIENT)
Dept: PULMONOLOGY | Facility: CLINIC | Age: 33
End: 2023-07-21
Payer: COMMERCIAL

## 2023-07-21 ENCOUNTER — TELEPHONE (OUTPATIENT)
Dept: INFECTIOUS DISEASES | Facility: CLINIC | Age: 33
End: 2023-07-21
Payer: COMMERCIAL

## 2023-07-21 DIAGNOSIS — N39.0 COMPLICATED UTI (URINARY TRACT INFECTION): ICD-10-CM

## 2023-07-21 LAB
BACTERIA #/AREA URNS HPF: ABNORMAL /HPF
BILIRUB UR QL STRIP: NEGATIVE
CLARITY UR: CLEAR
COLOR UR: YELLOW
GLUCOSE UR QL STRIP: NEGATIVE
HGB UR QL STRIP: NEGATIVE
KETONES UR QL STRIP: NEGATIVE
LEUKOCYTE ESTERASE UR QL STRIP: ABNORMAL
MICROSCOPIC COMMENT: ABNORMAL
NITRITE UR QL STRIP: POSITIVE
PH UR STRIP: 7 [PH] (ref 5–8)
PROT UR QL STRIP: NEGATIVE
RBC #/AREA URNS HPF: 1 /HPF (ref 0–4)
SP GR UR STRIP: 1 (ref 1–1.03)
SQUAMOUS #/AREA URNS HPF: 5 /HPF
URN SPEC COLLECT METH UR: ABNORMAL
WBC #/AREA URNS HPF: 12 /HPF (ref 0–5)

## 2023-07-21 PROCEDURE — 87086 URINE CULTURE/COLONY COUNT: CPT | Mod: HCNC | Performed by: INTERNAL MEDICINE

## 2023-07-21 PROCEDURE — 87077 CULTURE AEROBIC IDENTIFY: CPT | Mod: HCNC | Performed by: INTERNAL MEDICINE

## 2023-07-21 PROCEDURE — 87154 CUL TYP ID BLD PTHGN 6+ TRGT: CPT | Mod: HCNC | Performed by: INTERNAL MEDICINE

## 2023-07-21 PROCEDURE — 87186 SC STD MICRODIL/AGAR DIL: CPT | Mod: HCNC | Performed by: INTERNAL MEDICINE

## 2023-07-21 PROCEDURE — 81000 URINALYSIS NONAUTO W/SCOPE: CPT | Mod: HCNC | Performed by: INTERNAL MEDICINE

## 2023-07-21 PROCEDURE — 87088 URINE BACTERIA CULTURE: CPT | Mod: HCNC | Performed by: INTERNAL MEDICINE

## 2023-07-21 NOTE — TELEPHONE ENCOUNTER
----- Message from Wihtney Cervantes sent at 7/21/2023  9:05 AM CDT -----  Regarding: pt request  Name of Who is Calling:GORDO AC [[id number]]          What is the request in detail: pt would like a urine cup left at  to            Can the clinic reply by [facility]: no           What Number to Call Back if not in [facility]: [phone]

## 2023-07-25 DIAGNOSIS — N39.0 COMPLICATED UTI (URINARY TRACT INFECTION): Primary | ICD-10-CM

## 2023-07-26 ENCOUNTER — TELEPHONE (OUTPATIENT)
Dept: INFECTIOUS DISEASES | Facility: CLINIC | Age: 33
End: 2023-07-26
Payer: COMMERCIAL

## 2023-07-26 LAB
BACTERIA UR CULT: ABNORMAL
BACTERIA UR CULT: ABNORMAL

## 2023-07-26 NOTE — TELEPHONE ENCOUNTER
----- Message from Sofia Orpretty sent at 7/26/2023  1:57 PM CDT -----  Contact: Pt [phone]  Would like to receive medical advice.    Would they like a call back or a response via [facility]:  call back     Additional information:  Pt is calling to check the progress of Dr. Catsro setting up his PICC Line.

## 2023-07-26 NOTE — TELEPHONE ENCOUNTER
Called pt to give date for PICC placement, next available July 31st. Dr. Castro is it ok for pt. To wait until then ?

## 2023-07-27 NOTE — PROGRESS NOTES
We can use Zosyn 4.5 gram every 8 hours for 2 weeks .  The initial plan for Ertapenem daily -will not work as we now have a new organism .  We will plan to get the PICC line done ASAP

## 2023-07-28 ENCOUNTER — HOSPITAL ENCOUNTER (OUTPATIENT)
Dept: RADIOLOGY | Facility: HOSPITAL | Age: 33
Discharge: HOME | End: 2023-07-28
Attending: INTERNAL MEDICINE
Payer: COMMERCIAL

## 2023-07-28 DIAGNOSIS — N39.0 COMPLICATED UTI (URINARY TRACT INFECTION): ICD-10-CM

## 2023-07-28 PROCEDURE — 36573 INSJ PICC RS&I 5 YR+: CPT | Mod: HCNC,,, | Performed by: RADIOLOGY

## 2023-07-28 PROCEDURE — C1751 CATH, INF, PER/CENT/MIDLINE: HCPCS | Mod: HCNC

## 2023-07-28 PROCEDURE — 36573 FL PICC LINE PLACEMENT W/O PORT, W/IMG > 5 Y/O: ICD-10-PCS | Mod: HCNC,,, | Performed by: RADIOLOGY

## 2023-07-28 PROCEDURE — 36573 INSJ PICC RS&I 5 YR+: CPT | Mod: HCNC

## 2023-07-28 NOTE — DISCHARGE SUMMARY
[facility])  Discharge Note  Short Stay    FL PICC Line Placement w/o Port w/ Img > 4 Y/O      OUTCOME: Patient tolerated treatment/procedure well without complication and is now ready for discharge.    DISPOSITION: Home or Self Care    FINAL DIAGNOSIS:  <principal problem not specified>    FOLLOWUP: In clinic    DISCHARGE INSTRUCTIONS:  No discharge procedures on file.     TIME SPENT ON DISCHARGE: 15 minutes    Pre Op Diagnosis: Complicated UTI     Post Op Diagnosis: same     Procedure:  PICC line     Procedure performed by: Delmis ALEJANDRA, Mark DURAN     Written Informed Consent Obtained: Yes     Specimen Removed:  no    Estimated Blood Loss:  minimal     Findings: no     The patient tolerated the procedure well and there were no complications.      Disposition:  F/U in clinic or with ordering physician    Discharge Instructions:  Keep PICC clean and dry.    Sterile technique was performed in the RUE, lidocaine was used as a local anesthetic.  A 4 Indonesian DLPP 36 cm was inserted into the RUE and into the SVC/Right atrium junction.  Pt tolerated the procedure well without immediate complications.  Please see radiologist report for details. F/u with PCP and/or ordering physician. PICC is ready to use

## 2023-08-01 ENCOUNTER — LAB VISIT (OUTPATIENT)
Dept: LAB | Facility: HOSPITAL | Age: 33
End: 2023-08-01
Attending: INTERNAL MEDICINE
Payer: COMMERCIAL

## 2023-08-01 DIAGNOSIS — N39.0 URINARY TRACT INFECTION, SITE NOT SPECIFIED: Primary | ICD-10-CM

## 2023-08-01 LAB
ALBUMIN SERPL BCP-MCNC: 3.3 G/DL (ref 3.5–5.2)
ALP SERPL-CCNC: 94 U/L (ref 55–135)
ALT SERPL W/O P-5'-P-CCNC: 13 U/L (ref 10–44)
ANION GAP SERPL CALC-SCNC: 8 MMOL/L (ref 8–16)
AST SERPL-CCNC: 12 U/L (ref 10–40)
BASOPHILS # BLD AUTO: 0.01 K/UL (ref 0–0.2)
BASOPHILS NFR BLD: 0.2 % (ref 0–1.9)
BILIRUB SERPL-MCNC: 0.3 MG/DL (ref 0.1–1)
BUN SERPL-MCNC: 7 MG/DL (ref 6–20)
CALCIUM SERPL-MCNC: 8.8 MG/DL (ref 8.7–10.5)
CHLORIDE SERPL-SCNC: 105 MMOL/L (ref 95–110)
CO2 SERPL-SCNC: 29 MMOL/L (ref 23–29)
CREAT SERPL-MCNC: 0.8 MG/DL (ref 0.5–1.4)
CRP SERPL-MCNC: 30.2 MG/L (ref 0–8.2)
DIFFERENTIAL METHOD: ABNORMAL
EOSINOPHIL # BLD AUTO: 0.3 K/UL (ref 0–0.5)
EOSINOPHIL NFR BLD: 4.3 % (ref 0–8)
ERYTHROCYTE [DISTWIDTH] IN BLOOD BY AUTOMATED COUNT: 14.3 % (ref 11.5–14.5)
ERYTHROCYTE [SEDIMENTATION RATE] IN BLOOD BY WESTERGREN METHOD: 59 MM/HR (ref 0–10)
EST. GFR  (NO RACE VARIABLE): >60 ML/MIN/1.73 M^2
GLUCOSE SERPL-MCNC: 97 MG/DL (ref 70–110)
HCT VFR BLD AUTO: 34.1 % (ref 40–54)
HGB BLD-MCNC: 12.8 G/DL (ref 14–18)
IMM GRANULOCYTES # BLD AUTO: 0.02 K/UL (ref 0–0.04)
IMM GRANULOCYTES NFR BLD AUTO: 0.3 % (ref 0–0.5)
LYMPHOCYTES # BLD AUTO: 2 K/UL (ref 1–4.8)
LYMPHOCYTES NFR BLD: 34.7 % (ref 18–48)
MCH RBC QN AUTO: 35.7 PG (ref 27–31)
MCHC RBC AUTO-ENTMCNC: 37.5 G/DL (ref 32–36)
MCV RBC AUTO: 95 FL (ref 82–98)
MONOCYTES # BLD AUTO: 0.6 K/UL (ref 0.3–1)
MONOCYTES NFR BLD: 9.4 % (ref 4–15)
NEUTROPHILS # BLD AUTO: 3 K/UL (ref 1.8–7.7)
NEUTROPHILS NFR BLD: 51.1 % (ref 38–73)
NRBC BLD-RTO: 0 /100 WBC
PLATELET # BLD AUTO: 235 K/UL (ref 150–450)
PMV BLD AUTO: 10.8 FL (ref 9.2–12.9)
POTASSIUM SERPL-SCNC: 4 MMOL/L (ref 3.5–5.1)
PROT SERPL-MCNC: 7 G/DL (ref 6–8.4)
RBC # BLD AUTO: 3.59 M/UL (ref 4.6–6.2)
SODIUM SERPL-SCNC: 142 MMOL/L (ref 136–145)
WBC # BLD AUTO: 5.85 K/UL (ref 3.9–12.7)

## 2023-08-01 PROCEDURE — 86140 C-REACTIVE PROTEIN: CPT | Mod: HCNC | Performed by: INTERNAL MEDICINE

## 2023-08-01 PROCEDURE — 80053 COMPREHEN METABOLIC PANEL: CPT | Mod: HCNC | Performed by: INTERNAL MEDICINE

## 2023-08-01 PROCEDURE — 85651 RBC SED RATE NONAUTOMATED: CPT | Mod: HCNC | Performed by: INTERNAL MEDICINE

## 2023-08-01 PROCEDURE — 85025 COMPLETE CBC W/AUTO DIFF WBC: CPT | Mod: HCNC | Performed by: INTERNAL MEDICINE

## 2023-08-05 DIAGNOSIS — N39.0 COMPLICATED UTI (URINARY TRACT INFECTION): Primary | ICD-10-CM

## 2023-08-05 NOTE — PROGRESS NOTES
Will plan to give one dose of Tobramycin for the pseudomonas,will repeat UA .  Will discuss dosing with Pharmacy

## 2023-08-08 ENCOUNTER — TELEPHONE (OUTPATIENT)
Dept: ADMINISTRATIVE | Facility: HOSPITAL | Age: 33
End: 2023-08-08
Payer: COMMERCIAL

## 2023-08-09 ENCOUNTER — LAB VISIT (OUTPATIENT)
Dept: LAB | Facility: HOSPITAL | Age: 33
End: 2023-08-09
Attending: INTERNAL MEDICINE
Payer: COMMERCIAL

## 2023-08-09 DIAGNOSIS — N39.0 URINARY TRACT INFECTION, SITE NOT SPECIFIED: Primary | ICD-10-CM

## 2023-08-09 LAB
ALBUMIN SERPL BCP-MCNC: 3.3 G/DL (ref 3.5–5.2)
ALP SERPL-CCNC: 73 U/L (ref 55–135)
ALT SERPL W/O P-5'-P-CCNC: 17 U/L (ref 10–44)
ANION GAP SERPL CALC-SCNC: 7 MMOL/L (ref 8–16)
AST SERPL-CCNC: 12 U/L (ref 10–40)
BASOPHILS # BLD AUTO: 0.01 K/UL (ref 0–0.2)
BASOPHILS NFR BLD: 0.2 % (ref 0–1.9)
BILIRUB SERPL-MCNC: 0.3 MG/DL (ref 0.1–1)
BUN SERPL-MCNC: 8 MG/DL (ref 6–20)
CALCIUM SERPL-MCNC: 8.8 MG/DL (ref 8.7–10.5)
CHLORIDE SERPL-SCNC: 103 MMOL/L (ref 95–110)
CO2 SERPL-SCNC: 26 MMOL/L (ref 23–29)
CREAT SERPL-MCNC: 0.8 MG/DL (ref 0.5–1.4)
CRP SERPL-MCNC: 4.6 MG/L (ref 0–8.2)
DIFFERENTIAL METHOD: ABNORMAL
EOSINOPHIL # BLD AUTO: 0.2 K/UL (ref 0–0.5)
EOSINOPHIL NFR BLD: 2.9 % (ref 0–8)
ERYTHROCYTE [DISTWIDTH] IN BLOOD BY AUTOMATED COUNT: 14.8 % (ref 11.5–14.5)
ERYTHROCYTE [SEDIMENTATION RATE] IN BLOOD BY WESTERGREN METHOD: 20 MM/HR (ref 0–10)
EST. GFR  (NO RACE VARIABLE): >60 ML/MIN/1.73 M^2
GLUCOSE SERPL-MCNC: 110 MG/DL (ref 70–110)
HCT VFR BLD AUTO: 38.4 % (ref 40–54)
HGB BLD-MCNC: 13.1 G/DL (ref 14–18)
IMM GRANULOCYTES # BLD AUTO: 0.01 K/UL (ref 0–0.04)
IMM GRANULOCYTES NFR BLD AUTO: 0.2 % (ref 0–0.5)
LYMPHOCYTES # BLD AUTO: 1.9 K/UL (ref 1–4.8)
LYMPHOCYTES NFR BLD: 38 % (ref 18–48)
MCH RBC QN AUTO: 32.3 PG (ref 27–31)
MCHC RBC AUTO-ENTMCNC: 34.1 G/DL (ref 32–36)
MCV RBC AUTO: 95 FL (ref 82–98)
MONOCYTES # BLD AUTO: 0.3 K/UL (ref 0.3–1)
MONOCYTES NFR BLD: 4.9 % (ref 4–15)
NEUTROPHILS # BLD AUTO: 2.7 K/UL (ref 1.8–7.7)
NEUTROPHILS NFR BLD: 53.8 % (ref 38–73)
NRBC BLD-RTO: 0 /100 WBC
PLATELET # BLD AUTO: 207 K/UL (ref 150–450)
PMV BLD AUTO: 10.5 FL (ref 9.2–12.9)
POTASSIUM SERPL-SCNC: 3.6 MMOL/L (ref 3.5–5.1)
PROT SERPL-MCNC: 6.7 G/DL (ref 6–8.4)
RBC # BLD AUTO: 4.05 M/UL (ref 4.6–6.2)
SODIUM SERPL-SCNC: 136 MMOL/L (ref 136–145)
WBC # BLD AUTO: 5.1 K/UL (ref 3.9–12.7)

## 2023-08-09 PROCEDURE — 85025 COMPLETE CBC W/AUTO DIFF WBC: CPT | Mod: HCNC | Performed by: INTERNAL MEDICINE

## 2023-08-09 PROCEDURE — 86140 C-REACTIVE PROTEIN: CPT | Mod: HCNC | Performed by: INTERNAL MEDICINE

## 2023-08-09 PROCEDURE — 80053 COMPREHEN METABOLIC PANEL: CPT | Mod: HCNC | Performed by: INTERNAL MEDICINE

## 2023-08-09 PROCEDURE — 85651 RBC SED RATE NONAUTOMATED: CPT | Mod: HCNC | Performed by: INTERNAL MEDICINE

## 2023-08-10 ENCOUNTER — TELEPHONE (OUTPATIENT)
Dept: PULMONOLOGY | Facility: CLINIC | Age: 33
End: 2023-08-10
Payer: COMMERCIAL

## 2023-08-12 ENCOUNTER — PATIENT MESSAGE (OUTPATIENT)
Dept: INTERNAL MEDICINE | Facility: CLINIC | Age: 33
End: 2023-08-12
Payer: COMMERCIAL

## 2023-08-23 ENCOUNTER — TELEPHONE (OUTPATIENT)
Dept: INFECTIOUS DISEASES | Facility: CLINIC | Age: 33
End: 2023-08-23
Payer: COMMERCIAL

## 2023-08-23 NOTE — TELEPHONE ENCOUNTER
----- Message from Lissette Simms sent at 8/22/2023 10:16 AM CDT -----  Contact: Pt  Type: Needs Medical Advice    Who Called:Pt  Best Call Back Number:[phone]    Additional Information Requesting a call back regarding Pt was calling to speak with nurse in regards to there picc line being removed pt stated they have left messages but never heard back please call when available Thank you  Please Advise-Thank you

## 2023-08-24 ENCOUNTER — LAB VISIT (OUTPATIENT)
Dept: LAB | Facility: HOSPITAL | Age: 33
End: 2023-08-24
Payer: COMMERCIAL

## 2023-08-24 ENCOUNTER — OFFICE VISIT (OUTPATIENT)
Dept: INFECTIOUS DISEASES | Facility: CLINIC | Age: 33
End: 2023-08-24
Payer: MEDICARE

## 2023-08-24 DIAGNOSIS — I10 PRIMARY HYPERTENSION: ICD-10-CM

## 2023-08-24 DIAGNOSIS — N39.0 COMPLICATED UTI (URINARY TRACT INFECTION): ICD-10-CM

## 2023-08-24 DIAGNOSIS — Z93.59 SUPRAPUBIC CATHETER: ICD-10-CM

## 2023-08-24 DIAGNOSIS — N39.0 COMPLICATED UTI (URINARY TRACT INFECTION): Primary | ICD-10-CM

## 2023-08-24 LAB
BACTERIA #/AREA URNS HPF: ABNORMAL /HPF
BILIRUB UR QL STRIP: NEGATIVE
CLARITY UR: CLEAR
COLOR UR: YELLOW
GLUCOSE UR QL STRIP: NEGATIVE
HGB UR QL STRIP: NEGATIVE
KETONES UR QL STRIP: NEGATIVE
LEUKOCYTE ESTERASE UR QL STRIP: ABNORMAL
MICROSCOPIC COMMENT: ABNORMAL
NITRITE UR QL STRIP: NEGATIVE
PH UR STRIP: 7 [PH] (ref 5–8)
PROT UR QL STRIP: NEGATIVE
SP GR UR STRIP: <=1.005 (ref 1–1.03)
SQUAMOUS #/AREA URNS HPF: 5 /HPF
URN SPEC COLLECT METH UR: ABNORMAL
WBC #/AREA URNS HPF: 10 /HPF (ref 0–5)

## 2023-08-24 PROCEDURE — 99999 PR PBB SHADOW E&M-EST. PATIENT-LVL III: CPT | Mod: PBBFAC,HCNC,, | Performed by: STUDENT IN AN ORGANIZED HEALTH CARE EDUCATION/TRAINING PROGRAM

## 2023-08-24 PROCEDURE — 99214 OFFICE O/P EST MOD 30 MIN: CPT | Mod: HCNC,S$GLB,, | Performed by: STUDENT IN AN ORGANIZED HEALTH CARE EDUCATION/TRAINING PROGRAM

## 2023-08-24 PROCEDURE — 99999 PR PBB SHADOW E&M-EST. PATIENT-LVL III: ICD-10-PCS | Mod: PBBFAC,HCNC,, | Performed by: STUDENT IN AN ORGANIZED HEALTH CARE EDUCATION/TRAINING PROGRAM

## 2023-08-24 PROCEDURE — 1159F PR MEDICATION LIST DOCUMENTED IN MEDICAL RECORD: ICD-10-PCS | Mod: HCNC,CPTII,S$GLB, | Performed by: STUDENT IN AN ORGANIZED HEALTH CARE EDUCATION/TRAINING PROGRAM

## 2023-08-24 PROCEDURE — 1159F MED LIST DOCD IN RCRD: CPT | Mod: HCNC,CPTII,S$GLB, | Performed by: STUDENT IN AN ORGANIZED HEALTH CARE EDUCATION/TRAINING PROGRAM

## 2023-08-24 PROCEDURE — 81000 URINALYSIS NONAUTO W/SCOPE: CPT | Mod: HCNC | Performed by: STUDENT IN AN ORGANIZED HEALTH CARE EDUCATION/TRAINING PROGRAM

## 2023-08-24 PROCEDURE — 99214 PR OFFICE/OUTPT VISIT, EST, LEVL IV, 30-39 MIN: ICD-10-PCS | Mod: HCNC,S$GLB,, | Performed by: STUDENT IN AN ORGANIZED HEALTH CARE EDUCATION/TRAINING PROGRAM

## 2023-08-24 NOTE — ASSESSMENT & PLAN NOTE
--Will repeat U/A to ensure prior isolates have been eradicated  --No current indication for continuing antibiotics   --PICC removal today   --Follow up in 2 weeks

## 2023-08-24 NOTE — ASSESSMENT & PLAN NOTE
--Recommend changing at least every 4 weeks to prevent recurrent UTI   --If UTI persists, consider in and out catheterization to see if pathogens isolated from bladder   --Follow up with urology
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
## 2023-08-25 ENCOUNTER — PATIENT MESSAGE (OUTPATIENT)
Dept: INFECTIOUS DISEASES | Facility: CLINIC | Age: 33
End: 2023-08-25
Payer: COMMERCIAL

## 2023-08-25 ENCOUNTER — PATIENT MESSAGE (OUTPATIENT)
Dept: INTERNAL MEDICINE | Facility: CLINIC | Age: 33
End: 2023-08-25
Payer: COMMERCIAL

## 2023-08-25 ENCOUNTER — TELEPHONE (OUTPATIENT)
Dept: INTERNAL MEDICINE | Facility: CLINIC | Age: 33
End: 2023-08-25
Payer: COMMERCIAL

## 2023-08-25 NOTE — TELEPHONE ENCOUNTER
----- Message from Dorene Hernández sent at 8/25/2023  9:05 AM CDT -----  Regarding: Patient Advice  Contact: Brandon  .Type:  Needs Medical Advice    Who Called: Juan Pablocristacurry   Symptoms (please be specific):    How long has patient had these symptoms:    Pharmacy name and phone #:    Would the patient rather a call back or a response via [facility]? call  Best Call Back Number:  [phone]   Additional Information: brandon is requesting a callback in regards to knowing how he can get iv fluids because he feels dehydrated and water is not helping.

## 2023-08-30 RX ORDER — GABAPENTIN 300 MG/1
CAPSULE ORAL
Qty: 270 CAPSULE | Refills: 0 | Status: SHIPPED | OUTPATIENT
Start: 2023-08-30 | End: 2023-12-14 | Stop reason: SDUPTHER

## 2023-08-30 NOTE — TELEPHONE ENCOUNTER
No care due was identified.  [facility] Embedded Care Due Messages. Reference number: [id number].   8/30/2023 9:21:29 AM CDT

## 2023-08-31 ENCOUNTER — PATIENT MESSAGE (OUTPATIENT)
Dept: INTERNAL MEDICINE | Facility: CLINIC | Age: 33
End: 2023-08-31
Payer: COMMERCIAL

## 2023-09-11 ENCOUNTER — TELEPHONE (OUTPATIENT)
Dept: INTERNAL MEDICINE | Facility: CLINIC | Age: 33
End: 2023-09-11
Payer: COMMERCIAL

## 2023-09-11 ENCOUNTER — TELEPHONE (OUTPATIENT)
Dept: INFECTIOUS DISEASES | Facility: CLINIC | Age: 33
End: 2023-09-11
Payer: COMMERCIAL

## 2023-09-11 ENCOUNTER — PATIENT MESSAGE (OUTPATIENT)
Dept: INTERNAL MEDICINE | Facility: CLINIC | Age: 33
End: 2023-09-11
Payer: COMMERCIAL

## 2023-09-11 ENCOUNTER — PATIENT MESSAGE (OUTPATIENT)
Dept: INFECTIOUS DISEASES | Facility: CLINIC | Age: 33
End: 2023-09-11
Payer: COMMERCIAL

## 2023-09-11 NOTE — TELEPHONE ENCOUNTER
Called patient back and let him know to go to urgent center. He was attempting to see what meds to take. 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 Set him up with Karen tomorrow at 220 in case he did not get seen tonight.   Verbalized understanding.

## 2023-09-11 NOTE — TELEPHONE ENCOUNTER
Informed pt that appt on 9/12 with Nnadi needed to be r/s due to book out. Offered pt same day appt at [facility] with Haydee. Pt declined stating he would prefer to see Nnadi. Pt r/s to 10/31 with Jimmieadi. Pt verbalized understanding and agreed to date, time, and location.

## 2023-09-11 NOTE — TELEPHONE ENCOUNTER
----- Message from Harrison Morrison sent at 9/11/2023  4:48 PM CDT -----  Contact: Kate Mcbride is calling in regards to having a fever of 102.9 and took tylenol but wanted to know what else he can take besides that, pt stated that he have coughing and having thick mucus. Please call back at [phone]                                      Thanks  KT

## 2023-09-12 ENCOUNTER — OFFICE VISIT (OUTPATIENT)
Dept: UROLOGY | Facility: CLINIC | Age: 33
End: 2023-09-12
Payer: MEDICARE

## 2023-09-12 ENCOUNTER — OFFICE VISIT (OUTPATIENT)
Dept: INTERNAL MEDICINE | Facility: CLINIC | Age: 33
End: 2023-09-12
Payer: COMMERCIAL

## 2023-09-12 VITALS
SYSTOLIC BLOOD PRESSURE: 112 MMHG | HEART RATE: 113 BPM | DIASTOLIC BLOOD PRESSURE: 70 MMHG | OXYGEN SATURATION: 95 % | TEMPERATURE: 100 F

## 2023-09-12 VITALS
SYSTOLIC BLOOD PRESSURE: 109 MMHG | HEIGHT: 70 IN | HEART RATE: 101 BPM | BODY MASS INDEX: 31.57 KG/M2 | TEMPERATURE: 99 F | DIASTOLIC BLOOD PRESSURE: 72 MMHG

## 2023-09-12 DIAGNOSIS — N30.00 ACUTE CYSTITIS WITHOUT HEMATURIA: Primary | ICD-10-CM

## 2023-09-12 DIAGNOSIS — S14.109S QUADRIPLEGIA, POST-TRAUMATIC: ICD-10-CM

## 2023-09-12 DIAGNOSIS — G82.50 QUADRIPLEGIA, POST-TRAUMATIC: ICD-10-CM

## 2023-09-12 DIAGNOSIS — N31.9 NEUROGENIC BLADDER: ICD-10-CM

## 2023-09-12 DIAGNOSIS — R50.9 FEVER, UNSPECIFIED FEVER CAUSE: ICD-10-CM

## 2023-09-12 DIAGNOSIS — Z93.59 SUPRAPUBIC CATHETER: ICD-10-CM

## 2023-09-12 DIAGNOSIS — R50.9 FEVER, UNSPECIFIED FEVER CAUSE: Primary | ICD-10-CM

## 2023-09-12 DIAGNOSIS — K59.04 CHRONIC IDIOPATHIC CONSTIPATION: ICD-10-CM

## 2023-09-12 PROBLEM — R14.0 ABDOMINAL BLOATING: Status: RESOLVED | Noted: 2021-10-04 | Resolved: 2023-09-12

## 2023-09-12 PROBLEM — K62.5 RECTAL BLEEDING: Status: RESOLVED | Noted: 2021-10-04 | Resolved: 2023-09-12

## 2023-09-12 LAB
CTP QC/QA: YES
SARS-COV-2 RDRP RESP QL NAA+PROBE: NEGATIVE

## 2023-09-12 PROCEDURE — 99214 PR OFFICE/OUTPT VISIT, EST, LEVL IV, 30-39 MIN: ICD-10-PCS | Mod: HCNC,25,S$GLB, | Performed by: NURSE PRACTITIONER

## 2023-09-12 PROCEDURE — 1159F MED LIST DOCD IN RCRD: CPT | Mod: HCNC,CPTII,S$GLB, | Performed by: PHYSICIAN ASSISTANT

## 2023-09-12 PROCEDURE — 1159F MED LIST DOCD IN RCRD: CPT | Mod: HCNC,CPTII,S$GLB, | Performed by: NURSE PRACTITIONER

## 2023-09-12 PROCEDURE — 3074F PR MOST RECENT SYSTOLIC BLOOD PRESSURE < 130 MM HG: ICD-10-PCS | Mod: HCNC,CPTII,S$GLB, | Performed by: PHYSICIAN ASSISTANT

## 2023-09-12 PROCEDURE — 96372 PR INJECTION,THERAP/PROPH/DIAG2ST, IM OR SUBCUT: ICD-10-PCS | Mod: HCNC,S$GLB,, | Performed by: NURSE PRACTITIONER

## 2023-09-12 PROCEDURE — 99999 PR PBB SHADOW E&M-EST. PATIENT-LVL IV: ICD-10-PCS | Mod: PBBFAC,HCNC,, | Performed by: NURSE PRACTITIONER

## 2023-09-12 PROCEDURE — 99214 OFFICE O/P EST MOD 30 MIN: CPT | Mod: HCNC,25,S$GLB, | Performed by: NURSE PRACTITIONER

## 2023-09-12 PROCEDURE — 3078F PR MOST RECENT DIASTOLIC BLOOD PRESSURE < 80 MM HG: ICD-10-PCS | Mod: HCNC,CPTII,S$GLB, | Performed by: PHYSICIAN ASSISTANT

## 2023-09-12 PROCEDURE — 99999 PR PBB SHADOW E&M-EST. PATIENT-LVL IV: CPT | Mod: PBBFAC,HCNC,, | Performed by: NURSE PRACTITIONER

## 2023-09-12 PROCEDURE — 3008F BODY MASS INDEX DOCD: CPT | Mod: HCNC,CPTII,S$GLB, | Performed by: NURSE PRACTITIONER

## 2023-09-12 PROCEDURE — 1159F PR MEDICATION LIST DOCUMENTED IN MEDICAL RECORD: ICD-10-PCS | Mod: HCNC,CPTII,S$GLB, | Performed by: NURSE PRACTITIONER

## 2023-09-12 PROCEDURE — 99213 OFFICE O/P EST LOW 20 MIN: CPT | Mod: HCNC,S$GLB,, | Performed by: PHYSICIAN ASSISTANT

## 2023-09-12 PROCEDURE — 3078F PR MOST RECENT DIASTOLIC BLOOD PRESSURE < 80 MM HG: ICD-10-PCS | Mod: HCNC,CPTII,S$GLB, | Performed by: NURSE PRACTITIONER

## 2023-09-12 PROCEDURE — 3074F SYST BP LT 130 MM HG: CPT | Mod: HCNC,CPTII,S$GLB, | Performed by: NURSE PRACTITIONER

## 2023-09-12 PROCEDURE — 1160F RVW MEDS BY RX/DR IN RCRD: CPT | Mod: HCNC,CPTII,S$GLB, | Performed by: PHYSICIAN ASSISTANT

## 2023-09-12 PROCEDURE — 96372 THER/PROPH/DIAG INJ SC/IM: CPT | Mod: HCNC,S$GLB,, | Performed by: NURSE PRACTITIONER

## 2023-09-12 PROCEDURE — 1159F PR MEDICATION LIST DOCUMENTED IN MEDICAL RECORD: ICD-10-PCS | Mod: HCNC,CPTII,S$GLB, | Performed by: PHYSICIAN ASSISTANT

## 2023-09-12 PROCEDURE — 3078F DIAST BP <80 MM HG: CPT | Mod: HCNC,CPTII,S$GLB, | Performed by: PHYSICIAN ASSISTANT

## 2023-09-12 PROCEDURE — 99999 PR PBB SHADOW E&M-EST. PATIENT-LVL IV: CPT | Mod: PBBFAC,HCNC,, | Performed by: PHYSICIAN ASSISTANT

## 2023-09-12 PROCEDURE — 99213 PR OFFICE/OUTPT VISIT, EST, LEVL III, 20-29 MIN: ICD-10-PCS | Mod: HCNC,S$GLB,, | Performed by: PHYSICIAN ASSISTANT

## 2023-09-12 PROCEDURE — 3074F PR MOST RECENT SYSTOLIC BLOOD PRESSURE < 130 MM HG: ICD-10-PCS | Mod: HCNC,CPTII,S$GLB, | Performed by: NURSE PRACTITIONER

## 2023-09-12 PROCEDURE — 87635 SARS-COV-2 COVID-19 AMP PRB: CPT | Mod: QW,HCNC,S$GLB, | Performed by: PHYSICIAN ASSISTANT

## 2023-09-12 PROCEDURE — 3078F DIAST BP <80 MM HG: CPT | Mod: HCNC,CPTII,S$GLB, | Performed by: NURSE PRACTITIONER

## 2023-09-12 PROCEDURE — 3008F PR BODY MASS INDEX (BMI) DOCUMENTED: ICD-10-PCS | Mod: HCNC,CPTII,S$GLB, | Performed by: NURSE PRACTITIONER

## 2023-09-12 PROCEDURE — 87635: ICD-10-PCS | Mod: QW,HCNC,S$GLB, | Performed by: PHYSICIAN ASSISTANT

## 2023-09-12 PROCEDURE — 1160F PR REVIEW ALL MEDS BY PRESCRIBER/CLIN PHARMACIST DOCUMENTED: ICD-10-PCS | Mod: HCNC,CPTII,S$GLB, | Performed by: PHYSICIAN ASSISTANT

## 2023-09-12 PROCEDURE — 3074F SYST BP LT 130 MM HG: CPT | Mod: HCNC,CPTII,S$GLB, | Performed by: PHYSICIAN ASSISTANT

## 2023-09-12 PROCEDURE — 99999 PR PBB SHADOW E&M-EST. PATIENT-LVL IV: ICD-10-PCS | Mod: PBBFAC,HCNC,, | Performed by: PHYSICIAN ASSISTANT

## 2023-09-12 RX ORDER — CEFTRIAXONE 1 G/1
1 INJECTION, POWDER, FOR SOLUTION INTRAMUSCULAR; INTRAVENOUS
Status: COMPLETED | OUTPATIENT
Start: 2023-09-12 | End: 2023-09-12

## 2023-09-12 RX ORDER — CEPHALEXIN 500 MG/1
500 CAPSULE ORAL EVERY 12 HOURS
Qty: 20 CAPSULE | Refills: 0 | Status: SHIPPED | OUTPATIENT
Start: 2023-09-12 | End: 2023-09-20

## 2023-09-12 RX ADMIN — CEFTRIAXONE 1 G: 1 INJECTION, POWDER, FOR SOLUTION INTRAMUSCULAR; INTRAVENOUS at 04:09
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
## 2023-09-12 NOTE — PROGRESS NOTES
Subjective:       Patient ID: Kate Lazo is a 33 y.o. male.    Chief Complaint: Fever (Patient stated that he has had a fever for a while. The day before it was 99 and then yesterday it got up to 102.9. ) and Cough (Patient stated that every once in a while he has to cough. He said he isnt sure if its gas moving around because he hasnt had a BM in a while. )      HPI  34 y/o male with quadriplegia presents to clinic with c/o fever 102.9 yesterday, chronic cough, decreased appetite and constipation that started about 1 week ago. Pt had suprapubic catheter changed about 2 days ago. He sees ID for complicated UTI, took some left over augmentin yesterday, previously followed by Dr. Raymond but has been lost to follow up over 1 year. Reports daughter at home with sinus congestion. He did report orange color to his urine just before catheter change. No vomiting.     Review of Systems   Constitutional:  Positive for appetite change and fever.   Respiratory:  Positive for cough. Negative for shortness of breath and wheezing.    Gastrointestinal:  Positive for abdominal pain and constipation. Negative for vomiting.   Genitourinary:  Negative for decreased urine volume.       Objective:      Physical Exam  Vitals reviewed.   Constitutional:       General: He is not in acute distress.     Comments: Functional quadriplegic in personal motorized wheelchair   HENT:      Head: Normocephalic and atraumatic.   Cardiovascular:      Rate and Rhythm: Regular rhythm. Tachycardia present.      Heart sounds: No murmur heard.  Pulmonary:      Effort: Pulmonary effort is normal.      Breath sounds: Decreased breath sounds present. No wheezing.   Abdominal:      General: Bowel sounds are normal. There is no distension.      Palpations: Abdomen is soft.   Neurological:      Mental Status: He is alert.         Assessment:       1. Fever, unspecified fever cause    2. Chronic idiopathic constipation    3. Quadriplegia, post-traumatic         Plan:   1. Fever, unspecified fever cause  -     POCT COVID-19 Rapid Screening  -     Ambulatory referral/consult to Urology; Future; Expected date: 09/19/2023    2. Chronic idiopathic constipation    3. Quadriplegia, post-traumatic      Covid pending, see urology today for urine check.

## 2023-09-13 ENCOUNTER — LAB VISIT (OUTPATIENT)
Dept: LAB | Facility: HOSPITAL | Age: 33
End: 2023-09-13
Attending: NURSE PRACTITIONER
Payer: MEDICARE

## 2023-09-13 ENCOUNTER — TELEPHONE (OUTPATIENT)
Dept: UROLOGY | Facility: CLINIC | Age: 33
End: 2023-09-13
Payer: COMMERCIAL

## 2023-09-13 ENCOUNTER — PATIENT MESSAGE (OUTPATIENT)
Dept: ADMINISTRATIVE | Facility: OTHER | Age: 33
End: 2023-09-13
Payer: COMMERCIAL

## 2023-09-13 DIAGNOSIS — N30.00 ACUTE CYSTITIS WITHOUT HEMATURIA: ICD-10-CM

## 2023-09-13 PROCEDURE — 87086 URINE CULTURE/COLONY COUNT: CPT | Mod: HCNC | Performed by: NURSE PRACTITIONER

## 2023-09-13 NOTE — TELEPHONE ENCOUNTER
"Tried calling message said not accepting calls. Labs are still "in process"      ----- Message from Mamie Pena sent at 9/13/2023  2:11 PM CDT -----  Contact: Kate  Type:  Test Results    Who Called: Kate  Name of Test (Lab/Mammo/Etc): Urine Test  Date of Test: 09/13/2023  Ordering Provider: Dr. Justice  Where the test was performed: [facility]  Would the patient rather a call back or a response via [facility]? call  Best Call Back Number: [phone]   Additional Information:  Patient request a call back to get additional information regarding visit on 09/12/23.  Thank you,   TH      "

## 2023-09-13 NOTE — TELEPHONE ENCOUNTER
Returned call to pt twice both times it said unavailable. No results in yet       ----- Message from Katie Mcneal sent at 9/13/2023  3:40 PM CDT -----  Contact: [phone]  Type:  Patient Returning Call    Who Called:Kate   Who Left Message for Patient:nurse   Does the patient know what this is regarding?:yes   Would the patient rather a call back or a response via [facility]? Call back   Best Call Back Number:[phone]  Additional Information: n/a      Thanks KB

## 2023-09-14 ENCOUNTER — TELEPHONE (OUTPATIENT)
Dept: UROLOGY | Facility: CLINIC | Age: 33
End: 2023-09-14
Payer: COMMERCIAL

## 2023-09-14 ENCOUNTER — HOSPITAL ENCOUNTER (EMERGENCY)
Facility: HOSPITAL | Age: 33
Discharge: HOME | End: 2023-09-14
Attending: FAMILY MEDICINE
Payer: COMMERCIAL

## 2023-09-14 VITALS
HEART RATE: 95 BPM | RESPIRATION RATE: 18 BRPM | DIASTOLIC BLOOD PRESSURE: 79 MMHG | TEMPERATURE: 100 F | SYSTOLIC BLOOD PRESSURE: 113 MMHG | OXYGEN SATURATION: 99 %

## 2023-09-14 DIAGNOSIS — J18.9 PNEUMONIA OF RIGHT LOWER LOBE DUE TO INFECTIOUS ORGANISM: Primary | ICD-10-CM

## 2023-09-14 DIAGNOSIS — R10.13 EPIGASTRIC PAIN: ICD-10-CM

## 2023-09-14 LAB
ALBUMIN SERPL BCP-MCNC: 3.3 G/DL (ref 3.5–5.2)
ALP SERPL-CCNC: 90 U/L (ref 55–135)
ALT SERPL W/O P-5'-P-CCNC: 13 U/L (ref 10–44)
ANION GAP SERPL CALC-SCNC: 15 MMOL/L (ref 8–16)
AST SERPL-CCNC: 14 U/L (ref 10–40)
BACTERIA UR CULT: NORMAL
BACTERIA UR CULT: NORMAL
BASOPHILS # BLD AUTO: 0.02 K/UL (ref 0–0.2)
BASOPHILS NFR BLD: 0.3 % (ref 0–1.9)
BILIRUB SERPL-MCNC: 0.7 MG/DL (ref 0.1–1)
BUN SERPL-MCNC: 7 MG/DL (ref 6–20)
CALCIUM SERPL-MCNC: 9.1 MG/DL (ref 8.7–10.5)
CHLORIDE SERPL-SCNC: 102 MMOL/L (ref 95–110)
CO2 SERPL-SCNC: 22 MMOL/L (ref 23–29)
CREAT SERPL-MCNC: 0.7 MG/DL (ref 0.5–1.4)
DIFFERENTIAL METHOD: ABNORMAL
EOSINOPHIL # BLD AUTO: 0.2 K/UL (ref 0–0.5)
EOSINOPHIL NFR BLD: 2.6 % (ref 0–8)
ERYTHROCYTE [DISTWIDTH] IN BLOOD BY AUTOMATED COUNT: 13.2 % (ref 11.5–14.5)
EST. GFR  (NO RACE VARIABLE): >60 ML/MIN/1.73 M^2
GLUCOSE SERPL-MCNC: 89 MG/DL (ref 70–110)
HCT VFR BLD AUTO: 40.4 % (ref 40–54)
HGB BLD-MCNC: 13.4 G/DL (ref 14–18)
IMM GRANULOCYTES # BLD AUTO: 0.01 K/UL (ref 0–0.04)
IMM GRANULOCYTES NFR BLD AUTO: 0.2 % (ref 0–0.5)
LIPASE SERPL-CCNC: 10 U/L (ref 4–60)
LYMPHOCYTES # BLD AUTO: 2.2 K/UL (ref 1–4.8)
LYMPHOCYTES NFR BLD: 38.3 % (ref 18–48)
MCH RBC QN AUTO: 28.7 PG (ref 27–31)
MCHC RBC AUTO-ENTMCNC: 33.2 G/DL (ref 32–36)
MCV RBC AUTO: 87 FL (ref 82–98)
MONOCYTES # BLD AUTO: 0.5 K/UL (ref 0.3–1)
MONOCYTES NFR BLD: 8.4 % (ref 4–15)
NEUTROPHILS # BLD AUTO: 2.9 K/UL (ref 1.8–7.7)
NEUTROPHILS NFR BLD: 50.2 % (ref 38–73)
NRBC BLD-RTO: 0 /100 WBC
PLATELET # BLD AUTO: 244 K/UL (ref 150–450)
PMV BLD AUTO: 10.8 FL (ref 9.2–12.9)
POTASSIUM SERPL-SCNC: 3.8 MMOL/L (ref 3.5–5.1)
PROT SERPL-MCNC: 7.6 G/DL (ref 6–8.4)
RBC # BLD AUTO: 4.67 M/UL (ref 4.6–6.2)
SARS-COV-2 RDRP RESP QL NAA+PROBE: NEGATIVE
SODIUM SERPL-SCNC: 139 MMOL/L (ref 136–145)
WBC # BLD AUTO: 5.8 K/UL (ref 3.9–12.7)

## 2023-09-14 PROCEDURE — 93010 ELECTROCARDIOGRAM REPORT: CPT | Mod: HCNC,,, | Performed by: INTERNAL MEDICINE

## 2023-09-14 PROCEDURE — 25000003 PHARM REV CODE 250: Mod: HCNC | Performed by: FAMILY MEDICINE

## 2023-09-14 PROCEDURE — 99285 EMERGENCY DEPT VISIT HI MDM: CPT | Mod: 25,HCNC

## 2023-09-14 PROCEDURE — 93005 ELECTROCARDIOGRAM TRACING: CPT | Mod: HCNC

## 2023-09-14 PROCEDURE — 83690 ASSAY OF LIPASE: CPT | Mod: HCNC | Performed by: NURSE PRACTITIONER

## 2023-09-14 PROCEDURE — 63600175 PHARM REV CODE 636 W HCPCS: Mod: HCNC | Performed by: FAMILY MEDICINE

## 2023-09-14 PROCEDURE — 80053 COMPREHEN METABOLIC PANEL: CPT | Mod: HCNC | Performed by: NURSE PRACTITIONER

## 2023-09-14 PROCEDURE — 85025 COMPLETE CBC W/AUTO DIFF WBC: CPT | Mod: HCNC | Performed by: NURSE PRACTITIONER

## 2023-09-14 PROCEDURE — U0002 COVID-19 LAB TEST NON-CDC: HCPCS | Mod: HCNC | Performed by: NURSE PRACTITIONER

## 2023-09-14 PROCEDURE — 96365 THER/PROPH/DIAG IV INF INIT: CPT | Mod: HCNC

## 2023-09-14 PROCEDURE — 93010 EKG 12-LEAD: ICD-10-PCS | Mod: HCNC,,, | Performed by: INTERNAL MEDICINE

## 2023-09-14 RX ORDER — CEFDINIR 300 MG/1
300 CAPSULE ORAL 2 TIMES DAILY
Qty: 20 CAPSULE | Refills: 0 | Status: SHIPPED | OUTPATIENT
Start: 2023-09-14 | End: 2023-09-24

## 2023-09-14 RX ADMIN — CEFTRIAXONE 1 G: 1 INJECTION, POWDER, FOR SOLUTION INTRAMUSCULAR; INTRAVENOUS at 10:09

## 2023-09-14 NOTE — TELEPHONE ENCOUNTER
I called patient for the 2nd time this morning and urged him to proceed to the emergency department for additional evaluation.

## 2023-09-14 NOTE — TELEPHONE ENCOUNTER
I personally call patient, and he is aware that he needs to present to the emergency room secondary to inability to keep fluids and food down.  Patient was aware of the need to proceed to the emergency department if he had any exacerbation after our visit.  Urine culture is pending.

## 2023-09-14 NOTE — FIRST PROVIDER EVALUATION
Medical screening examination initiated.  I have conducted a focused provider triage encounter, findings are as follows:    Brief history of present illness:  Patient presents with upper abdominal pain with nausea x3 days.  States that it is uncomfortable when he eats any food.    Vitals:    09/14/23 1804   BP: 113/79   BP Location: Right arm   Patient Position: Sitting   Pulse: 95   Resp: 18   Temp: 99.5 °F (37.5 °C)   TempSrc: Oral   SpO2: 99%       Pertinent physical exam:      Brief workup plan:      Preliminary workup initiated; this workup will be continued and followed by the physician or advanced practice provider that is assigned to the patient when roomed.

## 2023-09-14 NOTE — TELEPHONE ENCOUNTER
----- Message from Bushra Peoples sent at 9/14/2023  9:20 AM CDT -----  Regarding: pt call back  Name of Who is Calling:Pt         What is the request in detail: Requesting call back regarding meds he taking pt states.  Please advise           Can the clinic reply by [facility]: yes         [facility] Telephone Information:  Scrip-t          [phone]

## 2023-09-14 NOTE — TELEPHONE ENCOUNTER
----- Message from Chrissy Nix sent at 9/14/2023 10:50 AM CDT -----  Patient is requesting the nurse give him a call back at [phone]
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
## 2023-09-20 ENCOUNTER — TELEPHONE (OUTPATIENT)
Dept: UROLOGY | Facility: CLINIC | Age: 33
End: 2023-09-20
Payer: COMMERCIAL

## 2023-09-20 ENCOUNTER — OFFICE VISIT (OUTPATIENT)
Dept: HOME HEALTH SERVICES | Facility: CLINIC | Age: 33
End: 2023-09-20
Payer: COMMERCIAL

## 2023-09-20 ENCOUNTER — HOSPITAL ENCOUNTER (OUTPATIENT)
Dept: RADIOLOGY | Facility: HOSPITAL | Age: 33
Discharge: HOME | End: 2023-09-20
Attending: NURSE PRACTITIONER
Payer: COMMERCIAL

## 2023-09-20 VITALS
TEMPERATURE: 98 F | DIASTOLIC BLOOD PRESSURE: 67 MMHG | BODY MASS INDEX: 31.5 KG/M2 | HEIGHT: 70 IN | SYSTOLIC BLOOD PRESSURE: 105 MMHG | WEIGHT: 220 LBS | OXYGEN SATURATION: 97 % | HEART RATE: 72 BPM

## 2023-09-20 DIAGNOSIS — F33.0 MILD RECURRENT MAJOR DEPRESSION: ICD-10-CM

## 2023-09-20 DIAGNOSIS — F41.9 ANXIETY: ICD-10-CM

## 2023-09-20 DIAGNOSIS — Z00.00 ENCOUNTER FOR MEDICARE ANNUAL WELLNESS EXAM: ICD-10-CM

## 2023-09-20 DIAGNOSIS — Z00.00 ENCOUNTER FOR PREVENTIVE HEALTH EXAMINATION: Primary | ICD-10-CM

## 2023-09-20 DIAGNOSIS — E55.9 VITAMIN D DEFICIENCY: ICD-10-CM

## 2023-09-20 DIAGNOSIS — L97.529 ULCER OF LEFT FOOT, UNSPECIFIED ULCER STAGE: ICD-10-CM

## 2023-09-20 DIAGNOSIS — I10 PRIMARY HYPERTENSION: ICD-10-CM

## 2023-09-20 DIAGNOSIS — G82.50 QUADRIPLEGIA, POST-TRAUMATIC: ICD-10-CM

## 2023-09-20 DIAGNOSIS — N39.0 COMPLICATED UTI (URINARY TRACT INFECTION): ICD-10-CM

## 2023-09-20 DIAGNOSIS — Z99.3 DEPENDENCE ON WHEELCHAIR: ICD-10-CM

## 2023-09-20 DIAGNOSIS — Z93.59 SUPRAPUBIC CATHETER: ICD-10-CM

## 2023-09-20 DIAGNOSIS — Z59.9 FINANCIAL DIFFICULTIES: ICD-10-CM

## 2023-09-20 DIAGNOSIS — S14.109S QUADRIPLEGIA, POST-TRAUMATIC: ICD-10-CM

## 2023-09-20 DIAGNOSIS — K21.9 GASTROESOPHAGEAL REFLUX DISEASE, UNSPECIFIED WHETHER ESOPHAGITIS PRESENT: ICD-10-CM

## 2023-09-20 DIAGNOSIS — K59.09 OTHER CONSTIPATION: ICD-10-CM

## 2023-09-20 DIAGNOSIS — N30.00 ACUTE CYSTITIS WITHOUT HEMATURIA: ICD-10-CM

## 2023-09-20 DIAGNOSIS — E46 MALNUTRITION, UNSPECIFIED TYPE: ICD-10-CM

## 2023-09-20 DIAGNOSIS — Z99.3 WHEELCHAIR DEPENDENCE: ICD-10-CM

## 2023-09-20 DIAGNOSIS — R53.2 FUNCTIONAL QUADRIPLEGIA: ICD-10-CM

## 2023-09-20 PROBLEM — L97.509 FOOT ULCER: Status: ACTIVE | Noted: 2023-09-20

## 2023-09-20 PROCEDURE — 3008F BODY MASS INDEX DOCD: CPT | Mod: CPTII,S$GLB,, | Performed by: NURSE PRACTITIONER

## 2023-09-20 PROCEDURE — 76770 US RETROPERITONEAL COMPLETE: ICD-10-PCS | Mod: 26,HCNC,, | Performed by: RADIOLOGY

## 2023-09-20 PROCEDURE — 99499 RISK ADDL DX/OHS AUDIT: ICD-10-PCS | Mod: S$GLB,,, | Performed by: NURSE PRACTITIONER

## 2023-09-20 PROCEDURE — 99499 UNLISTED E&M SERVICE: CPT | Mod: S$GLB,,, | Performed by: NURSE PRACTITIONER

## 2023-09-20 PROCEDURE — 76770 US EXAM ABDO BACK WALL COMP: CPT | Mod: 26,HCNC,, | Performed by: RADIOLOGY

## 2023-09-20 PROCEDURE — 1159F PR MEDICATION LIST DOCUMENTED IN MEDICAL RECORD: ICD-10-PCS | Mod: CPTII,S$GLB,, | Performed by: NURSE PRACTITIONER

## 2023-09-20 PROCEDURE — 3078F PR MOST RECENT DIASTOLIC BLOOD PRESSURE < 80 MM HG: ICD-10-PCS | Mod: CPTII,S$GLB,, | Performed by: NURSE PRACTITIONER

## 2023-09-20 PROCEDURE — 1160F PR REVIEW ALL MEDS BY PRESCRIBER/CLIN PHARMACIST DOCUMENTED: ICD-10-PCS | Mod: CPTII,S$GLB,, | Performed by: NURSE PRACTITIONER

## 2023-09-20 PROCEDURE — 3074F SYST BP LT 130 MM HG: CPT | Mod: CPTII,S$GLB,, | Performed by: NURSE PRACTITIONER

## 2023-09-20 PROCEDURE — 1159F MED LIST DOCD IN RCRD: CPT | Mod: CPTII,S$GLB,, | Performed by: NURSE PRACTITIONER

## 2023-09-20 PROCEDURE — 76770 US EXAM ABDO BACK WALL COMP: CPT | Mod: TC,HCNC

## 2023-09-20 PROCEDURE — 3008F PR BODY MASS INDEX (BMI) DOCUMENTED: ICD-10-PCS | Mod: CPTII,S$GLB,, | Performed by: NURSE PRACTITIONER

## 2023-09-20 PROCEDURE — 1160F RVW MEDS BY RX/DR IN RCRD: CPT | Mod: CPTII,S$GLB,, | Performed by: NURSE PRACTITIONER

## 2023-09-20 PROCEDURE — G0439 PR MEDICARE ANNUAL WELLNESS SUBSEQUENT VISIT: ICD-10-PCS | Mod: S$GLB,,, | Performed by: NURSE PRACTITIONER

## 2023-09-20 PROCEDURE — 3074F PR MOST RECENT SYSTOLIC BLOOD PRESSURE < 130 MM HG: ICD-10-PCS | Mod: CPTII,S$GLB,, | Performed by: NURSE PRACTITIONER

## 2023-09-20 PROCEDURE — G0439 PPPS, SUBSEQ VISIT: HCPCS | Mod: S$GLB,,, | Performed by: NURSE PRACTITIONER

## 2023-09-20 PROCEDURE — 3078F DIAST BP <80 MM HG: CPT | Mod: CPTII,S$GLB,, | Performed by: NURSE PRACTITIONER

## 2023-09-20 RX ORDER — PANTOPRAZOLE SODIUM 40 MG/1
40 TABLET, DELAYED RELEASE ORAL DAILY
Qty: 30 TABLET | Refills: 1 | Status: SHIPPED | OUTPATIENT
Start: 2023-09-20 | End: 2023-12-21

## 2023-09-20 RX ORDER — CATHETER 18 FR
12 EACH MISCELLANEOUS
Qty: 12 EACH | Refills: 11 | Status: SHIPPED | OUTPATIENT
Start: 2023-09-20

## 2023-09-20 SDOH — SOCIAL DETERMINANTS OF HEALTH (SDOH): PROBLEM RELATED TO HOUSING AND ECONOMIC CIRCUMSTANCES, UNSPECIFIED: Z59.9

## 2023-09-20 NOTE — PATIENT INSTRUCTIONS
Counseling and Referral of Other Preventative  (Italic type indicates deductible and co-insurance are waived)    Patient Name: Kate Lazo  Today's Date: 9/20/2023    Health Maintenance       Date Due Completion Date    COVID-19 Vaccine (1) Never done ---    Pneumococcal Vaccines (Age 0-64) (1 - PCV) Never done ---    TETANUS VACCINE 08/01/2016 8/1/2006    Influenza Vaccine (1) 09/01/2023 10/6/2015        Orders Placed This Encounter   Procedures    Ambulatory referral/consult to Outpatient Case Management    Ambulatory referral/consult to Psychiatry       The following information is provided to all patients.  This information is to help you find resources for any of the problems found today that may be affecting your health:                Living healthy guide: www.[facility].[place].gov      Understanding Diabetes: www.diabetes.org      Eating healthy: www.cdc.gov/healthyweight      [facility] home safety checklist: www.cdc.gov/steadi/patient.html      Agency on Aging: www.goea.[place].[facility]      Alcoholics anonymous (AA): www.aa.org      Physical Activity: www.nitesh.nih.gov/ct6grtn      Tobacco use: www.quitwithusla.org

## 2023-09-20 NOTE — Clinical Note
Medicare awv complete. Health maintenance:  Pneumococcal vaccine, tetanus vaccine, flu vaccine due-encourage patient to obtain at a pharmacy.  Patient states he has received COVID-19 vaccines but unsure of the date and will find out and let us know.  Discussed obtaining new COVID-19 vaccine at a pharmacy.  Needs stat lock, docusate sodium enema, and ensure/boost Case management also consulted.   Ulcer of left foot, unspecified ulcer stage New problem. Started 3 weeks ago. Pt states he has been treating it at home and improving some but I recommend home health. Message sent to pcp.   Pt states he still feels depressed more than half the days in the past 2 weeks. Has been taking lexapro everyday. He denies any si. Pt agreed to seeing psychiatry. Referral placed.   States he is still having reflux  Financial concerns.

## 2023-09-20 NOTE — PROGRESS NOTES
"Kate Lazo presented for Medicare AW today. The following components were reviewed and updated:    Medical history  Family History  Social history  Allergies and Current Medications  Health Risk Assessment  Health Maintenance  Care Team    **See Completed Assessments for Annual Wellness visit with in the encounter summary    The following assessments were completed:  Depression Screening  Cognitive function Screening    Timed Get Up Test  Whisper Test    Vitals:    09/20/23 0953   BP: 105/67   Pulse: 72   Temp: 97.5 °F (36.4 °C)   TempSrc: Temporal   SpO2: 97%   Weight: 99.8 kg (220 lb)   Height: 5' 10" (1.778 m)     Body mass index is 31.57 kg/m².   ]    Physical Exam  Vitals reviewed.   Constitutional:       Appearance: Normal appearance.      Comments: Wheelchair bound   HENT:      Head: Normocephalic and atraumatic.   Cardiovascular:      Rate and Rhythm: Normal rate and regular rhythm.      Pulses: Normal pulses.      Heart sounds: Normal heart sounds.   Pulmonary:      Effort: Pulmonary effort is normal.      Breath sounds: Normal breath sounds.   Musculoskeletal:         General: Normal range of motion.      Cervical back: Normal range of motion and neck supple.   Skin:     General: Skin is warm and dry.      Comments: Ulceration to the metatarsal-phalangeal joint of the first digit on the left foot.    Neurological:      Mental Status: He is alert and oriented to person, place, and time. Mental status is at baseline.      Motor: Weakness (unable to move legs and right arm. minimal mvmt to left arm.) present.   Psychiatric:         Mood and Affect: Mood normal.         Behavior: Behavior normal.              Outpatient Medications Marked as Taking for the 9/20/23 encounter (Office Visit) with Le Rico FNP   Medication Sig Dispense Refill    ALPRAZolam (XANAX) 0.25 MG tablet Take 1 tablet (0.25 mg total) by mouth nightly as needed for Anxiety. 30 tablet 0    baclofen (LIORESAL) 10 MG tablet " -- DO NOT REPLY / DO NOT REPLY ALL --  -- Message is from the [facility]--    COVID-19 Universal Screening: Negative    Order Request  Lab: blood work    Message / reason: refill on medication     Insurance type:   Payor: MEDICARE / Plan: PARTA AND B / Product Type: MEDICARE    Preferred Delivery Method   Call when ready for pickup - phone number to notify: [phone]     Caller Information       Type Contact Phone    06/18/2020 06:53 PM Phone (Incoming) Selina Miller (Self) [phone] (M)          Alternative phone number: n/a    Turnaround time given to caller:   \"This message will be sent to [state Provider's name]. The clinical team will fulfill your request as soon as they review your message when the office opens tomorrow.\"   Take the 10 mg tablet in addition to your 20 mg tablet to equal 30 mg at a time. Take 3x/day as needed 270 tablet 1    baclofen (LIORESAL) 20 MG tablet Take 1 tablet (20 mg total) by mouth 3 (three) times daily. 270 tablet 1    cefdinir (OMNICEF) 300 MG capsule Take 1 capsule (300 mg total) by mouth 2 (two) times daily. for 10 days 20 capsule 0    docusate sodium-benzocaine 283-20 mg/5 mL Enem Place 1 each rectally Daily.      EScitalopram oxalate (LEXAPRO) 10 MG tablet Take 2 tablets (20 mg total) by mouth once daily. 180 tablet 1    furosemide (LASIX) 20 MG tablet Take 1 tablet (20 mg total) by mouth once daily. 90 tablet 1    gabapentin (NEURONTIN) 300 MG capsule TAKE 1 CAPSULE BY MOUTH THREE TIMES DAILY 270 capsule 0    ketoconazole (NIZORAL) 2 % shampoo Wash hair with medicated shampoo at least 2x/week - let sit on scalp at least 5 minutes prior to rinsing. 120 mL 5    LITHOSTAT 250 mg Tab Take 1 tablet by mouth 3 (three) times daily.      mupirocin (BACTROBAN) 2 % ointment Apply topically 4 (four) times daily.      omeprazole (PRILOSEC) 40 MG capsule Take 1 capsule (40 mg total) by mouth once daily. 90 capsule 3    ondansetron (ZOFRAN) 4 MG tablet Take 1 tablet (4 mg total) by mouth every 6 (six) hours as needed for Nausea. 10 tablet 1    oxybutynin (DITROPAN) 5 MG Tab Take 1 tablet (5 mg total) by mouth 2 (two) times daily. 180 tablet 3    senna (SENOKOT) 8.6 mg tablet Take 1 tablet by mouth.      traZODone (DESYREL) 50 MG tablet Take 0.5 tablets (25 mg total) by mouth every evening. 30 tablet 3    triamcinolone acetonide 0.025% (KENALOG) 0.025 % cream APPLY CREAM TOPICALLY TO AFFECTED AREA TWICE DAILY AS NEEDED FOR FLARES. MILD STEROID 80 g 2        Diagnoses and health risks identified today and associated recommendations/orders:  1. Encounter for preventive health examination;  Encounter for Medicare annual wellness exam  Medicare awv complete. Health maintenance:  Pneumococcal vaccine, tetanus vaccine,  flu vaccine due-encourage patient to obtain at a pharmacy.  Patient states he has received COVID-19 vaccines but unsure of the date and will find out and let us know.  Discussed obtaining new COVID-19 vaccine at a pharmacy.  - Ambulatory Referral/Consult to Enhanced Annual Wellness Visit (eAWV)    2. Functional quadriplegia  Chronic. Skin and Fall precautions recommended and discussed. Follow up with PCP.    syed he needs alonso catheter 18 Hungarian supplies (dark brown tubing one) along with stat lock for catheter. Unable to order stat lock.   - catheter (ALONSO CATHETER) 18 Fr Misc; 12 each by Misc.(Non-Drug; Combo Route) route every 14 (fourteen) days.  Dispense: 12 each; Refill: 11    3. Quadriplegia, post-traumatic  Chronic. Skin and Fall precautions recommended and discussed.  Follow up with PCP.      4. Ulcer of left foot, unspecified ulcer stage  New problem. Started 3 weeks ago. Pt states he has been treating it at home and improving some but I recommend home health. Message sent to pcp.     5. Malnutrition, unspecified type   Latest Reference Range & Units 09/25/20 12:15 12/15/20 17:54 01/14/21 17:38 01/26/22 12:30 11/29/22 14:27 03/15/23 11:42 08/01/23 17:34 08/09/23 13:00 09/14/23 19:06   Albumin 3.5 - 5.2 g/dL 3.9 3.6 3.9 3.9 3.8 3.7 3.3 (L) 3.3 (L) 3.3 (L)   (L): Data is abnormally low  New problem. Pt states he has had moderate decrease food intake over the past few months. Ensure recommended but expensive. Case management consulted.   - Ambulatory referral/consult to Outpatient Case Management    6. Mild recurrent major depression  Chronic and stable. Pt states he still feels depressed more than half the days in the past 2 weeks. Has been taking lexapro everyday. He denies any si. Pt agreed to seeing psychiatry. Referral placed.   - Ambulatory referral/consult to Psychiatry; Future    7. Primary hypertension  Chronic and stable on BP medication.  Continue current management.  See med list above.  Recommend low sodium diet. Follow up with PCP.       8. Suprapubic catheter  Chronic and stable. Continue current management. See med list above. Follow up with PCP and urology. Catheter ordered. Pt states he changes catheter every 2 weeks.     9. Complicated UTI (urinary tract infection)  Chronic and stable. Continue current management. On cefdinir. See med list above. Follow up with PCP and ID.     10. Gastroesophageal reflux disease, unspecified whether esophagitis present  Chronic.  Patient states he has been taking omeprazole every day and still having reflux.  Protonix prescription sent.   - pantoprazole (PROTONIX) 40 MG tablet; Take 1 tablet (40 mg total) by mouth once daily.  Dispense: 30 tablet; Refill: 1    11. Anxiety  Chronic and stable. Continue current management. See med list above. Follow up with PCP.     12. Vitamin D deficiency  Chronic and stable. Continue current management. See med list above. Follow up with PCP.     13. Other constipation  Pt needs Docusate sodium 283mg enemeez regular mini enema. Med added to his med list. Tried to order it but prior authorization needed. Sent message to MA.     14. Wheelchair bound  Chronic. Skin and Fall precautions recommended and discussed. Follow up with PCP.      15. Financial difficulties  pt has financial concerns with paying the basic bills and food runs out. Case management consulted.   - Ambulatory referral/consult to Outpatient Case Management    16. Dependence on wheelchair  Chronic. Skin and Fall precautions recommended and discussed. Follow up with PCP.          Provided Kate with a 5-10 year written screening schedule and personal prevention plan. Recommendations were developed using the USPSTF age appropriate recommendations. Education, counseling, and referrals were provided as needed.  After Visit Summary printed and given to patient which includes a list of additional screenings\tests needed.    Follow up in about 1 year (around  9/20/2024) for annual wellness visit.      Le Rico, P    I offered to discuss advanced care planning, including how to pick a person who would make decisions for you if you were unable to make them for yourself, called a health care power of , and what kind of decisions you might make such as use of life sustaining treatments such as ventilators and tube feeding when faced with a life limiting illness recorded on a living will that they will need to know. (How you want to be cared for as you near the end of your natural life)     X Patient is interested in learning more about how to make advanced directives.  I provided them paperwork and offered to discuss this with them.

## 2023-09-20 NOTE — TELEPHONE ENCOUNTER
Spoke with pt concerning results and follow up      ----- Message from Pineda James MD sent at 9/20/2023  4:48 PM CDT -----  Looks good but needs follow-up with Mandy or me

## 2023-09-22 DIAGNOSIS — L98.9 FOOT LESION: Primary | ICD-10-CM

## 2023-09-25 ENCOUNTER — PATIENT MESSAGE (OUTPATIENT)
Dept: INTERNAL MEDICINE | Facility: CLINIC | Age: 33
End: 2023-09-25
Payer: COMMERCIAL

## 2023-09-26 ENCOUNTER — PATIENT MESSAGE (OUTPATIENT)
Dept: INTERNAL MEDICINE | Facility: CLINIC | Age: 33
End: 2023-09-26
Payer: COMMERCIAL

## 2023-09-28 ENCOUNTER — PATIENT MESSAGE (OUTPATIENT)
Dept: INTERNAL MEDICINE | Facility: CLINIC | Age: 33
End: 2023-09-28
Payer: COMMERCIAL

## 2023-09-29 ENCOUNTER — OUTPATIENT CASE MANAGEMENT (OUTPATIENT)
Dept: ADMINISTRATIVE | Facility: OTHER | Age: 33
End: 2023-09-29
Payer: COMMERCIAL

## 2023-09-29 ENCOUNTER — PATIENT MESSAGE (OUTPATIENT)
Dept: ADMINISTRATIVE | Facility: OTHER | Age: 33
End: 2023-09-29
Payer: COMMERCIAL

## 2023-09-29 NOTE — LETTER
Kate Lazo  [address] [address]    Dear Kate Lazo,     Welcome to [facility] Out[facility] Management [facility]. We are here to assist patients with multiple long-term (chronic) conditions who often need more personalized healthcare.    It was a pleasure talking with you today. My name is Elda Fuentes RN. I look forward to working with you as your Care Manager. I will be contacting you by telephone routinely to help coordinate care and resolve issues.    My goal is to help you function at the healthiest and highest level possible. You can contact me directly at [phone].    As an [facility] patient with [employer], some of the services we provide, at no cost to you, include:     Development of an individualized care plan with a Registered Nurse   Connection with a   Assistance from a Community Health Worker  Connection with available resources and services    Coordinate communication among your care team members   Provide coaching and education  Help you understand your doctor's treatment plan  Help you obtain information about your insurance coverage.    All services provided by [facility] Outpatient Care Managers and other care team members are coordinated with and communicated to your primary care team.      As part of your enrollment, you will be receiving education materials and more information about these services in your [facility] account, by phone, or through the mail. If you do not wish to participate or receive information, you can Opt Out by contacting our office at [phone].      Sincerely,        Elda Fuentes RN  [facility]   Outpatient Care Management

## 2023-09-29 NOTE — PROGRESS NOTES
Outpatient Care Management  Initial Patient Assessment    Patient: Kate Lazo  MRN: [id number]  Date of Service: 09/29/2023  Completed by: Elda Fuentes RN  Referral Date: 09/20/2023  Program: High Risk  Status: Ongoing  Effective Dates: 9/29/2023 - present  Responsible Staff: Elda Fuentes RN        Reason for Visit   Patient presents with    OPCM Enrollment Call       Brief Summary:  Kate Lazo was referred by AURELIO Rico NP for malnutrition. Patient qualifies for program based on risk score of 82%.   Active problem list, medical, surgical and social history reviewed. Active comorbidities include quadriplegia, depression, anxiety, HTN, suprapubic cath with recurring UTI's, malnutrition, constipation, contracture of R hand joint and L foot ulcer. Areas of need identified by patient include assistance with managing recurring UTI's. He is also interested in being contacted by the LCSW to determine if he is eligible for any financial or medical supply resources or assistance.   Next steps: Send some educational literature to his pt portal account and follow up in about 10 days to begin to discuss measures to help prevent ongoing UTI's and skin breakdown. Refer to LCSW. Elda Fuentes RN    Disability Status  Is the patient alert and oriented (person, place, time, and situation)?: Alert and oriented x 4  Hearing Difficulty or Deaf: no  Visual Difficulty or Blind: no  Visual and Hearing Needs Conclusion: -- (Reports no vision on hearing deficits.)  Difficulty Concentrating, Remembering or Making Decisions: no  Communication Difficulty: no  Eating/Swallowing Difficulty: no  Walking or Climbing Stairs Difficulty: yes  Walking or Climbing Stairs: ambulation difficulty, dependent; stair climbing difficulty, dependent; transferring difficulty, dependent  Mobility Management: -- (Is quadriplegic and requires total assistance with all ADL's/IADL's.)  Dressing/Bathing Difficulty: yes  Dressing/Bathing:  "bathing difficulty, dependent; dressing difficulty, dependent  Dressing/Bathing Management: -- (Has daily long term care worker that assists with all ADL's.)  Toileting : Dependent  Continence : Cathererization - Suprapubic; Incontinence - Bowel  Difficulty Managing Errands Independently: yes  Errands Management: -- (Requires someone else to perform all errands.)  Equipment Currently Used at Home: colostomy/ostomy supplies; lift device; power chair; shower chair; urinary catheter supplies  ADL Conclusion Statement: -- (Is dependent with all needs.)  Change in Functional Status Since Onset of Current Illness/Injury: no        Spiritual Beliefs  Spiritual, Cultural Beliefs, Bahai Practices, Values that Affect Care: no      Social History     Socioeconomic History    Marital status: Single   Tobacco Use    Smoking status: Never    Smokeless tobacco: Never   Substance and Sexual Activity    Alcohol use: No    Drug use: Not Currently     Types: Marijuana     Comment: "Discontinued about 1 month ago"    Sexual activity: Not Currently   Social History Narrative    ** Merged History Encounter **          Social Determinants of Health     Financial Resource Strain: Medium Risk (9/20/2023)    Overall Financial Resource Strain (CARDIA)     Difficulty of Paying Living Expenses: Somewhat hard   Food Insecurity: Food Insecurity Present (9/20/2023)    Hunger Vital Sign     Worried About Running Out of Food in the Last Year: Often true     Ran Out of Food in the Last Year: Often true   Transportation Needs: No Transportation Needs (9/20/2023)    PRAPARE - Transportation     Lack of Transportation (Medical): No     Lack of Transportation (Non-Medical): No   Physical Activity: Sufficiently Active (9/20/2023)    Exercise Vital Sign     Days of Exercise per Week: 3 days     Minutes of Exercise per Session: 60 min   Stress: Stress Concern Present (9/20/2023)    [employer] [place] [employer] - Occupational Stress " Questionnaire     Feeling of Stress : Rather much   Social Connections: Unknown (9/20/2023)    Social Connection and Isolation Panel [NHANES]     Frequency of Communication with Friends and Family: More than three times a week     Frequency of Social Gatherings with Friends and Family: Once a week     Attends Presybeterian Services: Never   Housing Stability: High Risk (9/20/2023)    Housing Stability Vital Sign     Unable to Pay for Housing in the Last Year: Yes     Number of Places Lived in the Last Year: 1     Unstable Housing in the Last Year: No       Roles and Relationships  Primary Source of Support/Comfort: parent; nonrelative caregiver  Name of Support/Comfort Primary Source: -- (Latrece)      Advance Directives (For Healthcare)  Advance Directive  (If Adv Dir status is received, view document under Adv Dir in header or Chart Review Media tab): Patient does not have Advance Directive, requests information.  Patient Requests Assistance: Handouts provided        Patient Reported Insurance  Verified current insurance plan:: [employer] Medicare Advantage; Medicaid; Blue Cross  [employer] benefits discussed:: Transportation; Other (See comments) (Healthy food card)            9/29/2023     1:58 PM 9/20/2023    10:32 AM   Depression Patient Health Questionnaire   Over the last two weeks how often have you been bothered by little interest or pleasure in doing things Several days Not at all   Over the last two weeks how often have you been bothered by feeling down, depressed or hopeless Several days More than half the days   PHQ-2 Total Score 2 2       Learning Assessment       09/29/2023 1518 [facility] (9/29/2023 - Present)   Created by Elda Fuentes, RN - RN (Nurse) Status: Complete                 PRIMARY LEARNER     Primary Learner Name:  Kate  - 09/29/2023 1518    Relationship:  Patient [facility] - 09/29/2023 1518    Does the primary learner have any barriers to learning?:  No Barriers  - 09/29/2023  1518    What is the preferred language of the primary learner?:  English  - 09/29/2023 1518    Is an  required?:  No  - 09/29/2023 1518    How does the primary learner prefer to learn new concepts?:  Listening, Reading  - 09/29/2023 1518    How often do you need to have someone help you read instructions, pamphlets, or written material from your doctor or pharmacy?:  Never  - 09/29/2023 1518        CO-LEARNER #1     No question answered        CO-LEARNER #2     No question answered        SPECIAL TOPICS     No question answered        ANSWERED BY:     No question answered        Edit History       Elda Fuentes, RN - RN (Nurse)   09/29/2023 1518

## 2023-10-02 DIAGNOSIS — M79.89 LEG SWELLING: ICD-10-CM

## 2023-10-02 RX ORDER — FUROSEMIDE 20 MG/1
20 TABLET ORAL
Qty: 90 TABLET | Refills: 0 | Status: SHIPPED | OUTPATIENT
Start: 2023-10-02 | End: 2023-12-21 | Stop reason: SDUPTHER

## 2023-10-02 NOTE — TELEPHONE ENCOUNTER
No care due was identified.  [facility] Embedded Care Due Messages. Reference number: [id number].   10/02/2023 9:32:59 AM CDT

## 2023-10-03 ENCOUNTER — OFFICE VISIT (OUTPATIENT)
Dept: UROLOGY | Facility: CLINIC | Age: 33
End: 2023-10-03
Payer: COMMERCIAL

## 2023-10-03 VITALS
HEART RATE: 87 BPM | RESPIRATION RATE: 18 BRPM | WEIGHT: 220 LBS | DIASTOLIC BLOOD PRESSURE: 50 MMHG | BODY MASS INDEX: 31.57 KG/M2 | SYSTOLIC BLOOD PRESSURE: 75 MMHG

## 2023-10-03 DIAGNOSIS — Z93.59 SUPRAPUBIC CATHETER: Primary | ICD-10-CM

## 2023-10-03 DIAGNOSIS — N31.9 NEUROGENIC BLADDER: ICD-10-CM

## 2023-10-03 PROCEDURE — 99999 PR PBB SHADOW E&M-EST. PATIENT-LVL IV: ICD-10-PCS | Mod: PBBFAC,,, | Performed by: NURSE PRACTITIONER

## 2023-10-03 PROCEDURE — 1159F MED LIST DOCD IN RCRD: CPT | Mod: CPTII,S$GLB,, | Performed by: NURSE PRACTITIONER

## 2023-10-03 PROCEDURE — 99214 OFFICE O/P EST MOD 30 MIN: CPT | Mod: S$GLB,,, | Performed by: NURSE PRACTITIONER

## 2023-10-03 PROCEDURE — 3008F PR BODY MASS INDEX (BMI) DOCUMENTED: ICD-10-PCS | Mod: CPTII,S$GLB,, | Performed by: NURSE PRACTITIONER

## 2023-10-03 PROCEDURE — 1159F PR MEDICATION LIST DOCUMENTED IN MEDICAL RECORD: ICD-10-PCS | Mod: CPTII,S$GLB,, | Performed by: NURSE PRACTITIONER

## 2023-10-03 PROCEDURE — 3078F DIAST BP <80 MM HG: CPT | Mod: CPTII,S$GLB,, | Performed by: NURSE PRACTITIONER

## 2023-10-03 PROCEDURE — 99999 PR PBB SHADOW E&M-EST. PATIENT-LVL IV: CPT | Mod: PBBFAC,,, | Performed by: NURSE PRACTITIONER

## 2023-10-03 PROCEDURE — 3008F BODY MASS INDEX DOCD: CPT | Mod: CPTII,S$GLB,, | Performed by: NURSE PRACTITIONER

## 2023-10-03 PROCEDURE — 99214 PR OFFICE/OUTPT VISIT, EST, LEVL IV, 30-39 MIN: ICD-10-PCS | Mod: S$GLB,,, | Performed by: NURSE PRACTITIONER

## 2023-10-03 PROCEDURE — 3078F PR MOST RECENT DIASTOLIC BLOOD PRESSURE < 80 MM HG: ICD-10-PCS | Mod: CPTII,S$GLB,, | Performed by: NURSE PRACTITIONER

## 2023-10-03 PROCEDURE — 3074F PR MOST RECENT SYSTOLIC BLOOD PRESSURE < 130 MM HG: ICD-10-PCS | Mod: CPTII,S$GLB,, | Performed by: NURSE PRACTITIONER

## 2023-10-03 PROCEDURE — 3074F SYST BP LT 130 MM HG: CPT | Mod: CPTII,S$GLB,, | Performed by: NURSE PRACTITIONER
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
## 2023-10-05 ENCOUNTER — PATIENT MESSAGE (OUTPATIENT)
Dept: UROLOGY | Facility: CLINIC | Age: 33
End: 2023-10-05
Payer: COMMERCIAL

## 2023-10-06 NOTE — TELEPHONE ENCOUNTER
Spoke with pt about  catheter RX concerns    ----- Message from Erika Villareal sent at 10/6/2023 10:54 AM CDT -----  Contact: [phone]  Pt is calling regards to the catheter supplies you prescribed to [facility]. They do not carry it .Please send somewhere that does. Also are those the exact ones you spoke about in last appointment. Please call back at [phone]

## 2023-10-09 ENCOUNTER — OUTPATIENT CASE MANAGEMENT (OUTPATIENT)
Dept: ADMINISTRATIVE | Facility: OTHER | Age: 33
End: 2023-10-09
Payer: COMMERCIAL

## 2023-10-09 NOTE — PROGRESS NOTES
Outpatient Care Management   - High Risk Patient Assessment    Patient: Kate Lazo  MRN:  [id number]  Date of Service:  10/9/2023  Completed by:  Irene Childress LMSW  Referral Date: 09/20/2023    Reason for Visit   Patient presents with    Social Work Assessment - High Risk     10/09/2023         Brief Summary:  received a referral from OPCM RN Elda Fuentes RN for the following patient identified psycho-social needs; assistance with cath supplies, household supplies or any other assistance resources. Patient lives with his mother. PT reports having caregivers during the day to assist with his care. Unsure if they are through the waiver program, would like more information. Pt requires assistance with IADLs. Pt does receive SNAP benefits( $100).  Expressed needing assistance with food, utilities, and rent. Most of the time assistance is needed at the end of the month. Discussed with patient about issues affording Cathter supplies, patient reported something happened with the original company he was set up with. Pt lost their contact information and was unsure how to get his supplies set up again. This caused him to pay out of pocket. Recently there was a new prescription written for him. Pt reported it was sent to [facility], but they do not have those supplies. Pt notified provider, waiting on response. Pt expressed interested in counseling referral, provider messaged on his behalf. Denied any other needs or concerns at this time. Care plan was created in collaboration with patient/caregiver input.  completed the SDOH questionnaire.

## 2023-10-11 ENCOUNTER — OFFICE VISIT (OUTPATIENT)
Dept: PODIATRY | Facility: CLINIC | Age: 33
End: 2023-10-11
Payer: COMMERCIAL

## 2023-10-11 ENCOUNTER — PATIENT MESSAGE (OUTPATIENT)
Dept: INTERNAL MEDICINE | Facility: CLINIC | Age: 33
End: 2023-10-11
Payer: COMMERCIAL

## 2023-10-11 VITALS — BODY MASS INDEX: 31.5 KG/M2 | WEIGHT: 220 LBS | HEIGHT: 70 IN

## 2023-10-11 DIAGNOSIS — F41.9 ANXIETY: Primary | ICD-10-CM

## 2023-10-11 DIAGNOSIS — Z99.3 WHEELCHAIR DEPENDENCE: ICD-10-CM

## 2023-10-11 DIAGNOSIS — R53.2 FUNCTIONAL QUADRIPLEGIA: ICD-10-CM

## 2023-10-11 DIAGNOSIS — L97.522 ULCERATED, FOOT, LEFT, WITH FAT LAYER EXPOSED: Primary | ICD-10-CM

## 2023-10-11 DIAGNOSIS — L98.9 FOOT LESION: ICD-10-CM

## 2023-10-11 PROCEDURE — 1160F RVW MEDS BY RX/DR IN RCRD: CPT | Mod: CPTII,S$GLB,, | Performed by: PODIATRIST

## 2023-10-11 PROCEDURE — 3008F BODY MASS INDEX DOCD: CPT | Mod: CPTII,S$GLB,, | Performed by: PODIATRIST

## 2023-10-11 PROCEDURE — 99203 OFFICE O/P NEW LOW 30 MIN: CPT | Mod: 25,S$GLB,, | Performed by: PODIATRIST

## 2023-10-11 PROCEDURE — 11042 PR DEBRIDEMENT, SKIN, SUB-Q TISSUE,=<20 SQ CM: ICD-10-PCS | Mod: S$GLB,,, | Performed by: PODIATRIST

## 2023-10-11 PROCEDURE — 1159F PR MEDICATION LIST DOCUMENTED IN MEDICAL RECORD: ICD-10-PCS | Mod: CPTII,S$GLB,, | Performed by: PODIATRIST

## 2023-10-11 PROCEDURE — 1159F MED LIST DOCD IN RCRD: CPT | Mod: CPTII,S$GLB,, | Performed by: PODIATRIST

## 2023-10-11 PROCEDURE — 99999 PR PBB SHADOW E&M-EST. PATIENT-LVL V: ICD-10-PCS | Mod: PBBFAC,,, | Performed by: PODIATRIST

## 2023-10-11 PROCEDURE — 99203 PR OFFICE/OUTPT VISIT, NEW, LEVL III, 30-44 MIN: ICD-10-PCS | Mod: 25,S$GLB,, | Performed by: PODIATRIST

## 2023-10-11 PROCEDURE — 3008F PR BODY MASS INDEX (BMI) DOCUMENTED: ICD-10-PCS | Mod: CPTII,S$GLB,, | Performed by: PODIATRIST

## 2023-10-11 PROCEDURE — 99999 PR PBB SHADOW E&M-EST. PATIENT-LVL V: CPT | Mod: PBBFAC,,, | Performed by: PODIATRIST

## 2023-10-11 PROCEDURE — 11042 DBRDMT SUBQ TIS 1ST 20SQCM/<: CPT | Mod: S$GLB,,, | Performed by: PODIATRIST

## 2023-10-11 PROCEDURE — 1160F PR REVIEW ALL MEDS BY PRESCRIBER/CLIN PHARMACIST DOCUMENTED: ICD-10-PCS | Mod: CPTII,S$GLB,, | Performed by: PODIATRIST
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
## 2023-10-12 ENCOUNTER — TELEPHONE (OUTPATIENT)
Dept: UROLOGY | Facility: CLINIC | Age: 33
End: 2023-10-12
Payer: COMMERCIAL

## 2023-10-12 RX ORDER — AMOXICILLIN AND CLAVULANATE POTASSIUM 875; 125 MG/1; MG/1
TABLET, FILM COATED ORAL
Qty: 14 TABLET | Refills: 0 | OUTPATIENT
Start: 2023-10-12

## 2023-10-13 PROCEDURE — G0180 MD CERTIFICATION HHA PATIENT: HCPCS | Mod: ,,, | Performed by: PODIATRIST

## 2023-10-13 PROCEDURE — G0180 PR HOME HEALTH MD CERTIFICATION: ICD-10-PCS | Mod: ,,, | Performed by: PODIATRIST

## 2023-10-18 ENCOUNTER — OUTPATIENT CASE MANAGEMENT (OUTPATIENT)
Dept: ADMINISTRATIVE | Facility: OTHER | Age: 33
End: 2023-10-18
Payer: COMMERCIAL

## 2023-10-20 ENCOUNTER — OUTPATIENT CASE MANAGEMENT (OUTPATIENT)
Dept: ADMINISTRATIVE | Facility: OTHER | Age: 33
End: 2023-10-20
Payer: COMMERCIAL

## 2023-10-20 ENCOUNTER — TELEPHONE (OUTPATIENT)
Dept: UROLOGY | Facility: CLINIC | Age: 33
End: 2023-10-20
Payer: COMMERCIAL

## 2023-10-20 NOTE — TELEPHONE ENCOUNTER
Spoke with patient, he has suprapubic catheter in place. He stated that when he lies down he has urine flowing to  bag but when he's sitting up in his chair there is a very small amount that flows to bag. Informed patient that I would make call to [facility] to try and have someone to come out to assess him. Patient verbalized understanding.      Called [facility] spoke with Troy. Informed him of patient complaints and asked if there was someone available to go out and assess patient today. Troy stated that it was already 4:15 pm and it was likely that no one would go out to assess him today but someone would go out on tomorrow to assess patient. He also stated that he would reach out to patient.

## 2023-10-20 NOTE — TELEPHONE ENCOUNTER
----- Message from Prema Manjarrez sent at 10/20/2023 12:56 PM CDT -----  Contact: Kate Mcbride states he is having trouble urinating. Please call him back at [phone].    Thanks  TS

## 2023-10-23 ENCOUNTER — TELEPHONE (OUTPATIENT)
Dept: INTERNAL MEDICINE | Facility: CLINIC | Age: 33
End: 2023-10-23
Payer: COMMERCIAL

## 2023-10-25 ENCOUNTER — HOSPITAL ENCOUNTER (EMERGENCY)
Facility: HOSPITAL | Age: 33
Discharge: HOME | End: 2023-10-25
Attending: EMERGENCY MEDICINE
Payer: COMMERCIAL

## 2023-10-25 VITALS
DIASTOLIC BLOOD PRESSURE: 92 MMHG | OXYGEN SATURATION: 100 % | HEIGHT: 70 IN | HEART RATE: 66 BPM | BODY MASS INDEX: 27.27 KG/M2 | TEMPERATURE: 98 F | SYSTOLIC BLOOD PRESSURE: 141 MMHG | RESPIRATION RATE: 18 BRPM | WEIGHT: 190.5 LBS

## 2023-10-25 DIAGNOSIS — N39.0 RECURRENT UTI (URINARY TRACT INFECTION): Primary | ICD-10-CM

## 2023-10-25 DIAGNOSIS — G82.50 QUADRIPLEGIA: ICD-10-CM

## 2023-10-25 DIAGNOSIS — R10.30 LOWER ABDOMINAL PAIN: ICD-10-CM

## 2023-10-25 DIAGNOSIS — Z93.59 CHRONIC SUPRAPUBIC CATHETER: ICD-10-CM

## 2023-10-25 LAB
ALBUMIN SERPL BCP-MCNC: 3.9 G/DL (ref 3.5–5.2)
ALP SERPL-CCNC: 100 U/L (ref 55–135)
ALT SERPL W/O P-5'-P-CCNC: 16 U/L (ref 10–44)
ANION GAP SERPL CALC-SCNC: 13 MMOL/L (ref 8–16)
AST SERPL-CCNC: 17 U/L (ref 10–40)
BACTERIA #/AREA URNS HPF: ABNORMAL /HPF
BASOPHILS # BLD AUTO: 0.01 K/UL (ref 0–0.2)
BASOPHILS NFR BLD: 0.2 % (ref 0–1.9)
BILIRUB SERPL-MCNC: 0.6 MG/DL (ref 0.1–1)
BILIRUB UR QL STRIP: NEGATIVE
BUN SERPL-MCNC: 7 MG/DL (ref 6–20)
CALCIUM SERPL-MCNC: 8.9 MG/DL (ref 8.7–10.5)
CHLORIDE SERPL-SCNC: 105 MMOL/L (ref 95–110)
CLARITY UR: CLEAR
CO2 SERPL-SCNC: 21 MMOL/L (ref 23–29)
COLOR UR: YELLOW
CREAT SERPL-MCNC: 0.8 MG/DL (ref 0.5–1.4)
DIFFERENTIAL METHOD: NORMAL
EOSINOPHIL # BLD AUTO: 0.1 K/UL (ref 0–0.5)
EOSINOPHIL NFR BLD: 0.8 % (ref 0–8)
ERYTHROCYTE [DISTWIDTH] IN BLOOD BY AUTOMATED COUNT: 13.1 % (ref 11.5–14.5)
EST. GFR  (NO RACE VARIABLE): >60 ML/MIN/1.73 M^2
GLUCOSE SERPL-MCNC: 115 MG/DL (ref 70–110)
GLUCOSE UR QL STRIP: NEGATIVE
HCT VFR BLD AUTO: 43.6 % (ref 40–54)
HGB BLD-MCNC: 14.2 G/DL (ref 14–18)
HGB UR QL STRIP: NEGATIVE
IMM GRANULOCYTES # BLD AUTO: 0 K/UL (ref 0–0.04)
IMM GRANULOCYTES NFR BLD AUTO: 0 % (ref 0–0.5)
KETONES UR QL STRIP: ABNORMAL
LEUKOCYTE ESTERASE UR QL STRIP: ABNORMAL
LIPASE SERPL-CCNC: 24 U/L (ref 4–60)
LYMPHOCYTES # BLD AUTO: 2.2 K/UL (ref 1–4.8)
LYMPHOCYTES NFR BLD: 35.6 % (ref 18–48)
MCH RBC QN AUTO: 28.2 PG (ref 27–31)
MCHC RBC AUTO-ENTMCNC: 32.6 G/DL (ref 32–36)
MCV RBC AUTO: 87 FL (ref 82–98)
MICROSCOPIC COMMENT: ABNORMAL
MONOCYTES # BLD AUTO: 0.5 K/UL (ref 0.3–1)
MONOCYTES NFR BLD: 8.4 % (ref 4–15)
NEUTROPHILS # BLD AUTO: 3.3 K/UL (ref 1.8–7.7)
NEUTROPHILS NFR BLD: 55 % (ref 38–73)
NITRITE UR QL STRIP: POSITIVE
NRBC BLD-RTO: 0 /100 WBC
PH UR STRIP: 7 [PH] (ref 5–8)
PLATELET # BLD AUTO: 225 K/UL (ref 150–450)
PMV BLD AUTO: 10.2 FL (ref 9.2–12.9)
POTASSIUM SERPL-SCNC: 4.2 MMOL/L (ref 3.5–5.1)
PROT SERPL-MCNC: 7.4 G/DL (ref 6–8.4)
PROT UR QL STRIP: NEGATIVE
RBC # BLD AUTO: 5.04 M/UL (ref 4.6–6.2)
RBC #/AREA URNS HPF: 3 /HPF (ref 0–4)
SODIUM SERPL-SCNC: 139 MMOL/L (ref 136–145)
SP GR UR STRIP: 1.01 (ref 1–1.03)
URN SPEC COLLECT METH UR: ABNORMAL
UROBILINOGEN UR STRIP-ACNC: NEGATIVE EU/DL
WBC # BLD AUTO: 6.06 K/UL (ref 3.9–12.7)
WBC #/AREA URNS HPF: 23 /HPF (ref 0–5)
WBC CLUMPS URNS QL MICRO: ABNORMAL

## 2023-10-25 PROCEDURE — 87086 URINE CULTURE/COLONY COUNT: CPT | Performed by: EMERGENCY MEDICINE

## 2023-10-25 PROCEDURE — 96365 THER/PROPH/DIAG IV INF INIT: CPT

## 2023-10-25 PROCEDURE — 96375 TX/PRO/DX INJ NEW DRUG ADDON: CPT

## 2023-10-25 PROCEDURE — 87077 CULTURE AEROBIC IDENTIFY: CPT | Performed by: EMERGENCY MEDICINE

## 2023-10-25 PROCEDURE — 87088 URINE BACTERIA CULTURE: CPT | Performed by: EMERGENCY MEDICINE

## 2023-10-25 PROCEDURE — 25000003 PHARM REV CODE 250: Performed by: EMERGENCY MEDICINE

## 2023-10-25 PROCEDURE — 63600175 PHARM REV CODE 636 W HCPCS: Performed by: EMERGENCY MEDICINE

## 2023-10-25 PROCEDURE — 85025 COMPLETE CBC W/AUTO DIFF WBC: CPT | Performed by: EMERGENCY MEDICINE

## 2023-10-25 PROCEDURE — 81000 URINALYSIS NONAUTO W/SCOPE: CPT | Performed by: EMERGENCY MEDICINE

## 2023-10-25 PROCEDURE — 83690 ASSAY OF LIPASE: CPT | Performed by: EMERGENCY MEDICINE

## 2023-10-25 PROCEDURE — 87186 SC STD MICRODIL/AGAR DIL: CPT | Performed by: EMERGENCY MEDICINE

## 2023-10-25 PROCEDURE — 99284 EMERGENCY DEPT VISIT MOD MDM: CPT | Mod: 25

## 2023-10-25 PROCEDURE — 80053 COMPREHEN METABOLIC PANEL: CPT | Performed by: EMERGENCY MEDICINE

## 2023-10-25 RX ORDER — GABAPENTIN 300 MG/1
300 CAPSULE ORAL ONCE
Status: COMPLETED | OUTPATIENT
Start: 2023-10-25 | End: 2023-10-25

## 2023-10-25 RX ADMIN — GABAPENTIN 300 MG: 300 CAPSULE ORAL at 07:10

## 2023-10-25 RX ADMIN — GENTAMICIN SULFATE 392 MG: 40 INJECTION, SOLUTION INTRAMUSCULAR; INTRAVENOUS at 06:10

## 2023-10-25 RX ADMIN — PIPERACILLIN AND TAZOBACTAM 4.5 G: 4; .5 INJECTION, POWDER, LYOPHILIZED, FOR SOLUTION INTRAVENOUS; PARENTERAL at 05:10
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
## 2023-10-30 ENCOUNTER — TELEPHONE (OUTPATIENT)
Dept: INFECTIOUS DISEASES | Facility: CLINIC | Age: 33
End: 2023-10-30
Payer: COMMERCIAL

## 2023-10-31 ENCOUNTER — TELEPHONE (OUTPATIENT)
Dept: UROLOGY | Facility: CLINIC | Age: 33
End: 2023-10-31
Payer: COMMERCIAL
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
## 2023-11-01 ENCOUNTER — PATIENT MESSAGE (OUTPATIENT)
Dept: INFECTIOUS DISEASES | Facility: CLINIC | Age: 33
End: 2023-11-01
Payer: COMMERCIAL

## 2023-11-01 ENCOUNTER — TELEPHONE (OUTPATIENT)
Dept: UROLOGY | Facility: CLINIC | Age: 33
End: 2023-11-01
Payer: COMMERCIAL

## 2023-11-01 ENCOUNTER — PATIENT MESSAGE (OUTPATIENT)
Dept: UROLOGY | Facility: CLINIC | Age: 33
End: 2023-11-01
Payer: COMMERCIAL

## 2023-11-01 ENCOUNTER — TELEPHONE (OUTPATIENT)
Dept: INFECTIOUS DISEASES | Facility: CLINIC | Age: 33
End: 2023-11-01
Payer: COMMERCIAL

## 2023-11-01 ENCOUNTER — OUTPATIENT CASE MANAGEMENT (OUTPATIENT)
Dept: ADMINISTRATIVE | Facility: OTHER | Age: 33
End: 2023-11-01
Payer: COMMERCIAL

## 2023-11-01 LAB — BACTERIA UR CULT: ABNORMAL

## 2023-11-01 RX ORDER — GRANULES FOR ORAL 3 G/1
3 POWDER ORAL ONCE
Qty: 3 G | Refills: 0 | Status: SHIPPED | OUTPATIENT
Start: 2023-11-01 | End: 2023-11-01

## 2023-11-01 NOTE — TELEPHONE ENCOUNTER
----- Message from Whitney Cervantes sent at 11/1/2023  9:15 AM CDT -----  Regarding: pt call back  Name of Who is Calling:GORDO AC [[id number]]          What is the request in detail:    Pt missed appt ,pt thought it was yesterday ,pt really needs a appt ,pt states his wbc is high and       would like to talk to provider           Can the clinic reply by [facility]: no           What Number to Call Back if not in [facility]: [phone]

## 2023-11-01 NOTE — TELEPHONE ENCOUNTER
Informed pt that his appt had been r/s to vv on 11/2. Pt verbalized understanding and agreed to appt date, time, and location.

## 2023-11-01 NOTE — TELEPHONE ENCOUNTER
Received message from pt stating that his s/p tube was not draining.  I called and spoke to Troy at [facility] and requested a prn visit to assess pt.

## 2023-11-02 ENCOUNTER — OFFICE VISIT (OUTPATIENT)
Dept: INFECTIOUS DISEASES | Facility: CLINIC | Age: 33
End: 2023-11-02
Payer: COMMERCIAL

## 2023-11-02 DIAGNOSIS — N39.0 COMPLICATED UTI (URINARY TRACT INFECTION): ICD-10-CM

## 2023-11-02 DIAGNOSIS — Z99.3 WHEELCHAIR DEPENDENCE: ICD-10-CM

## 2023-11-02 PROCEDURE — 99214 PR OFFICE/OUTPT VISIT, EST, LEVL IV, 30-39 MIN: ICD-10-PCS | Mod: 95,,, | Performed by: INTERNAL MEDICINE

## 2023-11-02 PROCEDURE — 99214 OFFICE O/P EST MOD 30 MIN: CPT | Mod: 95,,, | Performed by: INTERNAL MEDICINE

## 2023-11-02 NOTE — PROGRESS NOTES
Subjective     This is a Virtual visit -audio/Visual  connection  Location - [place]       Patient ID: Kate Lazo is a 33 y.o. male.    Chief Complaint: Follow up  HPI  Last clinic note- 07/2012  Last clinic note-  31 year old man with PMH as listed below. He has history of quadriparesis and has chronic [facility]ic [facility]. He is in a wheelchair with his step Dad    Cultures reviewed-  04/08-Urine culture-pseudomonas  1/14- Pseudomonas   12/11/20- pseudomonas/morganella  09/2020-E coli -ESBL  Case was discussed with Dr Loaiza and Fosfomycin was sent to the pharmacy.-04/13- FOSFOMYCIN po 3gram every 72 hours X2 doses  He is in a wheelchair.  11/30/21-  Latest urine culture-      11/8/21-proteus and was given Augmentin 875 mg for 7 days.   03/08/202-  Since the last clinic visit ,   Ct scan of the abdomen -02/10/2022-  Suprapubic pelvic catheter and bladder wall thickening, somewhat nonspecific in the setting of a nondistended bladder.   Moderate stool burden in the distal colon.  Correlation for constipation.   Cholelithiasis.   Questionable early sacral decubitus ulcer.  Correlation is advised     02.21/22- Urine culture-  PSEUDOMONAS AERUGINOSA   50,000 - 99,999 cfu/ml   He took the fosfomycin without clinical relief -his major symptoms are abdominal pain.  He reports that the urine smell gets better and he feels better .  He denies fever at this time.  The plan was made to start -IV meropenem for 2 weeks but he has not yet started PICC line.      05/05/22  Since the last visit ,he had another episode of UTI-04/19/22  KLEBSIELLA PNEUMONIAE-he was given Levaquin   He was seen by PCP yesterday for malaise.  Cbc WAS NORMAL       07/13/22 -since his last visit, he had recurrent positive urine culture-  Latest urine culture- 06/09/22- Morganella   05/2022- Proteus   04/22- Klebsiella  03/22- Morganella  02/22- Klebsiella  01/22-pseudomoas     12/2021- Morgabella  11/21- Proteus   He reports  intermittent dysuria . Denies fever .  He changes the alonso every 2 weeks.  CT scan of the abdomen/pelvis- 02/22-Suprapubic pelvic catheter and bladder wall thickening, somewhat nonspecific in the setting of a nondistended bladder.   Moderate stool burden in the distal colon.  Correlation for constipation.   Cholelithiasis.   Questionable early sacral decubitus ulcer.  Correlation is advised.     Over the last 6 months - he did monthly Urine cultures whenever he contacts the office about dysuria      03/15- the patient  comes for follow up.     Last urine culture- 02/28- Klebsiella.  He was given Fosfomycin .     Component      Latest Ref Rng & Units 2/28/2023 1/31/2023             1:49 PM   Urine Culture, Routine       KLEBSIELLA PNEUMONIAE (A) . . . clinically necessary.      Component      Latest Ref Rng & Units 1/31/2023           1:49 PM   Urine Culture, Routine       Multiple organisms isolated. None in predominance.  Repeat if      Component      Latest Ref Rng & Units 12/21/2022           2:20 PM   Urine Culture, Routine       PROTEUS MIRABILIS (A) . . .      Component      Latest Ref Rng & Units 12/21/2022           2:20 PM   Urine Culture, Routine       KLEBSIELLA PNEUMONIAE (A) . . .      Component      Latest Ref Rng & Units 11/10/2022              Urine Culture, Routine       PSEUDOMONAS AERUGINOSA (A) . . .      Component      Latest Ref Rng & Units 9/29/2022              Urine Culture, Routine       PSEUDOMONAS AERUGINOSA (A) . . .      Component      Latest Ref Rng & Units 6/9/2022 11/2  He was seen via Tele med.    He has pain over the pelvic region.     Lab test -   10/25-  ESCHERICHIA COLI ESBL   >100,000 cfu/ml     CT scan -09/14-     Right lower lobe opacities consistent with right basilar pneumonia     Suprapubic urinary catheter.  Correlate clinically.  Review of Systems   Constitutional:  Negative for activity change, appetite change and chills.   Respiratory:  Negative for apnea  "and chest tightness.    Neurological:  Negative for dizziness and coordination difficulties.          Objective     Physical Exam       Assessment and Plan   Problem List Items Addressed This Visit       Wheelchair bound     Continue supportive care          Complicated UTI (urinary tract infection)      ID plan - during 07/22 clinic visit   "He was advised to reduce frequent monthly testing .  Over the last 6-7 months, he has taken monthly medications/antibiiotics .  Hipprex cannot be used as it requires urine acidification  And not effective in catheter associated UTIS because sufficient  concentration of formaldehyde cannot be maintained in catheterized bladder     It is metabolized to formaldehyde and ammonia . The active agent is formaldehyde and this needs acid environment to occur .  Will try azo dye and see ."        3/15- we had an extensive discussion about the true symptoms of UTI and the need to avoid treating colonization.  The mother was in the room,  Fever , confusion,dysuria  are likely true symptoms-he has a suprapubic .  the     Caregiver was also  in the room.  Will send a dose of fosfomycin to keep at home  I have also contacted  Dr Raymond some weeks cassi  -his urologist and we discussed his care-he also calls his office too with symptoms.     Will do CT scan of abdomen /pelvis to rule out any other pathology and will avoid testing the urine in the bag as that will have bacteria    Will use lithostat and azo dye "    11/2- was recently given fosmycin for last urine culture.  He is colonized with bacteria and not all the cultures mean a true infection .  WE went through this issue again .  Continue Lithostat /Azo dye as needed.                "

## 2023-11-02 NOTE — ASSESSMENT & PLAN NOTE
" ID plan - during 07/22 clinic visit   "He was advised to reduce frequent monthly testing .  Over the last 6-7 months, he has taken monthly medications/antibiiotics .  Hipprex cannot be used as it requires urine acidification  And not effective in catheter associated UTIS because sufficient  concentration of formaldehyde cannot be maintained in catheterized bladder     It is metabolized to formaldehyde and ammonia . The active agent is formaldehyde and this needs acid environment to occur .  Will try azo dye and see ."        3/15- we had an extensive discussion about the true symptoms of UTI and the need to avoid treating colonization.  The mother was in the room,  Fever , confusion,dysuria  are likely true symptoms-he has a suprapubic .  the     Caregiver was also  in the room.  Will send a dose of fosfomycin to keep at home  I have also contacted  Dr Raymond some weeks cassi  -his urologist and we discussed his care-he also calls his office too with symptoms.     Will do CT scan of abdomen /pelvis to rule out any other pathology and will avoid testing the urine in the bag as that will have bacteria    Will use lithostat and azo dye "    11/2- was recently given fosmycin for last urine culture.  He is colonized with bacteria and not all the cultures mean a true infection .  WE went through this issue again .  Continue Lithostat /Azo dye as needed.      "

## 2023-11-07 ENCOUNTER — OFFICE VISIT (OUTPATIENT)
Dept: PODIATRY | Facility: CLINIC | Age: 33
End: 2023-11-07
Payer: COMMERCIAL

## 2023-11-07 VITALS — WEIGHT: 190 LBS | HEIGHT: 70 IN | BODY MASS INDEX: 27.2 KG/M2

## 2023-11-07 DIAGNOSIS — R53.2 FUNCTIONAL QUADRIPLEGIA: ICD-10-CM

## 2023-11-07 DIAGNOSIS — Z99.3 WHEELCHAIR DEPENDENCE: ICD-10-CM

## 2023-11-07 DIAGNOSIS — L98.9 FOOT LESION: ICD-10-CM

## 2023-11-07 DIAGNOSIS — L97.522 ULCERATED, FOOT, LEFT, WITH FAT LAYER EXPOSED: Primary | ICD-10-CM

## 2023-11-07 PROCEDURE — 99999 PR PBB SHADOW E&M-EST. PATIENT-LVL III: CPT | Mod: PBBFAC,,, | Performed by: PODIATRIST

## 2023-11-07 PROCEDURE — 99499 NO LOS: ICD-10-PCS | Mod: S$GLB,,, | Performed by: PODIATRIST

## 2023-11-07 PROCEDURE — 99499 UNLISTED E&M SERVICE: CPT | Mod: S$GLB,,, | Performed by: PODIATRIST

## 2023-11-07 PROCEDURE — 11042 PR DEBRIDEMENT, SKIN, SUB-Q TISSUE,=<20 SQ CM: ICD-10-PCS | Mod: S$GLB,,, | Performed by: PODIATRIST

## 2023-11-07 PROCEDURE — 11042 DBRDMT SUBQ TIS 1ST 20SQCM/<: CPT | Mod: S$GLB,,, | Performed by: PODIATRIST

## 2023-11-07 PROCEDURE — 99999 PR PBB SHADOW E&M-EST. PATIENT-LVL III: ICD-10-PCS | Mod: PBBFAC,,, | Performed by: PODIATRIST
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
## 2023-11-09 ENCOUNTER — OUTPATIENT CASE MANAGEMENT (OUTPATIENT)
Dept: ADMINISTRATIVE | Facility: OTHER | Age: 33
End: 2023-11-09
Payer: COMMERCIAL

## 2023-11-10 ENCOUNTER — OUTPATIENT CASE MANAGEMENT (OUTPATIENT)
Dept: ADMINISTRATIVE | Facility: OTHER | Age: 33
End: 2023-11-10
Payer: COMMERCIAL

## 2023-11-15 ENCOUNTER — EXTERNAL HOME HEALTH (OUTPATIENT)
Dept: HOME HEALTH SERVICES | Facility: HOSPITAL | Age: 33
End: 2023-11-15
Payer: COMMERCIAL

## 2023-11-20 ENCOUNTER — PATIENT MESSAGE (OUTPATIENT)
Dept: INTERNAL MEDICINE | Facility: CLINIC | Age: 33
End: 2023-11-20
Payer: COMMERCIAL

## 2023-11-20 ENCOUNTER — TELEPHONE (OUTPATIENT)
Dept: INTERNAL MEDICINE | Facility: CLINIC | Age: 33
End: 2023-11-20
Payer: COMMERCIAL

## 2023-11-20 ENCOUNTER — ON-DEMAND VIRTUAL (OUTPATIENT)
Dept: URGENT CARE | Facility: CLINIC | Age: 33
End: 2023-11-20
Payer: COMMERCIAL

## 2023-11-20 DIAGNOSIS — R30.9 PAINFUL URINATION: Primary | ICD-10-CM

## 2023-11-20 PROCEDURE — 99213 OFFICE O/P EST LOW 20 MIN: CPT | Mod: 95,,,

## 2023-11-20 PROCEDURE — 99213 PR OFFICE/OUTPT VISIT, EST, LEVL III, 20-29 MIN: ICD-10-PCS | Mod: 95,,,

## 2023-11-20 NOTE — TELEPHONE ENCOUNTER
----- Message from Chrissy Nix sent at 11/20/2023  9:21 AM CST -----  Pt states he's going out of town this week and is having problems with uti's. He is requesting something he can take orally be sent to his pharmacy. He states he is having symptoms of a possible infection. Call back at [phone]    [address] [place] [address] [place]  [address] [place] [address]  Phone: [phone] Fax: [phone]    .
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
## 2023-11-20 NOTE — PATIENT INSTRUCTIONS
Advised patient due to complicated UTI symptoms and previous history to been seen in person for further testing and treatment.

## 2023-11-21 RX ORDER — GRANULES FOR ORAL 3 G/1
3 POWDER ORAL ONCE
Qty: 3 G | Refills: 0 | Status: SHIPPED | OUTPATIENT
Start: 2023-11-21 | End: 2023-11-21

## 2023-11-21 RX ORDER — PHENAZOPYRIDINE HYDROCHLORIDE 95 MG/1
95 TABLET ORAL 3 TIMES DAILY PRN
Qty: 20 TABLET | Refills: 0 | Status: SHIPPED | OUTPATIENT
Start: 2023-11-21 | End: 2023-11-28

## 2023-11-22 ENCOUNTER — TELEPHONE (OUTPATIENT)
Dept: INFECTIOUS DISEASES | Facility: CLINIC | Age: 33
End: 2023-11-22
Payer: COMMERCIAL

## 2023-11-22 NOTE — TELEPHONE ENCOUNTER
----- Message from Jaden Castro MD, HOLLY sent at 11/21/2023  6:51 PM CST -----  Will try azo tablet too   ----- Message -----  From: Penny Doherty LPN  Sent: 11/20/2023  12:15 PM CST  To: Jaden Castro MD, HOLLY    Pt requesting PO medication for UTI  ----- Message -----  From: Chrissy Nix  Sent: 11/20/2023   9:23 AM CST  To: Micheal Simmons Staff; Gloria Stanley Staff    Pt states he's going out of town this week and is having problems with uti's. He is requesting something he can take orally be sent to his pharmacy. He states he is having symptoms of a possible infection. Call back at [phone]    [address] [place] - [address] [place]  [address] [place] [address]  Phone: [phone] Fax: [phone]    .

## 2023-11-27 ENCOUNTER — PATIENT MESSAGE (OUTPATIENT)
Dept: INFECTIOUS DISEASES | Facility: CLINIC | Age: 33
End: 2023-11-27
Payer: COMMERCIAL

## 2023-11-27 DIAGNOSIS — N39.0 COMPLICATED UTI (URINARY TRACT INFECTION): Primary | ICD-10-CM

## 2023-11-29 ENCOUNTER — LAB VISIT (OUTPATIENT)
Dept: LAB | Facility: HOSPITAL | Age: 33
End: 2023-11-29
Attending: INTERNAL MEDICINE
Payer: COMMERCIAL

## 2023-11-29 DIAGNOSIS — N39.0 COMPLICATED UTI (URINARY TRACT INFECTION): ICD-10-CM

## 2023-11-29 LAB
BACTERIA #/AREA URNS HPF: NORMAL /HPF
BILIRUB UR QL STRIP: NEGATIVE
CLARITY UR: CLEAR
COLOR UR: YELLOW
GLUCOSE UR QL STRIP: NEGATIVE
HGB UR QL STRIP: NEGATIVE
KETONES UR QL STRIP: NEGATIVE
LEUKOCYTE ESTERASE UR QL STRIP: ABNORMAL
MICROSCOPIC COMMENT: NORMAL
NITRITE UR QL STRIP: NEGATIVE
PH UR STRIP: 8 [PH] (ref 5–8)
PROT UR QL STRIP: ABNORMAL
RBC #/AREA URNS HPF: 2 /HPF (ref 0–4)
SP GR UR STRIP: 1 (ref 1–1.03)
URN SPEC COLLECT METH UR: ABNORMAL
UROBILINOGEN UR STRIP-ACNC: NEGATIVE EU/DL
WBC #/AREA URNS HPF: 1 /HPF (ref 0–5)

## 2023-11-29 PROCEDURE — 81000 URINALYSIS NONAUTO W/SCOPE: CPT | Performed by: INTERNAL MEDICINE

## 2023-11-30 ENCOUNTER — OUTPATIENT CASE MANAGEMENT (OUTPATIENT)
Dept: ADMINISTRATIVE | Facility: OTHER | Age: 33
End: 2023-11-30
Payer: COMMERCIAL

## 2023-11-30 ENCOUNTER — PATIENT MESSAGE (OUTPATIENT)
Dept: ADMINISTRATIVE | Facility: OTHER | Age: 33
End: 2023-11-30
Payer: COMMERCIAL

## 2023-12-01 NOTE — PROGRESS NOTES
After verifying two patient identifiers, results given to patient. Pt confirmed that he had already viewed results on portal and had no further questions.

## 2023-12-04 ENCOUNTER — OUTPATIENT CASE MANAGEMENT (OUTPATIENT)
Dept: ADMINISTRATIVE | Facility: OTHER | Age: 33
End: 2023-12-04
Payer: COMMERCIAL

## 2023-12-04 ENCOUNTER — TELEPHONE (OUTPATIENT)
Dept: UROLOGY | Facility: CLINIC | Age: 33
End: 2023-12-04
Payer: COMMERCIAL

## 2023-12-04 ENCOUNTER — PATIENT MESSAGE (OUTPATIENT)
Dept: INTERNAL MEDICINE | Facility: CLINIC | Age: 33
End: 2023-12-04
Payer: COMMERCIAL

## 2023-12-04 ENCOUNTER — PATIENT MESSAGE (OUTPATIENT)
Dept: INFECTIOUS DISEASES | Facility: CLINIC | Age: 33
End: 2023-12-04
Payer: COMMERCIAL

## 2023-12-04 DIAGNOSIS — N31.9 NEUROGENIC BLADDER: Primary | ICD-10-CM

## 2023-12-04 NOTE — LETTER
December 4, 2023        Kate Lazo  [address] [facility] [address]             [facility]  [address] [place] [id number]  Phone: [phone]  Fax: [phone]   Patient: Kate Lazo   MR Number: [id number]   YOB: 1990                                                      Physician Orders                                                        Please collect urine for urinalysis and culture.                          Mandy Justice, NP

## 2023-12-04 NOTE — TELEPHONE ENCOUNTER
----- Message from Lin Moncaad sent at 12/4/2023  9:14 AM CST -----  Contact: Kate Love is calling to speak to the nurse regarding questions about urinating, its not coming in the catheter its coming out of the penis, the catheter has been changed out, he will further explain to the nurse please give him a call back at  [phone]    Thanks  LJ

## 2023-12-04 NOTE — TELEPHONE ENCOUNTER
Spoke with Kate informed him that I would reach out to [facility]. Spoke with home health nurse in office to see if someone  from the office can go out and check catheter and collect urine for urinalysis/culture. Orders have been faxed over.  nurse stated that he would try and get someone to go out to visit patient on today instead of at the end of the week as he was scheduled.

## 2023-12-05 ENCOUNTER — TELEPHONE (OUTPATIENT)
Dept: UROLOGY | Facility: CLINIC | Age: 33
End: 2023-12-05
Payer: COMMERCIAL

## 2023-12-05 ENCOUNTER — TELEPHONE (OUTPATIENT)
Dept: INFECTIOUS DISEASES | Facility: CLINIC | Age: 33
End: 2023-12-05
Payer: COMMERCIAL

## 2023-12-05 ENCOUNTER — LAB VISIT (OUTPATIENT)
Dept: LAB | Facility: HOSPITAL | Age: 33
End: 2023-12-05
Payer: COMMERCIAL

## 2023-12-05 ENCOUNTER — PATIENT MESSAGE (OUTPATIENT)
Dept: ADMINISTRATIVE | Facility: OTHER | Age: 33
End: 2023-12-05
Payer: COMMERCIAL

## 2023-12-05 DIAGNOSIS — N39.0 COMPLICATED UTI (URINARY TRACT INFECTION): Primary | ICD-10-CM

## 2023-12-05 DIAGNOSIS — Z46.6 FITTING AND ADJUSTMENT OF URINARY DEVICE: ICD-10-CM

## 2023-12-05 DIAGNOSIS — N31.9 HIGH COMPLIANCE BLADDER: ICD-10-CM

## 2023-12-05 LAB
BACTERIA #/AREA URNS HPF: ABNORMAL /HPF
BILIRUB UR QL STRIP: NEGATIVE
CLARITY UR: CLEAR
COLOR UR: YELLOW
GLUCOSE UR QL STRIP: NEGATIVE
HGB UR QL STRIP: ABNORMAL
KETONES UR QL STRIP: NEGATIVE
LEUKOCYTE ESTERASE UR QL STRIP: ABNORMAL
MICROSCOPIC COMMENT: ABNORMAL
NITRITE UR QL STRIP: NEGATIVE
PH UR STRIP: 7 [PH] (ref 5–8)
PROT UR QL STRIP: NEGATIVE
RBC #/AREA URNS HPF: 2 /HPF (ref 0–4)
SP GR UR STRIP: <=1.005 (ref 1–1.03)
SQUAMOUS #/AREA URNS HPF: 5 /HPF
URN SPEC COLLECT METH UR: ABNORMAL
WBC #/AREA URNS HPF: 20 /HPF (ref 0–5)

## 2023-12-05 PROCEDURE — 81000 URINALYSIS NONAUTO W/SCOPE: CPT | Performed by: NURSE PRACTITIONER

## 2023-12-05 PROCEDURE — 87088 URINE BACTERIA CULTURE: CPT | Performed by: NURSE PRACTITIONER

## 2023-12-05 PROCEDURE — 87077 CULTURE AEROBIC IDENTIFY: CPT | Mod: 59 | Performed by: NURSE PRACTITIONER

## 2023-12-05 PROCEDURE — 87086 URINE CULTURE/COLONY COUNT: CPT | Performed by: NURSE PRACTITIONER

## 2023-12-05 PROCEDURE — 87186 SC STD MICRODIL/AGAR DIL: CPT | Performed by: NURSE PRACTITIONER

## 2023-12-05 NOTE — TELEPHONE ENCOUNTER
Spoke with patient informed him that per ADOLFO Young he is to see infectious disease or report to ER for abdominal and back pain rating 10/10. Spoke with infectious disease patient has virtual appointment scheduled for am. Also CT has been ordered but isn't scheduled until 12/18. Patient still c/o pain rating 6/10 but is not in any diistress. Advised patient to report to ER. Patient stated that he may not report to ER this evening but he may go in the am around 11 am. Informed patient that he also has virtual appointment for 8:30 with Dr. Hubbard, patient stated that he was going to do pre check in for virtual appointment in the am as well. Again I advised patient to report to ER. Patient verbalized understanding.

## 2023-12-05 NOTE — TELEPHONE ENCOUNTER
----- Message from Bushra Peoples sent at 12/5/2023 12:05 PM CST -----  Regarding: pt call back  Name of Who is Calling:Home Health Nurse         What is the request in detail: Nurse states pt has abdominal pain and bladder issues. Pt requesting labs as well due to having bladder issues.  Pt also states pain being a 10/10           Can the clinic reply by [facility]: yes         What Number to Call Back if not [facility]Telephone Information:  [place]          [phone]

## 2023-12-05 NOTE — TELEPHONE ENCOUNTER
----- Message from Vandana Lazo sent at 12/5/2023  8:54 AM CST -----  Contact: Emily/Mandy Diaz is needing a call back in regards to the patient. Please give her a call back at [phone]

## 2023-12-05 NOTE — TELEPHONE ENCOUNTER
Informed pt per Dr. Castro that last UA was good. Pt stated that he is lower abdomen pain on his left and right side. Ct orders placed/ and scheduled. Pt scheduled for vv with Dr. Castro. Pt verbalized understanding and agreed to appt date, time, and location.

## 2023-12-06 ENCOUNTER — OFFICE VISIT (OUTPATIENT)
Dept: INTERNAL MEDICINE | Facility: CLINIC | Age: 33
End: 2023-12-06
Payer: COMMERCIAL

## 2023-12-06 ENCOUNTER — OFFICE VISIT (OUTPATIENT)
Dept: INFECTIOUS DISEASES | Facility: CLINIC | Age: 33
End: 2023-12-06
Payer: COMMERCIAL

## 2023-12-06 ENCOUNTER — DOCUMENT SCAN (OUTPATIENT)
Dept: HOME HEALTH SERVICES | Facility: HOSPITAL | Age: 33
End: 2023-12-06
Payer: COMMERCIAL

## 2023-12-06 ENCOUNTER — HOSPITAL ENCOUNTER (EMERGENCY)
Facility: HOSPITAL | Age: 33
Discharge: HOME | End: 2023-12-06
Attending: EMERGENCY MEDICINE
Payer: COMMERCIAL

## 2023-12-06 VITALS
TEMPERATURE: 98 F | SYSTOLIC BLOOD PRESSURE: 144 MMHG | HEART RATE: 61 BPM | DIASTOLIC BLOOD PRESSURE: 96 MMHG | RESPIRATION RATE: 18 BRPM | OXYGEN SATURATION: 100 %

## 2023-12-06 DIAGNOSIS — K59.04 CHRONIC IDIOPATHIC CONSTIPATION: Primary | ICD-10-CM

## 2023-12-06 DIAGNOSIS — N39.0 COMPLICATED UTI (URINARY TRACT INFECTION): Primary | ICD-10-CM

## 2023-12-06 DIAGNOSIS — I10 PRIMARY HYPERTENSION: ICD-10-CM

## 2023-12-06 DIAGNOSIS — R10.9 ABDOMINAL PAIN, UNSPECIFIED ABDOMINAL LOCATION: ICD-10-CM

## 2023-12-06 DIAGNOSIS — N30.01 ACUTE CYSTITIS WITH HEMATURIA: Primary | ICD-10-CM

## 2023-12-06 DIAGNOSIS — Z93.59 SUPRAPUBIC CATHETER: ICD-10-CM

## 2023-12-06 LAB
ALBUMIN SERPL BCP-MCNC: 3.7 G/DL (ref 3.5–5.2)
ALP SERPL-CCNC: 104 U/L (ref 55–135)
ALT SERPL W/O P-5'-P-CCNC: 13 U/L (ref 10–44)
ANION GAP SERPL CALC-SCNC: 10 MMOL/L (ref 8–16)
AST SERPL-CCNC: 12 U/L (ref 10–40)
BACTERIA #/AREA URNS HPF: ABNORMAL /HPF
BASOPHILS # BLD AUTO: 0.01 K/UL (ref 0–0.2)
BASOPHILS NFR BLD: 0.1 % (ref 0–1.9)
BILIRUB SERPL-MCNC: 0.7 MG/DL (ref 0.1–1)
BILIRUB UR QL STRIP: NEGATIVE
BUN SERPL-MCNC: 9 MG/DL (ref 6–20)
CALCIUM SERPL-MCNC: 9.6 MG/DL (ref 8.7–10.5)
CHLORIDE SERPL-SCNC: 106 MMOL/L (ref 95–110)
CLARITY UR: CLEAR
CO2 SERPL-SCNC: 27 MMOL/L (ref 23–29)
COLOR UR: COLORLESS
CREAT SERPL-MCNC: 0.8 MG/DL (ref 0.5–1.4)
DIFFERENTIAL METHOD: NORMAL
EOSINOPHIL # BLD AUTO: 0.1 K/UL (ref 0–0.5)
EOSINOPHIL NFR BLD: 1.3 % (ref 0–8)
ERYTHROCYTE [DISTWIDTH] IN BLOOD BY AUTOMATED COUNT: 13.8 % (ref 11.5–14.5)
EST. GFR  (NO RACE VARIABLE): >60 ML/MIN/1.73 M^2
GLUCOSE SERPL-MCNC: 97 MG/DL (ref 70–110)
GLUCOSE UR QL STRIP: NEGATIVE
HCT VFR BLD AUTO: 44 % (ref 40–54)
HGB BLD-MCNC: 14.4 G/DL (ref 14–18)
HGB UR QL STRIP: NEGATIVE
HYALINE CASTS #/AREA URNS LPF: 1 /LPF
IMM GRANULOCYTES # BLD AUTO: 0.02 K/UL (ref 0–0.04)
IMM GRANULOCYTES NFR BLD AUTO: 0.3 % (ref 0–0.5)
KETONES UR QL STRIP: NEGATIVE
LEUKOCYTE ESTERASE UR QL STRIP: ABNORMAL
LIPASE SERPL-CCNC: 26 U/L (ref 4–60)
LYMPHOCYTES # BLD AUTO: 1.6 K/UL (ref 1–4.8)
LYMPHOCYTES NFR BLD: 23.3 % (ref 18–48)
MCH RBC QN AUTO: 28.2 PG (ref 27–31)
MCHC RBC AUTO-ENTMCNC: 32.7 G/DL (ref 32–36)
MCV RBC AUTO: 86 FL (ref 82–98)
MICROSCOPIC COMMENT: ABNORMAL
MONOCYTES # BLD AUTO: 0.7 K/UL (ref 0.3–1)
MONOCYTES NFR BLD: 9.2 % (ref 4–15)
NEUTROPHILS # BLD AUTO: 4.6 K/UL (ref 1.8–7.7)
NEUTROPHILS NFR BLD: 65.8 % (ref 38–73)
NITRITE UR QL STRIP: POSITIVE
NRBC BLD-RTO: 0 /100 WBC
PH UR STRIP: 7 [PH] (ref 5–8)
PLATELET # BLD AUTO: 206 K/UL (ref 150–450)
PMV BLD AUTO: 10.8 FL (ref 9.2–12.9)
POTASSIUM SERPL-SCNC: 3.8 MMOL/L (ref 3.5–5.1)
PROT SERPL-MCNC: 7.5 G/DL (ref 6–8.4)
PROT UR QL STRIP: NEGATIVE
RBC # BLD AUTO: 5.1 M/UL (ref 4.6–6.2)
RBC #/AREA URNS HPF: 3 /HPF (ref 0–4)
SODIUM SERPL-SCNC: 143 MMOL/L (ref 136–145)
SP GR UR STRIP: <1.005 (ref 1–1.03)
URN SPEC COLLECT METH UR: ABNORMAL
UROBILINOGEN UR STRIP-ACNC: NEGATIVE EU/DL
WBC # BLD AUTO: 7.04 K/UL (ref 3.9–12.7)
WBC #/AREA URNS HPF: 32 /HPF (ref 0–5)
WBC CLUMPS URNS QL MICRO: ABNORMAL

## 2023-12-06 PROCEDURE — 99213 PR OFFICE/OUTPT VISIT, EST, LEVL III, 20-29 MIN: ICD-10-PCS | Mod: 95,,, | Performed by: FAMILY MEDICINE

## 2023-12-06 PROCEDURE — 1159F PR MEDICATION LIST DOCUMENTED IN MEDICAL RECORD: ICD-10-PCS | Mod: CPTII,95,, | Performed by: STUDENT IN AN ORGANIZED HEALTH CARE EDUCATION/TRAINING PROGRAM

## 2023-12-06 PROCEDURE — 25000003 PHARM REV CODE 250: Performed by: EMERGENCY MEDICINE

## 2023-12-06 PROCEDURE — 1159F MED LIST DOCD IN RCRD: CPT | Mod: CPTII,95,, | Performed by: STUDENT IN AN ORGANIZED HEALTH CARE EDUCATION/TRAINING PROGRAM

## 2023-12-06 PROCEDURE — 99213 OFFICE O/P EST LOW 20 MIN: CPT | Mod: 95,,, | Performed by: STUDENT IN AN ORGANIZED HEALTH CARE EDUCATION/TRAINING PROGRAM

## 2023-12-06 PROCEDURE — 63600175 PHARM REV CODE 636 W HCPCS: Performed by: EMERGENCY MEDICINE

## 2023-12-06 PROCEDURE — 87077 CULTURE AEROBIC IDENTIFY: CPT | Performed by: REGISTERED NURSE

## 2023-12-06 PROCEDURE — 87088 URINE BACTERIA CULTURE: CPT | Performed by: REGISTERED NURSE

## 2023-12-06 PROCEDURE — 80053 COMPREHEN METABOLIC PANEL: CPT | Performed by: REGISTERED NURSE

## 2023-12-06 PROCEDURE — 87186 SC STD MICRODIL/AGAR DIL: CPT | Performed by: REGISTERED NURSE

## 2023-12-06 PROCEDURE — 83690 ASSAY OF LIPASE: CPT | Performed by: REGISTERED NURSE

## 2023-12-06 PROCEDURE — 85025 COMPLETE CBC W/AUTO DIFF WBC: CPT | Performed by: REGISTERED NURSE

## 2023-12-06 PROCEDURE — 96361 HYDRATE IV INFUSION ADD-ON: CPT

## 2023-12-06 PROCEDURE — 99285 EMERGENCY DEPT VISIT HI MDM: CPT | Mod: 25

## 2023-12-06 PROCEDURE — 99213 OFFICE O/P EST LOW 20 MIN: CPT | Mod: 95,,, | Performed by: FAMILY MEDICINE

## 2023-12-06 PROCEDURE — 87086 URINE CULTURE/COLONY COUNT: CPT | Performed by: REGISTERED NURSE

## 2023-12-06 PROCEDURE — 96374 THER/PROPH/DIAG INJ IV PUSH: CPT

## 2023-12-06 PROCEDURE — 81000 URINALYSIS NONAUTO W/SCOPE: CPT | Performed by: REGISTERED NURSE

## 2023-12-06 PROCEDURE — 99213 PR OFFICE/OUTPT VISIT, EST, LEVL III, 20-29 MIN: ICD-10-PCS | Mod: 95,,, | Performed by: STUDENT IN AN ORGANIZED HEALTH CARE EDUCATION/TRAINING PROGRAM

## 2023-12-06 RX ORDER — CEFUROXIME AXETIL 500 MG/1
500 TABLET ORAL 2 TIMES DAILY
Qty: 20 TABLET | Refills: 0 | Status: SHIPPED | OUTPATIENT
Start: 2023-12-06 | End: 2023-12-16

## 2023-12-06 RX ADMIN — CEFTRIAXONE 1 G: 1 INJECTION, POWDER, FOR SOLUTION INTRAMUSCULAR; INTRAVENOUS at 02:12

## 2023-12-06 RX ADMIN — SODIUM CHLORIDE 1000 ML: 9 INJECTION, SOLUTION INTRAVENOUS at 12:12

## 2023-12-06 NOTE — ED NOTES
I announced Kate's name to the ER waiting room to implement MD orders.  There was no response from Kate.     Outcome: Will try again later.
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
## 2023-12-06 NOTE — FIRST PROVIDER EVALUATION
Medical screening examination initiated.  I have conducted a focused provider triage encounter, findings are as follows:    Brief history of present illness:  Lower abdominal pain x 1 weeks, constipation and bloating    There were no vitals filed for this visit.    Pertinent physical exam:  No acute distress, patient alert and oriented    Brief workup plan:  Workup    Preliminary workup initiated; this workup will be continued and followed by the physician or advanced practice provider that is assigned to the patient when roomed.

## 2023-12-06 NOTE — PROGRESS NOTES
The patient location is: Home  The chief complaint leading to consultation is: Diffuse pain     Visit type: audiovisual    Notes:    Subjective:    HPI: Mr. Kate Lazo is a 33 y.o. male w/ significant PMHx of post traumatic quadriplegia, HTN, urinary incontinence leading to chronic suprapubic catheterization and recurrent UTI, and constipation who was evaluated by Infectious Diseases on 7/21/23 after clean catch U/A showed positive nitrites and 12 WBC. Urine culture grew pan R Morganella morganii and  Pseudomonas. Plan was for 2 weeks of targeted IV antibiotics. PICC placed on 7/28. Plan was then to give dose of tobramycin and a course of pip/tazo via PICC line followed by repeat U/A. 8/24, patient states he gets UTI frequently. Changes catheter every 2 weeks at home. Usually gets chills, sweats, abdominal pain, urinary difficulty, and foul odor with UTI. Had these symptoms when July UTI was diagnosed. Currently having abdominal pain and myalgias. Unsure if this is due to unrelated ailment or another UTI. Tolerated pip/tazo other than intermittent loose stools. Has provided new urine sample today. Will see if any pathogens grow and discuss whether further antibiotics are warranted. Patient with provide update if worsening symptoms occur. Scheduled for home catheter change next week. Agreeable to PICC removal today.    Last ID note 11/2: was recently given Fosfomycin for last urine culture.  He is colonized with bacteria and not all the cultures mean a true infection .  WE went through this issue again .  Continue Lithostat /Azo dye as needed.    Today patient reports constipation and pain on both sides of abdomen for a few weeks. Has also struggled to urinate through [place]. Also pain below neck to feet within past week. Mainly a nerve pain especially in right arm. Left side feels more like gas. Also with trapped gas due to wheelchair.  U/A from urology on 12/5 with 20 WBC. Culture in process. One  week ago urine was sterile. CT a/p has been scheduled for 12/18. HH irrigated Crenshaw and it flowed well yesterday but today the physical flow still bad. Only 600 cc went into bag. Day before had 1500 cc. Has not had any fever. No nausea or vomiting. No shortness of breath or cough. Has appetite but unable to eat or drink much. Given how many symptoms patient is experiencing, advised him to go to ER for imaging and blood work. May also be able to provide pain management and a bowel regimen for his constipation. Will follow urine culture result.       Review of Systems:  Constitutional: no fever or chills  Eyes: no visual changes  ENT: no nasal congestion or sore throat  Respiratory: no cough or shortness of breath  Cardiovascular: no chest pain  Gastrointestinal: no nausea or vomiting, shooting nerve pain through abdomen, constipation   Genitourinary: no hematuria or dysuria; poor urinary stream and retention   Musculoskeletal: myalgias   Skin: no rash  Neurological: no headaches, nerve pain both arms predominantly       Objective:    Physical Exam:  General- Patient alert and oriented x3 in NAD  HEENT- PERRLA, EOMI, OP clear  Resp- No increased WOB noted. Not using accessory muscles.  Extrem- No cyanosis, clubbing, edema.   Skin-  No Jaundice. No visible skin lesions.      Plan:  Complicated UTI (urinary tract infection)  --Will follow urine culture   --Would not give empiric treatment as patient has grown MDRO in past    --CT a/p scheduled 12/18 which is far out given constant pain patient is experiencing  --Have advised patient to go to ER for imaging and further workup   --Would rule out hydronephrosis and SBO   --Continue urology follow up   --Follow up with ID next week       Suprapubic catheter  --Recommend changing at least every 3 weeks to prevent recurrent UTI   --Follow up with urology      Hypertension  Continue current medications. Follow with PCP.       Face to Face time with patient: 20 minutes  30  minutes of total time spent on the encounter, which includes face to face time and non-face to face time preparing to see the patient (eg, review of tests), Obtaining and/or reviewing separately obtained history, Documenting clinical information in the electronic or other health record, Independently interpreting results (not separately reported) and communicating results to the patient/family/caregiver, or Care coordination (not separately reported).     Each patient to whom he or she provides medical services by telemedicine is:  (1) informed of the relationship between the physician and patient and the respective role of any other health care provider with respect to management of the patient; and (2) notified that he or she may decline to receive medical services by telemedicine and may withdraw from such care at any time.;e
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
## 2023-12-07 ENCOUNTER — TELEPHONE (OUTPATIENT)
Dept: INTERNAL MEDICINE | Facility: CLINIC | Age: 33
End: 2023-12-07
Payer: COMMERCIAL

## 2023-12-07 NOTE — TELEPHONE ENCOUNTER
Informed imaging and lab test result from ER visit will be discuss on his upcoming visit. Patient verbally agreed.

## 2023-12-07 NOTE — TELEPHONE ENCOUNTER
----- Message from Gino Harrison sent at 12/7/2023 10:14 AM CST -----  Regarding: pt advice  Contact: pt [phone]  PATIENT CALL    Pt called regarding his condition. States that he went to the ED yesterday, had labs drawn and imaging done as well. Would like this provider to review all results and advise on next steps, Please call back at [phone]

## 2023-12-08 ENCOUNTER — TELEPHONE (OUTPATIENT)
Dept: UROLOGY | Facility: CLINIC | Age: 33
End: 2023-12-08
Payer: COMMERCIAL

## 2023-12-08 ENCOUNTER — TELEPHONE (OUTPATIENT)
Dept: INFECTIOUS DISEASES | Facility: CLINIC | Age: 33
End: 2023-12-08
Payer: COMMERCIAL

## 2023-12-08 ENCOUNTER — PATIENT MESSAGE (OUTPATIENT)
Dept: INFECTIOUS DISEASES | Facility: CLINIC | Age: 33
End: 2023-12-08
Payer: COMMERCIAL

## 2023-12-08 LAB
BACTERIA UR CULT: ABNORMAL
BACTERIA UR CULT: ABNORMAL

## 2023-12-08 RX ORDER — CIPROFLOXACIN 500 MG/1
500 TABLET ORAL 2 TIMES DAILY
Qty: 20 TABLET | Refills: 0 | Status: SHIPPED | OUTPATIENT
Start: 2023-12-08 | End: 2023-12-21

## 2023-12-08 NOTE — TELEPHONE ENCOUNTER
Called pt and informed him that Dr. James sent in Cipro to his pharmacy; he should continue taking meds given to him from the ER on yesterday also.  Pt verbalized understanding.

## 2023-12-11 ENCOUNTER — DOCUMENT SCAN (OUTPATIENT)
Dept: HOME HEALTH SERVICES | Facility: HOSPITAL | Age: 33
End: 2023-12-11
Payer: COMMERCIAL

## 2023-12-12 ENCOUNTER — DOCUMENT SCAN (OUTPATIENT)
Dept: HOME HEALTH SERVICES | Facility: HOSPITAL | Age: 33
End: 2023-12-12
Payer: COMMERCIAL

## 2023-12-12 ENCOUNTER — TELEPHONE (OUTPATIENT)
Dept: PSYCHIATRY | Facility: CLINIC | Age: 33
End: 2023-12-12
Payer: COMMERCIAL

## 2023-12-12 LAB — BACTERIA UR CULT: ABNORMAL

## 2023-12-12 PROCEDURE — G0179 MD RECERTIFICATION HHA PT: HCPCS | Mod: ,,, | Performed by: FAMILY MEDICINE

## 2023-12-13 ENCOUNTER — TELEPHONE (OUTPATIENT)
Dept: INTERNAL MEDICINE | Facility: CLINIC | Age: 33
End: 2023-12-13
Payer: COMMERCIAL

## 2023-12-13 ENCOUNTER — PATIENT MESSAGE (OUTPATIENT)
Dept: INFECTIOUS DISEASES | Facility: CLINIC | Age: 33
End: 2023-12-13
Payer: COMMERCIAL

## 2023-12-13 NOTE — TELEPHONE ENCOUNTER
----- Message from Selin Verdugo sent at 12/13/2023 10:17 AM CST -----  Name of Who is Calling:patient           What is the request in detail:  Patient rescheduled for 12/18 as he is having frequent bowel movements.         Can the clinic reply by [facility]:no           What Number to [facility] Back [facility]: [phone]

## 2023-12-14 ENCOUNTER — TELEPHONE (OUTPATIENT)
Dept: INFECTIOUS DISEASES | Facility: CLINIC | Age: 33
End: 2023-12-14
Payer: COMMERCIAL

## 2023-12-14 ENCOUNTER — TELEPHONE (OUTPATIENT)
Dept: GASTROENTEROLOGY | Facility: CLINIC | Age: 33
End: 2023-12-14
Payer: COMMERCIAL

## 2023-12-14 ENCOUNTER — PATIENT MESSAGE (OUTPATIENT)
Dept: INFECTIOUS DISEASES | Facility: CLINIC | Age: 33
End: 2023-12-14

## 2023-12-14 ENCOUNTER — OUTPATIENT CASE MANAGEMENT (OUTPATIENT)
Dept: ADMINISTRATIVE | Facility: OTHER | Age: 33
End: 2023-12-14
Payer: COMMERCIAL

## 2023-12-14 ENCOUNTER — PATIENT MESSAGE (OUTPATIENT)
Dept: ADMINISTRATIVE | Facility: OTHER | Age: 33
End: 2023-12-14
Payer: COMMERCIAL

## 2023-12-14 RX ORDER — GABAPENTIN 300 MG/1
300 CAPSULE ORAL 3 TIMES DAILY
Qty: 270 CAPSULE | Refills: 0 | Status: SHIPPED | OUTPATIENT
Start: 2023-12-14 | End: 2024-01-02

## 2023-12-14 NOTE — TELEPHONE ENCOUNTER
----- Message from Dorene Hernández sent at 12/14/2023 10:28 AM CST -----  Regarding: Medical Advice  Contact: Kate  .Type:  Needs Medical Advice    Who Called: Kate   Symptoms (please be specific):  gasey, gas up to his chest  How long has patient had these symptoms:    Pharmacy name and phone #:     [facility] [address] [address] [facility] [address]  Phone: [phone] Fax: [phone]       Would the patient rather a call back or a response via [facility]? call  Best Call Back Number: [phone]  Additional Information:  Kate os quadriplegic and need to speak to the nurse today in regards to having gas in his chest,

## 2023-12-14 NOTE — TELEPHONE ENCOUNTER
No care due was identified.  [facility] Embedded Care Due Messages. Reference number: [id number].   12/14/2023 8:42:41 AM CST

## 2023-12-14 NOTE — TELEPHONE ENCOUNTER
----- Message from Dulce Roldan sent at 12/14/2023 11:50 AM CST -----  States he left a message earlier today. Please call pt [phone]. Thank you

## 2023-12-14 NOTE — TELEPHONE ENCOUNTER
Attempted to assist pt with vv log on. Informed Dr. Castro of problem. Appt handle via phone encounter with Dr. Castro.

## 2023-12-14 NOTE — TELEPHONE ENCOUNTER
----- Message from Prema Manjarrez sent at 12/14/2023  8:24 AM CST -----  Contact: Kate  Type:  RX Refill Request    Who Called: Pt  Refill or New Rx:refill  RX Name and Strength:gabapentin (NEURONTIN) 300 MG capsule  How is the patient currently taking it? (ex. 1XDay):as prescribed  Is this a 30 day or 90 day RX:90 days  Preferred Pharmacy with phone number:  [facility] [address] [address] [facility]  [address] [facility] [address]  Phone: [phone] Fax: [phone]  Local or Mail Order:local  Ordering Provider:Micheal  Would the patient rather a call back or a response via [facility]? Call back  Best Call Back Number:[phone].  Additional Information: n/a    Thanks  TS

## 2023-12-14 NOTE — TELEPHONE ENCOUNTER
Advised pt to log on for vv with Dr. Castro. Informed pt that Dr. Castro is not in clinic tomorrow and will not return until next week. Pt verbalized understanding.

## 2023-12-14 NOTE — TELEPHONE ENCOUNTER
----- Message from Lurdes Tom sent at 12/14/2023  7:46 AM CST -----  Contact: patient  Kate Lazo would like a call back at [phone], in regards to him still having UTI pains. PT states he was seen in the ER on 12/6/23, and prescribed oral antibiotics. Pt feels as if the oral antbiotics aren't working. He would like to know if he can receive a picline to receive antibiotics through an IV.

## 2023-12-14 NOTE — TELEPHONE ENCOUNTER
Response given through pt advice req. Pt scheduled for vv. Pt did not join visit when advised to join.

## 2023-12-15 ENCOUNTER — TELEPHONE (OUTPATIENT)
Dept: PSYCHIATRY | Facility: CLINIC | Age: 33
End: 2023-12-15
Payer: COMMERCIAL

## 2023-12-15 ENCOUNTER — TREATMENT PLANNING (OUTPATIENT)
Dept: INFECTIOUS DISEASES | Facility: CLINIC | Age: 33
End: 2023-12-15
Payer: COMMERCIAL

## 2023-12-15 RX ORDER — PHENAZOPYRIDINE HYDROCHLORIDE 200 MG/1
200 TABLET, FILM COATED ORAL 3 TIMES DAILY PRN
Qty: 30 TABLET | Refills: 4 | Status: ON HOLD | OUTPATIENT
Start: 2023-12-15 | End: 2024-01-23 | Stop reason: ALTCHOICE

## 2023-12-15 NOTE — PROGRESS NOTES
I called the patient today and also on 12/14/23.  Discussed his symptoms with him  He is already ob Cipro for recent pseudomonas UTI.  CT scan of abdomen -  12/6-     No renal stones or hydronephrosis.  Stable suprapubic catheter.  No acute findings.       12/15- he still complained of suprapubic pain   Will add advil /Azodye  Will follow urology for possible bladder spasm.

## 2023-12-18 ENCOUNTER — TELEPHONE (OUTPATIENT)
Dept: INTERNAL MEDICINE | Facility: CLINIC | Age: 33
End: 2023-12-18
Payer: COMMERCIAL

## 2023-12-18 NOTE — TELEPHONE ENCOUNTER
----- Message from Dorene Hernández sent at 12/18/2023 12:43 PM CST -----  Regarding: Soon Appt  Contact: Kate  Type:  Sooner Apoointment Request    Caller is requesting a sooner appointment.  Caller declined first available appointment listed below.  Caller will not accept being placed on the waitlist and is requesting a message be sent to doctor.  Name of Caller: kate   When is the first available appointment? March 2024   Symptoms:  Would the patient rather a call back or a response via [facility]? call  Best Call Back Number: [phone]     Additional Information:  Kate is at his dental appt and need to be seen 12/19/2023 around 12:30 or 1:00. Hel is a quadriplegic and need to come afternoon

## 2023-12-19 DIAGNOSIS — N39.0 COMPLICATED UTI (URINARY TRACT INFECTION): Primary | ICD-10-CM

## 2023-12-21 ENCOUNTER — OFFICE VISIT (OUTPATIENT)
Dept: INTERNAL MEDICINE | Facility: CLINIC | Age: 33
End: 2023-12-21
Payer: COMMERCIAL

## 2023-12-21 VITALS — HEART RATE: 89 BPM | HEIGHT: 70 IN | BODY MASS INDEX: 27.26 KG/M2 | OXYGEN SATURATION: 97 % | TEMPERATURE: 96 F

## 2023-12-21 DIAGNOSIS — Z87.440 HISTORY OF RECURRENT UTIS: ICD-10-CM

## 2023-12-21 DIAGNOSIS — S14.109S QUADRIPLEGIA, POST-TRAUMATIC: ICD-10-CM

## 2023-12-21 DIAGNOSIS — G82.50 QUADRIPLEGIA, POST-TRAUMATIC: ICD-10-CM

## 2023-12-21 DIAGNOSIS — K21.9 GASTROESOPHAGEAL REFLUX DISEASE, UNSPECIFIED WHETHER ESOPHAGITIS PRESENT: ICD-10-CM

## 2023-12-21 DIAGNOSIS — F41.9 ANXIETY: ICD-10-CM

## 2023-12-21 DIAGNOSIS — R22.43 LOCALIZED SWELLING, MASS, OR LUMP OF LOWER EXTREMITY, BILATERAL: Primary | ICD-10-CM

## 2023-12-21 DIAGNOSIS — M79.89 LEG SWELLING: ICD-10-CM

## 2023-12-21 DIAGNOSIS — G47.00 INSOMNIA, UNSPECIFIED TYPE: ICD-10-CM

## 2023-12-21 PROCEDURE — 1159F PR MEDICATION LIST DOCUMENTED IN MEDICAL RECORD: ICD-10-PCS | Mod: CPTII,S$GLB,, | Performed by: FAMILY MEDICINE

## 2023-12-21 PROCEDURE — 3008F PR BODY MASS INDEX (BMI) DOCUMENTED: ICD-10-PCS | Mod: CPTII,S$GLB,, | Performed by: FAMILY MEDICINE

## 2023-12-21 PROCEDURE — 1159F MED LIST DOCD IN RCRD: CPT | Mod: CPTII,S$GLB,, | Performed by: FAMILY MEDICINE

## 2023-12-21 PROCEDURE — 99999 PR PBB SHADOW E&M-EST. PATIENT-LVL III: ICD-10-PCS | Mod: PBBFAC,,, | Performed by: FAMILY MEDICINE

## 2023-12-21 PROCEDURE — 99214 OFFICE O/P EST MOD 30 MIN: CPT | Mod: S$GLB,,, | Performed by: FAMILY MEDICINE

## 2023-12-21 PROCEDURE — 99214 PR OFFICE/OUTPT VISIT, EST, LEVL IV, 30-39 MIN: ICD-10-PCS | Mod: S$GLB,,, | Performed by: FAMILY MEDICINE

## 2023-12-21 PROCEDURE — 3008F BODY MASS INDEX DOCD: CPT | Mod: CPTII,S$GLB,, | Performed by: FAMILY MEDICINE

## 2023-12-21 PROCEDURE — 99999 PR PBB SHADOW E&M-EST. PATIENT-LVL III: CPT | Mod: PBBFAC,,, | Performed by: FAMILY MEDICINE

## 2023-12-21 RX ORDER — FUROSEMIDE 20 MG/1
20 TABLET ORAL DAILY
Qty: 90 TABLET | Refills: 3 | Status: SHIPPED | OUTPATIENT
Start: 2023-12-21

## 2023-12-21 RX ORDER — ONDANSETRON 4 MG/1
4 TABLET, FILM COATED ORAL EVERY 6 HOURS PRN
Qty: 90 TABLET | Refills: 0 | Status: SHIPPED | OUTPATIENT
Start: 2023-12-21

## 2023-12-21 RX ORDER — ALPRAZOLAM 0.25 MG/1
0.25 TABLET ORAL NIGHTLY PRN
Qty: 30 TABLET | Refills: 0 | Status: SHIPPED | OUTPATIENT
Start: 2023-12-21 | End: 2024-01-23
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
## 2023-12-26 ENCOUNTER — OFFICE VISIT (OUTPATIENT)
Dept: GASTROENTEROLOGY | Facility: CLINIC | Age: 33
End: 2023-12-26
Payer: COMMERCIAL

## 2023-12-26 DIAGNOSIS — K59.04 CHRONIC IDIOPATHIC CONSTIPATION: ICD-10-CM

## 2023-12-26 DIAGNOSIS — K21.9 GASTRO-ESOPHAGEAL REFLUX DISEASE WITHOUT ESOPHAGITIS: ICD-10-CM

## 2023-12-26 DIAGNOSIS — K59.09 OTHER CONSTIPATION: Primary | ICD-10-CM

## 2023-12-26 DIAGNOSIS — R14.0 ABDOMINAL BLOATING: ICD-10-CM

## 2023-12-26 PROCEDURE — 99214 OFFICE O/P EST MOD 30 MIN: CPT | Mod: 95,,, | Performed by: INTERNAL MEDICINE

## 2023-12-26 PROCEDURE — 99214 PR OFFICE/OUTPT VISIT, EST, LEVL IV, 30-39 MIN: ICD-10-PCS | Mod: 95,,, | Performed by: INTERNAL MEDICINE
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
## 2023-12-27 ENCOUNTER — OUTPATIENT CASE MANAGEMENT (OUTPATIENT)
Dept: ADMINISTRATIVE | Facility: OTHER | Age: 33
End: 2023-12-27
Payer: COMMERCIAL

## 2023-12-27 ENCOUNTER — HOSPITAL ENCOUNTER (OUTPATIENT)
Dept: PREADMISSION TESTING | Facility: HOSPITAL | Age: 33
Discharge: HOME | End: 2023-12-27
Attending: INTERNAL MEDICINE
Payer: COMMERCIAL

## 2023-12-27 ENCOUNTER — DOCUMENT SCAN (OUTPATIENT)
Dept: HOME HEALTH SERVICES | Facility: HOSPITAL | Age: 33
End: 2023-12-27
Payer: COMMERCIAL

## 2023-12-27 DIAGNOSIS — K21.9 GASTRO-ESOPHAGEAL REFLUX DISEASE WITHOUT ESOPHAGITIS: ICD-10-CM

## 2023-12-27 DIAGNOSIS — R14.0 ABDOMINAL BLOATING: ICD-10-CM

## 2023-12-29 DIAGNOSIS — L21.9 SEBORRHEIC DERMATITIS: ICD-10-CM

## 2023-12-29 RX ORDER — KETOCONAZOLE 20 MG/ML
SHAMPOO, SUSPENSION TOPICAL
Qty: 120 ML | Refills: 0 | Status: SHIPPED | OUTPATIENT
Start: 2023-12-29 | End: 2024-01-22

## 2023-12-29 RX ORDER — OXYBUTYNIN CHLORIDE 5 MG/1
5 TABLET ORAL 2 TIMES DAILY
Qty: 180 TABLET | Refills: 3 | Status: ON HOLD | OUTPATIENT
Start: 2023-12-29 | End: 2024-01-23 | Stop reason: ALTCHOICE

## 2023-12-29 NOTE — TELEPHONE ENCOUNTER
No care due was identified.  [facility] Embedded Care Due Messages. Reference number: [id number].   12/29/2023 10:50:23 AM CST

## 2023-12-29 NOTE — TELEPHONE ENCOUNTER
----- Message from Sg Mccain sent at 12/29/2023  9:40 AM CST -----  Contact: Jaquelin  .Type:  RX Refill Request    Who Called: Jaquelin   Refill or New Rx: refill   RX Name and Strength: oxybutynin (DITROPAN) 5 MG Tab   How is the patient currently taking it? (ex. 1XDay):as prescribed    Preferred Pharmacy with phone number: .  [facility] [address] [address] [facility]  [address] [facility] [address]  Phone: [phone] Fax: [phone]    Local or Mail Order: Local   Ordering Provider: Dr. Burton   Would the patient rather a call back or a response via [facility]?  Call   Best Call Back Number:  [phone]

## 2023-12-31 NOTE — TELEPHONE ENCOUNTER
No care due was identified.  [facility] Embedded Care Due Messages. Reference number: [id number].   12/31/2023 7:40:58 AM CST   Hydroxychloroquine Counseling:  I discussed with the patient that a baseline ophthalmologic exam is needed at the start of therapy and every year thereafter while on therapy. A CBC may also be warranted for monitoring.  The side effects of this medication were discussed with the patient, including but not limited to agranulocytosis, aplastic anemia, seizures, rashes, retinopathy, and liver toxicity. Patient instructed to call the office should any adverse effect occur.  The patient verbalized understanding of the proper use and possible adverse effects of Plaquenil.  All the patient's questions and concerns were addressed.

## 2024-01-02 RX ORDER — GABAPENTIN 300 MG/1
300 CAPSULE ORAL 3 TIMES DAILY
Qty: 270 CAPSULE | Refills: 0 | Status: SHIPPED | OUTPATIENT
Start: 2024-01-02

## 2024-01-03 ENCOUNTER — DOCUMENT SCAN (OUTPATIENT)
Dept: HOME HEALTH SERVICES | Facility: HOSPITAL | Age: 34
End: 2024-01-03
Payer: COMMERCIAL

## 2024-01-04 ENCOUNTER — LAB VISIT (OUTPATIENT)
Dept: LAB | Facility: HOSPITAL | Age: 34
End: 2024-01-04
Attending: INTERNAL MEDICINE
Payer: COMMERCIAL

## 2024-01-04 DIAGNOSIS — N39.0 COMPLICATED UTI (URINARY TRACT INFECTION): ICD-10-CM

## 2024-01-04 LAB
AMORPH CRY URNS QL MICRO: ABNORMAL
BACTERIA #/AREA URNS HPF: ABNORMAL /HPF
BILIRUB UR QL STRIP: NEGATIVE
CLARITY UR: ABNORMAL
COLOR UR: YELLOW
GLUCOSE UR QL STRIP: NEGATIVE
HGB UR QL STRIP: NEGATIVE
HYALINE CASTS #/AREA URNS LPF: 0 /LPF
KETONES UR QL STRIP: NEGATIVE
LEUKOCYTE ESTERASE UR QL STRIP: ABNORMAL
MICROSCOPIC COMMENT: ABNORMAL
NITRITE UR QL STRIP: POSITIVE
PH UR STRIP: 8 [PH] (ref 5–8)
PROT UR QL STRIP: ABNORMAL
RBC #/AREA URNS HPF: 0 /HPF (ref 0–4)
SP GR UR STRIP: 1.01 (ref 1–1.03)
SQUAMOUS #/AREA URNS HPF: 5 /HPF
TRI-PHOS CRY URNS QL MICRO: ABNORMAL
URN SPEC COLLECT METH UR: ABNORMAL
WBC #/AREA URNS HPF: 50 /HPF (ref 0–5)

## 2024-01-04 PROCEDURE — 81000 URINALYSIS NONAUTO W/SCOPE: CPT | Performed by: INTERNAL MEDICINE

## 2024-01-04 PROCEDURE — 87086 URINE CULTURE/COLONY COUNT: CPT | Performed by: INTERNAL MEDICINE

## 2024-01-04 PROCEDURE — 87184 SC STD DISK METHOD PER PLATE: CPT | Performed by: INTERNAL MEDICINE

## 2024-01-04 PROCEDURE — 87088 URINE BACTERIA CULTURE: CPT | Performed by: INTERNAL MEDICINE

## 2024-01-04 PROCEDURE — 87186 SC STD MICRODIL/AGAR DIL: CPT | Mod: 59 | Performed by: INTERNAL MEDICINE

## 2024-01-04 PROCEDURE — 87077 CULTURE AEROBIC IDENTIFY: CPT | Performed by: INTERNAL MEDICINE

## 2024-01-05 ENCOUNTER — OUTPATIENT CASE MANAGEMENT (OUTPATIENT)
Dept: ADMINISTRATIVE | Facility: OTHER | Age: 34
End: 2024-01-05
Payer: COMMERCIAL

## 2024-01-08 ENCOUNTER — TELEPHONE (OUTPATIENT)
Dept: INFECTIOUS DISEASES | Facility: CLINIC | Age: 34
End: 2024-01-08
Payer: COMMERCIAL

## 2024-01-08 DIAGNOSIS — Z93.59 SUPRAPUBIC CATHETER: Primary | ICD-10-CM

## 2024-01-08 LAB
BACTERIA UR CULT: ABNORMAL
BACTERIA UR CULT: ABNORMAL

## 2024-01-08 NOTE — TELEPHONE ENCOUNTER
Spoke with pt, scheduled PICC placement for tomorrow at 10am. Reviewed instructions with pt. Understanding verbalized. Spoke with Sunshine with [employer], provided her with pt's OPAT. She will contact pt to set up Tx. Pt already has fu scheduled for 2/12.

## 2024-01-08 NOTE — TELEPHONE ENCOUNTER
----- Message from Jaden Castro MD, MARIBELSA sent at 1/8/2024 11:54 AM CST -----  Will plan to use IV Ertapenem one gram daily for 10 days   Will insert PICC line  Contact infusion company   Weekly labs- CBC,CMP

## 2024-01-09 ENCOUNTER — DOCUMENTATION ONLY (OUTPATIENT)
Dept: INFECTIOUS DISEASES | Facility: CLINIC | Age: 34
End: 2024-01-09
Payer: COMMERCIAL

## 2024-01-09 ENCOUNTER — HOSPITAL ENCOUNTER (OUTPATIENT)
Dept: RADIOLOGY | Facility: HOSPITAL | Age: 34
Discharge: HOME | End: 2024-01-09
Attending: INTERNAL MEDICINE
Payer: COMMERCIAL

## 2024-01-09 ENCOUNTER — OUTPATIENT CASE MANAGEMENT (OUTPATIENT)
Dept: ADMINISTRATIVE | Facility: OTHER | Age: 34
End: 2024-01-09
Payer: COMMERCIAL

## 2024-01-09 DIAGNOSIS — Z93.59 SUPRAPUBIC CATHETER: ICD-10-CM

## 2024-01-09 PROCEDURE — 36573 INSJ PICC RS&I 5 YR+: CPT | Mod: ,,, | Performed by: RADIOLOGY

## 2024-01-09 PROCEDURE — 36573 INSJ PICC RS&I 5 YR+: CPT

## 2024-01-09 NOTE — DISCHARGE SUMMARY
[facility]  Discharge Note  Short Stay    FL PICC Line Placement w/o Port w/ Img > 7 Y/O      OUTCOME: Patient tolerated treatment/procedure well without complication and is now ready for discharge.    DISPOSITION: Home or Self Care    FINAL DIAGNOSIS:  <principal problem not specified>    FOLLOWUP: In clinic    DISCHARGE INSTRUCTIONS:  No discharge procedures on file.     TIME SPENT ON DISCHARGE: 15 minutes    Pre Op Diagnosis: Infection     Post Op Diagnosis: same     Procedure:  PICC Line     Procedure performed by: Nacho ALEJANDRA, Mark DURAN     Written Informed Consent Obtained: Yes       Estimated Blood Loss:  minimal     Findings: Local anesthesia     Sedation:  none     The patient tolerated the procedure well and there were no complications.      Disposition:  F/U in clinic or with ordering physician          Sterile technique was performed in the LUE, lidocaine was used as a local anesthetic.  Pt tolerated the procedure well without immediate complications.  Please see radiologist report for details. F/u with PCP and/or ordering physician.

## 2024-01-09 NOTE — PROGRESS NOTES
Outpatient Care Management   - Care Plan Follow Up    Patient: Kate Lazo  MRN:  [id number]  Date of Service:  1/9/2024  Completed by:  Irene Childress LMSW  Referral Date: 09/20/2023    Reason for Visit   Patient presents with    [facility] Follow Up Call     01/09/2024      Case Closure     01/09/2024         Brief Summary: Completed follow up with patient. Discussed status of pursued resources. Identified resources pt has not followed up with. Discussed case closure. Patient agreeable to contact [facility] in the future when ready to pursue other resources and needs assistance. Received all requested resources. Will close case. Will notify [facility] Elda Fuentes RN.     Complex Care Plan     Care plan was discussed and completed today with input from patient and/or caregiver.    Patient Instructions     No follow-ups on file.

## 2024-01-09 NOTE — PROGRESS NOTES
Urine culture=  1/04/24-          Component 5 d ago   Urine Culture, Routine  Abnormal   PROTEUS MIRABILIS  >100,000 cfu/ml    Urine Culture, Routine  Abnormal   ESCHERICHIA COLI ESBL  > 100,000 cfu/ml      Plan-  Will use IV /IM Ertapenem one gram daily for 3 days   Will contact infusion company

## 2024-01-11 ENCOUNTER — EXTERNAL HOME HEALTH (OUTPATIENT)
Dept: HOME HEALTH SERVICES | Facility: HOSPITAL | Age: 34
End: 2024-01-11
Payer: COMMERCIAL

## 2024-01-19 ENCOUNTER — OUTPATIENT CASE MANAGEMENT (OUTPATIENT)
Dept: ADMINISTRATIVE | Facility: OTHER | Age: 34
End: 2024-01-19
Payer: COMMERCIAL

## 2024-01-19 NOTE — PROGRESS NOTES
Outpatient Care Management  Plan of Care Follow Up Visit    Patient: Kate Lazo  MRN: [id number]  Date of Service: 01/19/2024  Completed by: Edla Fuentes RN  Referral Date: 09/20/2023    No chief complaint on file.      Brief Summary: Phone contact with Kate today for follow up and d/c from [facility]. Encouraged him to promptly report onset of any new symptoms causing concern to the appropriate provider and he voiced understanding. He also agreed to plan for [facility] d/c today.   Next Steps: D/C from [facility] today. Elda Fuentes RN

## 2024-01-22 DIAGNOSIS — L21.9 SEBORRHEIC DERMATITIS: ICD-10-CM

## 2024-01-22 RX ORDER — KETOCONAZOLE 20 MG/ML
SHAMPOO, SUSPENSION TOPICAL
Qty: 120 ML | Refills: 0 | Status: SHIPPED | OUTPATIENT
Start: 2024-01-22 | End: 2024-04-02 | Stop reason: SDUPTHER

## 2024-01-23 ENCOUNTER — ANESTHESIA (OUTPATIENT)
Dept: ENDOSCOPY | Facility: HOSPITAL | Age: 34
End: 2024-01-23
Payer: COMMERCIAL

## 2024-01-23 ENCOUNTER — HOSPITAL ENCOUNTER (OUTPATIENT)
Facility: HOSPITAL | Age: 34
Discharge: HOME | End: 2024-01-23
Attending: INTERNAL MEDICINE | Admitting: INTERNAL MEDICINE
Payer: COMMERCIAL

## 2024-01-23 ENCOUNTER — ANESTHESIA EVENT (OUTPATIENT)
Dept: ENDOSCOPY | Facility: HOSPITAL | Age: 34
End: 2024-01-23
Payer: COMMERCIAL

## 2024-01-23 DIAGNOSIS — K21.9 GASTRO-ESOPHAGEAL REFLUX DISEASE WITHOUT ESOPHAGITIS: Primary | ICD-10-CM

## 2024-01-23 DIAGNOSIS — R14.0 ABDOMINAL BLOATING: ICD-10-CM

## 2024-01-23 PROCEDURE — 43239 EGD BIOPSY SINGLE/MULTIPLE: CPT | Mod: ,,, | Performed by: INTERNAL MEDICINE

## 2024-01-23 PROCEDURE — 27201012 HC FORCEPS, HOT/COLD, DISP: Performed by: INTERNAL MEDICINE

## 2024-01-23 PROCEDURE — 63600175 PHARM REV CODE 636 W HCPCS: Performed by: NURSE ANESTHETIST, CERTIFIED REGISTERED

## 2024-01-23 PROCEDURE — 43239 EGD BIOPSY SINGLE/MULTIPLE: CPT | Performed by: INTERNAL MEDICINE

## 2024-01-23 PROCEDURE — 37000008 HC ANESTHESIA 1ST 15 MINUTES: Performed by: INTERNAL MEDICINE

## 2024-01-23 PROCEDURE — 25000003 PHARM REV CODE 250: Performed by: NURSE ANESTHETIST, CERTIFIED REGISTERED

## 2024-01-23 PROCEDURE — 37000009 HC ANESTHESIA EA ADD 15 MINS: Performed by: INTERNAL MEDICINE

## 2024-01-23 PROCEDURE — 88305 TISSUE EXAM BY PATHOLOGIST: CPT | Performed by: PATHOLOGY

## 2024-01-23 PROCEDURE — 88305 TISSUE EXAM BY PATHOLOGIST: CPT | Mod: 26,,, | Performed by: PATHOLOGY

## 2024-01-23 RX ORDER — PROPOFOL 10 MG/ML
VIAL (ML) INTRAVENOUS
Status: DISCONTINUED | OUTPATIENT
Start: 2024-01-23 | End: 2024-01-23

## 2024-01-23 RX ORDER — LIDOCAINE HYDROCHLORIDE 10 MG/ML
INJECTION, SOLUTION EPIDURAL; INFILTRATION; INTRACAUDAL; PERINEURAL
Status: DISCONTINUED | OUTPATIENT
Start: 2024-01-23 | End: 2024-01-23

## 2024-01-23 RX ORDER — AMITRIPTYLINE HYDROCHLORIDE 10 MG/1
10 TABLET, FILM COATED ORAL NIGHTLY PRN
COMMUNITY
End: 2024-04-02

## 2024-01-23 RX ADMIN — PROPOFOL 20 MG: 10 INJECTION, EMULSION INTRAVENOUS at 02:01

## 2024-01-23 RX ADMIN — PROPOFOL 30 MG: 10 INJECTION, EMULSION INTRAVENOUS at 02:01

## 2024-01-23 RX ADMIN — LIDOCAINE HYDROCHLORIDE 50 MG: 10 SOLUTION INTRAVENOUS at 02:01

## 2024-01-23 RX ADMIN — SODIUM CHLORIDE, POTASSIUM CHLORIDE, SODIUM LACTATE AND CALCIUM CHLORIDE: 600; 310; 30; 20 INJECTION, SOLUTION INTRAVENOUS at 02:01

## 2024-01-23 RX ADMIN — PROPOFOL 100 MG: 10 INJECTION, EMULSION INTRAVENOUS at 02:01

## 2024-01-23 NOTE — DISCHARGE SUMMARY
[facility])  Discharge Note  Short Stay    Procedure(s) (LRB):  EGD (ESOPHAGOGASTRODUODENOSCOPY) (N/A)      OUTCOME: Patient tolerated treatment/procedure well without complication and is now ready for discharge.    DISPOSITION: Home or Self Care    FINAL DIAGNOSIS:  Gastro-esophageal reflux disease without esophagitis    FOLLOWUP: In clinic    DISCHARGE INSTRUCTIONS:  No discharge procedures on file.
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
## 2024-01-23 NOTE — [employer] PATIENT INSTRUCTIONS
Discharge Summary/Instructions after an Endoscopic Procedure  Patient Name: Kate Lazo  Patient MRN: [id number]  Patient YOB: 1990 Tuesday, January 23, 2024 Colleen Coe MD  Dear patient,  As a result of recent federal legislation (The Federal Cures Act), you may   receive lab or pathology results from your procedure in your [facility]   account before your physician is able to contact you. Your physician or   their representative will relay the results to you with their   recommendations at their soonest availability.  Thank you,  RESTRICTIONS:  During your procedure today, you received medications for sedation.  These   medications may affect your judgment, balance and coordination.  Therefore,   for 24 hours, you have the following restrictions:   - DO NOT drive a car, operate machinery, make legal/financial decisions,   sign important papers or drink alcohol.    ACTIVITY:  Today: no heavy lifting, straining or running due to procedural   sedation/anesthesia.  The following day: return to full activity including work.  DIET:  Eat and drink normally unless instructed otherwise.     TREATMENT FOR COMMON SIDE EFFECTS:  - Mild abdominal pain, nausea, belching, bloating or excessive gas:  rest,   eat lightly and use a heating pad.  - Sore Throat: treat with throat lozenges and/or gargle with warm salt   water.  - Because air was used during the procedure, expelling large amounts of air   from your rectum or belching is normal.  - If a bowel prep was taken, you may not have a bowel movement for 1-3 days.    This is normal.  SYMPTOMS TO WATCH FOR AND REPORT TO YOUR PHYSICIAN:  1. Abdominal pain or bloating, other than gas cramps.  2. Chest pain.  3. Back pain.  4. Signs of infection such as: chills or fever occurring within 24 hours   after the procedure.  5. Rectal bleeding, which would show as bright red, maroon, or black stools.   (A tablespoon of blood from the rectum is not serious, especially  if   hemorrhoids are present.)  6. Vomiting.  7. Weakness or dizziness.  GO DIRECTLY TO THE NEAREST EMERGENCY ROOM IF YOU HAVE ANY OF THE FOLLOWING:      Difficulty breathing              Chills and/or fever over 101 F   Persistent vomiting and/or vomiting blood   Severe abdominal pain   Severe chest pain   Black, tarry stools   Bleeding- more than one tablespoon   Any other symptom or condition that you feel may need urgent attention  Your doctor recommends these additional instructions:  If any biopsies were taken, your doctors clinic will contact you in 1 to 2   weeks with any results.  - Discharge patient to home (via wheelchair).   - Resume previous diet.   - Continue present medications.   - Await pathology results.   - Patient has a contact number available for emergencies.  The signs and   symptoms of potential delayed complications were discussed with the   patient.  Return to normal activities tomorrow.  Written discharge   instructions were provided to the patient.  For questions, problems or results please call your physician Colleen Coe MD at Work:  [phone]  If you have any questions about the above instructions, call the GI   department at [phone] or call the endoscopy unit at [phone]   from 7am until 3 pm.  [facility] [place], EMERGENCY ROOM PHONE NUMBER:   [phone]  IF A COMPLICATION OR EMERGENCY SITUATION ARISES AND YOU ARE UNABLE TO REACH   YOUR PHYSICIAN - GO DIRECTLY TO THE EMERGENCY ROOM.  I have read or have had read to me these discharge instructions for my   procedure and have received a written copy.  I understand these   instructions and will follow-up with my physician if I have any questions.     __________________________________       _____________________________________  Nurse Signature                                          Patient/Designated   Responsible Party Signature  MD Colleen Vargas MD  1/23/2024  2:35:12 PM  [employer]

## 2024-01-23 NOTE — ANESTHESIA POSTPROCEDURE EVALUATION
Anesthesia Post Evaluation    Patient: Kate Lazo    Procedure(s) Performed: Procedure(s) (LRB):  EGD (ESOPHAGOGASTRODUODENOSCOPY) (N/A)    Final Anesthesia Type: MAC      Patient location during evaluation: PACU  Patient participation: Yes- Able to Participate  Level of consciousness: awake  Post-procedure vital signs: reviewed and stable  Pain management: adequate  Airway patency: patent    PONV status at discharge: No PONV  Anesthetic complications: no      Cardiovascular status: blood pressure returned to baseline and hemodynamically stable  Respiratory status: unassisted and spontaneous ventilation  Hydration status: euvolemic  Follow-up not needed.          Vitals Value Taken Time   /73 01/23/24 1448   Temp 36.2 °C (97.2 °F) 01/23/24 1438   Pulse 66 01/23/24 1448   Resp 18 01/23/24 1448   SpO2 100 % 01/23/24 1448         No case tracking events are documented in the log.      Pain/Nettie Score: Nettie Score: 8 (1/23/2024  2:48 PM)

## 2024-01-23 NOTE — TRANSFER OF CARE
"Anesthesia Transfer of Care Note    Patient: Kate Lazo    Procedure(s) Performed: Procedure(s) (LRB):  EGD (ESOPHAGOGASTRODUODENOSCOPY) (N/A)    Patient location: PACU    Anesthesia Type: MAC    Transport from OR: Transported from OR on room air with adequate spontaneous ventilation    Post pain: adequate analgesia    Post assessment: no apparent anesthetic complications and tolerated procedure well    Post vital signs: stable    Level of consciousness: sedated    Nausea/Vomiting: no nausea/vomiting    Complications: none    Transfer of care protocol was followed    Last vitals: Visit Vitals  /76 (BP Location: Left arm, Patient Position: Lying)   Pulse (!) 56   Temp 37.2 °C (99 °F) (Temporal)   Resp 17   Ht 5' 10" (1.778 m)   Wt 90.7 kg (200 lb)   SpO2 99%   BMI 28.70 kg/m²     "

## 2024-01-24 VITALS
RESPIRATION RATE: 18 BRPM | WEIGHT: 200 LBS | HEART RATE: 55 BPM | OXYGEN SATURATION: 99 % | SYSTOLIC BLOOD PRESSURE: 132 MMHG | DIASTOLIC BLOOD PRESSURE: 84 MMHG | BODY MASS INDEX: 28.63 KG/M2 | TEMPERATURE: 97 F | HEIGHT: 70 IN

## 2024-01-26 LAB
FINAL PATHOLOGIC DIAGNOSIS: NORMAL
GROSS: NORMAL
Lab: NORMAL

## 2024-01-31 ENCOUNTER — PATIENT MESSAGE (OUTPATIENT)
Dept: INFECTIOUS DISEASES | Facility: CLINIC | Age: 34
End: 2024-01-31
Payer: COMMERCIAL

## 2024-02-01 ENCOUNTER — LAB VISIT (OUTPATIENT)
Dept: LAB | Facility: HOSPITAL | Age: 34
End: 2024-02-01
Attending: INTERNAL MEDICINE
Payer: COMMERCIAL

## 2024-02-01 DIAGNOSIS — N39.0 COMPLICATED UTI (URINARY TRACT INFECTION): Primary | ICD-10-CM

## 2024-02-01 DIAGNOSIS — N39.0 COMPLICATED UTI (URINARY TRACT INFECTION): ICD-10-CM

## 2024-02-01 LAB
BACTERIA #/AREA URNS HPF: ABNORMAL /HPF
BILIRUB UR QL STRIP: NEGATIVE
CLARITY UR: CLEAR
COLOR UR: YELLOW
GLUCOSE UR QL STRIP: NEGATIVE
HGB UR QL STRIP: NEGATIVE
KETONES UR QL STRIP: NEGATIVE
LEUKOCYTE ESTERASE UR QL STRIP: ABNORMAL
MICROSCOPIC COMMENT: ABNORMAL
NITRITE UR QL STRIP: POSITIVE
PH UR STRIP: 7.5 [PH] (ref 5–8)
PROT UR QL STRIP: NEGATIVE
RBC #/AREA URNS HPF: 3 /HPF (ref 0–4)
SP GR UR STRIP: 1.01 (ref 1–1.03)
SQUAMOUS #/AREA URNS HPF: 5 /HPF
URN SPEC COLLECT METH UR: ABNORMAL
WBC #/AREA URNS HPF: 15 /HPF (ref 0–5)

## 2024-02-01 PROCEDURE — 87077 CULTURE AEROBIC IDENTIFY: CPT | Performed by: INTERNAL MEDICINE

## 2024-02-01 PROCEDURE — 87186 SC STD MICRODIL/AGAR DIL: CPT | Performed by: INTERNAL MEDICINE

## 2024-02-01 PROCEDURE — 87088 URINE BACTERIA CULTURE: CPT | Performed by: INTERNAL MEDICINE

## 2024-02-01 PROCEDURE — 87086 URINE CULTURE/COLONY COUNT: CPT | Performed by: INTERNAL MEDICINE

## 2024-02-01 PROCEDURE — 81000 URINALYSIS NONAUTO W/SCOPE: CPT | Performed by: INTERNAL MEDICINE

## 2024-02-04 LAB
BACTERIA UR CULT: ABNORMAL
BACTERIA UR CULT: ABNORMAL

## 2024-02-08 ENCOUNTER — TREATMENT PLANNING (OUTPATIENT)
Dept: INFECTIOUS DISEASES | Facility: CLINIC | Age: 34
End: 2024-02-08
Payer: COMMERCIAL

## 2024-02-08 DIAGNOSIS — N39.0 COMPLICATED UTI (URINARY TRACT INFECTION): Primary | ICD-10-CM

## 2024-02-08 NOTE — PROGRESS NOTES
Urine culture-    02/01=    omponent 7 d ago   Urine Culture, Routine  Abnormal   SERRATIA MARCESCENS  > 100,000 cfu/ml    Urine Culture, Routine  Abnormal   PROTEUS MIRABILIS  > 100,000 cfu/ml    Resulting Agency [facility]        Susceptibility       Serratia marcescens Proteus mirabilis     CULTURE, URINE CULTURE, URINE     Amikacin <=16 mcg/mL Sensitive       Amox/K Clav'ate   <=8/4 mcg/mL Sensitive     Amp/Sulbactam   <=8/4 mcg/mL Sensitive     Ampicillin   <=8 mcg/mL Sensitive     Cefazolin   <=2 mcg/mL Sensitive     Cefepime >16 mcg/mL Resistant <=2 mcg/mL Sensitive     Ceftriaxone >32 mcg/mL Resistant <=1 mcg/mL Sensitive     Ciprofloxacin >2 mcg/mL Resistant >2 mcg/mL Resistant     Ertapenem <=0.5 mcg/mL Sensitive <=0.5 mcg/mL Sensitive     Gentamicin >8 mcg/mL Resistant <=4 mcg/mL Sensitive     Levofloxacin >4 mcg/mL Resistant >4 mcg/mL Resistant     Meropenem <=1 mcg/mL Sensitive <=1 mcg/mL Sensitive     Piperacillin/Tazo <=16 mcg/mL Sensitive <=16 mcg/mL Sensitive     Tobramycin <=4 mcg/mL Sensitive <=4 mcg/mL Sensitive     Trimeth/Sulfa >2/38 mcg/mL Resistant <=2/38 mcg/mL Sensitive             Plan - Will follow repeat UA post catheter removal  He reports chronic burning sensation -colonization versus infection.  If repeat UA is positive -will  give a dose of dose of Tobramycin  Will discuss with Pharmacy

## 2024-02-10 PROCEDURE — G0179 MD RECERTIFICATION HHA PT: HCPCS | Mod: ,,, | Performed by: FAMILY MEDICINE

## 2024-02-13 ENCOUNTER — LAB VISIT (OUTPATIENT)
Dept: LAB | Facility: HOSPITAL | Age: 34
End: 2024-02-13
Attending: INTERNAL MEDICINE
Payer: COMMERCIAL

## 2024-02-13 DIAGNOSIS — N39.0 COMPLICATED UTI (URINARY TRACT INFECTION): ICD-10-CM

## 2024-02-13 LAB
BACTERIA #/AREA URNS HPF: ABNORMAL /HPF
BILIRUB UR QL STRIP: NEGATIVE
BILIRUB UR QL STRIP: NEGATIVE
CLARITY UR: CLEAR
CLARITY UR: CLEAR
COLOR UR: YELLOW
COLOR UR: YELLOW
GLUCOSE UR QL STRIP: NEGATIVE
GLUCOSE UR QL STRIP: NEGATIVE
HGB UR QL STRIP: NEGATIVE
HGB UR QL STRIP: NEGATIVE
KETONES UR QL STRIP: NEGATIVE
KETONES UR QL STRIP: NEGATIVE
LEUKOCYTE ESTERASE UR QL STRIP: ABNORMAL
LEUKOCYTE ESTERASE UR QL STRIP: ABNORMAL
MICROSCOPIC COMMENT: ABNORMAL
NITRITE UR QL STRIP: NEGATIVE
NITRITE UR QL STRIP: NEGATIVE
PH UR STRIP: 7 [PH] (ref 5–8)
PH UR STRIP: 7 [PH] (ref 5–8)
PROT UR QL STRIP: NEGATIVE
PROT UR QL STRIP: NEGATIVE
SP GR UR STRIP: 1.02 (ref 1–1.03)
SP GR UR STRIP: 1.02 (ref 1–1.03)
SQUAMOUS #/AREA URNS HPF: 5 /HPF
URN SPEC COLLECT METH UR: ABNORMAL
URN SPEC COLLECT METH UR: ABNORMAL
WBC #/AREA URNS HPF: 30 /HPF (ref 0–5)
WBC CLUMPS URNS QL MICRO: ABNORMAL

## 2024-02-13 PROCEDURE — 87077 CULTURE AEROBIC IDENTIFY: CPT | Performed by: INTERNAL MEDICINE

## 2024-02-13 PROCEDURE — 87088 URINE BACTERIA CULTURE: CPT | Performed by: INTERNAL MEDICINE

## 2024-02-13 PROCEDURE — 87186 SC STD MICRODIL/AGAR DIL: CPT | Performed by: INTERNAL MEDICINE

## 2024-02-13 PROCEDURE — 81000 URINALYSIS NONAUTO W/SCOPE: CPT | Performed by: INTERNAL MEDICINE

## 2024-02-13 PROCEDURE — 87086 URINE CULTURE/COLONY COUNT: CPT | Performed by: INTERNAL MEDICINE

## 2024-02-15 LAB — BACTERIA UR CULT: ABNORMAL

## 2024-02-17 ENCOUNTER — PATIENT MESSAGE (OUTPATIENT)
Dept: INFECTIOUS DISEASES | Facility: CLINIC | Age: 34
End: 2024-02-17
Payer: COMMERCIAL

## 2024-02-19 RX ORDER — ACETOHYDROXAMIC ACID 250 MG/1
250 TABLET ORAL DAILY
Qty: 30 EACH | Refills: 0 | Status: SHIPPED | OUTPATIENT
Start: 2024-02-19 | End: 2024-03-20

## 2024-02-19 RX ORDER — METHENAMINE HIPPURATE 1000 MG/1
1 TABLET ORAL 2 TIMES DAILY
Qty: 60 TABLET | Refills: 0 | Status: SHIPPED | OUTPATIENT
Start: 2024-02-19 | End: 2024-03-18

## 2024-02-23 DIAGNOSIS — N39.0 COMPLICATED UTI (URINARY TRACT INFECTION): Primary | ICD-10-CM

## 2024-02-26 ENCOUNTER — TELEPHONE (OUTPATIENT)
Dept: PSYCHIATRY | Facility: CLINIC | Age: 34
End: 2024-02-26
Payer: COMMERCIAL

## 2024-02-26 ENCOUNTER — TELEPHONE (OUTPATIENT)
Dept: INFECTIOUS DISEASES | Facility: CLINIC | Age: 34
End: 2024-02-26
Payer: COMMERCIAL

## 2024-02-26 ENCOUNTER — PATIENT MESSAGE (OUTPATIENT)
Dept: UROLOGY | Facility: CLINIC | Age: 34
End: 2024-02-26
Payer: COMMERCIAL

## 2024-02-26 NOTE — TELEPHONE ENCOUNTER
----- Message from Fidelia Dorado sent at 2/26/2024  9:37 AM CST -----  Contact: Kate Mcbride is calling in regards to getting same day appt(virtual). Pt can be reached at [phone].        Thanks  MERCEDEZ

## 2024-02-26 NOTE — TELEPHONE ENCOUNTER
Pt scheduled for next available virtual slot on 2/28 at 8:30 am with Dr. Hubbard. Pt verbalized understanding and agreed to appt date, time, and location.

## 2024-02-27 ENCOUNTER — TELEPHONE (OUTPATIENT)
Dept: INFECTIOUS DISEASES | Facility: CLINIC | Age: 34
End: 2024-02-27
Payer: COMMERCIAL

## 2024-02-27 ENCOUNTER — PATIENT MESSAGE (OUTPATIENT)
Dept: UROLOGY | Facility: CLINIC | Age: 34
End: 2024-02-27
Payer: COMMERCIAL

## 2024-02-28 ENCOUNTER — OFFICE VISIT (OUTPATIENT)
Dept: INFECTIOUS DISEASES | Facility: CLINIC | Age: 34
End: 2024-02-28
Payer: COMMERCIAL

## 2024-02-28 DIAGNOSIS — N39.0 COMPLICATED UTI (URINARY TRACT INFECTION): Primary | ICD-10-CM

## 2024-02-28 DIAGNOSIS — Z93.59 SUPRAPUBIC CATHETER: ICD-10-CM

## 2024-02-28 DIAGNOSIS — I10 PRIMARY HYPERTENSION: ICD-10-CM

## 2024-02-28 PROCEDURE — 99214 OFFICE O/P EST MOD 30 MIN: CPT | Mod: 95,,, | Performed by: STUDENT IN AN ORGANIZED HEALTH CARE EDUCATION/TRAINING PROGRAM

## 2024-02-28 NOTE — PROGRESS NOTES
The patient location is: [facility]  The chief complaint leading to consultation is: UTI recurrence     Visit type: audiovisual    Notes:    Subjective:    HPI: 34 y.o. male with pertinent PMHx of post traumatic quadriplegia, HTN, urinary incontinence leading to chronic suprapubic catheterization and recurrent UTI, and constipation who was evaluated by Infectious Diseases on 7/21/23 after clean catch U/A showed positive nitrites and 12 WBC. Urine culture grew pan R Morganella morganii and  Pseudomonas. Plan was for 2 weeks of targeted IV antibiotics. PICC placed on 7/28. Plan was then to give dose of tobramycin and a course of pip/tazo via PICC line followed by repeat U/A. 8/24, patient states he gets UTI frequently. Changes catheter every 2 weeks at home. Usually gets chills, sweats, abdominal pain, urinary difficulty, and foul odor with UTI. Had these symptoms when July UTI was diagnosed. Currently having abdominal pain and myalgias. Unsure if this is due to unrelated ailment or another UTI. Tolerated pip/tazo other than intermittent loose stools. Has provided new urine sample today. Will see if any pathogens grow and discuss whether further antibiotics are warranted. Patient with provide update if worsening symptoms occur. Scheduled for home catheter change next week. Agreeable to PICC removal today.     Last ID note 11/2: was recently given Fosfomycin for last urine culture.  He is colonized with bacteria and not all the cultures mean a true infection .  WE went through this issue again .  Continue Lithostat /Azo dye as needed.     12/6/23: Today patient reports constipation and pain on both sides of abdomen for a few weeks. Has also struggled to urinate through Crenshaw. Also pain below neck to feet within past week. Mainly a nerve pain especially in right arm. Left side feels more like gas. Also with trapped gas due to wheelchair.  U/A from urology on 12/5 with 20 WBC. Culture in process. One week ago urine was  sterile. CT a/p has been scheduled for 12/18. HH irrigated Crenshaw and it flowed well yesterday but today the physical flow still bad. Only 600 cc went into bag. Day before had 1500 cc. Has not had any fever. No nausea or vomiting. No shortness of breath or cough. Has appetite but unable to eat or drink much. Given how many symptoms patient is experiencing, advised him to go to ER for imaging and blood work. May also be able to provide pain management and a bowel regimen for his constipation. Will follow urine culture result.     2/28/24: Current UTI symptoms- abdominal pain and foul odor. Crenshaw last changed 1 week ago. Changed every 2-3 weeks. [facility] doing the exchanges. No current fever. Urine appears darker but not abnormal. Discussed treating based on last urine culture using IV tobramycin. Will reach out to [facility]. Will also discuss bowel regimen with PCP. Has been frequently constipated which is huge risk factor. Will ask HH to stick to biweekly exchange of Crenshaw.     Review of Systems:   Negative unless otherwise noted positive-  Gen- Weakness/ Fatigue  Neuro- Confusion  CV- Chest Pain/ Palpitations  Resp: Cough/ SOB  GI- Nausea/Vomiting; abdominal pain   : foul smelling odor  Extrem- Pain/Swelling      Objective:    Physical Exam:  General- Patient alert and oriented x3 in NAD  HEENT- PERRLA, EOMI, OP clear  Resp- No increased WOB noted. Not using accessory muscles.  Extrem- No cyanosis, clubbing, edema.   Skin-  No Jaundice. No visible skin lesions.      Plan:  Complicated UTI (urinary tract infection)  --Will ask HH for fresh U/A after next Crenshaw exchange  --Recommend biweekly exchange moving forward   --Will target Morganella from last urine culture with IV tobramycin 400 mg x 3 doses   --[facility] has been updated with IV plan   --Needs bowel regimen to prevent constipation which is huge risk factor for recurrent UTI  --Follow up with ID in 2 weeks      Suprapubic catheter  --Recommend changing at  least every 2 weeks to prevent recurrent UTI   --Follow up with urology      Hypertension  Continue current medications. Follow with PCP.       Face to Face time with patient: 18 minutes   30 minutes of total time spent on the encounter, which includes face to face time and non-face to face time preparing to see the patient (eg, review of tests), Obtaining and/or reviewing separately obtained history, Documenting clinical information in the electronic or other health record, Independently interpreting results (not separately reported) and communicating results to the patient/family/caregiver, or Care coordination (not separately reported).     Each patient to whom he or she provides medical services by telemedicine is:  (1) informed of the relationship between the physician and patient and the respective role of any other health care provider with respect to management of the patient; and (2) notified that he or she may decline to receive medical services by telemedicine and may withdraw from such care at any time.

## 2024-02-29 ENCOUNTER — TELEPHONE (OUTPATIENT)
Dept: INTERNAL MEDICINE | Facility: CLINIC | Age: 34
End: 2024-02-29
Payer: COMMERCIAL

## 2024-02-29 RX ORDER — BACLOFEN 10 MG/1
TABLET ORAL
Qty: 270 TABLET | Refills: 0 | Status: SHIPPED | OUTPATIENT
Start: 2024-02-29 | End: 2024-04-19 | Stop reason: SDUPTHER

## 2024-02-29 NOTE — TELEPHONE ENCOUNTER
contacted  in regards to c/o chronic constipation, GERD, abdominal pain with discomfort during BM, and lower back pain. Pt offered next available with Rosa via VV on 05/21/2024; pt verbally confirmed. Please advised on pts steps to take regarding complaints

## 2024-02-29 NOTE — TELEPHONE ENCOUNTER
Patient needs a follow up with his GI due to bad constipation that might be caused his UTI as per Dr. Burton. Can you get in touch the patient for follow up please.

## 2024-02-29 NOTE — TELEPHONE ENCOUNTER
----- Message from Troy Burton MD sent at 2/29/2024  2:19 PM CST -----  Regarding: FW: Bowel regimen  Can you help make sure he gets a f/u w GI  ----- Message -----  From: London Hubbard DO  Sent: 2/29/2024   8:45 AM CST  To: Troy Burton MD  Subject: Bowel regimen                                    Good morning. Was hoping you could help this patient formulate a bowel regimen. Has been having bad constipation which likely contributes to his UTI episodes. Any assistance appreciated. Thanks.     Ryder

## 2024-03-04 ENCOUNTER — HOSPITAL ENCOUNTER (EMERGENCY)
Facility: HOSPITAL | Age: 34
Discharge: HOME | End: 2024-03-04
Attending: EMERGENCY MEDICINE
Payer: COMMERCIAL

## 2024-03-04 VITALS
WEIGHT: 200 LBS | HEIGHT: 70 IN | BODY MASS INDEX: 28.63 KG/M2 | RESPIRATION RATE: 18 BRPM | HEART RATE: 76 BPM | OXYGEN SATURATION: 99 % | TEMPERATURE: 98 F | DIASTOLIC BLOOD PRESSURE: 68 MMHG | SYSTOLIC BLOOD PRESSURE: 107 MMHG

## 2024-03-04 DIAGNOSIS — K21.9 GASTROESOPHAGEAL REFLUX DISEASE WITHOUT ESOPHAGITIS: Primary | ICD-10-CM

## 2024-03-04 DIAGNOSIS — R07.9 CHEST PAIN: ICD-10-CM

## 2024-03-04 LAB
ALBUMIN SERPL BCP-MCNC: 4 G/DL (ref 3.5–5.2)
ALP SERPL-CCNC: 106 U/L (ref 55–135)
ALT SERPL W/O P-5'-P-CCNC: 20 U/L (ref 10–44)
ANION GAP SERPL CALC-SCNC: 10 MMOL/L (ref 8–16)
AST SERPL-CCNC: 15 U/L (ref 10–40)
BASOPHILS # BLD AUTO: 0.01 K/UL (ref 0–0.2)
BASOPHILS NFR BLD: 0.2 % (ref 0–1.9)
BILIRUB SERPL-MCNC: 0.6 MG/DL (ref 0.1–1)
BNP SERPL-MCNC: <10 PG/ML (ref 0–99)
BUN SERPL-MCNC: 9 MG/DL (ref 6–20)
CALCIUM SERPL-MCNC: 9.4 MG/DL (ref 8.7–10.5)
CHLORIDE SERPL-SCNC: 105 MMOL/L (ref 95–110)
CO2 SERPL-SCNC: 25 MMOL/L (ref 23–29)
CREAT SERPL-MCNC: 0.8 MG/DL (ref 0.5–1.4)
DIFFERENTIAL METHOD BLD: NORMAL
EOSINOPHIL # BLD AUTO: 0.1 K/UL (ref 0–0.5)
EOSINOPHIL NFR BLD: 0.8 % (ref 0–8)
ERYTHROCYTE [DISTWIDTH] IN BLOOD BY AUTOMATED COUNT: 13 % (ref 11.5–14.5)
EST. GFR  (NO RACE VARIABLE): >60 ML/MIN/1.73 M^2
GLUCOSE SERPL-MCNC: 89 MG/DL (ref 70–110)
HCT VFR BLD AUTO: 46.6 % (ref 40–54)
HGB BLD-MCNC: 15.6 G/DL (ref 14–18)
IMM GRANULOCYTES # BLD AUTO: 0.02 K/UL (ref 0–0.04)
IMM GRANULOCYTES NFR BLD AUTO: 0.3 % (ref 0–0.5)
LYMPHOCYTES # BLD AUTO: 2.2 K/UL (ref 1–4.8)
LYMPHOCYTES NFR BLD: 34.3 % (ref 18–48)
MCH RBC QN AUTO: 29.3 PG (ref 27–31)
MCHC RBC AUTO-ENTMCNC: 33.5 G/DL (ref 32–36)
MCV RBC AUTO: 87 FL (ref 82–98)
MONOCYTES # BLD AUTO: 0.4 K/UL (ref 0.3–1)
MONOCYTES NFR BLD: 6.2 % (ref 4–15)
NEUTROPHILS # BLD AUTO: 3.8 K/UL (ref 1.8–7.7)
NEUTROPHILS NFR BLD: 58.2 % (ref 38–73)
NRBC BLD-RTO: 0 /100 WBC
OHS QRS DURATION: 90 MS
OHS QTC CALCULATION: 388 MS
PLATELET # BLD AUTO: 241 K/UL (ref 150–450)
PMV BLD AUTO: 10.7 FL (ref 9.2–12.9)
POTASSIUM SERPL-SCNC: 4.3 MMOL/L (ref 3.5–5.1)
PROT SERPL-MCNC: 7.8 G/DL (ref 6–8.4)
RBC # BLD AUTO: 5.33 M/UL (ref 4.6–6.2)
SODIUM SERPL-SCNC: 140 MMOL/L (ref 136–145)
TROPONIN I SERPL DL<=0.01 NG/ML-MCNC: <0.006 NG/ML (ref 0–0.03)
WBC # BLD AUTO: 6.45 K/UL (ref 3.9–12.7)

## 2024-03-04 PROCEDURE — 93005 ELECTROCARDIOGRAM TRACING: CPT

## 2024-03-04 PROCEDURE — 84484 ASSAY OF TROPONIN QUANT: CPT | Performed by: NURSE PRACTITIONER

## 2024-03-04 PROCEDURE — 83880 ASSAY OF NATRIURETIC PEPTIDE: CPT | Performed by: NURSE PRACTITIONER

## 2024-03-04 PROCEDURE — 85025 COMPLETE CBC W/AUTO DIFF WBC: CPT | Performed by: NURSE PRACTITIONER

## 2024-03-04 PROCEDURE — 80053 COMPREHEN METABOLIC PANEL: CPT | Performed by: NURSE PRACTITIONER

## 2024-03-04 PROCEDURE — 93010 ELECTROCARDIOGRAM REPORT: CPT | Mod: ,,, | Performed by: INTERNAL MEDICINE

## 2024-03-04 PROCEDURE — 99285 EMERGENCY DEPT VISIT HI MDM: CPT | Mod: 25

## 2024-03-04 RX ORDER — SUCRALFATE 1 G/1
1 TABLET ORAL 4 TIMES DAILY
Qty: 20 TABLET | Refills: 0 | Status: SHIPPED | OUTPATIENT
Start: 2024-03-04 | End: 2024-04-02

## 2024-03-04 NOTE — FIRST PROVIDER EVALUATION
"Medical screening examination initiated.  I have conducted a focused provider triage encounter, findings are as follows:    Brief history of present illness:  reports chest and abdominal pain    Vitals:    03/04/24 1125   Pulse: 60   Resp: 18   Temp: 97.5 °F (36.4 °C)   TempSrc: Oral   SpO2: 97%   Weight: (S)   Comment: needs bed weight   Height: 5' 10" (1.778 m)       Pertinent physical exam:  nad    Brief workup plan:  labs, EKG, imaging, further eval    Preliminary workup initiated; this workup will be continued and followed by the physician or advanced practice provider that is assigned to the patient when roomed.  "
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
## 2024-03-04 NOTE — ED NOTES
Instructions were given to Kate  regarding the importance of not leaving the waiting room with the IV access in place.     The patient was instructed that it is crucial to notify nursing staff prior to leaving for the removal of the IV      Outcome: Kate verbalized understanding and commitment to adhere to the instructions. Kate pledged not to leave the waiting area w/ IV.
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
## 2024-03-05 ENCOUNTER — PATIENT OUTREACH (OUTPATIENT)
Dept: EMERGENCY MEDICINE | Facility: HOSPITAL | Age: 34
End: 2024-03-05
Payer: COMMERCIAL

## 2024-03-05 ENCOUNTER — APPOINTMENT (OUTPATIENT)
Dept: LAB | Facility: HOSPITAL | Age: 34
End: 2024-03-05
Attending: STUDENT IN AN ORGANIZED HEALTH CARE EDUCATION/TRAINING PROGRAM
Payer: COMMERCIAL

## 2024-03-05 DIAGNOSIS — N31.9 HIGH COMPLIANCE BLADDER: Primary | ICD-10-CM

## 2024-03-05 LAB
BACTERIA #/AREA URNS HPF: NORMAL /HPF
BILIRUB UR QL STRIP: NEGATIVE
CLARITY UR: CLEAR
COLOR UR: COLORLESS
GLUCOSE UR QL STRIP: NEGATIVE
HGB UR QL STRIP: ABNORMAL
KETONES UR QL STRIP: NEGATIVE
LEUKOCYTE ESTERASE UR QL STRIP: ABNORMAL
MICROSCOPIC COMMENT: NORMAL
NITRITE UR QL STRIP: NEGATIVE
PH UR STRIP: 6 [PH] (ref 5–8)
PROT UR QL STRIP: NEGATIVE
RBC #/AREA URNS HPF: 0 /HPF (ref 0–4)
SP GR UR STRIP: <1.005 (ref 1–1.03)
SQUAMOUS #/AREA URNS HPF: 1 /HPF
URN SPEC COLLECT METH UR: ABNORMAL
UROBILINOGEN UR STRIP-ACNC: NEGATIVE EU/DL
WBC #/AREA URNS HPF: 2 /HPF (ref 0–5)

## 2024-03-05 PROCEDURE — 87086 URINE CULTURE/COLONY COUNT: CPT | Performed by: STUDENT IN AN ORGANIZED HEALTH CARE EDUCATION/TRAINING PROGRAM

## 2024-03-05 PROCEDURE — 81000 URINALYSIS NONAUTO W/SCOPE: CPT | Performed by: STUDENT IN AN ORGANIZED HEALTH CARE EDUCATION/TRAINING PROGRAM

## 2024-03-06 NOTE — PROGRESS NOTES
Pt visited the ED on 3/4/24. I made 2 attempts to reach patient to assist with scheduling a post ED 7-day follow up with PCP. Unable to reach. No further attempts scheduled.  ED Navigator to close encounter at this time.    Rashida Conley

## 2024-03-06 NOTE — TELEPHONE ENCOUNTER
contacted and states  that he currently takes Linzess every other day  72 mcg; states that he can't confirm or deny whether this medication helps due to his health condition and being wheelchair bound but states that he'll begin taking Linzess everyday and report back

## 2024-03-07 LAB — BACTERIA UR CULT: NORMAL

## 2024-03-08 RX ORDER — GABAPENTIN 300 MG/1
300 CAPSULE ORAL 3 TIMES DAILY
Qty: 270 CAPSULE | Refills: 0 | Status: SHIPPED | OUTPATIENT
Start: 2024-03-08 | End: 2024-04-02

## 2024-03-09 ENCOUNTER — DOCUMENT SCAN (OUTPATIENT)
Dept: HOME HEALTH SERVICES | Facility: HOSPITAL | Age: 34
End: 2024-03-09
Payer: COMMERCIAL

## 2024-03-12 ENCOUNTER — TELEPHONE (OUTPATIENT)
Dept: INTERNAL MEDICINE | Facility: CLINIC | Age: 34
End: 2024-03-12
Payer: COMMERCIAL

## 2024-03-12 NOTE — TELEPHONE ENCOUNTER
----- Message from Bushra Peoples sent at 3/12/2024  2:46 PM CDT -----  Regarding: pt meds  Can the clinic reply in [facility]: yes         Please refill the medication(s) listed below.         Please call the patient when the prescription(s) is ready for  at this phone number Telephone Information:[facility] [[id number]]  Mobile          [phone]                  Medication #1 Gabapentin       Medication #2       Preferred Pharmacy:   [facility] [place] [facility] [address] [address] [facility]  [address] [address]  Phone: [phone] Fax: [phone]

## 2024-03-14 ENCOUNTER — OFFICE VISIT (OUTPATIENT)
Dept: INFECTIOUS DISEASES | Facility: CLINIC | Age: 34
End: 2024-03-14
Payer: COMMERCIAL

## 2024-03-14 DIAGNOSIS — Z93.59 SUPRAPUBIC CATHETER: ICD-10-CM

## 2024-03-14 DIAGNOSIS — N39.0 COMPLICATED UTI (URINARY TRACT INFECTION): Primary | ICD-10-CM

## 2024-03-14 DIAGNOSIS — I10 PRIMARY HYPERTENSION: ICD-10-CM

## 2024-03-14 PROCEDURE — 99213 OFFICE O/P EST LOW 20 MIN: CPT | Mod: 95,,, | Performed by: STUDENT IN AN ORGANIZED HEALTH CARE EDUCATION/TRAINING PROGRAM

## 2024-03-14 NOTE — PROGRESS NOTES
The patient location is: Home  The chief complaint leading to consultation is: UTI follow up     Visit type: audiovisual    Notes:    Subjective:    HPI: 34 y.o. male with pertinent PMHx of post traumatic quadriplegia, HTN, urinary incontinence leading to chronic suprapubic catheterization and recurrent UTI, and constipation who was evaluated by Infectious Diseases on 7/21/23 after clean catch U/A showed positive nitrites and 12 WBC. Urine culture grew pan R Morganella morganii and  Pseudomonas. Plan was for 2 weeks of targeted IV antibiotics. PICC placed on 7/28. Plan was then to give dose of tobramycin and a course of pip/tazo via PICC line followed by repeat U/A. 8/24, patient states he gets UTI frequently. Changes catheter every 2 weeks at home. Usually gets chills, sweats, abdominal pain, urinary difficulty, and foul odor with UTI. Had these symptoms when July UTI was diagnosed. Currently having abdominal pain and myalgias. Unsure if this is due to unrelated ailment or another UTI. Tolerated pip/tazo other than intermittent loose stools. Has provided new urine sample today. Will see if any pathogens grow and discuss whether further antibiotics are warranted. Patient with provide update if worsening symptoms occur. Scheduled for home catheter change next week. Agreeable to PICC removal today.     Last ID note 11/2: was recently given Fosfomycin for last urine culture.  He is colonized with bacteria and not all the cultures mean a true infection .  WE went through this issue again .  Continue Lithostat /Azo dye as needed.     12/6/23: Today patient reports constipation and pain on both sides of abdomen for a few weeks. Has also struggled to urinate through Crenshaw. Also pain below neck to feet within past week. Mainly a nerve pain especially in right arm. Left side feels more like gas. Also with trapped gas due to wheelchair.  U/A from urology on 12/5 with 20 WBC. Culture in process. One week ago urine was  sterile. CT a/p has been scheduled for 12/18. HH irrigated Crenshaw and it flowed well yesterday but today the physical flow still bad. Only 600 cc went into bag. Day before had 1500 cc. Has not had any fever. No nausea or vomiting. No shortness of breath or cough. Has appetite but unable to eat or drink much. Given how many symptoms patient is experiencing, advised him to go to ER for imaging and blood work. May also be able to provide pain management and a bowel regimen for his constipation. Will follow urine culture result.      2/28/24: Current UTI symptoms- abdominal pain and foul odor. Crenshaw last changed 1 week ago. Changed every 2-3 weeks. [facility] doing the exchanges. No current fever. Urine appears darker but not abnormal. Discussed treating based on last urine culture using IV tobramycin. Will reach out to [facility] [place]. Will also discuss bowel regimen with PCP. Has been frequently constipated which is huge risk factor. Will ask HH to stick to biweekly exchange of Crenshaw.     3/14: Received 3 doses of tobramycin. Last urine culture from 3/5 no growth. Feels good today. Still battling constipation. On new bowel regimen. Will adjust with help of PCP. Sees GI next month.     Review of Systems:   Negative unless otherwise noted positive-  Gen- Weakness/ Fatigue  Neuro- Confusion  CV- Chest Pain/ Palpitations  Resp: Cough/ SOB  GI- Nausea/Vomiting  Extrem- Pain/Swelling      Objective:    Physical Exam:  General- Patient alert and oriented x3 in NAD  HEENT- PERRLA, EOMI, OP clear  Resp- No increased WOB noted. Not using accessory muscles.  Extrem- No cyanosis, clubbing, edema.   Skin-  No Jaundice. No visible skin lesions.      Plan:  Complicated UTI (urinary tract infection)  --Have asked HH to conduct biweekly Crenshaw exchange from now on in attempt to prevent UTI   --Have targeted Morganella from last positive urine culture with IV tobramycin 400 mg x 3 doses and patient experienced relief   --Urine culture 3/5 no  growth   --Needs bowel regimen to prevent constipation which is huge risk factor for recurrent UTI  --Follow up with ID in 3 months or earlier as needed      Suprapubic catheter  --Recommend changing at least every 2 weeks to prevent recurrent UTI   --Follow up with urology      Hypertension  Continue current medications. Follow with PCP.       Face to Face time with patient: 15 minutes   25 minutes of total time spent on the encounter, which includes face to face time and non-face to face time preparing to see the patient (eg, review of tests), Obtaining and/or reviewing separately obtained history, Documenting clinical information in the electronic or other health record, Independently interpreting results (not separately reported) and communicating results to the patient/family/caregiver, or Care coordination (not separately reported).     Each patient to whom he or she provides medical services by telemedicine is:  (1) informed of the relationship between the physician and patient and the respective role of any other health care provider with respect to management of the patient; and (2) notified that he or she may decline to receive medical services by telemedicine and may withdraw from such care at any time.

## 2024-03-18 ENCOUNTER — TELEPHONE (OUTPATIENT)
Dept: INTERNAL MEDICINE | Facility: CLINIC | Age: 34
End: 2024-03-18
Payer: COMMERCIAL

## 2024-03-18 RX ORDER — METHENAMINE HIPPURATE 1000 MG/1
1 TABLET ORAL 2 TIMES DAILY
Qty: 60 TABLET | Refills: 0 | Status: SHIPPED | OUTPATIENT
Start: 2024-03-18

## 2024-03-18 NOTE — TELEPHONE ENCOUNTER
----- Message from Dorene Hernández sent at 3/18/2024 12:18 PM CDT -----  Regarding: medical advcie  Contact: Radha  .Type:  Needs Medical Advice    Who Called: radha   Symptoms (please be specific):    How long has patient had these symptoms:    Pharmacy name and phone #:    Would the patient rather a call back or a response via [facility]? call  Best Call Back Number:  [phone]  Additional Information:  Radha is requesting a callback from the nurse today to know if he can be seen sooner than 04/02/2024 please.

## 2024-03-19 ENCOUNTER — HOSPITAL ENCOUNTER (EMERGENCY)
Facility: HOSPITAL | Age: 34
Discharge: HOME | End: 2024-03-19
Attending: EMERGENCY MEDICINE
Payer: COMMERCIAL

## 2024-03-19 ENCOUNTER — TELEPHONE (OUTPATIENT)
Dept: INTERNAL MEDICINE | Facility: CLINIC | Age: 34
End: 2024-03-19
Payer: COMMERCIAL

## 2024-03-19 VITALS
DIASTOLIC BLOOD PRESSURE: 84 MMHG | SYSTOLIC BLOOD PRESSURE: 127 MMHG | TEMPERATURE: 98 F | OXYGEN SATURATION: 100 % | RESPIRATION RATE: 17 BRPM | HEART RATE: 63 BPM

## 2024-03-19 DIAGNOSIS — R19.7 DIARRHEA, UNSPECIFIED TYPE: Primary | ICD-10-CM

## 2024-03-19 DIAGNOSIS — N39.0 ACUTE LOWER UTI: ICD-10-CM

## 2024-03-19 DIAGNOSIS — T88.7XXA MEDICATION SIDE EFFECT: ICD-10-CM

## 2024-03-19 LAB
ALBUMIN SERPL BCP-MCNC: 3.8 G/DL (ref 3.5–5.2)
ALP SERPL-CCNC: 105 U/L (ref 55–135)
ALT SERPL W/O P-5'-P-CCNC: 29 U/L (ref 10–44)
ANION GAP SERPL CALC-SCNC: 9 MMOL/L (ref 8–16)
AST SERPL-CCNC: 20 U/L (ref 10–40)
BACTERIA #/AREA URNS HPF: ABNORMAL /HPF
BASOPHILS # BLD AUTO: 0.01 K/UL (ref 0–0.2)
BASOPHILS NFR BLD: 0.2 % (ref 0–1.9)
BILIRUB SERPL-MCNC: 0.4 MG/DL (ref 0.1–1)
BILIRUB UR QL STRIP: NEGATIVE
BUN SERPL-MCNC: 6 MG/DL (ref 6–20)
CALCIUM SERPL-MCNC: 9.1 MG/DL (ref 8.7–10.5)
CHLORIDE SERPL-SCNC: 100 MMOL/L (ref 95–110)
CLARITY UR: ABNORMAL
CO2 SERPL-SCNC: 29 MMOL/L (ref 23–29)
COLOR UR: YELLOW
CREAT SERPL-MCNC: 0.8 MG/DL (ref 0.5–1.4)
DIFFERENTIAL METHOD BLD: NORMAL
EOSINOPHIL # BLD AUTO: 0.3 K/UL (ref 0–0.5)
EOSINOPHIL NFR BLD: 5.5 % (ref 0–8)
ERYTHROCYTE [DISTWIDTH] IN BLOOD BY AUTOMATED COUNT: 12.3 % (ref 11.5–14.5)
EST. GFR  (NO RACE VARIABLE): >60 ML/MIN/1.73 M^2
GLUCOSE SERPL-MCNC: 119 MG/DL (ref 70–110)
GLUCOSE UR QL STRIP: NEGATIVE
HCT VFR BLD AUTO: 45 % (ref 40–54)
HGB BLD-MCNC: 15.1 G/DL (ref 14–18)
HGB UR QL STRIP: NEGATIVE
HYALINE CASTS #/AREA URNS LPF: 0 /LPF
IMM GRANULOCYTES # BLD AUTO: 0.01 K/UL (ref 0–0.04)
IMM GRANULOCYTES NFR BLD AUTO: 0.2 % (ref 0–0.5)
KETONES UR QL STRIP: ABNORMAL
LEUKOCYTE ESTERASE UR QL STRIP: ABNORMAL
LIPASE SERPL-CCNC: 50 U/L (ref 4–60)
LYMPHOCYTES # BLD AUTO: 2.1 K/UL (ref 1–4.8)
LYMPHOCYTES NFR BLD: 39 % (ref 18–48)
MCH RBC QN AUTO: 29 PG (ref 27–31)
MCHC RBC AUTO-ENTMCNC: 33.6 G/DL (ref 32–36)
MCV RBC AUTO: 87 FL (ref 82–98)
MICROSCOPIC COMMENT: ABNORMAL
MONOCYTES # BLD AUTO: 0.6 K/UL (ref 0.3–1)
MONOCYTES NFR BLD: 11 % (ref 4–15)
NEUTROPHILS # BLD AUTO: 2.4 K/UL (ref 1.8–7.7)
NEUTROPHILS NFR BLD: 44.1 % (ref 38–73)
NITRITE UR QL STRIP: NEGATIVE
NRBC BLD-RTO: 0 /100 WBC
PH UR STRIP: 8 [PH] (ref 5–8)
PLATELET # BLD AUTO: 223 K/UL (ref 150–450)
PMV BLD AUTO: 11.1 FL (ref 9.2–12.9)
POTASSIUM SERPL-SCNC: 3.4 MMOL/L (ref 3.5–5.1)
PROT SERPL-MCNC: 7.3 G/DL (ref 6–8.4)
PROT UR QL STRIP: ABNORMAL
RBC # BLD AUTO: 5.2 M/UL (ref 4.6–6.2)
RBC #/AREA URNS HPF: 4 /HPF (ref 0–4)
SODIUM SERPL-SCNC: 138 MMOL/L (ref 136–145)
SP GR UR STRIP: 1.01 (ref 1–1.03)
SQUAMOUS #/AREA URNS HPF: 5 /HPF
URN SPEC COLLECT METH UR: ABNORMAL
UROBILINOGEN UR STRIP-ACNC: NEGATIVE EU/DL
WBC # BLD AUTO: 5.44 K/UL (ref 3.9–12.7)
WBC #/AREA URNS HPF: 74 /HPF (ref 0–5)

## 2024-03-19 PROCEDURE — 99284 EMERGENCY DEPT VISIT MOD MDM: CPT | Mod: 25

## 2024-03-19 PROCEDURE — 25000003 PHARM REV CODE 250: Performed by: EMERGENCY MEDICINE

## 2024-03-19 PROCEDURE — 80053 COMPREHEN METABOLIC PANEL: CPT | Performed by: REGISTERED NURSE

## 2024-03-19 PROCEDURE — 85025 COMPLETE CBC W/AUTO DIFF WBC: CPT | Performed by: REGISTERED NURSE

## 2024-03-19 PROCEDURE — 83690 ASSAY OF LIPASE: CPT | Performed by: EMERGENCY MEDICINE

## 2024-03-19 PROCEDURE — 96360 HYDRATION IV INFUSION INIT: CPT

## 2024-03-19 PROCEDURE — 87086 URINE CULTURE/COLONY COUNT: CPT | Performed by: REGISTERED NURSE

## 2024-03-19 PROCEDURE — 81000 URINALYSIS NONAUTO W/SCOPE: CPT | Performed by: REGISTERED NURSE

## 2024-03-19 RX ORDER — LEVOFLOXACIN 500 MG/1
500 TABLET, FILM COATED ORAL
Status: COMPLETED | OUTPATIENT
Start: 2024-03-19 | End: 2024-03-19

## 2024-03-19 RX ORDER — LEVOFLOXACIN 500 MG/1
500 TABLET, FILM COATED ORAL DAILY
Qty: 6 TABLET | Refills: 0 | Status: SHIPPED | OUTPATIENT
Start: 2024-03-19 | End: 2024-03-25

## 2024-03-19 RX ADMIN — SODIUM CHLORIDE 1000 ML: 9 INJECTION, SOLUTION INTRAVENOUS at 08:03

## 2024-03-19 RX ADMIN — LEVOFLOXACIN 500 MG: 500 TABLET, FILM COATED ORAL at 10:03

## 2024-03-19 NOTE — TELEPHONE ENCOUNTER
----- Message from Frances Tong sent at 3/19/2024 11:50 AM CDT -----  Name of Who is Calling: [facility] Nurse on behalf of  [facility] [[id number]]              What is the request in detail: Nurse requesting a call back to discuss patient not having bowel movement in 6 days and having trouble urinating. Patient visited ER last night but still has not had a bowel movement.              Can the clinic reply by [facility]: No              What Number to Call Back if not in [facility]: [phone]

## 2024-03-19 NOTE — FIRST PROVIDER EVALUATION
Medical screening examination initiated.  I have conducted a focused provider triage encounter, findings are as follows:    Brief history of present illness:  Constipation, seen and [facility] yesterday.  Patient reports no urine and catheter bag.    There were no vitals filed for this visit.    Pertinent physical exam:  No acute distress, patient alert and oriented    Brief workup plan:  Workup and further evaluation once roomed    Preliminary workup initiated; this workup will be continued and followed by the physician or advanced practice provider that is assigned to the patient when roomed.

## 2024-03-19 NOTE — TELEPHONE ENCOUNTER
----- Message from Netta Morrison sent at 3/19/2024 12:59 PM CDT -----  Contact: Agustina  Type:  Patient Returning Call    Who Called:Agustina  Who Left Message for Patient:Trupti  Does the patient know what this is regarding?:problem/concern  Would the patient rather a call back or a response via [facility]? Call back  Best Call Back Number:[phone]  Additional Information: n/a

## 2024-03-20 ENCOUNTER — TELEPHONE (OUTPATIENT)
Dept: INTERNAL MEDICINE | Facility: CLINIC | Age: 34
End: 2024-03-20
Payer: COMMERCIAL

## 2024-03-20 ENCOUNTER — PATIENT OUTREACH (OUTPATIENT)
Dept: EMERGENCY MEDICINE | Facility: HOSPITAL | Age: 34
End: 2024-03-20
Payer: COMMERCIAL
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
## 2024-03-20 NOTE — DISCHARGE INSTRUCTIONS
Your diarrhea as due to the magnesium citrate took.  Her constipation has resolved.  Drink plenty of fluids.  Take Levaquin for a mild UTI.  Follow up with her doctor 1-2 days for re-evaluation return as needed for any worsening symptoms, problems, questions or concerns

## 2024-03-20 NOTE — PROGRESS NOTES
Rashida Conley  ED Navigator  Emergency Department    Project: [facility] ED Navigator  Role: Community Health Worker    Date: 03/20/2024  Patient Name: Kate Lazo  MRN: [id number]  PCP: Troy Burton MD    Assessment:     Kate Lazo is a 34 y.o. male who has presented to ED for Diarrhea, unspecified type; Medication side effect; Acute lower UTI. Patient has visited the ED 2 times in the past 3 months. Patient did not contact PCP.     ED Navigator Initial Assessment    ED Navigator Enrollment Documentation  Consent to Services  Does patient consent to completing the assessment?: Yes  Contact  Method of Initial Contact: Phone  Transportation  Does the patient have issues with Transportation?: No  Does the patient have transportation to and from healthcare appointments?: Yes  Insurance Coverage  Do you have coverage/adequate coverage?: Yes  Type/kind of coverage: BCBS  Is patient able to afford co-pays/deductibles?: Yes  Is patient able to afford HME or supplies?: Yes  Does patient have an established [facility] PCP?: Yes  Able to access?: Yes  Does the patient have a lack of adequate coverage?: No  Specialist Appointment  Did the patient come to the ED to see a specialist?: No  Does the patient have a pending specialist referral?: No  Does the patient have a specialist appointment made?: No  PCP Follow Up Appointment  Has the patient had an appointment with a primary care provider in the past year?: Yes  Approximate date: 12/21/23  Provider: Troy Burton MD  Does the patient have a follow up appontment with a PCP?: Yes  Upcoming appointment date: 4/2/24  Provider: Troy Burton MD  When was the last time you saw your PCP?: 12/21/23  Medications  Psychological  Food  Communication/Education  Does the patient have limited English proficiency/English not primary language?: No  Does patient have low literacy and/or low health literacy?: Yes  Does patient have concerns with care?: No  Does patient  "have dissatisfaction with care?: No  Other Financial Concerns  Other Social Barriers/Concerns  Does the patient have any additional barriers or concerns?: Other (see comments) (Comment: Chronic Conditions)  Primary Barrier  Barriers identified: Cognitive barrier (health literacy, language and communication, etc.)  Root Cause of ED Utilization: Chronic Conditions  Plan to address Chronic Conditions: Schedule appointment for patient with their PCP/specialist per ED discharge instructions  Next steps: Provided Education  Additional Documentation: Pt was seen in the ED on 3/19/24 for Diarrhea, unspecified type; Medication side effect; Acute lower UTI. I spoke with pt for Post ED visit follow up navigation to assist with scheduling a 7-day Post ED visit follow up appt. Pt states that she had not yet contacted pcp to schedule and accepted scheduling assistance. I was unable to schedule appt for pt and sent a request for assistance to pcp.  Pt will be contacted directly for scheduling. Pt does not have transportation issues and has no additional needs at this time.     Rashida Conley                Social History     Socioeconomic History    Marital status: Single   Tobacco Use    Smoking status: Never    Smokeless tobacco: Never   Substance and Sexual Activity    Alcohol use: No    Drug use: Not Currently     Types: Marijuana     Comment: "Discontinued about 1 month ago"    Sexual activity: Not Currently   Social History Narrative    ** Merged History Encounter **          Social Determinants of Health     Financial Resource Strain: Patient Declined (12/6/2023)    Overall Financial Resource Strain (CARDIA)     Difficulty of Paying Living Expenses: Patient declined   Recent Concern: Financial Resource Strain - Medium Risk (10/9/2023)    Overall Financial Resource Strain (CARDIA)     Difficulty of Paying Living Expenses: Somewhat hard   Food Insecurity: Patient Declined (12/6/2023)    Hunger Vital Sign     Worried About " Running Out of Food in the Last Year: Patient declined     Ran Out of Food in the Last Year: Patient declined   Recent Concern: Food Insecurity - Food Insecurity Present (10/9/2023)    Hunger Vital Sign     Worried About Running Out of Food in the Last Year: Often true     Ran Out of Food in the Last Year: Often true   Transportation Needs: Patient Declined (12/6/2023)    PRAPARE - Transportation     Lack of Transportation (Medical): Patient declined     Lack of Transportation (Non-Medical): Patient declined   Physical Activity: Inactive (12/6/2023)    Exercise Vital Sign     Days of Exercise per Week: 0 days     Minutes of Exercise per Session: 0 min   Stress: No Stress Concern Present (12/6/2023)    [employer] - Occupational Stress Questionnaire     Feeling of Stress : Only a little   Recent Concern: Stress - Stress Concern Present (10/9/2023)    [employer] - Occupational Stress Questionnaire     Feeling of Stress : Rather much   Social Connections: Moderately Isolated (12/6/2023)    Social Connection and Isolation Panel [NHANES]     Frequency of Communication with Friends and Family: Three times a week     Frequency of Social Gatherings with Friends and Family: Once a week     Attends Temple Services: Never     Active Member of Clubs or Organizations: No     Attends Club or Organization Meetings: Never     Marital Status: Living with partner   Housing Stability: High Risk (12/6/2023)    Housing Stability Vital Sign     Unable to Pay for Housing in the Last Year: Yes     Number of Places Lived in the Last Year: 1     Unstable Housing in the Last Year: Patient refused       Plan:       Pt was seen in the ED on 3/19/24 for Diarrhea, unspecified type; Medication side effect; Acute lower UTI. I spoke with pt for Post ED visit follow up navigation to assist with scheduling a 7-day Post ED visit follow up appt. Pt states that she had not yet contacted pcp to  schedule and accepted scheduling assistance. I was unable to schedule appt for pt and sent a request for assistance to pcp.  Pt will be contacted directly for scheduling. Pt does not have transportation issues and has no additional needs at this time.     Rashida Conley    Appointment made with: Troy Burton MD

## 2024-03-20 NOTE — TELEPHONE ENCOUNTER
----- Message from Rashida Conley sent at 3/20/2024  2:35 PM CDT -----  Regarding: Post ED visit follow up appt  Good afternoon: Pt was seen in the ED on 3/19/24 for Diarrhea, unspecified type; Medication side effect; Acute lower UTI. I spoke with pt and he is requesting a follow up appt at earliest available. Pt requires a Post ED visit follow up appt within 7 days of d/c date. Please contact pt to schedule a follow up appt by 3/26/24, if possible.     Thank you  Rashida Conley

## 2024-03-21 LAB — BACTERIA UR CULT: NO GROWTH

## 2024-04-01 DIAGNOSIS — N39.0 COMPLICATED UTI (URINARY TRACT INFECTION): Primary | ICD-10-CM

## 2024-04-02 ENCOUNTER — OFFICE VISIT (OUTPATIENT)
Dept: INTERNAL MEDICINE | Facility: CLINIC | Age: 34
End: 2024-04-02
Payer: MEDICARE

## 2024-04-02 VITALS
SYSTOLIC BLOOD PRESSURE: 116 MMHG | HEART RATE: 69 BPM | BODY MASS INDEX: 28.7 KG/M2 | HEIGHT: 70 IN | DIASTOLIC BLOOD PRESSURE: 80 MMHG | OXYGEN SATURATION: 98 %

## 2024-04-02 DIAGNOSIS — S14.109S QUADRIPLEGIA, POST-TRAUMATIC: Primary | ICD-10-CM

## 2024-04-02 DIAGNOSIS — G82.50 QUADRIPLEGIA, POST-TRAUMATIC: Primary | ICD-10-CM

## 2024-04-02 DIAGNOSIS — K59.04 CHRONIC IDIOPATHIC CONSTIPATION: ICD-10-CM

## 2024-04-02 DIAGNOSIS — L21.9 SEBORRHEIC DERMATITIS: ICD-10-CM

## 2024-04-02 DIAGNOSIS — Z79.899 ENCOUNTER FOR LONG-TERM (CURRENT) USE OF MEDICATIONS: ICD-10-CM

## 2024-04-02 DIAGNOSIS — G89.29 OTHER CHRONIC PAIN: ICD-10-CM

## 2024-04-02 DIAGNOSIS — F41.9 ANXIETY: ICD-10-CM

## 2024-04-02 DIAGNOSIS — R23.8 DRY SCALP: ICD-10-CM

## 2024-04-02 DIAGNOSIS — E87.6 HYPOKALEMIA: ICD-10-CM

## 2024-04-02 DIAGNOSIS — Z99.3 WHEELCHAIR DEPENDENCE: ICD-10-CM

## 2024-04-02 DIAGNOSIS — M79.89 LEG SWELLING: ICD-10-CM

## 2024-04-02 DIAGNOSIS — G47.00 INSOMNIA, UNSPECIFIED TYPE: ICD-10-CM

## 2024-04-02 DIAGNOSIS — R73.09 ELEVATED RANDOM BLOOD GLUCOSE LEVEL: ICD-10-CM

## 2024-04-02 DIAGNOSIS — Z02.9 ADMINISTRATIVE ENCOUNTER: ICD-10-CM

## 2024-04-02 PROBLEM — E46 MALNUTRITION, UNSPECIFIED TYPE: Status: RESOLVED | Noted: 2023-09-20 | Resolved: 2024-04-02

## 2024-04-02 PROBLEM — L97.509 FOOT ULCER: Status: RESOLVED | Noted: 2023-09-20 | Resolved: 2024-04-02

## 2024-04-02 PROBLEM — F33.0 MILD RECURRENT MAJOR DEPRESSION: Status: RESOLVED | Noted: 2021-04-08 | Resolved: 2024-04-02

## 2024-04-02 PROCEDURE — 3074F SYST BP LT 130 MM HG: CPT | Mod: HCNC,CPTII,S$GLB, | Performed by: FAMILY MEDICINE

## 2024-04-02 PROCEDURE — 1159F MED LIST DOCD IN RCRD: CPT | Mod: HCNC,CPTII,S$GLB, | Performed by: FAMILY MEDICINE

## 2024-04-02 PROCEDURE — 99999 PR PBB SHADOW E&M-EST. PATIENT-LVL IV: CPT | Mod: PBBFAC,HCNC,, | Performed by: FAMILY MEDICINE

## 2024-04-02 PROCEDURE — G2211 COMPLEX E/M VISIT ADD ON: HCPCS | Mod: HCNC,S$GLB,, | Performed by: FAMILY MEDICINE

## 2024-04-02 PROCEDURE — 3079F DIAST BP 80-89 MM HG: CPT | Mod: HCNC,CPTII,S$GLB, | Performed by: FAMILY MEDICINE

## 2024-04-02 PROCEDURE — 3008F BODY MASS INDEX DOCD: CPT | Mod: HCNC,CPTII,S$GLB, | Performed by: FAMILY MEDICINE

## 2024-04-02 PROCEDURE — 99214 OFFICE O/P EST MOD 30 MIN: CPT | Mod: HCNC,S$GLB,, | Performed by: FAMILY MEDICINE

## 2024-04-02 RX ORDER — AMITRIPTYLINE HYDROCHLORIDE 25 MG/1
25 TABLET, FILM COATED ORAL NIGHTLY PRN
Qty: 90 TABLET | Refills: 1 | Status: SHIPPED | OUTPATIENT
Start: 2024-04-02 | End: 2025-04-02

## 2024-04-02 RX ORDER — TRIAMCINOLONE ACETONIDE 0.25 MG/G
CREAM TOPICAL
Qty: 80 G | Refills: 2 | Status: SHIPPED | OUTPATIENT
Start: 2024-04-02

## 2024-04-02 RX ORDER — GABAPENTIN 400 MG/1
400 CAPSULE ORAL 3 TIMES DAILY
Qty: 270 CAPSULE | Refills: 1 | Status: SHIPPED | OUTPATIENT
Start: 2024-04-02 | End: 2025-04-02

## 2024-04-02 RX ORDER — KETOCONAZOLE 20 MG/ML
SHAMPOO, SUSPENSION TOPICAL
Qty: 120 ML | Refills: 0 | Status: SHIPPED | OUTPATIENT
Start: 2024-04-02

## 2024-04-02 NOTE — TELEPHONE ENCOUNTER
"----- Message from Paige Lu PT sent at 4/2/2024  1:54 PM CDT -----  Regarding: Wheelchair eval  Good afternoon,     Thank you for the referral for the above patient.  Unfortunately at this time, we are no longer able to provide Wheelchair evals as we do not have a provider on staff to complete these.  This patient will have to be referred to another facility for this service.     Thanks,   Alana Lu (Theresa) PT, DPT   Physical Therapist     Supervisor [facility] [address] [address]  [phone]         "

## 2024-04-02 NOTE — TELEPHONE ENCOUNTER
No care due was identified.  Health [facility] Embedded Care Due Messages. Reference number: [id number].   4/02/2024 10:20:35 AM CDT
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
## 2024-04-03 ENCOUNTER — LAB VISIT (OUTPATIENT)
Dept: LAB | Facility: HOSPITAL | Age: 34
End: 2024-04-03
Attending: INTERNAL MEDICINE
Payer: COMMERCIAL

## 2024-04-03 ENCOUNTER — TELEPHONE (OUTPATIENT)
Dept: INFECTIOUS DISEASES | Facility: CLINIC | Age: 34
End: 2024-04-03
Payer: COMMERCIAL

## 2024-04-03 DIAGNOSIS — N39.0 COMPLICATED UTI (URINARY TRACT INFECTION): ICD-10-CM

## 2024-04-03 LAB
BACTERIA #/AREA URNS HPF: ABNORMAL /HPF
BILIRUB UR QL STRIP: NEGATIVE
BILIRUB UR QL STRIP: NEGATIVE
CLARITY UR: CLEAR
CLARITY UR: CLEAR
COLOR UR: YELLOW
COLOR UR: YELLOW
GLUCOSE UR QL STRIP: NEGATIVE
GLUCOSE UR QL STRIP: NEGATIVE
HGB UR QL STRIP: NEGATIVE
HGB UR QL STRIP: NEGATIVE
KETONES UR QL STRIP: ABNORMAL
KETONES UR QL STRIP: ABNORMAL
LEUKOCYTE ESTERASE UR QL STRIP: ABNORMAL
LEUKOCYTE ESTERASE UR QL STRIP: ABNORMAL
MICROSCOPIC COMMENT: ABNORMAL
NITRITE UR QL STRIP: NEGATIVE
NITRITE UR QL STRIP: NEGATIVE
NON-SQ EPI CELLS #/AREA URNS HPF: 5 /HPF
PH UR STRIP: 7 [PH] (ref 5–8)
PH UR STRIP: 7 [PH] (ref 5–8)
PROT UR QL STRIP: NEGATIVE
PROT UR QL STRIP: NEGATIVE
RBC #/AREA URNS HPF: 2 /HPF (ref 0–4)
SP GR UR STRIP: 1.01 (ref 1–1.03)
SP GR UR STRIP: 1.01 (ref 1–1.03)
SQUAMOUS #/AREA URNS HPF: 6 /HPF
URN SPEC COLLECT METH UR: ABNORMAL
URN SPEC COLLECT METH UR: ABNORMAL
WBC #/AREA URNS HPF: 12 /HPF (ref 0–5)

## 2024-04-03 PROCEDURE — 87086 URINE CULTURE/COLONY COUNT: CPT | Performed by: INTERNAL MEDICINE

## 2024-04-03 PROCEDURE — 81000 URINALYSIS NONAUTO W/SCOPE: CPT | Performed by: INTERNAL MEDICINE

## 2024-04-03 RX ORDER — TRIAMCINOLONE ACETONIDE 0.25 MG/G
CREAM TOPICAL
Qty: 80 G | Refills: 0 | Status: SHIPPED | OUTPATIENT
Start: 2024-04-03

## 2024-04-03 NOTE — TELEPHONE ENCOUNTER
Spoke with Lata from [employer] stating they will send patient PT eval at  [facility]. Asking to send over only the progress note from wheelchair eval. Informed will send it today. Verbally understood.

## 2024-04-03 NOTE — TELEPHONE ENCOUNTER
----- Message from Dulce Roldan sent at 4/3/2024  9:38 AM CDT -----  Regarding: Lata/[employer]  States she is calling regarding a fax that was sent over. States she would like to speak to Gilson Pandya. Please call Lata [phone]. Thank you

## 2024-04-03 NOTE — TELEPHONE ENCOUNTER
----- Message from Raymond Au sent at 4/3/2024  8:18 AM CDT -----  Contact: Kate Love would like a call back at  [phone] in regards to wanting to see if an order can be put in for an urinalysis, patient will be [facility]. Patient wants to make sure that the urine sample cup is left at the  for him.  Thanks   Am

## 2024-04-03 NOTE — TELEPHONE ENCOUNTER
Pt stated that he had already went to [facility] location and was given orders for UA and specimen cup.

## 2024-04-04 ENCOUNTER — EXTERNAL HOME HEALTH (OUTPATIENT)
Dept: HOME HEALTH SERVICES | Facility: HOSPITAL | Age: 34
End: 2024-04-04
Payer: COMMERCIAL

## 2024-04-04 LAB — BACTERIA UR CULT: NO GROWTH

## 2024-04-09 ENCOUNTER — APPOINTMENT (OUTPATIENT)
Dept: LAB | Facility: HOSPITAL | Age: 34
End: 2024-04-09
Attending: STUDENT IN AN ORGANIZED HEALTH CARE EDUCATION/TRAINING PROGRAM
Payer: COMMERCIAL

## 2024-04-09 DIAGNOSIS — N31.9 HIGH COMPLIANCE BLADDER: Primary | ICD-10-CM

## 2024-04-09 LAB
BILIRUB UR QL STRIP: NEGATIVE
CLARITY UR: CLEAR
COLOR UR: YELLOW
GLUCOSE UR QL STRIP: NEGATIVE
HGB UR QL STRIP: NEGATIVE
KETONES UR QL STRIP: NEGATIVE
LEUKOCYTE ESTERASE UR QL STRIP: NEGATIVE
NITRITE UR QL STRIP: NEGATIVE
PH UR STRIP: 8 [PH] (ref 5–8)
PROT UR QL STRIP: NEGATIVE
SP GR UR STRIP: 1.01 (ref 1–1.03)
URN SPEC COLLECT METH UR: NORMAL
UROBILINOGEN UR STRIP-ACNC: NEGATIVE EU/DL

## 2024-04-09 PROCEDURE — 81003 URINALYSIS AUTO W/O SCOPE: CPT | Performed by: STUDENT IN AN ORGANIZED HEALTH CARE EDUCATION/TRAINING PROGRAM

## 2024-04-09 PROCEDURE — 87086 URINE CULTURE/COLONY COUNT: CPT | Performed by: STUDENT IN AN ORGANIZED HEALTH CARE EDUCATION/TRAINING PROGRAM

## 2024-04-10 PROCEDURE — G0179 MD RECERTIFICATION HHA PT: HCPCS | Mod: ,,, | Performed by: FAMILY MEDICINE

## 2024-04-11 LAB
BACTERIA UR CULT: NORMAL
BACTERIA UR CULT: NORMAL

## 2024-04-19 DIAGNOSIS — K21.9 GASTRO-ESOPHAGEAL REFLUX DISEASE WITHOUT ESOPHAGITIS: ICD-10-CM

## 2024-04-19 RX ORDER — OMEPRAZOLE 40 MG/1
40 CAPSULE, DELAYED RELEASE ORAL
Qty: 90 CAPSULE | Refills: 0 | Status: SHIPPED | OUTPATIENT
Start: 2024-04-19

## 2024-04-19 RX ORDER — BACLOFEN 10 MG/1
TABLET ORAL
Qty: 270 TABLET | Refills: 0 | Status: SHIPPED | OUTPATIENT
Start: 2024-04-19

## 2024-04-19 NOTE — TELEPHONE ENCOUNTER
----- Message from Dorys Sanders sent at 4/19/2024 12:57 PM CDT -----  Contact: Patient, [phone]  Requesting an RX refill or new RX.  Is this a refill or new RX: Refill  RX name and strength (copy/paste from chart):  baclofen (LIORESAL) 20 MG tablet  Is this a 30 day or 90 day RX: 90  Pharmacy name and phone # (copy/paste from chart):    [address] [place] - [address] [facility]  [address] [facility] [place] [address]  Phone: [phone] Fax: [phone]  The doctors have asked that we provide their patients with the following 2 reminders -- prescription refills can take up to 72 hours, and a friendly reminder that in the future you can use your [facility] account to request refills: Yes

## 2024-04-26 RX ORDER — BACLOFEN 20 MG/1
20 TABLET ORAL 3 TIMES DAILY
Qty: 270 TABLET | Refills: 0 | Status: SHIPPED | OUTPATIENT
Start: 2024-04-26 | End: 2024-05-29

## 2024-04-29 ENCOUNTER — LAB VISIT (OUTPATIENT)
Dept: LAB | Facility: HOSPITAL | Age: 34
End: 2024-04-29
Attending: INTERNAL MEDICINE
Payer: COMMERCIAL

## 2024-04-29 ENCOUNTER — PATIENT MESSAGE (OUTPATIENT)
Dept: INFECTIOUS DISEASES | Facility: CLINIC | Age: 34
End: 2024-04-29
Payer: COMMERCIAL

## 2024-04-29 DIAGNOSIS — N39.0 COMPLICATED UTI (URINARY TRACT INFECTION): ICD-10-CM

## 2024-04-29 LAB
BILIRUB UR QL STRIP: NEGATIVE
CLARITY UR: CLEAR
COLOR UR: YELLOW
GLUCOSE UR QL STRIP: NEGATIVE
HGB UR QL STRIP: NEGATIVE
KETONES UR QL STRIP: NEGATIVE
LEUKOCYTE ESTERASE UR QL STRIP: ABNORMAL
MICROSCOPIC COMMENT: ABNORMAL
NITRITE UR QL STRIP: NEGATIVE
PH UR STRIP: 7 [PH] (ref 5–8)
PROT UR QL STRIP: NEGATIVE
RBC #/AREA URNS HPF: 0 /HPF (ref 0–4)
SP GR UR STRIP: 1.01 (ref 1–1.03)
SQUAMOUS #/AREA URNS HPF: 5 /HPF
URN SPEC COLLECT METH UR: ABNORMAL
UROBILINOGEN UR STRIP-ACNC: NEGATIVE EU/DL
WBC #/AREA URNS HPF: 10 /HPF (ref 0–5)

## 2024-04-29 PROCEDURE — 81000 URINALYSIS NONAUTO W/SCOPE: CPT | Performed by: INTERNAL MEDICINE

## 2024-04-30 ENCOUNTER — OFFICE VISIT (OUTPATIENT)
Dept: INFECTIOUS DISEASES | Facility: CLINIC | Age: 34
End: 2024-04-30
Payer: COMMERCIAL

## 2024-04-30 ENCOUNTER — EXTERNAL HOME HEALTH (OUTPATIENT)
Dept: HOME HEALTH SERVICES | Facility: HOSPITAL | Age: 34
End: 2024-04-30
Payer: COMMERCIAL

## 2024-04-30 ENCOUNTER — TELEPHONE (OUTPATIENT)
Dept: INFECTIOUS DISEASES | Facility: CLINIC | Age: 34
End: 2024-04-30
Payer: COMMERCIAL

## 2024-04-30 ENCOUNTER — OFFICE VISIT (OUTPATIENT)
Dept: GASTROENTEROLOGY | Facility: CLINIC | Age: 34
End: 2024-04-30
Payer: COMMERCIAL

## 2024-04-30 DIAGNOSIS — R50.9 FUO (FEVER OF UNKNOWN ORIGIN): ICD-10-CM

## 2024-04-30 DIAGNOSIS — R53.2 FUNCTIONAL QUADRIPLEGIA: ICD-10-CM

## 2024-04-30 DIAGNOSIS — K58.1 IRRITABLE BOWEL SYNDROME WITH CONSTIPATION: Primary | ICD-10-CM

## 2024-04-30 DIAGNOSIS — N39.0 COMPLICATED UTI (URINARY TRACT INFECTION): Primary | ICD-10-CM

## 2024-04-30 DIAGNOSIS — G83.9 PARALYTIC SYNDROME: Primary | ICD-10-CM

## 2024-04-30 PROBLEM — K59.04 CHRONIC IDIOPATHIC CONSTIPATION: Status: RESOLVED | Noted: 2021-10-04 | Resolved: 2024-04-30

## 2024-04-30 PROBLEM — K59.09 OTHER CONSTIPATION: Status: RESOLVED | Noted: 2021-04-08 | Resolved: 2024-04-30

## 2024-04-30 PROCEDURE — 1160F RVW MEDS BY RX/DR IN RCRD: CPT | Mod: CPTII,95,, | Performed by: NURSE PRACTITIONER

## 2024-04-30 PROCEDURE — 99214 OFFICE O/P EST MOD 30 MIN: CPT | Mod: 95,,, | Performed by: NURSE PRACTITIONER

## 2024-04-30 PROCEDURE — 99214 OFFICE O/P EST MOD 30 MIN: CPT | Mod: 95,,, | Performed by: INTERNAL MEDICINE

## 2024-04-30 PROCEDURE — 1159F MED LIST DOCD IN RCRD: CPT | Mod: CPTII,95,, | Performed by: NURSE PRACTITIONER

## 2024-04-30 RX ORDER — TENAPANOR HYDROCHLORIDE 53.2 MG/1
50 TABLET ORAL 2 TIMES DAILY
Qty: 60 TABLET | Refills: 11 | Status: SHIPPED | OUTPATIENT
Start: 2024-04-30
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
## 2024-04-30 NOTE — TELEPHONE ENCOUNTER
----- Message from Jaden Castro MD, FIDSA sent at 4/30/2024  8:49 AM CDT -----  Let us do a virtual visit

## 2024-04-30 NOTE — PROGRESS NOTES
Subjective     Patient ID: Kate Lazo is a 34 y.o. male.    Chief Complaint: No chief complaint on file.    HPI    Last clinic note- 07/2012  Last clinic note-  31 year old man with PMH as listed below. He has history of quadriparesis and has chronic suprapubic [facility]. He is in a wheelchair with his step Dad    Cultures reviewed-  04/08-Urine culture-pseudomonas  1/14- Pseudomonas   12/11/20- pseudomonas/morganella  09/2020-E coli -ESBL  Case was discussed with Dr Loaiza and Fosfomycin was sent to the pharmacy.-04/13- FOSFOMYCIN po 3gram every 72 hours X2 doses  He is in a wheelchair.  11/30/21-  Latest urine culture-      11/8/21-proteus and was given Augmentin 875 mg for 7 days.   03/08/202-  Since the last clinic visit ,   Ct scan of the abdomen -02/10/2022-  Suprapubic pelvic catheter and bladder wall thickening, somewhat nonspecific in the setting of a nondistended bladder.   Moderate stool burden in the distal colon.  Correlation for constipation.   Cholelithiasis.   Questionable early sacral decubitus ulcer.  Correlation is advised     02.21/22- Urine culture-  PSEUDOMONAS AERUGINOSA   50,000 - 99,999 cfu/ml   He took the fosfomycin without clinical relief -his major symptoms are abdominal pain.  He reports that the urine smell gets better and he feels better .  He denies fever at this time.  The plan was made to start -IV meropenem for 2 weeks but he has not yet started PICC line.      05/05/22  Since the last visit ,he had another episode of UTI-04/19/22  KLEBSIELLA PNEUMONIAE-he was given Levaquin   He was seen by PCP yesterday for malaise.  Cbc WAS NORMAL       07/13/22 -since his last visit, he had recurrent positive urine culture-  Latest urine culture- 06/09/22- Morganella   05/2022- Proteus   04/22- Klebsiella  03/22- Morganella  02/22- Klebsiella  01/22-pseudomoas     12/2021- Morgabella  11/21- Proteus   He reports intermittent dysuria . Denies fever .  He changes the alonso  every 2 weeks.  CT scan of the abdomen/pelvis- 02/22-Suprapubic pelvic catheter and bladder wall thickening, somewhat nonspecific in the setting of a nondistended bladder.   Moderate stool burden in the distal colon.  Correlation for constipation.   Cholelithiasis.   Questionable early sacral decubitus ulcer.  Correlation is advised.     Over the last 6 months - he did monthly Urine cultures whenever he contacts the office about dysuria      03/15- the patient  comes for follow up.     Last urine culture- 02/28- Klebsiella.  He was given Fosfomycin .     Component      Latest Ref Rng & Units 2/28/2023 1/31/2023             1:49 PM   Urine Culture, Routine       KLEBSIELLA PNEUMONIAE (A) . . . clinically necessary.      Component      Latest Ref Rng & Units 1/31/2023           1:49 PM   Urine Culture, Routine       Multiple organisms isolated. None in predominance.  Repeat if      Component      Latest Ref Rng & Units 12/21/2022           2:20 PM   Urine Culture, Routine       PROTEUS MIRABILIS (A) . . .      Component      Latest Ref Rng & Units 12/21/2022           2:20 PM   Urine Culture, Routine       KLEBSIELLA PNEUMONIAE (A) . . .      Component      Latest Ref Rng & Units 11/10/2022              Urine Culture, Routine       PSEUDOMONAS AERUGINOSA (A) . . .      Component      Latest Ref Rng & Units 9/29/2022              Urine Culture, Routine       PSEUDOMONAS AERUGINOSA (A) . . .      Component      Latest Ref Rng & Units 6/9/2022 11/2  He was seen via Tele med.     He has pain over the pelvic region.     Lab test -   10/25-  ESCHERICHIA COLI ESBL   >100,000 cfu/ml      CT scan -09/14-     Right lower lobe opacities consistent with right basilar pneumonia     Suprapubic urinary catheter.  Correlate clinically.  04/30-  He reports fever -101.9  UA- showed wbc 10- done 04/29      Review of Systems   Review of Systems       Objective     Physical Exam       Assessment and Plan   Problem List Items  Addressed This Visit       Paralytic syndrome - Primary     Supportive care          FUO (fever of unknown origin)     He reports fever -   Will need to do Blood cultures, CBC  He was advised to go to ED as he will need more evaluation too    UA done 09/29- wbc was 10 -no cultures done - he needs more imaging -abd pelvis/chest as he now has persistent high grade fever

## 2024-04-30 NOTE — TELEPHONE ENCOUNTER
Patient scheduled for vv with Dr. Castro. Patient verbalized understanding and agreed to appt date, time, and location.

## 2024-04-30 NOTE — ASSESSMENT & PLAN NOTE
He reports fever -   Will need to do Blood cultures, CBC  He was advised to go to ED as he will need more evaluation too    UA done 09/29- wbc was 10 -no cultures done - he needs more imaging -abd pelvis/chest as he now has persistent high grade fever   
Supportive care   
stated

## 2024-05-03 ENCOUNTER — TELEPHONE (OUTPATIENT)
Dept: INFECTIOUS DISEASES | Facility: CLINIC | Age: 34
End: 2024-05-03
Payer: COMMERCIAL

## 2024-05-03 ENCOUNTER — PATIENT MESSAGE (OUTPATIENT)
Dept: INFECTIOUS DISEASES | Facility: CLINIC | Age: 34
End: 2024-05-03
Payer: COMMERCIAL

## 2024-05-03 NOTE — TELEPHONE ENCOUNTER
----- Message from Dima Watkins sent at 5/3/2024  8:57 AM CDT -----  Contact: Kate Mcbride is calling in regards to getting a call back from the nurse to discuss getting labs done at home due to the weather. Please call back at . [phone]      Thanks

## 2024-05-03 NOTE — TELEPHONE ENCOUNTER
Called pt and informed that home health nurse is not available to come today, need more advance notice, I did advise that I called earlier this morning, with no return call back. I notified pt who states if home health can't come Monday, he will just come to the clinic and get lab, advised pt that I will communicate with home health and update him. Pt verbalized understanding.

## 2024-05-03 NOTE — TELEPHONE ENCOUNTER
----- Message from Shamir Huston sent at 5/3/2024  2:21 PM CDT -----  Contact: [phone]  Virgie with [facility] called in requesting a call back from [place] , please call back [phone]

## 2024-05-03 NOTE — TELEPHONE ENCOUNTER
Called pt to advise that I left message this morning after speaking with him for home health nurse in regard to lab. No call back from [facility]. I called home health back and another message was left as the nurse staff was not available. Advised pt I would keep him updated . Pt verbalized understanding

## 2024-05-03 NOTE — TELEPHONE ENCOUNTER
----- Message from Elda Bowenesdras sent at 5/3/2024  1:52 PM CDT -----  Contact: Virgie/[facility]  Virgie is retuning Selin's call.  Please call her back [phone]

## 2024-05-03 NOTE — TELEPHONE ENCOUNTER
Called pt to confirm home health company, advised that I will call to see if they would be available to come out to his home today and call him back. Pt verbalized understanding.

## 2024-05-07 ENCOUNTER — PATIENT MESSAGE (OUTPATIENT)
Dept: INFECTIOUS DISEASES | Facility: CLINIC | Age: 34
End: 2024-05-07
Payer: COMMERCIAL

## 2024-05-07 DIAGNOSIS — Z93.59 SUPRAPUBIC CATHETER: Primary | ICD-10-CM

## 2024-05-08 ENCOUNTER — TELEPHONE (OUTPATIENT)
Dept: INFECTIOUS DISEASES | Facility: CLINIC | Age: 34
End: 2024-05-08
Payer: COMMERCIAL

## 2024-05-08 ENCOUNTER — LAB VISIT (OUTPATIENT)
Dept: LAB | Facility: HOSPITAL | Age: 34
End: 2024-05-08
Attending: FAMILY MEDICINE
Payer: COMMERCIAL
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
## 2024-05-08 DIAGNOSIS — Z43.5 ATTENTION TO CYSTOSTOMY: ICD-10-CM

## 2024-05-08 DIAGNOSIS — Z46.6 FITTING AND ADJUSTMENT OF URINARY DEVICE: Primary | ICD-10-CM

## 2024-05-08 PROCEDURE — 36415 COLL VENOUS BLD VENIPUNCTURE: CPT | Performed by: FAMILY MEDICINE

## 2024-05-08 PROCEDURE — 87040 BLOOD CULTURE FOR BACTERIA: CPT | Mod: 59 | Performed by: FAMILY MEDICINE

## 2024-05-08 NOTE — TELEPHONE ENCOUNTER
----- Message from Shamir Huston sent at 5/8/2024  4:29 PM CDT -----  Contact: [phone]  Patient called in requesting a call back ,  please call back  [phone]

## 2024-05-08 NOTE — TELEPHONE ENCOUNTER
Informed pt that UA had reflexed to urine culture and that he should wait for urine culture results. Pt also had blood culture done today. Advised pt that if he feels he needs treatment ASAP then he should go to ED for further evaluation. Pt stated that he was not able to get to ED at moment. Informed pt that I will route to Dr. Castro to advise. Pt verbalized understanding.

## 2024-05-09 ENCOUNTER — DOCUMENTATION ONLY (OUTPATIENT)
Dept: INFECTIOUS DISEASES | Facility: HOSPITAL | Age: 34
End: 2024-05-09
Payer: COMMERCIAL

## 2024-05-09 NOTE — PROGRESS NOTES
I called the patient and spoke to him extensively about the urine culture .  Urine analysis -  Showed urine wbc -30     Will wait for cultures -  Will add empiric Macrobid

## 2024-05-10 ENCOUNTER — TELEPHONE (OUTPATIENT)
Dept: INTERNAL MEDICINE | Facility: CLINIC | Age: 34
End: 2024-05-10
Payer: COMMERCIAL

## 2024-05-10 ENCOUNTER — TELEPHONE (OUTPATIENT)
Dept: INFECTIOUS DISEASES | Facility: CLINIC | Age: 34
End: 2024-05-10
Payer: COMMERCIAL

## 2024-05-10 ENCOUNTER — HOSPITAL ENCOUNTER (EMERGENCY)
Facility: HOSPITAL | Age: 34
Discharge: HOME | End: 2024-05-10
Attending: EMERGENCY MEDICINE
Payer: COMMERCIAL

## 2024-05-10 VITALS
DIASTOLIC BLOOD PRESSURE: 69 MMHG | HEIGHT: 69 IN | SYSTOLIC BLOOD PRESSURE: 112 MMHG | OXYGEN SATURATION: 99 % | HEART RATE: 92 BPM | RESPIRATION RATE: 18 BRPM | BODY MASS INDEX: 29.53 KG/M2 | TEMPERATURE: 98 F

## 2024-05-10 DIAGNOSIS — E86.0 DEHYDRATION: Primary | ICD-10-CM

## 2024-05-10 DIAGNOSIS — R19.7 DIARRHEA, UNSPECIFIED TYPE: ICD-10-CM

## 2024-05-10 LAB
ALBUMIN SERPL BCP-MCNC: 3.7 G/DL (ref 3.5–5.2)
ALP SERPL-CCNC: 99 U/L (ref 55–135)
ALT SERPL W/O P-5'-P-CCNC: 24 U/L (ref 10–44)
ANION GAP SERPL CALC-SCNC: 10 MMOL/L (ref 8–16)
AST SERPL-CCNC: 19 U/L (ref 10–40)
BASOPHILS # BLD AUTO: 0.01 K/UL (ref 0–0.2)
BASOPHILS NFR BLD: 0.2 % (ref 0–1.9)
BILIRUB SERPL-MCNC: 0.5 MG/DL (ref 0.1–1)
BUN SERPL-MCNC: 9 MG/DL (ref 6–20)
CALCIUM SERPL-MCNC: 9.1 MG/DL (ref 8.7–10.5)
CHLORIDE SERPL-SCNC: 107 MMOL/L (ref 95–110)
CO2 SERPL-SCNC: 22 MMOL/L (ref 23–29)
CREAT SERPL-MCNC: 0.8 MG/DL (ref 0.5–1.4)
DIFFERENTIAL METHOD BLD: NORMAL
EOSINOPHIL # BLD AUTO: 0.2 K/UL (ref 0–0.5)
EOSINOPHIL NFR BLD: 3.8 % (ref 0–8)
ERYTHROCYTE [DISTWIDTH] IN BLOOD BY AUTOMATED COUNT: 13 % (ref 11.5–14.5)
EST. GFR  (NO RACE VARIABLE): >60 ML/MIN/1.73 M^2
GLUCOSE SERPL-MCNC: 120 MG/DL (ref 70–110)
HCT VFR BLD AUTO: 43.7 % (ref 40–54)
HGB BLD-MCNC: 14.7 G/DL (ref 14–18)
IMM GRANULOCYTES # BLD AUTO: 0.01 K/UL (ref 0–0.04)
IMM GRANULOCYTES NFR BLD AUTO: 0.2 % (ref 0–0.5)
LYMPHOCYTES # BLD AUTO: 1.8 K/UL (ref 1–4.8)
LYMPHOCYTES NFR BLD: 38.3 % (ref 18–48)
MCH RBC QN AUTO: 29.1 PG (ref 27–31)
MCHC RBC AUTO-ENTMCNC: 33.6 G/DL (ref 32–36)
MCV RBC AUTO: 87 FL (ref 82–98)
MONOCYTES # BLD AUTO: 0.4 K/UL (ref 0.3–1)
MONOCYTES NFR BLD: 7.3 % (ref 4–15)
NEUTROPHILS # BLD AUTO: 2.4 K/UL (ref 1.8–7.7)
NEUTROPHILS NFR BLD: 50.2 % (ref 38–73)
NRBC BLD-RTO: 0 /100 WBC
PLATELET # BLD AUTO: 218 K/UL (ref 150–450)
PMV BLD AUTO: 10.7 FL (ref 9.2–12.9)
POTASSIUM SERPL-SCNC: 3.9 MMOL/L (ref 3.5–5.1)
PROT SERPL-MCNC: 7.3 G/DL (ref 6–8.4)
RBC # BLD AUTO: 5.05 M/UL (ref 4.6–6.2)
SODIUM SERPL-SCNC: 139 MMOL/L (ref 136–145)
WBC # BLD AUTO: 4.78 K/UL (ref 3.9–12.7)

## 2024-05-10 PROCEDURE — 25000003 PHARM REV CODE 250: Performed by: REGISTERED NURSE

## 2024-05-10 PROCEDURE — 85025 COMPLETE CBC W/AUTO DIFF WBC: CPT | Performed by: REGISTERED NURSE

## 2024-05-10 PROCEDURE — 80053 COMPREHEN METABOLIC PANEL: CPT | Performed by: REGISTERED NURSE

## 2024-05-10 PROCEDURE — 96360 HYDRATION IV INFUSION INIT: CPT

## 2024-05-10 PROCEDURE — 99284 EMERGENCY DEPT VISIT MOD MDM: CPT | Mod: 25

## 2024-05-10 RX ADMIN — SODIUM CHLORIDE 1000 ML: 9 INJECTION, SOLUTION INTRAVENOUS at 06:05

## 2024-05-10 NOTE — TELEPHONE ENCOUNTER
----- Message from Madyson Finn sent at 5/10/2024  7:39 AM CDT -----  Contact: Kate Love called in regards to having medication prescribed. He feels he is dehydrated and has diarrhea  Would like to be seen on today before he leaves to go out of town. Call back number is [phone]

## 2024-05-10 NOTE — TELEPHONE ENCOUNTER
----- Message from Madyson Finn sent at 5/10/2024  7:34 AM CDT -----  Contact: Kate Love called in regards to having medication prescribed. He feels he is dehydrated and has diarrhea  Would like to be seen on today before he leaves to go out of town. Call back number is [phone].

## 2024-05-10 NOTE — TELEPHONE ENCOUNTER
Pt states that he has been experiencing diarrhea for a couple of says. Advised pt to go to ED for further evaluation, d/t him possibly needing IV fluids. Pt verbalized understanding. Pt asked if Dr. Castro would send medication. Informed pt that I would route to Dr. Castro to advise.

## 2024-05-10 NOTE — FIRST PROVIDER EVALUATION
Medical screening examination initiated.  I have conducted a focused provider triage encounter, findings are as follows:    Brief history of present illness:  Wheelchair bound patient reports diarrhea x3 days, dizziness and weakness    There were no vitals filed for this visit.    Pertinent physical exam:  No acute distress, patient alert and oriented    Brief workup plan:  Labs and fluids    Preliminary workup initiated; this workup will be continued and followed by the physician or advanced practice provider that is assigned to the patient when roomed.
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
## 2024-05-12 RX ORDER — GRANULES FOR ORAL 3 G/1
3 POWDER ORAL ONCE
Qty: 3 G | Refills: 0 | Status: SHIPPED | OUTPATIENT
Start: 2024-05-12 | End: 2024-05-12

## 2024-05-13 LAB
BACTERIA BLD CULT: NORMAL
BACTERIA BLD CULT: NORMAL

## 2024-05-14 ENCOUNTER — PATIENT MESSAGE (OUTPATIENT)
Dept: INTERNAL MEDICINE | Facility: CLINIC | Age: 34
End: 2024-05-14
Payer: COMMERCIAL

## 2024-05-15 ENCOUNTER — TELEPHONE (OUTPATIENT)
Dept: UROLOGY | Facility: CLINIC | Age: 34
End: 2024-05-15
Payer: COMMERCIAL

## 2024-05-15 ENCOUNTER — PATIENT MESSAGE (OUTPATIENT)
Dept: INFECTIOUS DISEASES | Facility: CLINIC | Age: 34
End: 2024-05-15
Payer: COMMERCIAL

## 2024-05-15 NOTE — TELEPHONE ENCOUNTER
Called [facility] --spoke to Merari; informed her that Ms. Justice requested a nurse visit today as pt is complaining of burning and inability to urinate; order also given for Urine culture.

## 2024-05-17 DIAGNOSIS — G82.50 QUADRIPLEGIA, POST-TRAUMATIC: Primary | ICD-10-CM

## 2024-05-17 DIAGNOSIS — S14.109S QUADRIPLEGIA, POST-TRAUMATIC: Primary | ICD-10-CM

## 2024-05-17 RX ORDER — NITROFURANTOIN 25; 75 MG/1; MG/1
100 CAPSULE ORAL 2 TIMES DAILY
Qty: 14 CAPSULE | Refills: 0 | Status: SHIPPED | OUTPATIENT
Start: 2024-05-17 | End: 2024-05-24

## 2024-05-20 ENCOUNTER — CARE AT HOME (OUTPATIENT)
Dept: HOME HEALTH SERVICES | Facility: CLINIC | Age: 34
End: 2024-05-20
Payer: COMMERCIAL

## 2024-05-20 VITALS
OXYGEN SATURATION: 99 % | RESPIRATION RATE: 18 BRPM | SYSTOLIC BLOOD PRESSURE: 98 MMHG | DIASTOLIC BLOOD PRESSURE: 66 MMHG | HEART RATE: 87 BPM

## 2024-05-20 DIAGNOSIS — Z93.59 SUPRAPUBIC CATHETER: ICD-10-CM

## 2024-05-20 DIAGNOSIS — K21.9 GASTRO-ESOPHAGEAL REFLUX DISEASE WITHOUT ESOPHAGITIS: ICD-10-CM

## 2024-05-20 DIAGNOSIS — G82.50 QUADRIPLEGIA, POST-TRAUMATIC: Primary | ICD-10-CM

## 2024-05-20 DIAGNOSIS — S14.109S QUADRIPLEGIA, POST-TRAUMATIC: Primary | ICD-10-CM

## 2024-05-20 DIAGNOSIS — I10 PRIMARY HYPERTENSION: ICD-10-CM

## 2024-05-20 DIAGNOSIS — N39.0 COMPLICATED UTI (URINARY TRACT INFECTION): ICD-10-CM

## 2024-05-20 PROCEDURE — 99349 HOME/RES VST EST MOD MDM 40: CPT | Mod: S$GLB,,,

## 2024-05-20 PROCEDURE — 3074F SYST BP LT 130 MM HG: CPT | Mod: CPTII,S$GLB,,

## 2024-05-20 PROCEDURE — 3078F DIAST BP <80 MM HG: CPT | Mod: CPTII,S$GLB,,

## 2024-05-20 RX ORDER — OMEPRAZOLE 40 MG/1
40 CAPSULE, DELAYED RELEASE ORAL 2 TIMES DAILY
Qty: 60 CAPSULE | Refills: 0 | Status: SHIPPED | OUTPATIENT
Start: 2024-05-20 | End: 2025-05-20

## 2024-05-20 NOTE — PROGRESS NOTES
[facility]  Medical Chronic Care Home Visit    Encounter Provider: Georgi Cantrell   PCP: Troy Burton MD  Consult Requested By: Dr. Jaden Castro    HISTORY OF PRESENT ILLNESS      Patient ID: Kate Lazo is a 34 y.o. male is being seen in the home due to physical debility that presents a taxing effort to leave the home, to mitigate high risk of hospital readmission and/or due to the limited availability of reliable or safe options for transportation to the point of access to the provider. Prior to treatment on this visit the chart was reviewed and patient verbal consent was obtained.    Chronic medical conditions synopsis:  Patient is a 34 y.o. male being seen today for a referral. He has medical diagnoses including quadriplegia post trauma, neurogenic bladder, suprapubic catheter,  HTN, anxiety, vit D deficiency, and GERD.  Recent ED visit with diarrhea and weakness. Currently taking Macrobid for UTI. Reports indigestion and constipation. Appetite is good. Reports compliance with medications. Denies any further complaints or concerns. Questions elicited. Time allowed for questions, all issues addressed. Contact info given for any concerns or changes.   DECISION MAKING TODAY       Assessment & Plan:  1. Quadriplegia, post-traumatic  Assessment & Plan:  Currently chair bound  Fall precautions  Has sitter at home      Orders:  -     Ambulatory referral/consult to [facility]    2. Primary hypertension  Assessment & Plan:  Stable during visit  Continue current medications      3. Complicated UTI (urinary tract infection)  Assessment & Plan:  Currently being treated for UTI  Continue Macrobid  F/U with ID      4. Suprapubic catheter  Overview:  Last Assessment & Plan:   Formatting of this note might be different from the original.  History & Physical Continue catheter care. Catheter was changed out in the ED.    Discharge Summary Patient does have suprapubic catheter.  Urine  culture growing gram-negative rods which is likely colonization.  For outpatient follow-up.    Follow-up    Assessment & Plan:  Changed per HH  F/U with urology      5. Gastro-esophageal reflux disease without esophagitis  Assessment & Plan:  Currently on omeprazole with break through reflux  Will increase to twice daily for now with famotidine prn  Monitor    Orders:  -     omeprazole (PRILOSEC) 40 MG capsule; Take 1 capsule (40 mg total) by mouth 2 (two) times a day.  Dispense: 60 capsule; Refill: 0           ENVIRONMENT OF CARE      Family and/or Caregiver present at visit?  Yes  Name of Caregiver: n/a  History provided by: patient    4Ms for Medical Decision-Making in Older Adults    Last Completed EAWV: 2023    MOBILITY:  Get Up and Go:       No data to display              Activities of Daily Livin/20/2023    10:42 AM   Activities of Daily Living   Ambulation Assistance Required   Ambulation Assistance Wheelchair   Dressing Assistance Required   Dressing Assistance Total Care   Transfers Assistance Required   Transfers Assistance Total Care   Toileting Incontinent of bladder;Incontinent of bowel   Catheter Type 18 Malagasy   Feeding Assistance Required   Feeding Assistance Total Feed   Cleaning home/Chores Assistance Required   Telephone use Independent   Shopping Assistance Required   Paying bills Assistance Required   Taking meds Assistance Required   If required, who assists the patient with ADLs? CNA and mother     Whisper Test:      2023    10:36 AM   Whisper Test   Whisper Test Normal     Disability Status:      2023    10:45 AM   Disability Status   Are you deaf or do you have serious difficulty hearing? N   Are you blind or do you have serious difficulty seeing, even when wearing glasses? N   Because of a physical, mental, or emotional condition, do you have serious difficulty concentrating, remembering, or making decisions? N   Do you have serious difficulty walking or climbing  stairs? Y   Do you have difficulty dressing or bathing? Y   Because of a physical, mental, or emotional condition, do you have difficulty doing errands alone such as visiting a doctor's office or shopping? Y     Nutrition Screenin/20/2023    10:36 AM   Nutrition Screening   Has food intake declined over the past three months due to loss of appetite, digestive problems, chewing or swallowing difficulties? Moderate decrease in food intake   Involuntary weight loss during the last 3 months? Does not know   Mobility? Bed or chair bound   Has the patient suffered psychological stress or acute disease in the past three months? No   Neuropsychological problems? No psychological problems   Body Mass Index (BMI)?  BMI 23 or greater   Screening Score 9   Interpretation At risk of malnutrition    Screening Score: 0-7 Malnourished, 8-11 At Risk, 12-14 Normal    MENTATION:   Depression Patient Health Questionnaire:      2024     1:18 PM   Depression Patient Health Questionnaire   Over the last two weeks how often have you been bothered by little interest or pleasure in doing things Not at all   Over the last two weeks how often have you been bothered by feeling down, depressed or hopeless Not at all   PHQ-2 Total Score 0     Has Dementia Dx: No    Cognitive Function Screenin/20/2023    10:36 AM   Cognitive Function Screening   Mini-Cog 3 Minute Recall 3     Cognitive Function Screening Total - Less than 4 = Abnormal,  Greater than or equal to 4 = Normal    MEDICATIONS:  High Risk Medications:  Total Active Medications: 4  amitriptyline - 25 MG  baclofen - 10 MG, 20 MG  gabapentin - 400 MG    WHAT MATTERS MOST:  Advance Care Planning   ACP Status:   Patient has had an ACP conversation  Living Will: No  Power of : No  [place]: No    What is most important right now is to focus on remaining as independent as possible    Accordingly, we have decided that the best plan to meet the patient's goals includes  "continuing with treatment      What matters most to patient today is: improving indigestion     Is patient hospice appropriate: No  (If needed, use PPS <30 or FAST score >7)  Was referral to hospice placed: No    Impression upon entering the home:  Physical Dwelling: apartment/condo   Appearance of home environment: cleaniness: clean, walking pathways: clear, lighting: adequate, and home structure: sound structure  Functional Status: max assistance  Mobility: chair bound  Nutritional access: adequate intake and access  Home Health: Yes, HH [facility].    DME/Supplies: wheelchair     Disease/illness education: Take all medication as prescribed. Activity as tolerated. Keep all upcoming appts.   Establishment or re-establishment of referral orders for community resources: No other necessary community resources.   Discussion with other health care providers: No discussion with other health care providers necessary.   Does patient have a PCP at [facility]? Yes   Repatriation plan with PCP? [facility] reason: mobility   Does patient have an ostomy (ileostomy, colostomy, suprapubic catheter, nephrostomy tube, tracheostomy, PEG tube, pleurex catheter, cholecystostomy, etc)? Yes. Is it on problem list?yes  Were BPAs reviewed? Yes    Social History     Socioeconomic History    Marital status: Single   Tobacco Use    Smoking status: Never    Smokeless tobacco: Never   Substance and Sexual Activity    Alcohol use: No    Drug use: Not Currently     Types: Marijuana     Comment: "Discontinued about 1 month ago"    Sexual activity: Not Currently   Social History Narrative    ** Merged History Encounter **          Social Determinants of Health     Financial Resource Strain: Patient Declined (12/6/2023)    Overall Financial Resource Strain (CARDIA)     Difficulty of Paying Living Expenses: Patient declined   Recent Concern: Financial Resource Strain - Medium Risk (10/9/2023)    Overall Financial Resource Strain (CARDIA)     Difficulty " of Paying Living Expenses: Somewhat hard   Food Insecurity: Patient Declined (12/6/2023)    Hunger Vital Sign     Worried About Running Out of Food in the Last Year: Patient declined     Ran Out of Food in the Last Year: Patient declined   Recent Concern: Food Insecurity - Food Insecurity Present (10/9/2023)    Hunger Vital Sign     Worried About Running Out of Food in the Last Year: Often true     Ran Out of Food in the Last Year: Often true   Transportation Needs: Patient Declined (12/6/2023)    PRAPARE - Transportation     Lack of Transportation (Medical): Patient declined     Lack of Transportation (Non-Medical): Patient declined   Physical Activity: Inactive (12/6/2023)    Exercise Vital Sign     Days of Exercise per Week: 0 days     Minutes of Exercise per Session: 0 min   Stress: No Stress Concern Present (12/6/2023)    [employer] - Occupational Stress Questionnaire     Feeling of Stress : Only a little   Recent Concern: Stress - Stress Concern Present (10/9/2023)    [employer] - Occupational Stress Questionnaire     Feeling of Stress : Rather much   Housing Stability: High Risk (12/6/2023)    Housing Stability Vital Sign     Unable to Pay for Housing in the Last Year: Yes     Number of Places Lived in the Last Year: 1     Unstable Housing in the Last Year: Patient refused         OBJECTIVE:     Vital Signs:  Vitals:    05/20/24 1231   BP: 98/66   Pulse: 87   Resp: 18       Review of Systems   Constitutional: Negative.    HENT: Negative.     Eyes: Negative.    Respiratory: Negative.  Negative for chest tightness.    Cardiovascular: Negative.  Negative for leg swelling.   Gastrointestinal: Negative.    Endocrine: Negative.    Genitourinary: Negative.    Musculoskeletal: Negative.    Skin: Negative.    Allergic/Immunologic: Negative.    Neurological:  Positive for weakness.   Hematological: Negative.    Psychiatric/Behavioral: Negative.  Negative for  agitation.    All other systems reviewed and are negative.      Physical Exam:  Physical Exam  Vitals reviewed.   Constitutional:       General: He is not in acute distress.     Appearance: Normal appearance. He is well-developed.   HENT:      Head: Normocephalic and atraumatic.      Nose: Nose normal.      Mouth/Throat:      Mouth: Mucous membranes are dry.      Pharynx: Oropharynx is clear.   Eyes:      Pupils: Pupils are equal, round, and reactive to light.   Cardiovascular:      Rate and Rhythm: Normal rate and regular rhythm.      Pulses: Normal pulses.      Heart sounds: Normal heart sounds.   Pulmonary:      Effort: Pulmonary effort is normal.      Breath sounds: Normal breath sounds.   Abdominal:      General: Bowel sounds are normal.      Palpations: Abdomen is soft.   Musculoskeletal:         General: Normal range of motion.      Cervical back: Normal range of motion and neck supple.      Comments: quadraplegia   Skin:     General: Skin is warm and dry.   Neurological:      General: No focal deficit present.      Mental Status: He is alert and oriented to person, place, and time. Mental status is at baseline.      Motor: Weakness present.   Psychiatric:         Mood and Affect: Mood normal.         Behavior: Behavior normal.         Thought Content: Thought content normal.         Judgment: Judgment normal.         INSTRUCTIONS FOR PATIENT:   - Continue all medications, treatments and therapies as ordered.   - Follow all instructions, recommendations as discussed.  - Maintain Safety Precautions at all times.  - Attend all medical appointments as scheduled.  - For worsening symptoms: call Primary Care Physician or Nurse Practitioner.  - For emergencies, call 911 or immediately report to the nearest emergency room.   Scheduled Follow-up, Appts Reviewed with Modifications if Needed: Yes  Future Appointments   Date Time Provider Department Center   6/14/2024  8:30 AM London Hubbard DO [facility]    7/2/2024  9[facility]50 [facility]   [facility]24  3[facility]00 PM [facility]          Current Outpatient Medications:     ALPRAZolam (XANAX) 0.25 MG tablet, Take 1 tablet (0.25 mg total) by mouth nightly as needed for Anxiety., Disp: 30 tablet, Rfl: 0    amitriptyline (ELAVIL) 25 MG tablet, Take 1 tablet (25 mg total) by mouth nightly as needed for Insomnia., Disp: 90 tablet, Rfl: 1    baclofen (LIORESAL) 10 MG tablet, TAKE ONE TABLET BY MOUTH THREE TIMES DAILY IN ADDITION WITH 20MG AS NEEDED TO EQUAL 30MG, Disp: 270 tablet, Rfl: 0    baclofen (LIORESAL) 20 MG tablet, TAKE 1 TABLET BY MOUTH THREE TIMES DAILY, Disp: 270 tablet, Rfl: 0    catheter (GALVAN CATHETER) 18 Fr Misc, 12 each by Misc.(Non-Drug; Combo Route) route every 14 (fourteen) days., Disp: 12 each, Rfl: 11    docusate sodium (ENEMEEZ) 283 mg enema, Use 1 enema rectally QD as needed for constipation, Disp: 30 each, Rfl: 0    docusate sodium-benzocaine 283-20 mg/5 mL Enem, Place 1 each rectally Daily., Disp: , Rfl:     furosemide (LASIX) 20 MG tablet, Take 1 tablet (20 mg total) by mouth once daily., Disp: 90 tablet, Rfl: 3    gabapentin (NEURONTIN) 400 MG capsule, Take 1 capsule (400 mg total) by mouth 3 (three) times daily., Disp: 270 capsule, Rfl: 1    ketoconazole (NIZORAL) 2 % shampoo, WASH HAIR WITH MEDICATED SHAMPOO AT LEAST TWICE A WEEK. LET SIT ON SCALP AT LEAST 5 MINTUES PRIOR TO RINSING, Disp: 120 mL, Rfl: 0    LITHOSTAT 250 mg Tab, Take 1 tablet by mouth 3 (three) times daily., Disp: , Rfl:     methenamine (HIPREX) 1 gram Tab, Take 1 tablet by mouth twice daily, Disp: 60 tablet, Rfl: 0    mupirocin (BACTROBAN) 2 % ointment, Apply topically 4 (four) times daily., Disp: , Rfl:     nitrofurantoin, macrocrystal-monohydrate, (MACROBID) 100 MG capsule, Take 1 capsule (100 mg total) by mouth 2 (two) times daily. for 7 days, Disp: 14 capsule, Rfl: 0    omeprazole (PRILOSEC) 40 MG capsule, Take 1 capsule by  mouth once daily, Disp: 90 capsule, Rfl: 0    omeprazole (PRILOSEC) 40 MG capsule, Take 1 capsule (40 mg total) by mouth 2 (two) times a day., Disp: 60 capsule, Rfl: 0    ondansetron (ZOFRAN) 4 MG tablet, Take 1 tablet (4 mg total) by mouth every 6 (six) hours as needed for Nausea., Disp: 90 tablet, Rfl: 0    senna (SENOKOT) 8.6 mg tablet, Take 1 tablet by mouth., Disp: , Rfl:     tenapanor (IBSRELA) 50 mg Tab, Take 1 tablet (50 mg) by mouth 2 (two) times daily., Disp: 60 tablet, Rfl: 11    triamcinolone acetonide 0.025% (KENALOG) 0.025 % cream, APPLY TOPICALLY TO AFFECTED AREA TWICE DAILY AS NEEDED FOR FLARES. MILD STEROID, Disp: 80 g, Rfl: 0    triamcinolone acetonide 0.025% (KENALOG) 0.025 % cream, Apply topically dry skin prn, Disp: 80 g, Rfl: 2    Medication Reconciliation:  Were medications changed during this appointment? Yes  Were medications in the home? Yes  Is the patient taking the medications as directed? Yes  Does the patient/caregiver understand the medications and changes? Yes  Does updated med list accurately reflects meds patient is currently taking? Yes    Signature: Georgi Cantrell NP

## 2024-05-21 NOTE — ASSESSMENT & PLAN NOTE
Currently on omeprazole with break through reflux  Will increase to twice daily for now with famotidine prn  Monitor

## 2024-05-24 ENCOUNTER — PATIENT MESSAGE (OUTPATIENT)
Dept: INFECTIOUS DISEASES | Facility: CLINIC | Age: 34
End: 2024-05-24
Payer: COMMERCIAL

## 2024-05-29 ENCOUNTER — LAB VISIT (OUTPATIENT)
Dept: LAB | Facility: HOSPITAL | Age: 34
End: 2024-05-29
Attending: INTERNAL MEDICINE
Payer: COMMERCIAL

## 2024-05-29 ENCOUNTER — TELEPHONE (OUTPATIENT)
Dept: UROLOGY | Facility: CLINIC | Age: 34
End: 2024-05-29
Payer: COMMERCIAL

## 2024-05-29 DIAGNOSIS — N39.0 COMPLICATED UTI (URINARY TRACT INFECTION): ICD-10-CM

## 2024-05-29 DIAGNOSIS — N39.0 COMPLICATED UTI (URINARY TRACT INFECTION): Primary | ICD-10-CM

## 2024-05-29 LAB
BACTERIA #/AREA URNS HPF: ABNORMAL /HPF
BILIRUB UR QL STRIP: NEGATIVE
CLARITY UR: ABNORMAL
COLOR UR: YELLOW
GLUCOSE UR QL STRIP: NEGATIVE
HGB UR QL STRIP: NEGATIVE
KETONES UR QL STRIP: ABNORMAL
LEUKOCYTE ESTERASE UR QL STRIP: ABNORMAL
MICROSCOPIC COMMENT: ABNORMAL
NITRITE UR QL STRIP: POSITIVE
PH UR STRIP: 8 [PH] (ref 5–8)
PROT UR QL STRIP: NEGATIVE
RBC #/AREA URNS HPF: 4 /HPF (ref 0–4)
SP GR UR STRIP: 1.01 (ref 1–1.03)
SQUAMOUS #/AREA URNS HPF: 3 /HPF
URN SPEC COLLECT METH UR: ABNORMAL
UROBILINOGEN UR STRIP-ACNC: NEGATIVE EU/DL
WBC #/AREA URNS HPF: 28 /HPF (ref 0–5)

## 2024-05-29 PROCEDURE — 87186 SC STD MICRODIL/AGAR DIL: CPT | Performed by: INTERNAL MEDICINE

## 2024-05-29 PROCEDURE — 87077 CULTURE AEROBIC IDENTIFY: CPT | Performed by: INTERNAL MEDICINE

## 2024-05-29 PROCEDURE — 87086 URINE CULTURE/COLONY COUNT: CPT | Performed by: INTERNAL MEDICINE

## 2024-05-29 PROCEDURE — 87150 DNA/RNA AMPLIFIED PROBE: CPT | Mod: 59 | Performed by: INTERNAL MEDICINE

## 2024-05-29 PROCEDURE — 81000 URINALYSIS NONAUTO W/SCOPE: CPT | Performed by: INTERNAL MEDICINE

## 2024-05-29 PROCEDURE — 87088 URINE BACTERIA CULTURE: CPT | Performed by: INTERNAL MEDICINE

## 2024-05-29 RX ORDER — BACLOFEN 20 MG/1
20 TABLET ORAL 3 TIMES DAILY
Qty: 270 TABLET | Refills: 0 | Status: SHIPPED | OUTPATIENT
Start: 2024-05-29

## 2024-05-29 NOTE — TELEPHONE ENCOUNTER
Received call from pt requesting a virtual visit; informed pt that Ms. Justice ordered the home health agency to do a urine culture last week but he was not home; I called [facility]  and requested another visit to obtain urine; nurse stated they would go out tomorrow.

## 2024-05-30 ENCOUNTER — PATIENT MESSAGE (OUTPATIENT)
Dept: INFECTIOUS DISEASES | Facility: CLINIC | Age: 34
End: 2024-05-30
Payer: COMMERCIAL

## 2024-05-30 ENCOUNTER — PATIENT MESSAGE (OUTPATIENT)
Dept: UROLOGY | Facility: CLINIC | Age: 34
End: 2024-05-30
Payer: COMMERCIAL

## 2024-05-30 ENCOUNTER — APPOINTMENT (OUTPATIENT)
Dept: LAB | Facility: HOSPITAL | Age: 34
End: 2024-05-30
Attending: STUDENT IN AN ORGANIZED HEALTH CARE EDUCATION/TRAINING PROGRAM
Payer: COMMERCIAL

## 2024-05-30 DIAGNOSIS — N31.9 HIGH COMPLIANCE BLADDER: Primary | ICD-10-CM

## 2024-05-30 LAB
BACTERIA #/AREA URNS HPF: ABNORMAL /HPF
BILIRUB UR QL STRIP: NEGATIVE
CLARITY UR: ABNORMAL
COLOR UR: ABNORMAL
GLUCOSE UR QL STRIP: NEGATIVE
HGB UR QL STRIP: ABNORMAL
HYALINE CASTS #/AREA URNS LPF: 0 /LPF
KETONES UR QL STRIP: NEGATIVE
LEUKOCYTE ESTERASE UR QL STRIP: ABNORMAL
MICROSCOPIC COMMENT: ABNORMAL
NITRITE UR QL STRIP: NEGATIVE
PH UR STRIP: 8 [PH] (ref 5–8)
PROT UR QL STRIP: ABNORMAL
RBC #/AREA URNS HPF: >100 /HPF (ref 0–4)
SP GR UR STRIP: 1.01 (ref 1–1.03)
SQUAMOUS #/AREA URNS HPF: 5 /HPF
UNIDENT CRYS URNS QL MICRO: ABNORMAL
URN SPEC COLLECT METH UR: ABNORMAL
UROBILINOGEN UR STRIP-ACNC: NEGATIVE EU/DL
WBC #/AREA URNS HPF: 24 /HPF (ref 0–5)
WBC CLUMPS URNS QL MICRO: ABNORMAL

## 2024-05-30 PROCEDURE — 81000 URINALYSIS NONAUTO W/SCOPE: CPT | Performed by: STUDENT IN AN ORGANIZED HEALTH CARE EDUCATION/TRAINING PROGRAM

## 2024-05-30 PROCEDURE — 87077 CULTURE AEROBIC IDENTIFY: CPT | Performed by: STUDENT IN AN ORGANIZED HEALTH CARE EDUCATION/TRAINING PROGRAM

## 2024-05-30 PROCEDURE — 87088 URINE BACTERIA CULTURE: CPT | Performed by: STUDENT IN AN ORGANIZED HEALTH CARE EDUCATION/TRAINING PROGRAM

## 2024-05-30 PROCEDURE — 87186 SC STD MICRODIL/AGAR DIL: CPT | Mod: 59 | Performed by: STUDENT IN AN ORGANIZED HEALTH CARE EDUCATION/TRAINING PROGRAM

## 2024-05-30 PROCEDURE — 87086 URINE CULTURE/COLONY COUNT: CPT | Performed by: STUDENT IN AN ORGANIZED HEALTH CARE EDUCATION/TRAINING PROGRAM

## 2024-05-31 ENCOUNTER — PATIENT MESSAGE (OUTPATIENT)
Dept: INFECTIOUS DISEASES | Facility: CLINIC | Age: 34
End: 2024-05-31
Payer: COMMERCIAL

## 2024-05-31 RX ORDER — NITROFURANTOIN 25; 75 MG/1; MG/1
100 CAPSULE ORAL 2 TIMES DAILY
Qty: 14 CAPSULE | Refills: 0 | Status: SHIPPED | OUTPATIENT
Start: 2024-05-31 | End: 2024-06-07

## 2024-06-04 LAB
BACTERIA UR CULT: ABNORMAL
BACTERIA UR CULT: ABNORMAL

## 2024-06-05 ENCOUNTER — DOCUMENT SCAN (OUTPATIENT)
Dept: HOME HEALTH SERVICES | Facility: HOSPITAL | Age: 34
End: 2024-06-05
Payer: COMMERCIAL

## 2024-06-06 ENCOUNTER — DOCUMENT SCAN (OUTPATIENT)
Dept: HOME HEALTH SERVICES | Facility: HOSPITAL | Age: 34
End: 2024-06-06
Payer: COMMERCIAL

## 2024-06-06 LAB
BACTERIA UR CULT: ABNORMAL
BACTERIA UR CULT: ABNORMAL

## 2024-06-07 NOTE — PROGRESS NOTES
We can arrange with [facility] for one time dose of IV Amikacin 15mg.kg .-    Let us contact them and I will discuss with their pharmacy

## 2024-06-09 PROCEDURE — G0179 MD RECERTIFICATION HHA PT: HCPCS | Mod: ,,, | Performed by: FAMILY MEDICINE

## 2024-06-10 ENCOUNTER — TELEPHONE (OUTPATIENT)
Dept: INTERNAL MEDICINE | Facility: CLINIC | Age: 34
End: 2024-06-10
Payer: COMMERCIAL

## 2024-06-10 ENCOUNTER — PATIENT MESSAGE (OUTPATIENT)
Dept: INFECTIOUS DISEASES | Facility: CLINIC | Age: 34
End: 2024-06-10
Payer: COMMERCIAL

## 2024-06-10 NOTE — TELEPHONE ENCOUNTER
Patient having abdominal and pelvic pain, loss appetite and retaining fluid as stated. Asking a sooner visit than Thursday as scheduled with Dr. Malik. Informed to keep the visit or proceed to ER if needed to seen sooner and follow up with Dr. Burton later if symptoms still persist. Patient verbally understood.

## 2024-06-10 NOTE — TELEPHONE ENCOUNTER
----- Message from Florinda Ramirez sent at 6/10/2024  8:45 AM CDT -----  Contact: self   .Type: Patient Call Back        Who called:   patient      What is the request in detail:    Patients states that last couple of nights he hasn't gotten any sleep due to abdominal pain , pelvic pain, legs. Patient wasn't able to eat or drink anything .Patients states he was forced to do this . Patient states that body is retaining fluids and can't move without spasms . Patient has been getting bowel movements but for the last two days he has been eating . Patient is not sure if he's dealing with trapped gas or not . Patient also states his body is tensed from shoulder to feet Patient states that if possible he would like to have a virtual visit. He has a visit with NP scheduled for Thursday but would like to be seen sooner . Please call back     Type:.[phone]

## 2024-06-12 ENCOUNTER — OFFICE VISIT (OUTPATIENT)
Dept: UROLOGY | Facility: CLINIC | Age: 34
End: 2024-06-12
Payer: COMMERCIAL

## 2024-06-12 DIAGNOSIS — N31.9 NEUROGENIC BLADDER: Primary | ICD-10-CM

## 2024-06-12 DIAGNOSIS — Z93.59 SUPRAPUBIC CATHETER: ICD-10-CM

## 2024-06-12 PROCEDURE — 99214 OFFICE O/P EST MOD 30 MIN: CPT | Mod: 95,,, | Performed by: NURSE PRACTITIONER
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
## 2024-06-14 ENCOUNTER — OFFICE VISIT (OUTPATIENT)
Dept: INFECTIOUS DISEASES | Facility: CLINIC | Age: 34
End: 2024-06-14
Payer: COMMERCIAL

## 2024-06-14 ENCOUNTER — TELEPHONE (OUTPATIENT)
Dept: INTERNAL MEDICINE | Facility: CLINIC | Age: 34
End: 2024-06-14
Payer: COMMERCIAL

## 2024-06-14 DIAGNOSIS — R07.9 CHEST PAIN, UNSPECIFIED TYPE: ICD-10-CM

## 2024-06-14 DIAGNOSIS — R10.9 ABDOMINAL PAIN, UNSPECIFIED ABDOMINAL LOCATION: Primary | ICD-10-CM

## 2024-06-14 PROCEDURE — 99213 OFFICE O/P EST LOW 20 MIN: CPT | Mod: 95,,, | Performed by: STUDENT IN AN ORGANIZED HEALTH CARE EDUCATION/TRAINING PROGRAM

## 2024-06-14 NOTE — PROGRESS NOTES
The patient location is: Home  The chief complaint leading to consultation is: UTI follow up      Visit type: audiovisual     Notes:     Subjective:     HPI: 34 y.o. male with pertinent PMHx of post traumatic quadriplegia, HTN, urinary incontinence leading to chronic suprapubic catheterization and recurrent UTI, and constipation who was evaluated by Infectious Diseases on 7/21/23 after clean catch U/A showed positive nitrites and 12 WBC. Urine culture grew pan R Morganella morganii and  Pseudomonas. Plan was for 2 weeks of targeted IV antibiotics. PICC placed on 7/28. Plan was then to give dose of tobramycin and a course of pip/tazo via PICC line followed by repeat U/A. 8/24, patient states he gets UTI frequently. Changes catheter every 2 weeks at home. Usually gets chills, sweats, abdominal pain, urinary difficulty, and foul odor with UTI. Had these symptoms when July UTI was diagnosed. Currently having abdominal pain and myalgias. Unsure if this is due to unrelated ailment or another UTI. Tolerated pip/tazo other than intermittent loose stools. Has provided new urine sample today. Will see if any pathogens grow and discuss whether further antibiotics are warranted. Patient with provide update if worsening symptoms occur. Scheduled for home catheter change next week. Agreeable to PICC removal today.     Last ID note 11/2: was recently given Fosfomycin for last urine culture.  He is colonized with bacteria and not all the cultures mean a true infection .  WE went through this issue again .  Continue Lithostat /Azo dye as needed.     12/6/23: Today patient reports constipation and pain on both sides of abdomen for a few weeks. Has also struggled to urinate through Crenshaw. Also pain below neck to feet within past week. Mainly a nerve pain especially in right arm. Left side feels more like gas. Also with trapped gas due to wheelchair.  U/A from urology on 12/5 with 20 WBC. Culture in process. One week ago urine was  sterile. CT a/p has been scheduled for 12/18. HH irrigated Crenshaw and it flowed well yesterday but today the physical flow still bad. Only 600 cc went into bag. Day before had 1500 cc. Has not had any fever. No nausea or vomiting. No shortness of breath or cough. Has appetite but unable to eat or drink much. Given how many symptoms patient is experiencing, advised him to go to ER for imaging and blood work. May also be able to provide pain management and a bowel regimen for his constipation. Will follow urine culture result.      2/28/24: Current UTI symptoms- abdominal pain and foul odor. Crensahw last changed 1 week ago. Changed every 2-3 weeks. [facility] doing the exchanges. No current fever. Urine appears darker but not abnormal. Discussed treating based on last urine culture using IV tobramycin. Will reach out to [facility]. Will also discuss bowel regimen with PCP. Has been frequently constipated which is huge risk factor. Will ask HH to stick to biweekly exchange of Crenshaw.      3/14: Received 3 doses of tobramycin. Last urine culture from 3/5 no growth. Feels good today. Still battling constipation. On new bowel regimen. Will adjust with help of PCP. Sees GI next month.     6/14: Here for follow up. Per [facility] was given large dose of amikacin 2 days ago. Currently no UTI symptoms. Still dealing with constipation but more BM than before. Placed on stronger med than Linzess. Crenshaw changed every 2 weeks. No fever but occasional chills. Continues to have generalized pain from chest to pelvis. Asking for MRI as CT have been negative. Will place order today.      Review of Systems:   Negative unless otherwise noted positive-  Gen- Weakness/ Fatigue  Neuro- Confusion  CV- +chest pain   Resp: Cough/ SOB  GI- Nausea/Vomiting; +abdominal pain  Extrem- Pain/Swelling        Objective:     Physical Exam:  General- Patient alert and oriented x3 in NAD  HEENT- PERRLA, EOMI, OP clear  Resp- No increased WOB noted. Not using  accessory muscles.  Extrem- No cyanosis, clubbing, edema.   Skin-  No Jaundice. No visible skin lesions.        Plan:  Complicated UTI (urinary tract infection)  --Continue biweekly Crenshaw exchange   --Have targeted Morganella and Pseudomonas from last positive urine culture with IV amikacin 15 mg/kg x 1 dose   --Currently asymptomatic   --Needs bowel regimen to prevent constipation which is huge risk factor for recurrent UTI  --Follow up with ID as needed      Suprapubic catheter  --Recommend changing at least every 2 weeks to prevent recurrent UTI   --Follow up with urology      Hypertension  Continue current medications. Follow with PCP.     Chest and abdominal pain  Ongoing symptomatology. Unclear etiology. CT always unrevealing. Patient requesting MRI of chest downward. Orders placed.         Face to Face time with patient: 15 minutes   25 minutes of total time spent on the encounter, which includes face to face time and non-face to face time preparing to see the patient (eg, review of tests), Obtaining and/or reviewing separately obtained history, Documenting clinical information in the electronic or other health record, Independently interpreting results (not separately reported) and communicating results to the patient/family/caregiver, or Care coordination (not separately reported).      Each patient to whom he or she provides medical services by telemedicine is:  (1) informed of the relationship between the physician and patient and the respective role of any other health care provider with respect to management of the patient; and (2) notified that he or she may decline to receive medical services by telemedicine and may withdraw from such care at any time.

## 2024-06-18 ENCOUNTER — TELEPHONE (OUTPATIENT)
Dept: INTERNAL MEDICINE | Facility: CLINIC | Age: 34
End: 2024-06-18
Payer: COMMERCIAL

## 2024-06-18 ENCOUNTER — TELEPHONE (OUTPATIENT)
Dept: GASTROENTEROLOGY | Facility: CLINIC | Age: 34
End: 2024-06-18
Payer: COMMERCIAL

## 2024-06-18 NOTE — TELEPHONE ENCOUNTER
Spoke with patient on [phone] in regard to appointment scheduled and  availabilities. Patient states he will keep the virtual appointment scheduled and proceed to the ED if needed.    ----- Message from Dulce Roldan sent at 6/18/2024  2:04 PM CDT -----  Type:  Sooner Apoointment Request    Caller is requesting a sooner appointment.  Caller declined first available appointment listed below.  Caller will not accept being placed on the waitlist and is requesting a message be sent to doctor.  Name of Caller:pt  When is the first available appointment?6/27 Virtual   Symptoms:dark green/black stool/abd pain all over more on the RT side  Would the patient rather a call back or a response via [facility]? call  Best Call Back Number:[phone]  Additional Information: .    Thank you

## 2024-06-18 NOTE — TELEPHONE ENCOUNTER
----- Message from Fidelia Dorado sent at 6/18/2024 11:16 AM CDT -----  [facility] stated pt was unavailable for catheter change last week. Troy rs visit for this week and will change other visits to accommodate pt. Tryo can be reached at [phone].            Thanks  MERCEDEZ

## 2024-06-19 ENCOUNTER — DOCUMENT SCAN (OUTPATIENT)
Dept: HOME HEALTH SERVICES | Facility: HOSPITAL | Age: 34
End: 2024-06-19
Payer: COMMERCIAL

## 2024-06-23 DIAGNOSIS — L21.9 SEBORRHEIC DERMATITIS: ICD-10-CM

## 2024-06-23 NOTE — TELEPHONE ENCOUNTER
No care due was identified.  [facility] Embedded Care Due Messages. Reference number: [id number].   6/23/2024 7:40:40 AM CDT

## 2024-06-24 RX ORDER — TRIAMCINOLONE ACETONIDE 0.25 MG/G
CREAM TOPICAL
Qty: 80 G | Refills: 0 | Status: SHIPPED | OUTPATIENT
Start: 2024-06-24

## 2024-06-27 ENCOUNTER — OFFICE VISIT (OUTPATIENT)
Dept: GASTROENTEROLOGY | Facility: CLINIC | Age: 34
End: 2024-06-27
Payer: COMMERCIAL

## 2024-06-27 VITALS — WEIGHT: 190 LBS | HEIGHT: 69 IN | BODY MASS INDEX: 28.14 KG/M2

## 2024-06-27 DIAGNOSIS — R19.5 DARK STOOLS: ICD-10-CM

## 2024-06-27 DIAGNOSIS — K58.1 IRRITABLE BOWEL SYNDROME WITH CONSTIPATION: Primary | ICD-10-CM

## 2024-06-27 PROCEDURE — 1159F MED LIST DOCD IN RCRD: CPT | Mod: CPTII,95,, | Performed by: INTERNAL MEDICINE

## 2024-06-27 PROCEDURE — 3008F BODY MASS INDEX DOCD: CPT | Mod: CPTII,95,, | Performed by: INTERNAL MEDICINE

## 2024-06-27 PROCEDURE — 99213 OFFICE O/P EST LOW 20 MIN: CPT | Mod: 95,,, | Performed by: INTERNAL MEDICINE

## 2024-06-27 RX ORDER — OXYBUTYNIN CHLORIDE 5 MG/1
5 TABLET ORAL 2 TIMES DAILY
COMMUNITY
Start: 2024-06-24
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
## 2024-07-03 ENCOUNTER — OFFICE VISIT (OUTPATIENT)
Dept: INFECTIOUS DISEASES | Facility: CLINIC | Age: 34
End: 2024-07-03
Payer: COMMERCIAL

## 2024-07-03 ENCOUNTER — DOCUMENTATION ONLY (OUTPATIENT)
Dept: INFECTIOUS DISEASES | Facility: CLINIC | Age: 34
End: 2024-07-03
Payer: COMMERCIAL

## 2024-07-03 ENCOUNTER — APPOINTMENT (OUTPATIENT)
Dept: LAB | Facility: HOSPITAL | Age: 34
End: 2024-07-03
Attending: STUDENT IN AN ORGANIZED HEALTH CARE EDUCATION/TRAINING PROGRAM
Payer: MEDICARE

## 2024-07-03 ENCOUNTER — PATIENT MESSAGE (OUTPATIENT)
Dept: INFECTIOUS DISEASES | Facility: CLINIC | Age: 34
End: 2024-07-03

## 2024-07-03 DIAGNOSIS — S14.109S QUADRIPLEGIA, POST-TRAUMATIC: ICD-10-CM

## 2024-07-03 DIAGNOSIS — R10.9 ABDOMINAL PAIN, UNSPECIFIED ABDOMINAL LOCATION: ICD-10-CM

## 2024-07-03 DIAGNOSIS — N39.0 COMPLICATED UTI (URINARY TRACT INFECTION): Primary | ICD-10-CM

## 2024-07-03 DIAGNOSIS — G82.50 QUADRIPLEGIA, POST-TRAUMATIC: ICD-10-CM

## 2024-07-03 DIAGNOSIS — N39.0 URINARY TRACT INFECTION, SITE NOT SPECIFIED: Primary | ICD-10-CM

## 2024-07-03 DIAGNOSIS — I10 PRIMARY HYPERTENSION: ICD-10-CM

## 2024-07-03 LAB
BACTERIA #/AREA URNS HPF: ABNORMAL /HPF
BILIRUB UR QL STRIP: NEGATIVE
CLARITY UR: ABNORMAL
COLOR UR: YELLOW
GLUCOSE UR QL STRIP: NEGATIVE
HGB UR QL STRIP: NEGATIVE
HYALINE CASTS #/AREA URNS LPF: 0 /LPF
KETONES UR QL STRIP: ABNORMAL
LEUKOCYTE ESTERASE UR QL STRIP: ABNORMAL
MICROSCOPIC COMMENT: ABNORMAL
NITRITE UR QL STRIP: POSITIVE
PH UR STRIP: 7 [PH] (ref 5–8)
PROT UR QL STRIP: ABNORMAL
RBC #/AREA URNS HPF: 6 /HPF (ref 0–4)
SP GR UR STRIP: 1.02 (ref 1–1.03)
SQUAMOUS #/AREA URNS HPF: 5 /HPF
UNIDENT CRYS URNS QL MICRO: ABNORMAL
URN SPEC COLLECT METH UR: ABNORMAL
UROBILINOGEN UR STRIP-ACNC: NEGATIVE EU/DL
WBC #/AREA URNS HPF: 54 /HPF (ref 0–5)

## 2024-07-03 PROCEDURE — 87086 URINE CULTURE/COLONY COUNT: CPT | Performed by: STUDENT IN AN ORGANIZED HEALTH CARE EDUCATION/TRAINING PROGRAM

## 2024-07-03 PROCEDURE — 81000 URINALYSIS NONAUTO W/SCOPE: CPT | Performed by: STUDENT IN AN ORGANIZED HEALTH CARE EDUCATION/TRAINING PROGRAM

## 2024-07-03 PROCEDURE — 87150 DNA/RNA AMPLIFIED PROBE: CPT | Mod: 59 | Performed by: STUDENT IN AN ORGANIZED HEALTH CARE EDUCATION/TRAINING PROGRAM

## 2024-07-03 PROCEDURE — 87077 CULTURE AEROBIC IDENTIFY: CPT | Performed by: STUDENT IN AN ORGANIZED HEALTH CARE EDUCATION/TRAINING PROGRAM

## 2024-07-03 PROCEDURE — 87088 URINE BACTERIA CULTURE: CPT | Performed by: STUDENT IN AN ORGANIZED HEALTH CARE EDUCATION/TRAINING PROGRAM

## 2024-07-03 PROCEDURE — 87186 SC STD MICRODIL/AGAR DIL: CPT | Performed by: STUDENT IN AN ORGANIZED HEALTH CARE EDUCATION/TRAINING PROGRAM

## 2024-07-03 NOTE — PROGRESS NOTES
The patient location is: Home  The chief complaint leading to consultation is: UTI follow up      Visit type: audiovisual     Notes:     Subjective:     HPI: 34 y.o. male with pertinent PMHx of post traumatic quadriplegia, HTN, urinary incontinence leading to chronic suprapubic catheterization and recurrent UTI, and constipation who was evaluated by Infectious Diseases on 7/21/23 after clean catch U/A showed positive nitrites and 12 WBC. Urine culture grew pan R Morganella morganii and  Pseudomonas. Plan was for 2 weeks of targeted IV antibiotics. PICC placed on 7/28. Plan was then to give dose of tobramycin and a course of pip/tazo via PICC line followed by repeat U/A. 8/24, patient states he gets UTI frequently. Changes catheter every 2 weeks at home. Usually gets chills, sweats, abdominal pain, urinary difficulty, and foul odor with UTI. Had these symptoms when July UTI was diagnosed. Currently having abdominal pain and myalgias. Unsure if this is due to unrelated ailment or another UTI. Tolerated pip/tazo other than intermittent loose stools. Has provided new urine sample today. Will see if any pathogens grow and discuss whether further antibiotics are warranted. Patient with provide update if worsening symptoms occur. Scheduled for home catheter change next week. Agreeable to PICC removal today.     Last ID note 11/2: was recently given Fosfomycin for last urine culture.  He is colonized with bacteria and not all the cultures mean a true infection .  WE went through this issue again .  Continue Lithostat /Azo dye as needed.     12/6/23: Today patient reports constipation and pain on both sides of abdomen for a few weeks. Has also struggled to urinate through Crenshaw. Also pain below neck to feet within past week. Mainly a nerve pain especially in right arm. Left side feels more like gas. Also with trapped gas due to wheelchair.  U/A from urology on 12/5 with 20 WBC. Culture in process. One week ago urine was  sterile. CT a/p has been scheduled for 12/18. HH irrigated Crenshaw and it flowed well yesterday but today the physical flow still bad. Only 600 cc went into bag. Day before had 1500 cc. Has not had any fever. No nausea or vomiting. No shortness of breath or cough. Has appetite but unable to eat or drink much. Given how many symptoms patient is experiencing, advised him to go to ER for imaging and blood work. May also be able to provide pain management and a bowel regimen for his constipation. Will follow urine culture result.      2/28/24: Current UTI symptoms- abdominal pain and foul odor. Crenshaw last changed 1 week ago. Changed every 2-3 weeks. [facility] doing the exchanges. No current fever. Urine appears darker but not abnormal. Discussed treating based on last urine culture using IV tobramycin. Will reach out to \[facility]\"". Will also discuss bowel regimen with PCP. Has been frequently constipated which is huge risk factor. Will ask HH to stick to biweekly exchange of Crenshaw.      3/14: Received 3 doses of tobramycin. Last urine culture from 3/5 no growth. Feels good today. Still battling constipation. On new bowel regimen. Will adjust with help of PCP. Sees GI next month.      6/14: Here for follow up. Per Kash was given large dose of amikacin 2 days ago. Currently no UTI symptoms. Still dealing with constipation but more BM than before. Placed on stronger med than Linzess. Crenshaw changed every 2 weeks. No fever but occasional chills. Continues to have generalized pain from chest to pelvis. Asking for MRI as CT have been negative. Will place order today.     7/3: Patient with another pyuria. Is concerned about recurrent infection. Will use meropenem as aggressive treatment. Then may try an oral suppression regimen. Patient in agreement. PICC ordered and \[facility]\"" updated.      Review of Systems:   Negative unless otherwise noted positive-  Gen- Weakness/ Fatigue  Neuro- Confusion  CV- chest pain   Resp: Cough/  SOB  GI- Nausea/Vomiting; +abdominal pain  Extrem- Pain/Swelling        Objective:     Physical Exam:  General- Patient alert and oriented x3 in NAD  HEENT- PERRLA, EOMI, OP clear  Resp- No increased WOB noted. Not using accessory muscles.  Extrem- No cyanosis, clubbing, edema.   Skin-  No Jaundice. No visible skin lesions.        Plan:  Complicated UTI (urinary tract infection)  --Continue biweekly Crenshaw exchange   --Have targeted Morganella and Pseudomonas from last positive urine culture with IV amikacin 15 mg/kg x 1 dose   --Currently concerned infection still present    --Will use meropenem for 14 days   --If repeat culture shows more susceptible isolate will start chronic oral suppression following IV   --Needs bowel regimen to prevent constipation which is huge risk factor for recurrent UTI  --Follow up with me in 2 weeks     Outpatient Antibiotic Therapy Plan:     1) Infection: Complicated UTI      2) Antibiotics:     Intravenous antibiotics:  IV meropenem 2 g Q12H      3) Therapy Duration:  14 days      Estimated end date of IV antibiotics: 7/18/24     4) Outpatient Weekly Labs:     Order the following labs to be drawn on Mondays:   CBC  CMP      5) Fax Lab Results to Infectious Diseases Provider: [facility] Fax Number: [phone]     Suprapubic catheter  --Recommend changing at least every 2 weeks to prevent recurrent UTI   --Follow up with urology      Hypertension  Continue current medications. Follow with PCP.      Chest and abdominal pain  Ongoing symptomatology. Unclear etiology. CT always unrevealing. Patient requesting MRI of chest downward. Orders placed.         Face to Face time with patient: 10 minutes   20 minutes of total time spent on the encounter, which includes face to face time and non-face to face time preparing to see the patient (eg, review of tests), Obtaining and/or reviewing separately obtained history, Documenting clinical information in the electronic or  other health record, Independently interpreting results (not separately reported) and communicating results to the patient/family/caregiver, or Care coordination (not separately reported).      Each patient to whom he or she provides medical services by telemedicine is:  (1) informed of the relationship between the physician and patient and the respective role of any other health care provider with respect to management of the patient; and (2) notified that he or she may decline to receive medical services by telemedicine and may withdraw from such care at any time.

## 2024-07-03 NOTE — H&P (VIEW-ONLY)
The patient location is: Home  The chief complaint leading to consultation is: UTI follow up      Visit type: audiovisual     Notes:     Subjective:     HPI: 34 y.o. male with pertinent PMHx of post traumatic quadriplegia, HTN, urinary incontinence leading to chronic suprapubic catheterization and recurrent UTI, and constipation who was evaluated by Infectious Diseases on 7/21/23 after clean catch U/A showed positive nitrites and 12 WBC. Urine culture grew pan R Morganella morganii and  Pseudomonas. Plan was for 2 weeks of targeted IV antibiotics. PICC placed on 7/28. Plan was then to give dose of tobramycin and a course of pip/tazo via PICC line followed by repeat U/A. 8/24, patient states he gets UTI frequently. Changes catheter every 2 weeks at home. Usually gets chills, sweats, abdominal pain, urinary difficulty, and foul odor with UTI. Had these symptoms when July UTI was diagnosed. Currently having abdominal pain and myalgias. Unsure if this is due to unrelated ailment or another UTI. Tolerated pip/tazo other than intermittent loose stools. Has provided new urine sample today. Will see if any pathogens grow and discuss whether further antibiotics are warranted. Patient with provide update if worsening symptoms occur. Scheduled for home catheter change next week. Agreeable to PICC removal today.     Last ID note 11/2: was recently given Fosfomycin for last urine culture.  He is colonized with bacteria and not all the cultures mean a true infection .  WE went through this issue again .  Continue Lithostat /Azo dye as needed.     12/6/23: Today patient reports constipation and pain on both sides of abdomen for a few weeks. Has also struggled to urinate through [place]. Also pain below neck to feet within past week. Mainly a nerve pain especially in right arm. Left side feels more like gas. Also with trapped gas due to wheelchair.  U/A from urology on 12/5 with 20 WBC. Culture in process. One week ago urine was  sterile. CT a/p has been scheduled for 12/18. HH irrigated Crenshaw and it flowed well yesterday but today the physical flow still bad. Only 600 cc went into bag. Day before had 1500 cc. Has not had any fever. No nausea or vomiting. No shortness of breath or cough. Has appetite but unable to eat or drink much. Given how many symptoms patient is experiencing, advised him to go to ER for imaging and blood work. May also be able to provide pain management and a bowel regimen for his constipation. Will follow urine culture result.      2/28/24: Current UTI symptoms- abdominal pain and foul odor. Crenshaw last changed 1 week ago. Changed every 2-3 weeks. [facility] doing the exchanges. No current fever. Urine appears darker but not abnormal. Discussed treating based on last urine culture using IV tobramycin. Will reach out to [facility]. Will also discuss bowel regimen with PCP. Has been frequently constipated which is huge risk factor. Will ask HH to stick to biweekly exchange of Crenshaw.      3/14: Received 3 doses of tobramycin. Last urine culture from 3/5 no growth. Feels good today. Still battling constipation. On new bowel regimen. Will adjust with help of PCP. Sees GI next month.      6/14: Here for follow up. Per Kash was given large dose of amikacin 2 days ago. Currently no UTI symptoms. Still dealing with constipation but more BM than before. Placed on stronger med than Linzess. Crenshaw changed every 2 weeks. No fever but occasional chills. Continues to have generalized pain from chest to pelvis. Asking for MRI as CT have been negative. Will place order today.     7/3: Patient with another pyuria. Is concerned about recurrent infection. Will use meropenem as aggressive treatment. Then may try an oral suppression regimen. Patient in agreement. PICC ordered and [facility] updated.      Review of Systems:   Negative unless otherwise noted positive-  Gen- Weakness/ Fatigue  Neuro- Confusion  CV- chest pain   Resp: Cough/  SOB  GI- Nausea/Vomiting; +abdominal pain  Extrem- Pain/Swelling        Objective:     Physical Exam:  General- Patient alert and oriented x3 in NAD  HEENT- PERRLA, EOMI, OP clear  Resp- No increased WOB noted. Not using accessory muscles.  Extrem- No cyanosis, clubbing, edema.   Skin-  No Jaundice. No visible skin lesions.        Plan:  Complicated UTI (urinary tract infection)  --Continue biweekly Crenshaw exchange   --Have targeted Morganella and Pseudomonas from last positive urine culture with IV amikacin 15 mg/kg x 1 dose   --Currently concerned infection still present    --Will use meropenem for 14 days   --If repeat culture shows more susceptible isolate will start chronic oral suppression following IV   --Needs bowel regimen to prevent constipation which is huge risk factor for recurrent UTI  --Follow up with me in 2 weeks     Outpatient Antibiotic Therapy Plan:     1) Infection: Complicated UTI      2) Antibiotics:     Intravenous antibiotics:  IV meropenem 2 g Q12H      3) Therapy Duration:  14 days      Estimated end date of IV antibiotics: 7/18/24     4) Outpatient Weekly Labs:     Order the following labs to be drawn on Mondays:   CBC  CMP      5) Fax Lab Results to Infectious Diseases Provider: [facility] Fax Number: [phone]     Suprapubic catheter  --Recommend changing at least every 2 weeks to prevent recurrent UTI   --Follow up with urology      Hypertension  Continue current medications. Follow with PCP.      Chest and abdominal pain  Ongoing symptomatology. Unclear etiology. CT always unrevealing. Patient requesting MRI of chest downward. Orders placed.         Face to Face time with patient: 10 minutes   20 minutes of total time spent on the encounter, which includes face to face time and non-face to face time preparing to see the patient (eg, review of tests), Obtaining and/or reviewing separately obtained history, Documenting clinical information in the electronic or  other health record, Independently interpreting results (not separately reported) and communicating results to the patient/family/caregiver, or Care coordination (not separately reported).      Each patient to whom he or she provides medical services by telemedicine is:  (1) informed of the relationship between the physician and patient and the respective role of any other health care provider with respect to management of the patient; and (2) notified that he or she may decline to receive medical services by telemedicine and may withdraw from such care at any time.

## 2024-07-03 NOTE — PROGRESS NOTES
Patient has been reporting constant urinary symptoms and concerned about pyuria for many months. Has tried multiple courses of both PO and IV antibiotics dosed intermittently, most recently amikacin. Will try to set up for OPAT at home using meropenem based on most recent resistant Pseudomonas isolate. PICC ordered.    Outpatient Antibiotic Therapy Plan:    1) Infection: Complicated UTI     2) Antibiotics:    Intravenous antibiotics:  IV meropenem 2 g Q12H     3) Therapy Duration:  14 days     Estimated end date of IV antibiotics: 7/18/24    4) Outpatient Weekly Labs:    Order the following labs to be drawn on Mondays:   CBC  CMP     5) Fax Lab Results to Infectious Diseases Provider: [facility] Fax Number: [phone]

## 2024-07-05 ENCOUNTER — HOSPITAL ENCOUNTER (OUTPATIENT)
Dept: RADIOLOGY | Facility: HOSPITAL | Age: 34
Discharge: HOME | End: 2024-07-05
Attending: STUDENT IN AN ORGANIZED HEALTH CARE EDUCATION/TRAINING PROGRAM
Payer: COMMERCIAL

## 2024-07-05 ENCOUNTER — OFFICE VISIT (OUTPATIENT)
Dept: INTERNAL MEDICINE | Facility: CLINIC | Age: 34
End: 2024-07-05
Payer: COMMERCIAL

## 2024-07-05 DIAGNOSIS — Z93.59 SUPRAPUBIC CATHETER: Primary | ICD-10-CM

## 2024-07-05 DIAGNOSIS — N31.9 NEUROGENIC BLADDER: ICD-10-CM

## 2024-07-05 DIAGNOSIS — G82.50 QUADRIPLEGIA, POST-TRAUMATIC: ICD-10-CM

## 2024-07-05 DIAGNOSIS — G47.00 INSOMNIA, UNSPECIFIED TYPE: ICD-10-CM

## 2024-07-05 DIAGNOSIS — M79.89 LEG SWELLING: ICD-10-CM

## 2024-07-05 DIAGNOSIS — N39.0 COMPLICATED UTI (URINARY TRACT INFECTION): ICD-10-CM

## 2024-07-05 DIAGNOSIS — S14.109S QUADRIPLEGIA, POST-TRAUMATIC: ICD-10-CM

## 2024-07-05 DIAGNOSIS — G89.29 OTHER CHRONIC PAIN: ICD-10-CM

## 2024-07-05 DIAGNOSIS — K21.9 GASTRO-ESOPHAGEAL REFLUX DISEASE WITHOUT ESOPHAGITIS: ICD-10-CM

## 2024-07-05 PROCEDURE — C1751 CATH, INF, PER/CENT/MIDLINE: HCPCS

## 2024-07-05 PROCEDURE — 36573 INSJ PICC RS&I 5 YR+: CPT

## 2024-07-05 PROCEDURE — 36573 INSJ PICC RS&I 5 YR+: CPT | Mod: ,,, | Performed by: RADIOLOGY

## 2024-07-05 RX ORDER — FUROSEMIDE 20 MG/1
20 TABLET ORAL 2 TIMES DAILY
Qty: 180 TABLET | Refills: 1 | Status: SHIPPED | OUTPATIENT
Start: 2024-07-05

## 2024-07-05 RX ORDER — POTASSIUM CHLORIDE 20 MEQ/1
20 TABLET, EXTENDED RELEASE ORAL DAILY
Qty: 90 TABLET | Refills: 1 | Status: SHIPPED | OUTPATIENT
Start: 2024-07-05

## 2024-07-05 NOTE — INTERVAL H&P NOTE
The patient has been examined and the H&P has been reviewed:    I concur with the findings and no changes have occurred since H&P was written.    Plan for PICC line placement    There are no hospital problems to display for this patient.
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
## 2024-07-05 NOTE — DISCHARGE SUMMARY
[facility])  Discharge Note  Short Stay    FL PICC Line Placement w/o Port w/ Img > 4 Y/O      OUTCOME: Patient tolerated treatment/procedure well without complication and is now ready for discharge.    DISPOSITION: Home or Self Care    FINAL DIAGNOSIS:  <principal problem not specified>    FOLLOWUP: In clinic    DISCHARGE INSTRUCTIONS:  No discharge procedures on file.     TIME SPENT ON DISCHARGE: 10 minutes    Date:  07/05/2024    Pre Op Diagnosis: osteomyelitis     Post Op Diagnosis: same     Procedure:  RUE PICC line insertion     Procedure performed by: Delmis ALEJANDRA, Kyle DURAN     Written Informed Consent Obtained: Yes      Estimated Blood Loss:  minimal     Findings: Local anesthesia.     The patient tolerated the procedure well and there were no complications.      Disposition: F/U in clinic or with ordering physician    Discharge instructions: Light activity for 24 hours.  Remove bandage in 24 hours.  No baths for 24 hours; showers are appropriate    The RUE was sterilely prepped and draped.  Lidocaine was used as a local anesthetic.  Using US guidance a 4 [place] 37 cm double lumen Power PICC was placed into the brachial vein into the SVC/right atrium junction.  Placement was verified using X Ray.  PICC was secured in place with attachment device and is ready to use.  Pt tolerated procedure well without immediate complications.    Time Spent on discharge:  10 mins

## 2024-07-08 ENCOUNTER — PATIENT MESSAGE (OUTPATIENT)
Dept: INTERNAL MEDICINE | Facility: CLINIC | Age: 34
End: 2024-07-08
Payer: MEDICARE

## 2024-07-08 ENCOUNTER — PATIENT MESSAGE (OUTPATIENT)
Dept: INFECTIOUS DISEASES | Facility: CLINIC | Age: 34
End: 2024-07-08
Payer: MEDICARE

## 2024-07-08 LAB — BACTERIA UR CULT: ABNORMAL

## 2024-07-10 ENCOUNTER — EXTERNAL HOME HEALTH (OUTPATIENT)
Dept: HOME HEALTH SERVICES | Facility: HOSPITAL | Age: 34
End: 2024-07-10
Payer: MEDICARE

## 2024-07-10 ENCOUNTER — PATIENT MESSAGE (OUTPATIENT)
Dept: GASTROENTEROLOGY | Facility: CLINIC | Age: 34
End: 2024-07-10
Payer: MEDICARE

## 2024-07-11 ENCOUNTER — HOSPITAL ENCOUNTER (EMERGENCY)
Facility: HOSPITAL | Age: 34
Discharge: HOME | End: 2024-07-11
Attending: EMERGENCY MEDICINE
Payer: MEDICARE

## 2024-07-11 VITALS
TEMPERATURE: 98 F | BODY MASS INDEX: 26.58 KG/M2 | DIASTOLIC BLOOD PRESSURE: 62 MMHG | SYSTOLIC BLOOD PRESSURE: 112 MMHG | OXYGEN SATURATION: 98 % | HEART RATE: 76 BPM | WEIGHT: 180 LBS | RESPIRATION RATE: 16 BRPM

## 2024-07-11 DIAGNOSIS — R07.9 CHEST PAIN: ICD-10-CM

## 2024-07-11 DIAGNOSIS — M79.10 MYALGIA: ICD-10-CM

## 2024-07-11 DIAGNOSIS — Z93.59 SUPRAPUBIC CATHETER: ICD-10-CM

## 2024-07-11 DIAGNOSIS — R14.0 ABDOMINAL BLOATING: Primary | ICD-10-CM

## 2024-07-11 LAB
ALBUMIN SERPL BCP-MCNC: 3.6 G/DL (ref 3.5–5.2)
ALP SERPL-CCNC: 89 U/L (ref 55–135)
ALT SERPL W/O P-5'-P-CCNC: 16 U/L (ref 10–44)
ANION GAP SERPL CALC-SCNC: 11 MMOL/L (ref 8–16)
AST SERPL-CCNC: 17 U/L (ref 10–40)
BASOPHILS # BLD AUTO: 0.01 K/UL (ref 0–0.2)
BASOPHILS NFR BLD: 0.2 % (ref 0–1.9)
BILIRUB SERPL-MCNC: 0.6 MG/DL (ref 0.1–1)
BNP SERPL-MCNC: <10 PG/ML (ref 0–99)
BUN SERPL-MCNC: 5 MG/DL (ref 6–20)
CALCIUM SERPL-MCNC: 9.3 MG/DL (ref 8.7–10.5)
CHLORIDE SERPL-SCNC: 102 MMOL/L (ref 95–110)
CO2 SERPL-SCNC: 28 MMOL/L (ref 23–29)
CREAT SERPL-MCNC: 0.7 MG/DL (ref 0.5–1.4)
DIFFERENTIAL METHOD BLD: NORMAL
EOSINOPHIL # BLD AUTO: 0.1 K/UL (ref 0–0.5)
EOSINOPHIL NFR BLD: 3.1 % (ref 0–8)
ERYTHROCYTE [DISTWIDTH] IN BLOOD BY AUTOMATED COUNT: 13.1 % (ref 11.5–14.5)
EST. GFR  (NO RACE VARIABLE): >60 ML/MIN/1.73 M^2
GLUCOSE SERPL-MCNC: 92 MG/DL (ref 70–110)
HCT VFR BLD AUTO: 43.6 % (ref 40–54)
HGB BLD-MCNC: 14.4 G/DL (ref 14–18)
IMM GRANULOCYTES # BLD AUTO: 0.01 K/UL (ref 0–0.04)
IMM GRANULOCYTES NFR BLD AUTO: 0.2 % (ref 0–0.5)
LACTATE SERPL-SCNC: 1.3 MMOL/L (ref 0.5–2.2)
LYMPHOCYTES # BLD AUTO: 1.8 K/UL (ref 1–4.8)
LYMPHOCYTES NFR BLD: 40 % (ref 18–48)
MCH RBC QN AUTO: 29 PG (ref 27–31)
MCHC RBC AUTO-ENTMCNC: 33 G/DL (ref 32–36)
MCV RBC AUTO: 88 FL (ref 82–98)
MONOCYTES # BLD AUTO: 0.4 K/UL (ref 0.3–1)
MONOCYTES NFR BLD: 7.9 % (ref 4–15)
NEUTROPHILS # BLD AUTO: 2.2 K/UL (ref 1.8–7.7)
NEUTROPHILS NFR BLD: 48.6 % (ref 38–73)
NRBC BLD-RTO: 0 /100 WBC
PLATELET # BLD AUTO: 202 K/UL (ref 150–450)
PMV BLD AUTO: 10.7 FL (ref 9.2–12.9)
POTASSIUM SERPL-SCNC: 3.5 MMOL/L (ref 3.5–5.1)
PROCALCITONIN SERPL IA-MCNC: 0.02 NG/ML
PROT SERPL-MCNC: 7 G/DL (ref 6–8.4)
RBC # BLD AUTO: 4.97 M/UL (ref 4.6–6.2)
SODIUM SERPL-SCNC: 141 MMOL/L (ref 136–145)
TROPONIN I SERPL DL<=0.01 NG/ML-MCNC: 0.01 NG/ML (ref 0–0.03)
TROPONIN I SERPL DL<=0.01 NG/ML-MCNC: <0.006 NG/ML (ref 0–0.03)
TROPONIN I SERPL DL<=0.01 NG/ML-MCNC: <0.006 NG/ML (ref 0–0.03)
WBC # BLD AUTO: 4.57 K/UL (ref 3.9–12.7)

## 2024-07-11 PROCEDURE — 84484 ASSAY OF TROPONIN QUANT: CPT | Mod: HCNC | Performed by: REGISTERED NURSE

## 2024-07-11 PROCEDURE — 84145 PROCALCITONIN (PCT): CPT | Mod: HCNC | Performed by: EMERGENCY MEDICINE

## 2024-07-11 PROCEDURE — 96360 HYDRATION IV INFUSION INIT: CPT | Mod: HCNC

## 2024-07-11 PROCEDURE — 63600175 PHARM REV CODE 636 W HCPCS: Mod: HCNC | Performed by: EMERGENCY MEDICINE

## 2024-07-11 PROCEDURE — 25000003 PHARM REV CODE 250: Mod: HCNC | Performed by: EMERGENCY MEDICINE

## 2024-07-11 PROCEDURE — 83880 ASSAY OF NATRIURETIC PEPTIDE: CPT | Mod: HCNC | Performed by: REGISTERED NURSE

## 2024-07-11 PROCEDURE — 84484 ASSAY OF TROPONIN QUANT: CPT | Mod: 91,HCNC | Performed by: EMERGENCY MEDICINE

## 2024-07-11 PROCEDURE — 93005 ELECTROCARDIOGRAM TRACING: CPT | Mod: HCNC

## 2024-07-11 PROCEDURE — 83605 ASSAY OF LACTIC ACID: CPT | Mod: HCNC | Performed by: EMERGENCY MEDICINE

## 2024-07-11 PROCEDURE — 80053 COMPREHEN METABOLIC PANEL: CPT | Mod: HCNC | Performed by: REGISTERED NURSE

## 2024-07-11 PROCEDURE — 85025 COMPLETE CBC W/AUTO DIFF WBC: CPT | Mod: HCNC | Performed by: REGISTERED NURSE

## 2024-07-11 PROCEDURE — 99285 EMERGENCY DEPT VISIT HI MDM: CPT | Mod: 25,HCNC

## 2024-07-11 PROCEDURE — 93010 ELECTROCARDIOGRAM REPORT: CPT | Mod: HCNC,,, | Performed by: INTERNAL MEDICINE

## 2024-07-11 RX ORDER — ALUMINUM HYDROXIDE, MAGNESIUM HYDROXIDE, AND SIMETHICONE 1200; 120; 1200 MG/30ML; MG/30ML; MG/30ML
30 SUSPENSION ORAL ONCE
Status: COMPLETED | OUTPATIENT
Start: 2024-07-11 | End: 2024-07-11

## 2024-07-11 RX ORDER — LIDOCAINE HYDROCHLORIDE 20 MG/ML
15 SOLUTION OROPHARYNGEAL ONCE
Status: COMPLETED | OUTPATIENT
Start: 2024-07-11 | End: 2024-07-11

## 2024-07-11 RX ADMIN — SODIUM CHLORIDE, POTASSIUM CHLORIDE, SODIUM LACTATE AND CALCIUM CHLORIDE 1000 ML: 600; 310; 30; 20 INJECTION, SOLUTION INTRAVENOUS at 05:07

## 2024-07-11 RX ADMIN — LIDOCAINE HYDROCHLORIDE 15 ML: 20 SOLUTION ORAL at 05:07

## 2024-07-11 RX ADMIN — ALUMINUM HYDROXIDE, MAGNESIUM HYDROXIDE, AND SIMETHICONE 30 ML: 1200; 120; 1200 SUSPENSION ORAL at 05:07

## 2024-07-11 NOTE — FIRST PROVIDER EVALUATION
Medical screening examination initiated.  I have conducted a focused provider triage encounter, findings are as follows:    Brief history of present illness:  Chest pain    There were no vitals filed for this visit.    Pertinent physical exam:  No acute distress, patient wheelchair-bound    Brief workup plan:  Cardiac workup    Preliminary workup initiated; this workup will be continued and followed by the physician or advanced practice provider that is assigned to the patient when roomed.
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
## 2024-07-12 ENCOUNTER — PATIENT OUTREACH (OUTPATIENT)
Dept: EMERGENCY MEDICINE | Facility: HOSPITAL | Age: 34
End: 2024-07-12
Payer: MEDICARE

## 2024-07-12 LAB
OHS QRS DURATION: 84 MS
OHS QTC CALCULATION: 408 MS

## 2024-07-12 RX ORDER — AMITRIPTYLINE HYDROCHLORIDE 25 MG/1
25 TABLET, FILM COATED ORAL NIGHTLY PRN
Qty: 90 TABLET | Refills: 1 | Status: SHIPPED | OUTPATIENT
Start: 2024-07-12 | End: 2025-07-12

## 2024-07-12 NOTE — TELEPHONE ENCOUNTER
No care due was identified.  [facility] Embedded Care Due Messages. Reference number: [id number].   7/12/2024 2:17:17 PM CDT

## 2024-07-12 NOTE — TELEPHONE ENCOUNTER
----- Message from Ruba Juan C sent at 7/12/2024  2:03 PM CDT -----  Contact: Kate  .Type:  RX Refill Request    Who Called: Kate   Refill or New Rx:refill   RX Name and Strength:amitriptyline (ELAVIL) 25 MG tablet  How is the patient currently taking it? (ex. 1XDay):as prescribed   Is this a 30 day or 90 day RX:30 days   Preferred Pharmacy with phone number:.  [facility] [address] [place] [facility] [address] [facility]  [address] [facility] [address]  Phone: [phone] Fax: [phone]  Local or Mail Order:local   Ordering Provider:Dr. Burton   Would the patient rather a call back or a response via [facility]?call   Best Call Back Number:.[phone]   Additional Information: Pt requesting medication

## 2024-07-16 ENCOUNTER — DOCUMENT SCAN (OUTPATIENT)
Dept: HOME HEALTH SERVICES | Facility: HOSPITAL | Age: 34
End: 2024-07-16
Payer: MEDICARE

## 2024-07-16 ENCOUNTER — LAB VISIT (OUTPATIENT)
Dept: LAB | Facility: HOSPITAL | Age: 34
End: 2024-07-16
Attending: STUDENT IN AN ORGANIZED HEALTH CARE EDUCATION/TRAINING PROGRAM
Payer: MEDICARE

## 2024-07-16 DIAGNOSIS — N39.0 URINARY TRACT INFECTION, SITE NOT SPECIFIED: Primary | ICD-10-CM

## 2024-07-16 LAB
ALBUMIN SERPL BCP-MCNC: 3.5 G/DL (ref 3.5–5.2)
ALP SERPL-CCNC: 114 U/L (ref 55–135)
ALT SERPL W/O P-5'-P-CCNC: 15 U/L (ref 10–44)
ANION GAP SERPL CALC-SCNC: 8 MMOL/L (ref 8–16)
AST SERPL-CCNC: 13 U/L (ref 10–40)
BASOPHILS # BLD AUTO: 0.02 K/UL (ref 0–0.2)
BASOPHILS NFR BLD: 0.3 % (ref 0–1.9)
BILIRUB SERPL-MCNC: 0.3 MG/DL (ref 0.1–1)
BUN SERPL-MCNC: 10 MG/DL (ref 6–20)
CALCIUM SERPL-MCNC: 9.2 MG/DL (ref 8.7–10.5)
CHLORIDE SERPL-SCNC: 106 MMOL/L (ref 95–110)
CO2 SERPL-SCNC: 27 MMOL/L (ref 23–29)
CREAT SERPL-MCNC: 0.7 MG/DL (ref 0.5–1.4)
CRP SERPL-MCNC: 2.7 MG/L (ref 0–8.2)
DIFFERENTIAL METHOD BLD: NORMAL
EOSINOPHIL # BLD AUTO: 0.2 K/UL (ref 0–0.5)
EOSINOPHIL NFR BLD: 3 % (ref 0–8)
ERYTHROCYTE [DISTWIDTH] IN BLOOD BY AUTOMATED COUNT: 13.2 % (ref 11.5–14.5)
EST. GFR  (NO RACE VARIABLE): >60 ML/MIN/1.73 M^2
GLUCOSE SERPL-MCNC: 92 MG/DL (ref 70–110)
HCT VFR BLD AUTO: 43 % (ref 40–54)
HGB BLD-MCNC: 14 G/DL (ref 14–18)
IMM GRANULOCYTES # BLD AUTO: 0.02 K/UL (ref 0–0.04)
IMM GRANULOCYTES NFR BLD AUTO: 0.3 % (ref 0–0.5)
LYMPHOCYTES # BLD AUTO: 1.9 K/UL (ref 1–4.8)
LYMPHOCYTES NFR BLD: 29.5 % (ref 18–48)
MCH RBC QN AUTO: 28.6 PG (ref 27–31)
MCHC RBC AUTO-ENTMCNC: 32.6 G/DL (ref 32–36)
MCV RBC AUTO: 88 FL (ref 82–98)
MONOCYTES # BLD AUTO: 0.5 K/UL (ref 0.3–1)
MONOCYTES NFR BLD: 7.6 % (ref 4–15)
NEUTROPHILS # BLD AUTO: 3.8 K/UL (ref 1.8–7.7)
NEUTROPHILS NFR BLD: 59.3 % (ref 38–73)
NRBC BLD-RTO: 0 /100 WBC
PLATELET # BLD AUTO: 208 K/UL (ref 150–450)
PMV BLD AUTO: 11.3 FL (ref 9.2–12.9)
POTASSIUM SERPL-SCNC: 4 MMOL/L (ref 3.5–5.1)
PROT SERPL-MCNC: 6.6 G/DL (ref 6–8.4)
RBC # BLD AUTO: 4.9 M/UL (ref 4.6–6.2)
SODIUM SERPL-SCNC: 141 MMOL/L (ref 136–145)
WBC # BLD AUTO: 6.33 K/UL (ref 3.9–12.7)

## 2024-07-16 PROCEDURE — 86140 C-REACTIVE PROTEIN: CPT | Mod: HCNC | Performed by: STUDENT IN AN ORGANIZED HEALTH CARE EDUCATION/TRAINING PROGRAM

## 2024-07-16 PROCEDURE — 36415 COLL VENOUS BLD VENIPUNCTURE: CPT | Mod: HCNC | Performed by: STUDENT IN AN ORGANIZED HEALTH CARE EDUCATION/TRAINING PROGRAM

## 2024-07-16 PROCEDURE — 80053 COMPREHEN METABOLIC PANEL: CPT | Mod: HCNC | Performed by: STUDENT IN AN ORGANIZED HEALTH CARE EDUCATION/TRAINING PROGRAM

## 2024-07-16 PROCEDURE — 85025 COMPLETE CBC W/AUTO DIFF WBC: CPT | Mod: HCNC | Performed by: STUDENT IN AN ORGANIZED HEALTH CARE EDUCATION/TRAINING PROGRAM

## 2024-07-20 DIAGNOSIS — L21.9 SEBORRHEIC DERMATITIS: ICD-10-CM

## 2024-07-20 NOTE — TELEPHONE ENCOUNTER
No care due was identified.  [facility] Embedded Care Due Messages. Reference number: [id number].   7/20/2024 6:41:12 AM CDT

## 2024-07-23 ENCOUNTER — TELEPHONE (OUTPATIENT)
Dept: INFECTIOUS DISEASES | Facility: CLINIC | Age: 34
End: 2024-07-23
Payer: MEDICARE

## 2024-07-23 DIAGNOSIS — L21.9 SEBORRHEIC DERMATITIS: ICD-10-CM

## 2024-07-23 NOTE — TELEPHONE ENCOUNTER
Spoke with Agustina from [facility] , she stated patient missed 2 doses and she didn't pull picc per ordered to

## 2024-07-23 NOTE — TELEPHONE ENCOUNTER
----- Message from Jessica Mills sent at 7/23/2024 10:12 AM CDT -----  Contact: [email or web address] @[email or web address] health nurse [phone]  Would like to receive medical advice.    Would they like a call back or a response via [facility]:  call back    Additional information:  Calling to speak with the office about the pt not completing his meds yet.

## 2024-07-23 NOTE — TELEPHONE ENCOUNTER
No care due was identified.  [facility] Embedded Care Due Messages. Reference number: [id number].   7/23/2024 1:38:05 PM CDT

## 2024-07-24 RX ORDER — TRIAMCINOLONE ACETONIDE 0.25 MG/G
CREAM TOPICAL
Qty: 80 G | Refills: 0 | Status: SHIPPED | OUTPATIENT
Start: 2024-07-24

## 2024-07-30 ENCOUNTER — PATIENT MESSAGE (OUTPATIENT)
Dept: INFECTIOUS DISEASES | Facility: CLINIC | Age: 34
End: 2024-07-30
Payer: MEDICARE

## 2024-07-30 ENCOUNTER — PATIENT MESSAGE (OUTPATIENT)
Dept: INTERNAL MEDICINE | Facility: CLINIC | Age: 34
End: 2024-07-30
Payer: MEDICARE

## 2024-07-30 ENCOUNTER — TELEPHONE (OUTPATIENT)
Dept: INFECTIOUS DISEASES | Facility: CLINIC | Age: 34
End: 2024-07-30
Payer: MEDICARE

## 2024-07-30 DIAGNOSIS — I82.621 ACUTE DEEP VEIN THROMBOSIS (DVT) OF OTHER VEIN OF RIGHT UPPER EXTREMITY: Primary | ICD-10-CM

## 2024-07-30 NOTE — TELEPHONE ENCOUNTER
Patient stated that he was having complications after picc removal, doc ordered a ultrasound and contacted his pcp regarding bowel movement regimen

## 2024-08-01 ENCOUNTER — TELEPHONE (OUTPATIENT)
Dept: INFECTIOUS DISEASES | Facility: CLINIC | Age: 34
End: 2024-08-01
Payer: MEDICARE

## 2024-08-01 ENCOUNTER — PATIENT MESSAGE (OUTPATIENT)
Dept: INFECTIOUS DISEASES | Facility: CLINIC | Age: 34
End: 2024-08-01
Payer: MEDICARE

## 2024-08-01 DIAGNOSIS — N39.0 COMPLICATED UTI (URINARY TRACT INFECTION): Primary | ICD-10-CM

## 2024-08-01 NOTE — TELEPHONE ENCOUNTER
Spoke with patient about abdominal pain, constipation, foul urine order. Iv antibiotics didn't work

## 2024-08-02 ENCOUNTER — TELEPHONE (OUTPATIENT)
Dept: INTERNAL MEDICINE | Facility: CLINIC | Age: 34
End: 2024-08-02
Payer: MEDICARE

## 2024-08-02 ENCOUNTER — HOSPITAL ENCOUNTER (OUTPATIENT)
Dept: RADIOLOGY | Facility: HOSPITAL | Age: 34
Discharge: HOME | End: 2024-08-02
Attending: STUDENT IN AN ORGANIZED HEALTH CARE EDUCATION/TRAINING PROGRAM
Payer: MEDICARE

## 2024-08-02 DIAGNOSIS — I82.621 ACUTE DEEP VEIN THROMBOSIS (DVT) OF OTHER VEIN OF RIGHT UPPER EXTREMITY: ICD-10-CM

## 2024-08-02 LAB
ALBUMIN SERPL BCP-MCNC: 3.5 G/DL (ref 3.5–5.2)
ALP SERPL-CCNC: 104 U/L (ref 55–135)
ALT SERPL W/O P-5'-P-CCNC: 15 U/L (ref 10–44)
ANION GAP SERPL CALC-SCNC: 11 MMOL/L (ref 8–16)
AST SERPL-CCNC: 13 U/L (ref 10–40)
BASOPHILS # BLD AUTO: 0.02 K/UL (ref 0–0.2)
BASOPHILS NFR BLD: 0.4 % (ref 0–1.9)
BILIRUB SERPL-MCNC: 0.4 MG/DL (ref 0.1–1)
BNP SERPL-MCNC: <10 PG/ML (ref 0–99)
BUN SERPL-MCNC: 9 MG/DL (ref 6–20)
CALCIUM SERPL-MCNC: 8.9 MG/DL (ref 8.7–10.5)
CHLORIDE SERPL-SCNC: 107 MMOL/L (ref 95–110)
CO2 SERPL-SCNC: 23 MMOL/L (ref 23–29)
CREAT SERPL-MCNC: 0.9 MG/DL (ref 0.5–1.4)
DIFFERENTIAL METHOD BLD: NORMAL
EOSINOPHIL # BLD AUTO: 0.3 K/UL (ref 0–0.5)
EOSINOPHIL NFR BLD: 5.6 % (ref 0–8)
ERYTHROCYTE [DISTWIDTH] IN BLOOD BY AUTOMATED COUNT: 13.2 % (ref 11.5–14.5)
EST. GFR  (NO RACE VARIABLE): >60 ML/MIN/1.73 M^2
GLUCOSE SERPL-MCNC: 97 MG/DL (ref 70–110)
HCT VFR BLD AUTO: 43.1 % (ref 40–54)
HGB BLD-MCNC: 14.3 G/DL (ref 14–18)
IMM GRANULOCYTES # BLD AUTO: 0.01 K/UL (ref 0–0.04)
IMM GRANULOCYTES NFR BLD AUTO: 0.2 % (ref 0–0.5)
LACTATE SERPL-SCNC: 1 MMOL/L (ref 0.5–2.2)
LYMPHOCYTES # BLD AUTO: 2.4 K/UL (ref 1–4.8)
LYMPHOCYTES NFR BLD: 45.4 % (ref 18–48)
MCH RBC QN AUTO: 29 PG (ref 27–31)
MCHC RBC AUTO-ENTMCNC: 33.2 G/DL (ref 32–36)
MCV RBC AUTO: 87 FL (ref 82–98)
MONOCYTES # BLD AUTO: 0.4 K/UL (ref 0.3–1)
MONOCYTES NFR BLD: 8.1 % (ref 4–15)
NEUTROPHILS # BLD AUTO: 2.2 K/UL (ref 1.8–7.7)
NEUTROPHILS NFR BLD: 40.3 % (ref 38–73)
NRBC BLD-RTO: 0 /100 WBC
PLATELET # BLD AUTO: 215 K/UL (ref 150–450)
PMV BLD AUTO: 11 FL (ref 9.2–12.9)
POTASSIUM SERPL-SCNC: 3.9 MMOL/L (ref 3.5–5.1)
PROT SERPL-MCNC: 7.1 G/DL (ref 6–8.4)
RBC # BLD AUTO: 4.93 M/UL (ref 4.6–6.2)
SODIUM SERPL-SCNC: 141 MMOL/L (ref 136–145)
TROPONIN I SERPL DL<=0.01 NG/ML-MCNC: <0.006 NG/ML (ref 0–0.03)
WBC # BLD AUTO: 5.33 K/UL (ref 3.9–12.7)

## 2024-08-02 PROCEDURE — 83880 ASSAY OF NATRIURETIC PEPTIDE: CPT | Mod: HCNC | Performed by: NURSE PRACTITIONER

## 2024-08-02 PROCEDURE — 87040 BLOOD CULTURE FOR BACTERIA: CPT | Mod: 59,HCNC | Performed by: EMERGENCY MEDICINE

## 2024-08-02 PROCEDURE — 80053 COMPREHEN METABOLIC PANEL: CPT | Mod: HCNC | Performed by: NURSE PRACTITIONER

## 2024-08-02 PROCEDURE — P9612 CATHETERIZE FOR URINE SPEC: HCPCS | Mod: HCNC

## 2024-08-02 PROCEDURE — 84484 ASSAY OF TROPONIN QUANT: CPT | Mod: HCNC | Performed by: NURSE PRACTITIONER

## 2024-08-02 PROCEDURE — 99285 EMERGENCY DEPT VISIT HI MDM: CPT | Mod: 25,HCNC

## 2024-08-02 PROCEDURE — 93971 EXTREMITY STUDY: CPT | Mod: TC,HCNC,RT

## 2024-08-02 PROCEDURE — 85025 COMPLETE CBC W/AUTO DIFF WBC: CPT | Mod: HCNC | Performed by: NURSE PRACTITIONER

## 2024-08-02 PROCEDURE — 93010 ELECTROCARDIOGRAM REPORT: CPT | Mod: HCNC,,, | Performed by: INTERNAL MEDICINE

## 2024-08-02 PROCEDURE — 93971 EXTREMITY STUDY: CPT | Mod: 26,HCNC,RT, | Performed by: STUDENT IN AN ORGANIZED HEALTH CARE EDUCATION/TRAINING PROGRAM

## 2024-08-02 PROCEDURE — 83605 ASSAY OF LACTIC ACID: CPT | Mod: HCNC | Performed by: EMERGENCY MEDICINE

## 2024-08-02 PROCEDURE — 93005 ELECTROCARDIOGRAM TRACING: CPT | Mod: HCNC

## 2024-08-02 NOTE — FIRST PROVIDER EVALUATION
Medical screening examination initiated.  I have conducted a focused provider triage encounter, findings are as follows:    Brief history of present illness:  Patient sent by  for evaluation possible right arm blood clot    There were no vitals filed for this visit.    Pertinent physical exam:  NAD    Brief workup plan:  While in the waiting room patient began experiencing chest pain    Preliminary workup initiated; this workup will be continued and followed by the physician or advanced practice provider that is assigned to the patient when roomed.

## 2024-08-02 NOTE — TELEPHONE ENCOUNTER
I received a message from Infectious Disease MD.  An ultrasound has been ordered as patient reported arm  swelling.  Report of partially occluded deep vein thrombosis noted subclavian.    I called patient to discuss.  Patient reports roughly 4 days of symptoms of swelling.    Potentially could justify outpatient therapy however I am not available to do outpatient evaluation and patient does warrants a clinical evaluation/ discussion.      I am recommending patient presents to the emergency department for initial evaluation.    Called. Discussed with patient. He is to present to ER.

## 2024-08-03 ENCOUNTER — HOSPITAL ENCOUNTER (INPATIENT)
Facility: HOSPITAL | Age: 34
LOS: 3 days | Discharge: HOME | DRG: 299 | End: 2024-08-06
Attending: EMERGENCY MEDICINE | Admitting: INTERNAL MEDICINE
Payer: MEDICARE

## 2024-08-03 DIAGNOSIS — Z16.24 MULTIPLE DRUG RESISTANT ORGANISM (MDRO) CULTURE POSITIVE: ICD-10-CM

## 2024-08-03 DIAGNOSIS — R07.9 CHEST PAIN: ICD-10-CM

## 2024-08-03 DIAGNOSIS — I82.621 ACUTE DEEP VEIN THROMBOSIS (DVT) OF RIGHT UPPER EXTREMITY, UNSPECIFIED VEIN: Primary | ICD-10-CM

## 2024-08-03 DIAGNOSIS — N39.0 URINARY TRACT INFECTION WITHOUT HEMATURIA, SITE UNSPECIFIED: ICD-10-CM

## 2024-08-03 DIAGNOSIS — G82.50 QUADRIPLEGIA: ICD-10-CM

## 2024-08-03 PROBLEM — K21.9 GERD (GASTROESOPHAGEAL REFLUX DISEASE): Status: ACTIVE | Noted: 2024-08-03

## 2024-08-03 LAB
ALBUMIN SERPL BCP-MCNC: 3.5 G/DL (ref 3.5–5.2)
ALBUMIN SERPL BCP-MCNC: 3.5 G/DL (ref 3.5–5.2)
ALP SERPL-CCNC: 100 U/L (ref 55–135)
ALP SERPL-CCNC: 101 U/L (ref 55–135)
ALT SERPL W/O P-5'-P-CCNC: 14 U/L (ref 10–44)
ALT SERPL W/O P-5'-P-CCNC: 15 U/L (ref 10–44)
ANION GAP SERPL CALC-SCNC: 8 MMOL/L (ref 8–16)
ANION GAP SERPL CALC-SCNC: 9 MMOL/L (ref 8–16)
AST SERPL-CCNC: 12 U/L (ref 10–40)
AST SERPL-CCNC: 13 U/L (ref 10–40)
BACTERIA #/AREA URNS HPF: ABNORMAL /HPF
BASOPHILS # BLD AUTO: 0.02 K/UL (ref 0–0.2)
BASOPHILS NFR BLD: 0.4 % (ref 0–1.9)
BILIRUB SERPL-MCNC: 0.4 MG/DL (ref 0.1–1)
BILIRUB SERPL-MCNC: 0.5 MG/DL (ref 0.1–1)
BILIRUB UR QL STRIP: NEGATIVE
BUN SERPL-MCNC: 9 MG/DL (ref 6–20)
BUN SERPL-MCNC: 9 MG/DL (ref 6–20)
CALCIUM SERPL-MCNC: 8.7 MG/DL (ref 8.7–10.5)
CALCIUM SERPL-MCNC: 9 MG/DL (ref 8.7–10.5)
CHLORIDE SERPL-SCNC: 106 MMOL/L (ref 95–110)
CHLORIDE SERPL-SCNC: 107 MMOL/L (ref 95–110)
CLARITY UR: ABNORMAL
CO2 SERPL-SCNC: 22 MMOL/L (ref 23–29)
CO2 SERPL-SCNC: 25 MMOL/L (ref 23–29)
COLOR UR: YELLOW
CREAT SERPL-MCNC: 0.8 MG/DL (ref 0.5–1.4)
CREAT SERPL-MCNC: 0.8 MG/DL (ref 0.5–1.4)
DIFFERENTIAL METHOD BLD: NORMAL
EOSINOPHIL # BLD AUTO: 0.2 K/UL (ref 0–0.5)
EOSINOPHIL NFR BLD: 4.3 % (ref 0–8)
ERYTHROCYTE [DISTWIDTH] IN BLOOD BY AUTOMATED COUNT: 13 % (ref 11.5–14.5)
EST. GFR  (NO RACE VARIABLE): >60 ML/MIN/1.73 M^2
EST. GFR  (NO RACE VARIABLE): >60 ML/MIN/1.73 M^2
GLUCOSE SERPL-MCNC: 113 MG/DL (ref 70–110)
GLUCOSE SERPL-MCNC: 179 MG/DL (ref 70–110)
GLUCOSE UR QL STRIP: NEGATIVE
HCT VFR BLD AUTO: 43.2 % (ref 40–54)
HGB BLD-MCNC: 14.1 G/DL (ref 14–18)
HGB UR QL STRIP: ABNORMAL
HYALINE CASTS #/AREA URNS LPF: 0 /LPF
IMM GRANULOCYTES # BLD AUTO: 0.01 K/UL (ref 0–0.04)
IMM GRANULOCYTES NFR BLD AUTO: 0.2 % (ref 0–0.5)
KETONES UR QL STRIP: ABNORMAL
LEUKOCYTE ESTERASE UR QL STRIP: ABNORMAL
LYMPHOCYTES # BLD AUTO: 2.1 K/UL (ref 1–4.8)
LYMPHOCYTES NFR BLD: 40.6 % (ref 18–48)
MCH RBC QN AUTO: 28.7 PG (ref 27–31)
MCHC RBC AUTO-ENTMCNC: 32.6 G/DL (ref 32–36)
MCV RBC AUTO: 88 FL (ref 82–98)
MICROSCOPIC COMMENT: ABNORMAL
MONOCYTES # BLD AUTO: 0.3 K/UL (ref 0.3–1)
MONOCYTES NFR BLD: 6.5 % (ref 4–15)
NEUTROPHILS # BLD AUTO: 2.4 K/UL (ref 1.8–7.7)
NEUTROPHILS NFR BLD: 48 % (ref 38–73)
NITRITE UR QL STRIP: NEGATIVE
NRBC BLD-RTO: 0 /100 WBC
OHS QRS DURATION: 94 MS
OHS QTC CALCULATION: 386 MS
PH UR STRIP: 7 [PH] (ref 5–8)
PLATELET # BLD AUTO: 206 K/UL (ref 150–450)
PMV BLD AUTO: 11.6 FL (ref 9.2–12.9)
POTASSIUM SERPL-SCNC: 3.6 MMOL/L (ref 3.5–5.1)
POTASSIUM SERPL-SCNC: 3.9 MMOL/L (ref 3.5–5.1)
PROT SERPL-MCNC: 6.8 G/DL (ref 6–8.4)
PROT SERPL-MCNC: 7 G/DL (ref 6–8.4)
PROT UR QL STRIP: ABNORMAL
RBC # BLD AUTO: 4.91 M/UL (ref 4.6–6.2)
RBC #/AREA URNS HPF: 41 /HPF (ref 0–4)
SODIUM SERPL-SCNC: 138 MMOL/L (ref 136–145)
SODIUM SERPL-SCNC: 139 MMOL/L (ref 136–145)
SP GR UR STRIP: >1.03 (ref 1–1.03)
SQUAMOUS #/AREA URNS HPF: 18 /HPF
URN SPEC COLLECT METH UR: ABNORMAL
UROBILINOGEN UR STRIP-ACNC: ABNORMAL EU/DL
WBC # BLD AUTO: 5.08 K/UL (ref 3.9–12.7)
WBC #/AREA URNS HPF: >100 /HPF (ref 0–5)
WBC CLUMPS URNS QL MICRO: ABNORMAL

## 2024-08-03 PROCEDURE — 25000003 PHARM REV CODE 250: Mod: HCNC | Performed by: INTERNAL MEDICINE

## 2024-08-03 PROCEDURE — 25000003 PHARM REV CODE 250: Mod: HCNC | Performed by: HOSPITALIST

## 2024-08-03 PROCEDURE — 96372 THER/PROPH/DIAG INJ SC/IM: CPT | Mod: 59 | Performed by: EMERGENCY MEDICINE

## 2024-08-03 PROCEDURE — 80053 COMPREHEN METABOLIC PANEL: CPT | Mod: HCNC | Performed by: INTERNAL MEDICINE

## 2024-08-03 PROCEDURE — 80053 COMPREHEN METABOLIC PANEL: CPT | Mod: 91,HCNC | Performed by: HOSPITALIST

## 2024-08-03 PROCEDURE — 85025 COMPLETE CBC W/AUTO DIFF WBC: CPT | Mod: HCNC | Performed by: INTERNAL MEDICINE

## 2024-08-03 PROCEDURE — 63600175 PHARM REV CODE 636 W HCPCS: Mod: HCNC | Performed by: EMERGENCY MEDICINE

## 2024-08-03 PROCEDURE — 21400001 HC TELEMETRY ROOM: Mod: HCNC

## 2024-08-03 PROCEDURE — 99223 1ST HOSP IP/OBS HIGH 75: CPT | Mod: HCNC,,, | Performed by: STUDENT IN AN ORGANIZED HEALTH CARE EDUCATION/TRAINING PROGRAM

## 2024-08-03 PROCEDURE — 96374 THER/PROPH/DIAG INJ IV PUSH: CPT | Mod: HCNC

## 2024-08-03 PROCEDURE — 87086 URINE CULTURE/COLONY COUNT: CPT | Mod: HCNC | Performed by: EMERGENCY MEDICINE

## 2024-08-03 PROCEDURE — 63600175 PHARM REV CODE 636 W HCPCS: Mod: HCNC | Performed by: INTERNAL MEDICINE

## 2024-08-03 PROCEDURE — 11000001 HC ACUTE MED/SURG PRIVATE ROOM: Mod: HCNC

## 2024-08-03 PROCEDURE — 87186 SC STD MICRODIL/AGAR DIL: CPT | Mod: HCNC | Performed by: EMERGENCY MEDICINE

## 2024-08-03 PROCEDURE — 25000003 PHARM REV CODE 250: Mod: HCNC | Performed by: EMERGENCY MEDICINE

## 2024-08-03 PROCEDURE — 36415 COLL VENOUS BLD VENIPUNCTURE: CPT | Mod: HCNC | Performed by: HOSPITALIST

## 2024-08-03 PROCEDURE — 25500020 PHARM REV CODE 255: Mod: HCNC | Performed by: EMERGENCY MEDICINE

## 2024-08-03 PROCEDURE — 87088 URINE BACTERIA CULTURE: CPT | Mod: HCNC | Performed by: EMERGENCY MEDICINE

## 2024-08-03 PROCEDURE — 81000 URINALYSIS NONAUTO W/SCOPE: CPT | Mod: HCNC | Performed by: EMERGENCY MEDICINE

## 2024-08-03 RX ORDER — NALOXONE HCL 0.4 MG/ML
0.02 VIAL (ML) INJECTION
Status: DISCONTINUED | OUTPATIENT
Start: 2024-08-03 | End: 2024-08-06 | Stop reason: HOSPADM

## 2024-08-03 RX ORDER — SENNOSIDES 8.6 MG/1
8.6 TABLET ORAL DAILY
Status: DISCONTINUED | OUTPATIENT
Start: 2024-08-03 | End: 2024-08-06 | Stop reason: HOSPADM

## 2024-08-03 RX ORDER — PANTOPRAZOLE SODIUM 40 MG/1
40 TABLET, DELAYED RELEASE ORAL DAILY
Status: DISCONTINUED | OUTPATIENT
Start: 2024-08-03 | End: 2024-08-06 | Stop reason: HOSPADM

## 2024-08-03 RX ORDER — ENOXAPARIN SODIUM 100 MG/ML
1 INJECTION SUBCUTANEOUS EVERY 12 HOURS
Status: DISCONTINUED | OUTPATIENT
Start: 2024-08-03 | End: 2024-08-05

## 2024-08-03 RX ORDER — MORPHINE SULFATE 4 MG/ML
2 INJECTION, SOLUTION INTRAMUSCULAR; INTRAVENOUS EVERY 6 HOURS PRN
Status: DISCONTINUED | OUTPATIENT
Start: 2024-08-03 | End: 2024-08-06 | Stop reason: HOSPADM

## 2024-08-03 RX ORDER — BISACODYL 10 MG/1
10 SUPPOSITORY RECTAL DAILY
Status: DISCONTINUED | OUTPATIENT
Start: 2024-08-03 | End: 2024-08-06 | Stop reason: HOSPADM

## 2024-08-03 RX ORDER — BACLOFEN 10 MG/1
30 TABLET ORAL 3 TIMES DAILY
Status: DISCONTINUED | OUTPATIENT
Start: 2024-08-03 | End: 2024-08-06 | Stop reason: HOSPADM

## 2024-08-03 RX ORDER — SODIUM CHLORIDE 9 MG/ML
INJECTION, SOLUTION INTRAVENOUS CONTINUOUS
Status: ACTIVE | OUTPATIENT
Start: 2024-08-03 | End: 2024-08-04

## 2024-08-03 RX ORDER — AMITRIPTYLINE HYDROCHLORIDE 25 MG/1
25 TABLET, FILM COATED ORAL NIGHTLY PRN
Status: DISCONTINUED | OUTPATIENT
Start: 2024-08-03 | End: 2024-08-06 | Stop reason: HOSPADM

## 2024-08-03 RX ORDER — HYDRALAZINE HYDROCHLORIDE 20 MG/ML
10 INJECTION INTRAMUSCULAR; INTRAVENOUS EVERY 6 HOURS PRN
Status: DISCONTINUED | OUTPATIENT
Start: 2024-08-03 | End: 2024-08-06 | Stop reason: HOSPADM

## 2024-08-03 RX ORDER — ACETAMINOPHEN 325 MG/1
650 TABLET ORAL EVERY 6 HOURS PRN
Status: DISCONTINUED | OUTPATIENT
Start: 2024-08-03 | End: 2024-08-06 | Stop reason: HOSPADM

## 2024-08-03 RX ORDER — IBUPROFEN 200 MG
24 TABLET ORAL
Status: DISCONTINUED | OUTPATIENT
Start: 2024-08-03 | End: 2024-08-06 | Stop reason: HOSPADM

## 2024-08-03 RX ORDER — GLUCAGON 1 MG
1 KIT INJECTION
Status: DISCONTINUED | OUTPATIENT
Start: 2024-08-03 | End: 2024-08-06 | Stop reason: HOSPADM

## 2024-08-03 RX ORDER — ENOXAPARIN SODIUM 100 MG/ML
1 INJECTION SUBCUTANEOUS
Status: COMPLETED | OUTPATIENT
Start: 2024-08-03 | End: 2024-08-03

## 2024-08-03 RX ORDER — IBUPROFEN 200 MG
16 TABLET ORAL
Status: DISCONTINUED | OUTPATIENT
Start: 2024-08-03 | End: 2024-08-06 | Stop reason: HOSPADM

## 2024-08-03 RX ORDER — BACLOFEN 10 MG/1
20 TABLET ORAL 3 TIMES DAILY
Status: DISCONTINUED | OUTPATIENT
Start: 2024-08-03 | End: 2024-08-03

## 2024-08-03 RX ORDER — AMLODIPINE BESYLATE 5 MG/1
5 TABLET ORAL DAILY
Status: DISCONTINUED | OUTPATIENT
Start: 2024-08-03 | End: 2024-08-06 | Stop reason: HOSPADM

## 2024-08-03 RX ORDER — OXYBUTYNIN CHLORIDE 5 MG/1
5 TABLET ORAL 2 TIMES DAILY
Status: DISCONTINUED | OUTPATIENT
Start: 2024-08-03 | End: 2024-08-06 | Stop reason: HOSPADM

## 2024-08-03 RX ORDER — ALPRAZOLAM 0.25 MG/1
0.25 TABLET ORAL NIGHTLY PRN
Status: DISCONTINUED | OUTPATIENT
Start: 2024-08-03 | End: 2024-08-06 | Stop reason: HOSPADM

## 2024-08-03 RX ORDER — SODIUM CHLORIDE 0.9 % (FLUSH) 0.9 %
10 SYRINGE (ML) INJECTION EVERY 12 HOURS PRN
Status: DISCONTINUED | OUTPATIENT
Start: 2024-08-03 | End: 2024-08-06 | Stop reason: HOSPADM

## 2024-08-03 RX ORDER — ONDANSETRON HYDROCHLORIDE 2 MG/ML
4 INJECTION, SOLUTION INTRAVENOUS EVERY 6 HOURS PRN
Status: DISCONTINUED | OUTPATIENT
Start: 2024-08-03 | End: 2024-08-06 | Stop reason: HOSPADM

## 2024-08-03 RX ORDER — GABAPENTIN 400 MG/1
400 CAPSULE ORAL 3 TIMES DAILY
Status: DISCONTINUED | OUTPATIENT
Start: 2024-08-03 | End: 2024-08-06 | Stop reason: HOSPADM

## 2024-08-03 RX ADMIN — BACLOFEN 20 MG: 10 TABLET ORAL at 09:08

## 2024-08-03 RX ADMIN — CEFEPIME 2 G: 2 INJECTION, POWDER, FOR SOLUTION INTRAVENOUS at 02:08

## 2024-08-03 RX ADMIN — GABAPENTIN 400 MG: 400 CAPSULE ORAL at 09:08

## 2024-08-03 RX ADMIN — OXYBUTYNIN CHLORIDE 5 MG: 5 TABLET ORAL at 09:08

## 2024-08-03 RX ADMIN — SENNOSIDES 8.6 MG: 8.6 TABLET, FILM COATED ORAL at 09:08

## 2024-08-03 RX ADMIN — IOHEXOL 100 ML: 350 INJECTION, SOLUTION INTRAVENOUS at 01:08

## 2024-08-03 RX ADMIN — GABAPENTIN 400 MG: 400 CAPSULE ORAL at 03:08

## 2024-08-03 RX ADMIN — CEFEPIME 2 G: 2 INJECTION, POWDER, FOR SOLUTION INTRAVENOUS at 05:08

## 2024-08-03 RX ADMIN — CEFEPIME 2 G: 2 INJECTION, POWDER, FOR SOLUTION INTRAVENOUS at 11:08

## 2024-08-03 RX ADMIN — ENOXAPARIN SODIUM 90 MG: 100 INJECTION SUBCUTANEOUS at 02:08

## 2024-08-03 RX ADMIN — ENOXAPARIN SODIUM 90 MG: 100 INJECTION SUBCUTANEOUS at 03:08

## 2024-08-03 RX ADMIN — MORPHINE SULFATE 2 MG: 4 INJECTION INTRAVENOUS at 12:08

## 2024-08-03 RX ADMIN — AMLODIPINE BESYLATE 5 MG: 5 TABLET ORAL at 09:08

## 2024-08-03 RX ADMIN — PANTOPRAZOLE SODIUM 40 MG: 40 TABLET, DELAYED RELEASE ORAL at 09:08

## 2024-08-03 RX ADMIN — BACLOFEN 20 MG: 10 TABLET ORAL at 03:08

## 2024-08-03 RX ADMIN — ONDANSETRON 4 MG: 2 INJECTION INTRAMUSCULAR; INTRAVENOUS at 12:08

## 2024-08-03 RX ADMIN — BACLOFEN 30 MG: 10 TABLET ORAL at 09:08
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
## 2024-08-03 NOTE — ASSESSMENT & PLAN NOTE
Posttraumatic quadriplegia, wheelchair-bound, and neurogenic bladder with chronic indwelling suprapubic catheter  -continue baclofen, Neurontin, and Ditropan  -supportive care  -q.2 hours turns

## 2024-08-03 NOTE — ED NOTES
During pt assessment, attempted to uncover pt to look at his upper and lower ext. Pt developed spasms as soon as uncovered. Quickly replaced blanket. Pt states he can't feel on the outside but is able to feel pain on the inside.
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
## 2024-08-03 NOTE — ASSESSMENT & PLAN NOTE
-white blood cell count 5.33, lactic acid level 1.0, urinalysis consistent with UTI, urine culture pending, blood cultures pending, currently afebrile  -CT scan of abdomen and pelvis with suprapubic catheter within the bladder, small amount of bladder gas that is either iatrogenic or related to infection  -previous urine culture 07/03/2024 with multidrug resistant Morganella sensitive to amikacin, cefepime, Merrem, and Zosyn  -continue IV cefepime  -ID consult

## 2024-08-03 NOTE — PLAN OF CARE
Received from ED via stretcher. Transferred to bed per staff, oriented to room/unit. Soft touch call bell in use. Admit assessment complete. Cardiac monitoring in progress. Family at bedside. No S/S of acute distress.

## 2024-08-03 NOTE — ASSESSMENT & PLAN NOTE
--Challenging case due to chronic suprapubic catheter and nonspecific symptoms reported by patient  --Would not consider foul smelling urine an indication of infection   --Suspect chronic abdominal pain due to constipation   --Continue biweekly [place] exchange   --Will need to discuss with urology if there are alternatives to suprapubic catheter as patient is currently colonized with GN bacteria   --Continue IV cefepime for now while following newest urine culture  --Will likely need new PICC/midline for IV antibiotics at discharge due to history of MDRO  --If repeat culture shows more susceptible isolate will consider chronic oral suppression   --Needs bowel regimen to prevent constipation which is huge risk factor for recurrent UTI; discussed with PCP  --Above d/w primary team.

## 2024-08-03 NOTE — ASSESSMENT & PLAN NOTE
Chronic, uncontrolled. Latest blood pressure and vitals reviewed-     Temp:  [98 °F (36.7 °C)-98.3 °F (36.8 °C)]   Pulse:  [54-61]   Resp:  [16-18]   BP: (122-155)/()   SpO2:  [98 %-99 %] .   Home meds for hypertension were reviewed and noted below.   Hypertension Medications               furosemide (LASIX) 20 MG tablet Take 1 tablet (20 mg total) by mouth 2 (two) times a day.            While in the hospital, will manage blood pressure as follows; Adjust home antihypertensive regimen as follows- hold Lasix and potassium chloride in the setting of acute infection.  Place patient on Norvasc 5 mg p.o. daily and p.r.n. IV hydralazine with parameters. Cardiac diet.    Will utilize p.r.n. blood pressure medication only if patient's blood pressure greater than 160/100 and he develops symptoms such as worsening chest pain or shortness of breath.
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
## 2024-08-03 NOTE — HPI
This is a 33 yo M well known to Infectious Diseases with history of post traumatic quadriplegia, HTN, urinary incontinence leading to chronic suprapubic catheterization and recurrent UTI, and constipation who presents for right arm pain and swelling found to have DVT. Last ID note below:     2/28/24: Current UTI symptoms- abdominal pain and foul odor. Crenshaw last changed 1 week ago. Changed every 2-3 weeks. [facility] doing the exchanges. No current fever. Urine appears darker but not abnormal. Discussed treating based on last urine culture using IV tobramycin. Will reach out to [facility]. Will also discuss bowel regimen with PCP. Has been frequently constipated which is huge risk factor. Will ask HH to stick to biweekly exchange of Crenshaw.      3/14: Received 3 doses of tobramycin. Last urine culture from 3/5 no growth. Feels good today. Still battling constipation. On new bowel regimen. Will adjust with help of PCP. Sees GI next month.      6/14: Here for follow up. Per Kash was given large dose of amikacin 2 days ago. Currently no UTI symptoms. Still dealing with constipation but more BM than before. Placed on stronger med than Linzess. Crenshaw changed every 2 weeks. No fever but occasional chills. Continues to have generalized pain from chest to pelvis. Asking for MRI as CT have been negative. Will place order today.      7/3: Patient with another pyuria. Is concerned about recurrent infection. Will use meropenem as aggressive treatment. Then may try an oral suppression regimen. Patient in agreement. PICC ordered and [facility] updated.     8/3: Now admitted for DVT and started on anticoagulation. Resting in ER bed. 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 May benefit from urologic intervention. Will discuss with them. Will follow newest urine culture to tailor final antibiotic regimen for acute episode. Reports patient continues to  mention abdominal discomfort and foul smelling urine.

## 2024-08-03 NOTE — Clinical Note
Diagnosis: Chest pain [129720]   Future Attending Provider: MAYNOR HARVEY [[id number]]   Reason for IP Medical Treatment  (Clinical interventions that can only be accomplished in the IP setting? ) :: MDRO   I certify that Inpatient services for greater than or equal to 2 midnights are medically necessary:: No   Plans for Post-Acute care--if anticipated (pick the single best option):: A. No post acute care anticipated at this time   Special Needs:: No Special Needs [1]
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
## 2024-08-03 NOTE — ED NOTES
Pt asked if he could now take his amlodipine because his pressure was up. Pt had initially refused to take medication because his BP was normal.

## 2024-08-03 NOTE — PHARMACY MED REC
"Admission Medication History     The home medication history was taken by El Ward.    You may go to "Admission" then "Reconcile Home Medications" tabs to review and/or act upon these items.     The home medication list has been updated by the Pharmacy department.   Please read ALL comments highlighted in yellow.   Please address this information as you see fit.    Feel free to contact us if you have any questions or require assistance.      The medications listed below were removed from the home medication list. Please reorder if appropriate:  Patient reports no longer taking the following medication(s):  LITHOSTAT 250MG  HIPREX 1GM  BACTROBAN OINTMENT    Medications listed below were obtained from: Patient/family and Analytic software- [employer]  (Not in a hospital admission)      El Ward  [id number]    Current Outpatient Medications on File Prior to Encounter   Medication Sig Dispense Refill Last Dose    amitriptyline (ELAVIL) 25 MG tablet Take 1 tablet (25 mg total) by mouth nightly as needed for Insomnia. 90 tablet 1 Past Week    baclofen (LIORESAL) 10 MG tablet TAKE ONE TABLET BY MOUTH THREE TIMES DAILY IN ADDITION WITH 20MG AS NEEDED TO EQUAL 30MG 270 tablet 0 8/2/2024    baclofen (LIORESAL) 20 MG tablet TAKE 1 TABLET BY MOUTH THREE TIMES DAILY 270 tablet 0 8/2/2024    catheter (GALVAN CATHETER) 18 Fr Misc 12 each by Misc.(Non-Drug; Combo Route) route every 14 (fourteen) days. 12 each 11 8/3/2024    furosemide (LASIX) 20 MG tablet Take 1 tablet (20 mg total) by mouth 2 (two) times a day. 180 tablet 1 Past Week    gabapentin (NEURONTIN) 400 MG capsule Take 1 capsule (400 mg total) by mouth 3 (three) times daily. 270 capsule 1 8/2/2024    omeprazole (PRILOSEC) 40 MG capsule Take 1 capsule by mouth once daily 90 capsule 0 8/2/2024    oxybutynin (DITROPAN) 5 MG Tab Take 5 mg by mouth 2 (two) times daily.   Past Week    potassium chloride SA (K-DUR,KLOR-CON) 20 MEQ tablet Take 1 tablet (20 mEq " total) by mouth once daily. 90 tablet 1 Past Week    tenapanor (IBSRELA) 50 mg Tab Take 1 tablet (50 mg) by mouth 2 (two) times daily. 60 tablet 11 Past Week    ALPRAZolam (XANAX) 0.25 MG tablet Take 1 tablet (0.25 mg total) by mouth nightly as needed for Anxiety. 30 tablet 0     docusate sodium (ENEMEEZ) 283 mg enema Use 1 enema rectally QD as needed for constipation (Patient not taking: Reported on 6/27/2024) 30 each 0     docusate sodium-benzocaine 283-20 mg/5 mL Enem Place 1 each rectally Daily. (Patient not taking: Reported on 6/27/2024)       ketoconazole (NIZORAL) 2 % shampoo WASH HAIR WITH MEDICATED SHAMPOO AT LEAST TWICE A WEEK. LET SIT ON SCALP AT LEAST 5 MINTUES PRIOR TO RINSING (Patient not taking: Reported on 6/27/2024) 120 mL 0     omeprazole (PRILOSEC) 40 MG capsule Take 1 capsule (40 mg total) by mouth 2 (two) times a day. (Patient taking differently: Take 40 mg by mouth once daily.) 60 capsule 0     ondansetron (ZOFRAN) 4 MG tablet Take 1 tablet (4 mg total) by mouth every 6 (six) hours as needed for Nausea. 90 tablet 0     senna (SENOKOT) 8.6 mg tablet Take 1 tablet by mouth once daily.       triamcinolone acetonide 0.025% (KENALOG) 0.025 % cream APPLY TOPICALLY TO DRY SKIN AS NEEDED 80 g 0     triamcinolone acetonide 0.025% (KENALOG) 0.025 % cream APPLY TOPICALLY TO AFFECTED AREA TWICE DAILY AS NEEDED FOR FLARES. MILD STEROID 80 g 0                            .

## 2024-08-03 NOTE — ASSESSMENT & PLAN NOTE
-right upper extremity ultrasound 07/30/2024 with nonocclusive DVT of right subclavian vein  -likely related to recent right upper extremity PICC line which has already been removed  -CTA of chest was negative  -BNP less than 10, troponin negative  -treatment dose Lovenox with plans to transition to oral anticoagulation

## 2024-08-03 NOTE — ED NOTES
Pt resting quietly in bed, covers up over his head, respiration even non labored. CM at bedside reading SR, side rails up x 2, family member in chair at bedside. Asked if pt needed anything at this time and she said no. Advised that I would be back shortly.

## 2024-08-04 LAB
BASOPHILS # BLD AUTO: 0.02 K/UL (ref 0–0.2)
BASOPHILS NFR BLD: 0.5 % (ref 0–1.9)
DIFFERENTIAL METHOD BLD: ABNORMAL
EOSINOPHIL # BLD AUTO: 0.2 K/UL (ref 0–0.5)
EOSINOPHIL NFR BLD: 5.3 % (ref 0–8)
ERYTHROCYTE [DISTWIDTH] IN BLOOD BY AUTOMATED COUNT: 13.2 % (ref 11.5–14.5)
HCT VFR BLD AUTO: 43.2 % (ref 40–54)
HGB BLD-MCNC: 14 G/DL (ref 14–18)
IMM GRANULOCYTES # BLD AUTO: 0 K/UL (ref 0–0.04)
IMM GRANULOCYTES NFR BLD AUTO: 0 % (ref 0–0.5)
LYMPHOCYTES # BLD AUTO: 2.1 K/UL (ref 1–4.8)
LYMPHOCYTES NFR BLD: 47.2 % (ref 18–48)
MCH RBC QN AUTO: 28.7 PG (ref 27–31)
MCHC RBC AUTO-ENTMCNC: 32.4 G/DL (ref 32–36)
MCV RBC AUTO: 89 FL (ref 82–98)
MONOCYTES # BLD AUTO: 0.4 K/UL (ref 0.3–1)
MONOCYTES NFR BLD: 9.4 % (ref 4–15)
NEUTROPHILS # BLD AUTO: 1.6 K/UL (ref 1.8–7.7)
NEUTROPHILS NFR BLD: 37.6 % (ref 38–73)
NRBC BLD-RTO: 0 /100 WBC
PLATELET # BLD AUTO: 198 K/UL (ref 150–450)
PMV BLD AUTO: 10.8 FL (ref 9.2–12.9)
RBC # BLD AUTO: 4.88 M/UL (ref 4.6–6.2)
WBC # BLD AUTO: 4.36 K/UL (ref 3.9–12.7)

## 2024-08-04 PROCEDURE — 25000003 PHARM REV CODE 250: Mod: HCNC | Performed by: INTERNAL MEDICINE

## 2024-08-04 PROCEDURE — 63600175 PHARM REV CODE 636 W HCPCS: Mod: HCNC | Performed by: INTERNAL MEDICINE

## 2024-08-04 PROCEDURE — 21400001 HC TELEMETRY ROOM: Mod: HCNC

## 2024-08-04 PROCEDURE — 85025 COMPLETE CBC W/AUTO DIFF WBC: CPT | Mod: HCNC | Performed by: HOSPITALIST

## 2024-08-04 PROCEDURE — 11000001 HC ACUTE MED/SURG PRIVATE ROOM: Mod: HCNC

## 2024-08-04 PROCEDURE — 36415 COLL VENOUS BLD VENIPUNCTURE: CPT | Mod: HCNC | Performed by: HOSPITALIST

## 2024-08-04 PROCEDURE — 25000003 PHARM REV CODE 250: Mod: HCNC | Performed by: HOSPITALIST

## 2024-08-04 PROCEDURE — 99232 SBSQ HOSP IP/OBS MODERATE 35: CPT | Mod: HCNC,,, | Performed by: STUDENT IN AN ORGANIZED HEALTH CARE EDUCATION/TRAINING PROGRAM

## 2024-08-04 RX ADMIN — BACLOFEN 30 MG: 10 TABLET ORAL at 08:08

## 2024-08-04 RX ADMIN — ENOXAPARIN SODIUM 90 MG: 100 INJECTION SUBCUTANEOUS at 03:08

## 2024-08-04 RX ADMIN — PANTOPRAZOLE SODIUM 40 MG: 40 TABLET, DELAYED RELEASE ORAL at 08:08

## 2024-08-04 RX ADMIN — BACLOFEN 30 MG: 10 TABLET ORAL at 03:08

## 2024-08-04 RX ADMIN — OXYBUTYNIN CHLORIDE 5 MG: 5 TABLET ORAL at 08:08

## 2024-08-04 RX ADMIN — AMLODIPINE BESYLATE 5 MG: 5 TABLET ORAL at 08:08

## 2024-08-04 RX ADMIN — GABAPENTIN 400 MG: 400 CAPSULE ORAL at 03:08

## 2024-08-04 RX ADMIN — CEFEPIME 2 G: 2 INJECTION, POWDER, FOR SOLUTION INTRAVENOUS at 05:08

## 2024-08-04 RX ADMIN — GABAPENTIN 400 MG: 400 CAPSULE ORAL at 08:08

## 2024-08-04 RX ADMIN — SENNOSIDES 8.6 MG: 8.6 TABLET, FILM COATED ORAL at 08:08

## 2024-08-04 RX ADMIN — SODIUM CHLORIDE: 9 INJECTION, SOLUTION INTRAVENOUS at 12:08

## 2024-08-04 RX ADMIN — CEFEPIME 2 G: 2 INJECTION, POWDER, FOR SOLUTION INTRAVENOUS at 03:08

## 2024-08-04 RX ADMIN — CEFEPIME 2 G: 2 INJECTION, POWDER, FOR SOLUTION INTRAVENOUS at 10:08

## 2024-08-04 NOTE — PLAN OF CARE
Problem: Infection  Goal: Absence of Infection Signs and Symptoms  Outcome: Progressing     Problem: Adult Inpatient Plan of Care  Goal: Plan of Care Review  Outcome: Progressing  Goal: Patient-Specific Goal (Individualized)  Outcome: Progressing  Goal: Absence of Hospital-Acquired Illness or Injury  Outcome: Progressing  Goal: Optimal Comfort and Wellbeing  Outcome: Progressing  Goal: Readiness for Transition of Care  Outcome: Progressing     Problem: VTE (Venous Thromboembolism)  Goal: Tissue Perfusion  Outcome: Progressing  Goal: Right Ventricular Function  Outcome: Progressing     Problem: Skin Injury Risk Increased  Goal: Skin Health and Integrity  Outcome: Progressing

## 2024-08-04 NOTE — ASSESSMENT & PLAN NOTE
UA consistent with UTI, culture growing GNR  Cont IV cefepime for now  ID following, appreciate assistance  CT scan of abdomen and pelvis with suprapubic catheter within the bladder, small amount of bladder gas that is either iatrogenic or related to infection  previous urine culture 07/03/2024 with multidrug resistant Morganella sensitive to amikacin, cefepime, Merrem, and Zosyn

## 2024-08-04 NOTE — PLAN OF CARE
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 [Z16.24]  Urinary tract infection without hematuria, site unspecified [N39.0]  Acute deep vein thrombosis (DVT) of right upper extremity, unspecified vein [I82.621]    Admission Date: 8/3/2024  Expected Discharge Date:     Transition of Care Barriers: None    Payor: [employer] MEDICARE / Plan: [employer] H[employer] PPO SPECIAL NEEDS / Product Type: Medicare Advantage /     Extended Emergency Contact Information  Primary Emergency Contact: Tabatha Perez   [place]  Mobile Phone: [phone]  Relation: Mother  Secondary Emergency Contact: Macie Arthur  Mobile Phone: [phone]  Relation: Other    Discharge Plan A: Home         [employer] [facility] [address] [place], [place] - [address] [facility]  [address] [facility] [address]  Phone: [phone] Fax: [phone]    [facility] [address] [place], [place] - [address] [facility]  [address] [facility] [address]  Phone: [phone] Fax: [phone]    [employer] [address] [place], [place] [address] [address] [facility]  [address] [facility] [address]  Phone: [phone] Fax: [phone]      Initial Assessment (most recent)       Adult Discharge Assessment - 08/04/24 0906          Discharge Assessment    Assessment Type Discharge Planning Assessment     Confirmed/corrected address, phone number and insurance Yes     Confirmed Demographics Correct on Facesheet     Source of Information patient     When was your last doctors appointment? 08/02/24     Communicated TYLER with patient/caregiver Date not available/Unable to determine     Reason For Admission Acute deep vein thrombosis (DVT) of right upper extremity     People in Home significant other     Do you expect to return  to your current living situation? Yes     Do you have help at home or someone to help you manage your care at home? Yes     Who are your caregiver(s) and their phone number(s)? Caregiver     Prior to hospitilization cognitive status: Alert/Oriented     Current cognitive status: Alert/Oriented     Walking or Climbing Stairs Difficulty yes     Walking or Climbing Stairs ambulation difficulty, requires equipment;stair climbing difficulty, requires equipment     Mobility Management quadriplegia,  wheelchair-bound state, (P)      Dressing/Bathing Difficulty yes     Dressing/Bathing dressing difficulty, assistance 1 person;bathing difficulty, requires equipment     Home Accessibility wheelchair accessible     Home Layout Able to live on 1st floor     Equipment Currently Used at Home lift device;bedside commode;shower chair;power chair;blood pressure machine;dressing device     Readmission within 30 days? No     Patient currently being followed by outpatient case management? Yes     Do you currently have service(s) that help you manage your care at home? Yes     Name and Contact number of agency [facility]     Is the pt/caregiver preference to resume services with current agency Yes     Do you take prescription medications? Yes     Do you have prescription coverage? Yes     Coverage [employer]ED MEDICARE - [facility] [facility]     Do you have any problems affording any of your prescribed medications? No     Is the patient taking medications as prescribed? yes     Who is going to help you get home at discharge? [facility]     How do you get to doctors appointments? health plan transportation;family or friend will provide;agency     Are you on dialysis? No     Do you take coumadin? No     Discharge Plan A Home     DME Needed Upon Discharge  none     Discharge Plan discussed with: Patient     Transition of Care Barriers None        Physical Activity    On average, how many days per week do you engage in  moderate to strenuous exercise (like a brisk walk)? 0 days     On average, how many minutes do you engage in exercise at this level? 0 min        Financial Resource Strain    How hard is it for you to pay for the very basics like food, housing, medical care, and heating? Not hard at all        Housing Stability    In the last 12 months, was there a time when you were not able to pay the mortgage or rent on time? No     At any time in the past 12 months, were you homeless or living in a shelter (including now)? No        Transportation Needs    Has the lack of transportation kept you from medical appointments, meetings, work or from getting things needed for daily living? No        Food Insecurity    Within the past 12 months, you worried that your food would run out before you got the money to buy more. Never true     Within the past 12 months, the food you bought just didn't last and you didn't have money to get more. Never true                   Patient wishes to update his emergence contact to his father Hiral Arceo [phone]. He also requested to be set up wit Long Term Care.  He is current with [facility].        Jessica Lewis LMSW 8/4/2024 9:16 AM

## 2024-08-04 NOTE — PROGRESS NOTES
[facility]  Infectious Disease  Progress Note    Patient Name: Kate Lazo  MRN: [id number]  Admission Date: 8/3/2024  Length of Stay: 1 days  Attending Physician: Bishnu Glez,*  Primary Care Provider: Troy Burton MD    Isolation Status: No active isolations  Assessment/Plan:      Neuro  Quadriplegia, post-traumatic  Places at risk of recurrent infection due to immobility; chronic suprapubic catheter in place    Cardiac/Vascular  Hypertension  Continue current medications per primary      Renal/  Complicated UTI (urinary tract infection)  --CT a/p this admission reports suprapubic catheter within the bladder. Small amount of bladder gas may be iatrogenic or infectious. Otherwise unrevealing.   --Challenging case due to chronic suprapubic catheter and nonspecific symptoms reported by patient  --Would not consider foul smelling urine an indication of infection   --Suspect chronic abdominal pain due to constipation   --Continue biweekly suprapubic catheter exchange   --Will need to discuss with urology if there are alternatives to suprapubic catheter as patient is currently colonized with GN bacteria   --Continue IV cefepime for now while following newest urine culture (GNR reported so far)   --Will likely need new PICC/midline for IV antibiotics at discharge due to history of MDRO  --If repeat culture shows more susceptible isolate will consider chronic oral suppression   --Needs bowel regimen to prevent constipation which is huge risk factor for recurrent UTI; discussed with PCP  --Above d/w primary team.      Hematology  * Acute deep vein thrombosis (DVT) of right upper extremity  Continue anticoagulation per primary      Thank you for your consult. I will follow-up with patient. Please contact us if you have any additional questions.    London Hubbard, DO  Infectious Disease  [facility]    Subjective:     Principal Problem:Acute deep vein thrombosis (DVT) of right upper  extremity    HPI: This is a 35 yo M well known to Infectious Diseases with history of post traumatic quadriplegia, HTN, urinary incontinence leading to chronic suprapubic catheterization and recurrent UTI, and constipation who presents for right arm pain and swelling found to have DVT. Last ID note below:     2/28/24: Current UTI symptoms- abdominal pain and foul odor. Crenshaw last changed 1 week ago. Changed every 2-3 weeks. [facility] doing the exchanges. No current fever. Urine appears darker but not abnormal. Discussed treating based on last urine culture using IV tobramycin. Will reach out to [facility]. Will also discuss bowel regimen with PCP. Has been frequently constipated which is huge risk factor. Will ask HH to stick to biweekly exchange of Crenshaw.      3/14: Received 3 doses of tobramycin. Last urine culture from 3/5 no growth. Feels good today. Still battling constipation. On new bowel regimen. Will adjust with help of PCP. Sees GI next month.      6/14: Here for follow up. Per Kash was given large dose of amikacin 2 days ago. Currently no UTI symptoms. Still dealing with constipation but more BM than before. Placed on stronger med than Linzess. Crenshaw changed every 2 weeks. No fever but occasional chills. Continues to have generalized pain from chest to pelvis. Asking for MRI as CT have been negative. Will place order today.      7/3: Patient with another pyuria. Is concerned about recurrent infection. Will use meropenem as aggressive treatment. Then may try an oral suppression regimen. Patient in agreement. PICC ordered and [facility] updated.     8/3: Now admitted for DVT and started on anticoagulation. Resting in ER bed. 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 May benefit from urologic intervention. Will discuss with them. Will follow newest urine culture to tailor final antibiotic regimen for acute episode. Reports  patient continues to mention abdominal discomfort and foul smelling urine.   Interval History: Urine cx 8/2 and 8/3 growing a GNR. Speciation and susceptibilities pending. Remains hemodynamically stable. No fever.     Objective:     Vital Signs (Most Recent):  Temp: 97.7 °F (36.5 °C) (08/04/24 1247)  Pulse: (!) 57 (08/04/24 1247)  Resp: 18 (08/04/24 1247)  BP: 128/84 (08/04/24 1247)  SpO2: 98 % (08/04/24 1247) Vital Signs (24h Range):  Temp:  [97.4 °F (36.3 °C)-98.4 °F (36.9 °C)] 97.7 °F (36.5 °C)  Pulse:  [57-89] 57  Resp:  [18] 18  SpO2:  [95 %-99 %] 98 %  BP: ()/(51-97) 128/84     Weight: 69.2 kg (152 lb 8.9 oz)  Body mass index is 22.53 kg/m².    Estimated Creatinine Clearance: 128.5 mL/min (based on SCr of 0.8 mg/dL).       Significant Labs: Blood Culture:   Recent Labs   Lab 05/08/24  1200 05/08/24  1215 08/02/24 2247 08/02/24  2252   LABBLOO No growth after 5 days. No growth after 5 days. No Growth to date  No Growth to date No Growth to date  No Growth to date     CBC:   Recent Labs   Lab 08/02/24  2250 08/03/24  0415 08/04/24  0440   WBC 5.33 5.08 4.36   HGB 14.3 14.1 14.0   HCT 43.1 43.2 43.2    206 198     CMP:   Recent Labs   Lab 08/02/24  2250 08/03/24  0415 08/03/24  1349    138 139   K 3.9 3.9 3.6    107 106   CO2 23 22* 25   GLU 97 179* 113*   BUN 9 9 9   CREATININE 0.9 0.8 0.8   CALCIUM 8.9 8.7 9.0   PROT 7.1 7.0 6.8   ALBUMIN 3.5 3.5 3.5   BILITOT 0.4 0.4 0.5   ALKPHOS 104 101 100   AST 13 13 12   ALT 15 15 14   ANIONGAP 11 9 8     Microbiology Results (last 7 days)       Procedure Component Value Units Date/Time    Urine culture [[id number]]  (Abnormal) Collected: 08/03/24 0007    Order Status: Completed Specimen: Urine Updated: 08/04/24 1201     Urine Culture, Routine GRAM NEGATIVE JUSTYNA  >100,000 cfu/ml  Identification and susceptibility pending      Narrative:      Specimen Source->Urine    Blood culture #1 **CANNOT BE ORDERED STAT** [[id number]] Collected: 08/02/24  2252    Order Status: Completed Specimen: Blood from Peripheral, Antecubital, Left Updated: 08/04/24 0613     Blood Culture, Routine No Growth to date      No Growth to date    Blood culture #2 **CANNOT BE ORDERED STAT** [[id number]] Collected: 08/02/24 2247    Order Status: Completed Specimen: Blood from Peripheral, Forearm, Left Updated: 08/04/24 0613     Blood Culture, Routine No Growth to date      No Growth to date          Urine Culture:   Recent Labs   Lab 05/29/24  1527 05/30/24  1045 07/03/24  1000 08/02/24  1415 08/03/24  0007   LABURIN MORGANELLA MORGANII  >100,000 cfu/ml  *  MORGANELLA MORGANII  50,000 - 99,999 cfu/ml  * MORGANELLA MORGANII  >100,000 cfu/ml  *  PSEUDOMONAS AERUGINOSA  50,000 - 99,999 cfu/ml  * MORGANELLA MORGANII  >100,000 cfu/ml  * GRAM NEGATIVE JUSTYNA  >100,000 cfu/ml  Identification and susceptibility pending  No other significant isolate  * GRAM NEGATIVE JUSTYNA  >100,000 cfu/ml  Identification and susceptibility pending  *     Urine Studies:   Recent Labs   Lab 08/03/24  0007   COLORU Yellow   APPEARANCEUA Cloudy*   PHUR 7.0   SPECGRAV >1.030*   PROTEINUA 2+*   GLUCUA Negative   KETONESU Trace*   BILIRUBINUA Negative   OCCULTUA Trace*   NITRITE Negative   UROBILINOGEN 2.0-3.0*   LEUKOCYTESUR 3+*   RBCUA 41*   WBCUA >100*   BACTERIA Many*   SQUAMEPITHEL 18   HYALINECASTS 0     All pertinent labs within the past 24 hours have been reviewed.    Significant Imaging: I have reviewed all pertinent imaging results/findings within the past 24 hours.

## 2024-08-04 NOTE — ASSESSMENT & PLAN NOTE
--CT a/p this admission reports suprapubic catheter within the bladder. Small amount of bladder gas may be iatrogenic or infectious. Otherwise unrevealing.   --Challenging case due to chronic suprapubic catheter and nonspecific symptoms reported by patient  --Would not consider foul smelling urine an indication of infection   --Suspect chronic abdominal pain due to constipation   --Continue biweekly suprapubic catheter exchange   --Will need to discuss with urology if there are alternatives to suprapubic catheter as patient is currently colonized with GN bacteria   --Continue IV cefepime for now while following newest urine culture (GNR reported so far)   --Will likely need new PICC/midline for IV antibiotics at discharge due to history of MDRO  --If repeat culture shows more susceptible isolate will consider chronic oral suppression   --Needs bowel regimen to prevent constipation which is huge risk factor for recurrent UTI; discussed with PCP  --Above d/w primary team.

## 2024-08-04 NOTE — ASSESSMENT & PLAN NOTE
Chronic, uncontrolled. Latest blood pressure and vitals reviewed-     Temp:  [97.4 °F (36.3 °C)-98.3 °F (36.8 °C)]   Pulse:  [57-76]   Resp:  [18]   BP: ()/(51-97)   SpO2:  [95 %-99 %] .   Home meds for hypertension were reviewed and noted below.   Hypertension Medications               furosemide (LASIX) 20 MG tablet Take 1 tablet (20 mg total) by mouth 2 (two) times a day.          While in the hospital, will manage blood pressure as follows; Adjust home antihypertensive regimen as follows- hold Lasix and potassium chloride in the setting of acute infection.  Place patient on Norvasc 5 mg p.o. daily and p.r.n. IV hydralazine with parameters. Cardiac diet.    Will utilize p.r.n. blood pressure medication only if patient's blood pressure greater than 160/100 and he develops symptoms such as worsening chest pain or shortness of breath.  Optimize pain control

## 2024-08-04 NOTE — ASSESSMENT & PLAN NOTE
right upper extremity ultrasound 07/30/2024 with nonocclusive DVT of right subclavian vein  likely related to recent right upper extremity PICC line which has already been removed  CTA of chest was negative  BNP less than 10, troponin negative  treatment dose Lovenox with plans to transition to oral anticoagulation

## 2024-08-04 NOTE — SUBJECTIVE & OBJECTIVE
Interval History: f/u DVT RUE. Awake, alert, lying in bed, NAD. Patient reports pain in RUE. Cont therapeutic Lovenox for now. Urine culture GNR. Cont cefepime IV. Bowel regimen.     Review of Systems  Objective:     Vital Signs (Most Recent):  Temp: 98.3 °F (36.8 °C) (08/04/24 1554)  Pulse: 76 (08/04/24 1554)  Resp: 18 (08/04/24 1554)  BP: 124/72 (08/04/24 1554)  SpO2: 99 % (08/04/24 1554) Vital Signs (24h Range):  Temp:  [97.4 °F (36.3 °C)-98.3 °F (36.8 °C)] 98.3 °F (36.8 °C)  Pulse:  [57-76] 76  Resp:  [18] 18  SpO2:  [95 %-99 %] 99 %  BP: ()/(51-97) 124/72     Weight: 69.2 kg (152 lb 8.9 oz)  Body mass index is 22.53 kg/m².    Intake/Output Summary (Last 24 hours) at 8/4/2024 1623  Last data filed at 8/4/2024 0343  Gross per 24 hour   Intake --   Output 2000 ml   Net -2000 ml         Physical Exam  Vitals and nursing note reviewed.   Constitutional:       Appearance: Normal appearance.   HENT:      Head: Normocephalic.   Cardiovascular:      Rate and Rhythm: Normal rate and regular rhythm.      Heart sounds: No murmur heard.     Comments: Cont telemetry monitoring  Pulmonary:      Effort: Pulmonary effort is normal.      Breath sounds: Normal breath sounds. No wheezing or rales.   Abdominal:      General: Bowel sounds are normal.      Palpations: Abdomen is soft.   Musculoskeletal:         General: Normal range of motion.      Comments: paraplegic   Skin:     General: Skin is warm and dry.   Neurological:      General: No focal deficit present.      Mental Status: He is alert and oriented to person, place, and time.      Sensory: Sensory deficit present.   Psychiatric:         Mood and Affect: Mood normal.         Behavior: Behavior normal.             Significant Labs: All pertinent labs within the past 24 hours have been reviewed.  CBC:   Recent Labs   Lab 08/02/24  2250 08/03/24  0415 08/04/24  0440   WBC 5.33 5.08 4.36   HGB 14.3 14.1 14.0   HCT 43.1 43.2 43.2    206 198     CMP:   Recent Labs    Lab 08/02/24  2250 08/03/24  0415 08/03/24  1349    138 139   K 3.9 3.9 3.6    107 106   CO2 23 22* 25   GLU 97 179* 113*   BUN 9 9 9   CREATININE 0.9 0.8 0.8   CALCIUM 8.9 8.7 9.0   PROT 7.1 7.0 6.8   ALBUMIN 3.5 3.5 3.5   BILITOT 0.4 0.4 0.5   ALKPHOS 104 101 100   AST 13 13 12   ALT 15 15 14   ANIONGAP 11 9 8     Urine Culture:   Recent Labs   Lab 08/03/24  0007   LABURIN GRAM NEGATIVE JUSTYNA  >100,000 cfu/ml  Identification and susceptibility pending  *       Significant Imaging: I have reviewed all pertinent imaging results/findings within the past 24 hours.
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
## 2024-08-04 NOTE — PLAN OF CARE
Discussed poc with pt, pt verbalized understanding    Purposeful rounding     VS wnl  Cardiac monitoring in use, pt is NSR, tele monitor # [id number]  Fall precautions in place, remains injury free  Pain and nausea under control with PRN meds  Q2turn   Accurate I&Os  Abx given as prescribed  Bed locked at lowest position  Call light within reach    Chart check complete  Will cont with POC

## 2024-08-04 NOTE — SUBJECTIVE & OBJECTIVE
Interval History: Urine cx 8/2 and 8/3 growing a GNR. Speciation and susceptibilities pending. Remains hemodynamically stable. No fever.     Objective:     Vital Signs (Most Recent):  Temp: 97.7 °F (36.5 °C) (08/04/24 1247)  Pulse: (!) 57 (08/04/24 1247)  Resp: 18 (08/04/24 1247)  BP: 128/84 (08/04/24 1247)  SpO2: 98 % (08/04/24 1247) Vital Signs (24h Range):  Temp:  [97.4 °F (36.3 °C)-98.4 °F (36.9 °C)] 97.7 °F (36.5 °C)  Pulse:  [57-89] 57  Resp:  [18] 18  SpO2:  [95 %-99 %] 98 %  BP: ()/(51-97) 128/84     Weight: 69.2 kg (152 lb 8.9 oz)  Body mass index is 22.53 kg/m².    Estimated Creatinine Clearance: 128.5 mL/min (based on SCr of 0.8 mg/dL).       Significant Labs: Blood Culture:   Recent Labs   Lab 05/08/24  1200 05/08/24  1215 08/02/24 2247 08/02/24  2252   LABBLOO No growth after 5 days. No growth after 5 days. No Growth to date  No Growth to date No Growth to date  No Growth to date     CBC:   Recent Labs   Lab 08/02/24  2250 08/03/24  0415 08/04/24  0440   WBC 5.33 5.08 4.36   HGB 14.3 14.1 14.0   HCT 43.1 43.2 43.2    206 198     CMP:   Recent Labs   Lab 08/02/24  2250 08/03/24  0415 08/03/24  1349    138 139   K 3.9 3.9 3.6    107 106   CO2 23 22* 25   GLU 97 179* 113*   BUN 9 9 9   CREATININE 0.9 0.8 0.8   CALCIUM 8.9 8.7 9.0   PROT 7.1 7.0 6.8   ALBUMIN 3.5 3.5 3.5   BILITOT 0.4 0.4 0.5   ALKPHOS 104 101 100   AST 13 13 12   ALT 15 15 14   ANIONGAP 11 9 8     Microbiology Results (last 7 days)       Procedure Component Value Units Date/Time    Urine culture [[id number]]  (Abnormal) Collected: 08/03/24 0007    Order Status: Completed Specimen: Urine Updated: 08/04/24 1201     Urine Culture, Routine GRAM NEGATIVE JUSTYNA  >100,000 cfu/ml  Identification and susceptibility pending      Narrative:      Specimen Source->Urine    Blood culture #1 **CANNOT BE ORDERED STAT** [[id number]] Collected: 08/02/24 2252    Order Status: Completed Specimen: Blood from Peripheral, Antecubital,  Left Updated: 08/04/24 0613     Blood Culture, Routine No Growth to date      No Growth to date    Blood culture #2 **CANNOT BE ORDERED STAT** [[id number]] Collected: 08/02/24 2247    Order Status: Completed Specimen: Blood from Peripheral, Forearm, Left Updated: 08/04/24 0613     Blood Culture, Routine No Growth to date      No Growth to date          Urine Culture:   Recent Labs   Lab 05/29/24  1527 05/30/24  1045 07/03/24  1000 08/02/24  1415 08/03/24  0007   LABURIN MORGANELLA MORGANII  >100,000 cfu/ml  *  MORGANELLA MORGANII  50,000 - 99,999 cfu/ml  * MORGANELLA MORGANII  >100,000 cfu/ml  *  PSEUDOMONAS AERUGINOSA  50,000 - 99,999 cfu/ml  * MORGANELLA MORGANII  >100,000 cfu/ml  * GRAM NEGATIVE JUSTYNA  >100,000 cfu/ml  Identification and susceptibility pending  No other significant isolate  * GRAM NEGATIVE JUSTYNA  >100,000 cfu/ml  Identification and susceptibility pending  *     Urine Studies:   Recent Labs   Lab 08/03/24  0007   COLORU Yellow   APPEARANCEUA Cloudy*   PHUR 7.0   SPECGRAV >1.030*   PROTEINUA 2+*   GLUCUA Negative   KETONESU Trace*   BILIRUBINUA Negative   OCCULTUA Trace*   NITRITE Negative   UROBILINOGEN 2.0-3.0*   LEUKOCYTESUR 3+*   RBCUA 41*   WBCUA >100*   BACTERIA Many*   SQUAMEPITHEL 18   HYALINECASTS 0     All pertinent labs within the past 24 hours have been reviewed.    Significant Imaging: I have reviewed all pertinent imaging results/findings within the past 24 hours.

## 2024-08-04 NOTE — ASSESSMENT & PLAN NOTE
Posttraumatic quadriplegia, wheelchair-bound, and neurogenic bladder with chronic indwelling suprapubic catheter  continue baclofen, Neurontin, and Ditropan  supportive care  Turn Every 2 hours

## 2024-08-05 ENCOUNTER — TELEPHONE (OUTPATIENT)
Dept: INFECTIOUS DISEASES | Facility: CLINIC | Age: 34
End: 2024-08-05
Payer: MEDICARE

## 2024-08-05 ENCOUNTER — PATIENT MESSAGE (OUTPATIENT)
Dept: INTERNAL MEDICINE | Facility: CLINIC | Age: 34
End: 2024-08-05
Payer: MEDICARE

## 2024-08-05 ENCOUNTER — TELEPHONE (OUTPATIENT)
Dept: INTERNAL MEDICINE | Facility: CLINIC | Age: 34
End: 2024-08-05
Payer: MEDICARE

## 2024-08-05 LAB — BACTERIA UR CULT: ABNORMAL

## 2024-08-05 PROCEDURE — 63600175 PHARM REV CODE 636 W HCPCS: Mod: HCNC | Performed by: STUDENT IN AN ORGANIZED HEALTH CARE EDUCATION/TRAINING PROGRAM

## 2024-08-05 PROCEDURE — 21400001 HC TELEMETRY ROOM: Mod: HCNC

## 2024-08-05 PROCEDURE — 25000003 PHARM REV CODE 250: Mod: HCNC | Performed by: HOSPITALIST

## 2024-08-05 PROCEDURE — 63600175 PHARM REV CODE 636 W HCPCS: Mod: HCNC | Performed by: INTERNAL MEDICINE

## 2024-08-05 PROCEDURE — 99233 SBSQ HOSP IP/OBS HIGH 50: CPT | Mod: NSCH,HCNC,, | Performed by: INTERNAL MEDICINE

## 2024-08-05 PROCEDURE — 11000001 HC ACUTE MED/SURG PRIVATE ROOM: Mod: HCNC

## 2024-08-05 PROCEDURE — 99223 1ST HOSP IP/OBS HIGH 75: CPT | Mod: HCNC,,, | Performed by: UROLOGY

## 2024-08-05 PROCEDURE — 25000003 PHARM REV CODE 250: Mod: HCNC | Performed by: INTERNAL MEDICINE

## 2024-08-05 RX ORDER — ENOXAPARIN SODIUM 100 MG/ML
1 INJECTION SUBCUTANEOUS EVERY 12 HOURS
Status: DISCONTINUED | OUTPATIENT
Start: 2024-08-05 | End: 2024-08-06 | Stop reason: HOSPADM

## 2024-08-05 RX ADMIN — AMLODIPINE BESYLATE 5 MG: 5 TABLET ORAL at 08:08

## 2024-08-05 RX ADMIN — GABAPENTIN 400 MG: 400 CAPSULE ORAL at 02:08

## 2024-08-05 RX ADMIN — OXYBUTYNIN CHLORIDE 5 MG: 5 TABLET ORAL at 08:08

## 2024-08-05 RX ADMIN — GABAPENTIN 400 MG: 400 CAPSULE ORAL at 09:08

## 2024-08-05 RX ADMIN — ENOXAPARIN SODIUM 90 MG: 100 INJECTION SUBCUTANEOUS at 02:08

## 2024-08-05 RX ADMIN — ENOXAPARIN SODIUM 80 MG: 80 INJECTION SUBCUTANEOUS at 02:08

## 2024-08-05 RX ADMIN — OXYBUTYNIN CHLORIDE 5 MG: 5 TABLET ORAL at 09:08

## 2024-08-05 RX ADMIN — BISACODYL 10 MG: 10 SUPPOSITORY RECTAL at 04:08

## 2024-08-05 RX ADMIN — GABAPENTIN 400 MG: 400 CAPSULE ORAL at 08:08

## 2024-08-05 RX ADMIN — SENNOSIDES 8.6 MG: 8.6 TABLET, FILM COATED ORAL at 08:08

## 2024-08-05 RX ADMIN — CEFEPIME 2 G: 2 INJECTION, POWDER, FOR SOLUTION INTRAVENOUS at 11:08

## 2024-08-05 RX ADMIN — AMITRIPTYLINE HYDROCHLORIDE 25 MG: 25 TABLET, FILM COATED ORAL at 09:08

## 2024-08-05 RX ADMIN — BACLOFEN 30 MG: 10 TABLET ORAL at 09:08

## 2024-08-05 RX ADMIN — CEFEPIME 2 G: 2 INJECTION, POWDER, FOR SOLUTION INTRAVENOUS at 06:08

## 2024-08-05 RX ADMIN — BACLOFEN 30 MG: 10 TABLET ORAL at 02:08

## 2024-08-05 RX ADMIN — BACLOFEN 30 MG: 10 TABLET ORAL at 08:08

## 2024-08-05 RX ADMIN — PANTOPRAZOLE SODIUM 40 MG: 40 TABLET, DELAYED RELEASE ORAL at 08:08

## 2024-08-05 RX ADMIN — CEFEPIME 2 G: 2 INJECTION, POWDER, FOR SOLUTION INTRAVENOUS at 02:08
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
## 2024-08-05 NOTE — PLAN OF CARE
Problem: Infection  Goal: Absence of Infection Signs and Symptoms  Outcome: Progressing     Problem: Adult Inpatient Plan of Care  Goal: Plan of Care Review  Outcome: Progressing  Goal: Patient-Specific Goal (Individualized)  Outcome: Progressing  Goal: Absence of Hospital-Acquired Illness or Injury  Outcome: Progressing  Goal: Optimal Comfort and Wellbeing  Outcome: Progressing  Goal: Readiness for Transition of Care  Outcome: Progressing     Problem: VTE (Venous Thromboembolism)  Goal: Tissue Perfusion  Outcome: Progressing  Goal: Right Ventricular Function  Outcome: Progressing     Problem: Skin Injury Risk Increased  Goal: Skin Health and Integrity  Outcome: Progressing     Problem: Wound  Goal: Optimal Coping  Outcome: Progressing  Goal: Optimal Functional Ability  Outcome: Progressing  Goal: Absence of Infection Signs and Symptoms  Outcome: Progressing  Goal: Improved Oral Intake  Outcome: Progressing  Goal: Optimal Pain Control and Function  Outcome: Progressing  Goal: Skin Health and Integrity  Outcome: Progressing  Goal: Optimal Wound Healing  Outcome: Progressing
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
## 2024-08-05 NOTE — SUBJECTIVE & OBJECTIVE
"Interval History:   No acute events overnight   Resting comfortably   Complaining of constipation, bowel regimen adjusted   Awaiting urine cultures, id recommendations   Consulted Urology,;  Updated plan of care to patient/family at bedside, agreed       Review of Systems  Objective:     Vital Signs (Most Recent):  Temp: 98.1 °F (36.7 °C) (08/05/24 1246)  Pulse: 81 (08/05/24 1246)  Resp: 17 (08/05/24 1246)  BP: 105/65 (08/05/24 1246)  SpO2: 99 % (08/05/24 1246) Vital Signs (24h Range):  Temp:  [97.9 °F (36.6 °C)-98.8 °F (37.1 °C)] 98.1 °F (36.7 °C)  Pulse:  [73-94] 81  Resp:  [17-19] 17  SpO2:  [98 %-99 %] 99 %  BP: (100-128)/(57-86) 105/65     Weight: 82.8 kg (182 lb 8.7 oz)  Body mass index is 26.96 kg/m².    Intake/Output Summary (Last 24 hours) at 8/5/2024 1435  Last data filed at 8/5/2024 0434  Gross per 24 hour   Intake 488.08 ml   Output 1300 ml   Net -811.92 ml         Physical Exam      Constitutional:       Appearance: Normal appearance.   HENT:      Head: Normocephalic.   Cardiovascular:      Rate and Rhythm: Normal rate and regular rhythm.      Heart sounds: No murmur heard.     Comments: Cont telemetry monitoring  Pulmonary:      Effort: Pulmonary effort is normal.      Breath sounds: Normal breath sounds. No wheezing or rales.   Abdominal:      General: Bowel sounds are normal.      Palpations: Abdomen is soft.   Musculoskeletal:         General: Normal range of motion.      Comments: paraplegic   Skin:     General: Skin is warm and dry.   Neurological:      General: No focal deficit present.      Mental Status: He is alert and oriented to person, place, and time.      Sensory: Sensory deficit present.   Psychiatric:         Mood and Affect: Mood normal.         Behavior: Behavior normal.       Significant Labs: All pertinent labs within the past 24 hours have been reviewed.  CBC:   Recent Labs   Lab 08/04/24  0440   WBC 4.36   HGB 14.0   HCT 43.2        CMP: No results for input(s): "NA", "K", " ""CL", "CO2", "GLU", "BUN", "CREATININE", "CALCIUM", "PROT", "ALBUMIN", "BILITOT", "ALKPHOS", "AST", "ALT", "ANIONGAP", "EGFRNONAA" in the last 48 hours.    Invalid input(s): "ESTGFAFRICA"    Significant Imaging:     Imaging Results              CT Abdomen Pelvis  Without Contrast (Final result)  Result time 08/03/24 01:51:29      Final result by Diana Jules MD (08/03/24 01:51:29)                   Impression:      Suprapubic catheter within the bladder. Small amount of bladder gas may be iatrogenic or infectious.  Correlate with urinalysis.      Electronically signed by: Diana Jules  Date:    08/03/2024  Time:    01:51               Narrative:    EXAMINATION:  CT ABDOMEN PELVIS WITHOUT CONTRAST    CLINICAL HISTORY:  Sepsis;    TECHNIQUE:  Low dose axial images, sagittal and coronal reformations were obtained from the lung bases to the pubic symphysis, Oral contrast was not administered.    COMPARISON:  07/11/2024    FINDINGS:  Heart: Normal in size. No pericardial effusion.    Lung Bases: Well aerated, without consolidation or pleural fluid.    Liver: Normal in size and attenuation, with no focal hepatic lesions.    Gallbladder: Absent.    Bile Ducts: No evidence of dilated ducts.    Pancreas: No mass or peripancreatic fat stranding.    Spleen: Unremarkable.    Adrenals: Unremarkable.    Kidneys/ Ureters: Unremarkable.    Bladder: Suprapubic catheter within the bladder.  Small amount of bladder gas may be iatrogenic or infectious.  Correlate with urinalysis.    Reproductive organs: Unremarkable.    GI Tract/Mesentery: No evidence of bowel obstruction or inflammation.    Peritoneal Space: No ascites. No free air.    Retroperitoneum: No significant adenopathy.    Abdominal wall: Unremarkable.    Vasculature: No significant atherosclerosis or aneurysm.    Bones: No acute fracture.                                       CTA Chest Non-Coronary (PE Studies) (Final result)  Result time 08/03/24 01:46:45 "      Final result by Diana Jules MD (08/03/24 01:46:45)                   Impression:      No acute findings.      Electronically signed by: Diana Jules  Date:    08/03/2024  Time:    01:46               Narrative:    EXAMINATION:  CTA CHEST NON CORONARY (PE STUDIES)    CLINICAL HISTORY:  Pulmonary embolism (PE) suspected, high prob;    TECHNIQUE:  Low dose axial images, sagittal and coronal reformations were obtained from the thoracic inlet to the lung bases following the IV administration of 100 mL of Omnipaque 350.  Contrast timing was optimized to evaluate the pulmonary arteries.  MIP images were performed.    COMPARISON:  None    FINDINGS:  Base of Neck: No significant abnormality.    Thoracic soft tissues: Unremarkable.    Aorta: Left-sided aortic arch.  No aneurysm and no significant atherosclerosis    Heart: Normal size. No effusion.    Pulmonary vasculature: No central or segmental pulmonary embolus.    Malorie/Mediastinum: No pathologic ashley enlargement.    Airways: Patent.    Lungs/Pleura: Clear lungs. No pleural effusion or thickening.    Esophagus: Unremarkable.    Upper Abdomen: No abnormality of the partially imaged upper abdomen.    Bones: No acute fracture. No suspicious lytic or sclerotic lesions.                                       X-Ray Chest 1 View (Final result)  Result time 08/02/24 21:04:04      Final result by Leland Simon MD (08/02/24 21:04:04)                   Impression:      Metallic densities in the left supra clavicular fossa.  Otherwise no acute process seen      Electronically signed by: Leland Simon  Date:    08/02/2024  Time:    21:04               Narrative:    EXAMINATION:  XR CHEST 1 VIEW    CLINICAL HISTORY:  Chest pain, unspecified    TECHNIQUE:  Single frontal view of the chest was performed.    COMPARISON:  None    FINDINGS:  Metallic densities in the left supraclavicular fossa suggestive of foreign body.The lungs are clear, with normal appearance of  pulmonary vasculature and no pleural effusion or pneumothorax.    The cardiac silhouette is normal in size. The hilar and mediastinal contours are unremarkable.    Bones are intact.

## 2024-08-06 VITALS
TEMPERATURE: 98 F | HEART RATE: 66 BPM | DIASTOLIC BLOOD PRESSURE: 65 MMHG | HEIGHT: 69 IN | SYSTOLIC BLOOD PRESSURE: 109 MMHG | RESPIRATION RATE: 18 BRPM | OXYGEN SATURATION: 99 % | WEIGHT: 186.75 LBS | BODY MASS INDEX: 27.66 KG/M2

## 2024-08-06 PROCEDURE — 63600175 PHARM REV CODE 636 W HCPCS: Mod: HCNC | Performed by: STUDENT IN AN ORGANIZED HEALTH CARE EDUCATION/TRAINING PROGRAM

## 2024-08-06 PROCEDURE — 25000003 PHARM REV CODE 250: Mod: HCNC | Performed by: INTERNAL MEDICINE

## 2024-08-06 PROCEDURE — 63600175 PHARM REV CODE 636 W HCPCS: Mod: HCNC | Performed by: INTERNAL MEDICINE

## 2024-08-06 PROCEDURE — 99233 SBSQ HOSP IP/OBS HIGH 50: CPT | Mod: NSCH,HCNC,, | Performed by: INTERNAL MEDICINE

## 2024-08-06 PROCEDURE — 25000003 PHARM REV CODE 250: Mod: HCNC | Performed by: STUDENT IN AN ORGANIZED HEALTH CARE EDUCATION/TRAINING PROGRAM

## 2024-08-06 PROCEDURE — 25000003 PHARM REV CODE 250: Mod: HCNC | Performed by: HOSPITALIST

## 2024-08-06 RX ORDER — AMLODIPINE BESYLATE 5 MG/1
5 TABLET ORAL DAILY
Qty: 30 TABLET | Refills: 0 | Status: SHIPPED | OUTPATIENT
Start: 2024-08-07 | End: 2024-09-06

## 2024-08-06 RX ORDER — GRANULES FOR ORAL 3 G/1
3 POWDER ORAL ONCE
Status: DISCONTINUED | OUTPATIENT
Start: 2024-08-06 | End: 2024-08-06

## 2024-08-06 RX ADMIN — BACLOFEN 30 MG: 10 TABLET ORAL at 02:08

## 2024-08-06 RX ADMIN — PANTOPRAZOLE SODIUM 40 MG: 40 TABLET, DELAYED RELEASE ORAL at 08:08

## 2024-08-06 RX ADMIN — BACLOFEN 30 MG: 10 TABLET ORAL at 08:08

## 2024-08-06 RX ADMIN — AMLODIPINE BESYLATE 5 MG: 5 TABLET ORAL at 08:08

## 2024-08-06 RX ADMIN — ERTAPENEM 1 G: 1 INJECTION INTRAMUSCULAR; INTRAVENOUS at 02:08

## 2024-08-06 RX ADMIN — ENOXAPARIN SODIUM 80 MG: 80 INJECTION SUBCUTANEOUS at 02:08

## 2024-08-06 RX ADMIN — OXYBUTYNIN CHLORIDE 5 MG: 5 TABLET ORAL at 08:08

## 2024-08-06 RX ADMIN — SENNOSIDES 8.6 MG: 8.6 TABLET, FILM COATED ORAL at 08:08

## 2024-08-06 RX ADMIN — GABAPENTIN 400 MG: 400 CAPSULE ORAL at 08:08

## 2024-08-06 RX ADMIN — GABAPENTIN 400 MG: 400 CAPSULE ORAL at 02:08

## 2024-08-06 NOTE — PLAN OF CARE
Problem: Adult Inpatient Plan of Care  Goal: Plan of Care Review  Outcome: Progressing  Goal: Patient-Specific Goal (Individualized)  Outcome: Progressing  Flowsheets (Taken 8/6/2024 0122)  Individualized Care Needs: personal belongings positioned  Anxieties, Fears or Concerns: pain control  Goal: Absence of Hospital-Acquired Illness or Injury  Outcome: Progressing  Goal: Optimal Comfort and Wellbeing  Outcome: Progressing  Goal: Readiness for Transition of Care  Outcome: Progressing     Problem: Infection  Goal: Absence of Infection Signs and Symptoms  Outcome: Progressing     Problem: Skin Injury Risk Increased  Goal: Skin Health and Integrity  Outcome: Progressing     Problem: Wound  Goal: Optimal Coping  Outcome: Progressing  Goal: Optimal Functional Ability  Outcome: Progressing  Goal: Absence of Infection Signs and Symptoms  Outcome: Progressing  Goal: Improved Oral Intake  Outcome: Progressing  Goal: Optimal Pain Control and Function  Outcome: Progressing  Goal: Skin Health and Integrity  Outcome: Progressing  Goal: Optimal Wound Healing  Outcome: Progressing         Discussed POC with pt and family, verbalized understanding.  Patient remains free from injury.  Safety precautions maintained.   Call light and personal belongings within reach, bed in lowest position with bed wheels locked.   No s/s of acute distress.  Purposeful rounding continued this shift.  Pain controlled per MD order.  Cardiac monitoring in place, tele box number [id number]. Reading normal sinus  Diet orders continued, pt diet: low sodium  Vital signs continued per orders this shift.   Suprapubic cath care continued this shift.   Chart and orders review completed. Pt education about care completed.

## 2024-08-06 NOTE — PLAN OF CARE
[facility]  Discharge Final Note    Primary Care Provider: Troy Burton MD    Expected Discharge Date: 8/6/2024    Final Discharge Note (most recent)       Final Note - 08/06/24 1216          Final Note    Assessment Type Final Discharge Note     Anticipated Discharge Disposition Home or Self Care     Hospital Resources/Appts/Education Provided Appointments scheduled and added to AVS                              Contact Info       Troy Burton MD   Specialty: Family Medicine   Relationship: PCP - General    [address] [place] [address]   Phone: [phone]       Next Steps: Follow up in 1 week(s)    Navarro Lo MD   Specialty: Hematology and Oncology    [address] [place] [address]   Phone: [phone]       Next Steps: Follow up in 1 week(s)    Jaden Castro MD, [facility]   Specialty: Infectious Diseases, Hospitalist    [address] [place] [id number]   Phone: [phone]       Next Steps: Follow up in 1 week(s)
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
## 2024-08-06 NOTE — PROGRESS NOTES
[facility]  Infectious Disease  Progress Note    Patient Name: Kate Lazo  MRN: [id number]  Admission Date: 8/3/2024  Length of Stay: 3 days  Attending Physician: Bishnu Glez,*  Primary Care Provider: Troy Burton MD    Isolation Status: No active isolations  Assessment/Plan:      Neuro  Quadriplegia, post-traumatic  Places at risk of recurrent infection due to immobility; chronic suprapubic catheter in place    08/05- supportive  care    Renal/  Complicated UTI (urinary tract infection)  --CT a/p this admission reports suprapubic catheter within the bladder. Small amount of bladder gas may be iatrogenic or infectious. Otherwise unrevealing.   --Challenging case due to chronic suprapubic catheter and nonspecific symptoms reported by patient  --Would not consider foul smelling urine an indication of infection   --Suspect chronic abdominal pain due to constipation   --Continue biweekly suprapubic catheter exchange   --Will need to discuss with urology if there are alternatives to suprapubic catheter as patient is currently colonized with GN bacteria   --Continue IV cefepime for now while following newest urine culture (GNR reported so far)   --Will likely need new PICC/midline for IV antibiotics at discharge due to history of MDRO  --If repeat culture shows more susceptible isolate will consider chronic oral suppression   --Needs bowel regimen to prevent constipation which is huge risk factor for recurrent UTI; discussed with PCP  --Above d/w primary team.      08/05- will stop Cefepime- no need for PICC line on discharge .  Will give a dose of ertapenem .  He is colonized  Latest urine culture -08/03- Morganella      Hematology  * Acute deep vein thrombosis (DVT) of right upper extremity  Continue anticoagulation per primary        Anticipated Disposition:     Thank you for your consult. I will follow-up with patient. Please contact us if you have any additional questions.    Jaden STEPHEN  MD [place], [facility]  Infectious Disease  [facility]    Subjective:     Principal Problem:Acute deep vein thrombosis (DVT) of right upper extremity    HPI: This is a 35 yo M well known to Infectious Diseases with history of post traumatic quadriplegia, HTN, urinary incontinence leading to chronic suprapubic catheterization and recurrent UTI, and constipation who presents for right arm pain and swelling found to have DVT. Last ID note below:     2/28/24: Current UTI symptoms- abdominal pain and foul odor. Crenshaw last changed 1 week ago. Changed every 2-3 weeks. Stefaniasprincess  doing the exchanges. No current fever. Urine appears darker but not abnormal. Discussed treating based on last urine culture using IV tobramycin. Will reach out to [facility]. Will also discuss bowel regimen with PCP. Has been frequently constipated which is huge risk factor. Will ask HH to stick to biweekly exchange of Crenshaw.      3/14: Received 3 doses of tobramycin. Last urine culture from 3/5 no growth. Feels good today. Still battling constipation. On new bowel regimen. Will adjust with help of PCP. Sees GI next month.      6/14: Here for follow up. Per [place] was given large dose of amikacin 2 days ago. Currently no UTI symptoms. Still dealing with constipation but more BM than before. Placed on stronger med than Linzess. Crenshaw changed every 2 weeks. No fever but occasional chills. Continues to have generalized pain from chest to pelvis. Asking for MRI as CT have been negative. Will place order today.      7/3: Patient with another pyuria. Is concerned about recurrent infection. Will use meropenem as aggressive treatment. Then may try an oral suppression regimen. Patient in agreement. PICC ordered and [facility] updated.     8/3: Now admitted for DVT and started on anticoagulation. Resting in ER bed. 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 May benefit from  "urologic intervention. Will discuss with them. Will follow newest urine culture to tailor final antibiotic regimen for acute episode. Reports patient continues to mention abdominal discomfort and foul smelling urine.   Interval History:   34 year old man with complicated UTI /colonized infection .   Cultures -08/03- urine GNR    Review of Systems   Constitutional:  Positive for activity change. Negative for appetite change, chills, diaphoresis, fatigue and fever.     Objective:     Vital Signs (Most Recent):  Temp: 98.2 °F (36.8 °C) (08/05/24 1954)  Pulse: 79 (08/05/24 2359)  Resp: 20 (08/05/24 2359)  BP: 115/74 (08/05/24 2359)  SpO2: 100 % (08/05/24 2359) Vital Signs (24h Range):  Temp:  [97.9 °F (36.6 °C)-99 °F (37.2 °C)] 98.2 °F (36.8 °C)  Pulse:  [] 79  Resp:  [17-20] 20  SpO2:  [97 %-100 %] 100 %  BP: (100-152)/() 115/74     Weight: 84.7 kg (186 lb 11.7 oz)  Body mass index is 27.58 kg/m².    Estimated Creatinine Clearance: 131.3 mL/min (based on SCr of 0.8 mg/dL).     Physical Exam  Vitals and nursing note reviewed.   HENT:      Head: Normocephalic.   Cardiovascular:      Rate and Rhythm: Normal rate.   Pulmonary:      Effort: Pulmonary effort is normal.   Musculoskeletal:         General: Swelling present.      Comments: paraplegia   Neurological:      Mental Status: He is alert. Mental status is at baseline.        Significant Labs: Blood Culture:   Recent Labs   Lab 05/08/24  1200 05/08/24  1215 08/02/24  2247 08/02/24  2252   LABBLOO No growth after 5 days. No growth after 5 days. No Growth to date  No Growth to date  No Growth to date No Growth to date  No Growth to date  No Growth to date     BMP: No results for input(s): "GLU", "NA", "K", "CL", "CO2", "BUN", "CREATININE", "CALCIUM", "MG" in the last 48 hours.  Urine Culture:   Recent Labs   Lab 05/29/24  1527 05/30/24  1045 07/03/24  1000 08/02/24  1415 08/03/24  0007   LABURIN MORGANELLA MORGANII  >100,000 cfu/ml  *  MORGANELLA " MORGANII  50,000 - 99,999 cfu/ml  * MORGANELLA MORGANII  >100,000 cfu/ml  *  PSEUDOMONAS AERUGINOSA  50,000 - 99,999 cfu/ml  * MORGANELLA MORGANII  >100,000 cfu/ml  * GRAM NEGATIVE JUSTYNA  >100,000 cfu/ml  Identification and susceptibility pending  No other significant isolate  * MORGANELLA MORGANII  >100,000 cfu/ml  *     All pertinent labs within the past 24 hours have been reviewed.    Significant Imaging: I have reviewed all pertinent imaging results/findings within the past 24 hours.

## 2024-08-06 NOTE — ASSESSMENT & PLAN NOTE
--CT a/p this admission reports suprapubic catheter within the bladder. Small amount of bladder gas may be iatrogenic or infectious. Otherwise unrevealing.   --Challenging case due to chronic suprapubic catheter and nonspecific symptoms reported by patient  --Would not consider foul smelling urine an indication of infection   --Suspect chronic abdominal pain due to constipation   --Continue biweekly suprapubic catheter exchange   --Will need to discuss with urology if there are alternatives to suprapubic catheter as patient is currently colonized with GN bacteria   --Continue IV cefepime for now while following newest urine culture (GNR reported so far)   --Will likely need new PICC/midline for IV antibiotics at discharge due to history of MDRO  --If repeat culture shows more susceptible isolate will consider chronic oral suppression   --Needs bowel regimen to prevent constipation which is huge risk factor for recurrent UTI; discussed with PCP  --Above d/w primary team.      08/05- will stop Cefepime- no need for PICC line on discharge .  Will give a dose of ertapenem .  He is colonized  Latest urine culture -08/03- Morganella

## 2024-08-06 NOTE — SUBJECTIVE & OBJECTIVE
"Interval History:   34 year old man with complicated UTI /colonized infection .   Cultures -08/03- urine GNR    Review of Systems   Constitutional:  Positive for activity change. Negative for appetite change, chills, diaphoresis, fatigue and fever.     Objective:     Vital Signs (Most Recent):  Temp: 98.2 °F (36.8 °C) (08/05/24 1954)  Pulse: 79 (08/05/24 2359)  Resp: 20 (08/05/24 2359)  BP: 115/74 (08/05/24 2359)  SpO2: 100 % (08/05/24 2359) Vital Signs (24h Range):  Temp:  [97.9 °F (36.6 °C)-99 °F (37.2 °C)] 98.2 °F (36.8 °C)  Pulse:  [] 79  Resp:  [17-20] 20  SpO2:  [97 %-100 %] 100 %  BP: (100-152)/() 115/74     Weight: 84.7 kg (186 lb 11.7 oz)  Body mass index is 27.58 kg/m².    Estimated Creatinine Clearance: 131.3 mL/min (based on SCr of 0.8 mg/dL).     Physical Exam  Vitals and nursing note reviewed.   HENT:      Head: Normocephalic.   Cardiovascular:      Rate and Rhythm: Normal rate.   Pulmonary:      Effort: Pulmonary effort is normal.   Musculoskeletal:         General: Swelling present.      Comments: paraplegia   Neurological:      Mental Status: He is alert. Mental status is at baseline.        Significant Labs: Blood Culture:   Recent Labs   Lab 05/08/24  1200 05/08/24  1215 08/02/24  2247 08/02/24  2252   LABBLOO No growth after 5 days. No growth after 5 days. No Growth to date  No Growth to date  No Growth to date No Growth to date  No Growth to date  No Growth to date     BMP: No results for input(s): "GLU", "NA", "K", "CL", "CO2", "BUN", "CREATININE", "CALCIUM", "MG" in the last 48 hours.  Urine Culture:   Recent Labs   Lab 05/29/24  1527 05/30/24  1045 07/03/24  1000 08/02/24  1415 08/03/24  0007   LABURIN MORGANELLA MORGANII  >100,000 cfu/ml  *  MORGANELLA MORGANII  50,000 - 99,999 cfu/ml  * MORGANELLA MORGANII  >100,000 cfu/ml  *  PSEUDOMONAS AERUGINOSA  50,000 - 99,999 cfu/ml  * MORGANELLA MORGANII  >100,000 cfu/ml  * GRAM NEGATIVE JUSTYNA  >100,000 cfu/ml  Identification and " susceptibility pending  No other significant isolate  * MORGANELLA MORGANII  >100,000 cfu/ml  *     All pertinent labs within the past 24 hours have been reviewed.    Significant Imaging: I have reviewed all pertinent imaging results/findings within the past 24 hours.

## 2024-08-06 NOTE — ASSESSMENT & PLAN NOTE
Places at risk of recurrent infection due to immobility; chronic suprapubic catheter in place    08/05- supportive  care

## 2024-08-06 NOTE — NURSING
Pt remains free of falls/injury. Safety precautions maintained. Pt denies pain/discomfort. IV abx given.  Pt IV removed Cardiac diet tolerated. VSS. No S/S of distress noted at this time. Pt education completed.  Pt dc'd home with cousin Nasim in personal wheel chair.

## 2024-08-07 ENCOUNTER — OFFICE VISIT (OUTPATIENT)
Dept: INFECTIOUS DISEASES | Facility: CLINIC | Age: 34
End: 2024-08-07
Payer: MEDICARE

## 2024-08-07 ENCOUNTER — PATIENT OUTREACH (OUTPATIENT)
Dept: ADMINISTRATIVE | Facility: CLINIC | Age: 34
End: 2024-08-07
Payer: MEDICARE

## 2024-08-07 DIAGNOSIS — N39.0 COMPLICATED UTI (URINARY TRACT INFECTION): Primary | ICD-10-CM

## 2024-08-07 PROCEDURE — 99499 UNLISTED E&M SERVICE: CPT | Mod: HCNC,95,, | Performed by: STUDENT IN AN ORGANIZED HEALTH CARE EDUCATION/TRAINING PROGRAM

## 2024-08-07 NOTE — SUBJECTIVE & OBJECTIVE
"Interval History:   34 year old man with complicated UTI /colonized infection .   Cultures -08/03- urine GNR      08/06- no acute events noted  Urine culture- done- 08/03- Morganella   Cefepime/Ertapenem sensitive  Review of Systems   Constitutional:  Positive for activity change. Negative for appetite change, chills, diaphoresis, fatigue and fever.     Objective:     Vital Signs (Most Recent):  Temp: 97.7 °F (36.5 °C) (08/06/24 1220)  Pulse: 66 (08/06/24 1220)  Resp: 18 (08/06/24 1220)  BP: 109/65 (08/06/24 1220)  SpO2: 99 % (08/06/24 1220) Vital Signs (24h Range):  Temp:  [97.7 °F (36.5 °C)-97.8 °F (36.6 °C)] 97.7 °F (36.5 °C)  Pulse:  [] 66  Resp:  [15-18] 18  SpO2:  [99 %] 99 %  BP: (109-135)/(65-90) 109/65     Weight: 84.7 kg (186 lb 11.7 oz)  Body mass index is 27.58 kg/m².    Estimated Creatinine Clearance: 131.3 mL/min (based on SCr of 0.8 mg/dL).     Physical Exam  Vitals and nursing note reviewed.   HENT:      Head: Normocephalic.   Cardiovascular:      Rate and Rhythm: Normal rate.   Pulmonary:      Effort: Pulmonary effort is normal.   Musculoskeletal:         General: Swelling present.      Comments: paraplegia   Neurological:      Mental Status: He is alert. Mental status is at baseline.          Significant Labs: Blood Culture:   Recent Labs   Lab 05/08/24  1200 05/08/24  1215 08/02/24  2247 08/02/24  2252   LABBLOO No growth after 5 days. No growth after 5 days. No Growth to date  No Growth to date  No Growth to date  No Growth to date  No Growth to date No Growth to date  No Growth to date  No Growth to date  No Growth to date  No Growth to date     BMP: No results for input(s): "GLU", "NA", "K", "CL", "CO2", "BUN", "CREATININE", "CALCIUM", "MG" in the last 48 hours.  Urine Culture:   Recent Labs   Lab 05/29/24  1527 05/30/24  1045 07/03/24  1000 08/02/24  1415 08/03/24  0007   LABURIN MORGANELLA MORGANII  >100,000 cfu/ml  *  MORGANELLA MORGANII  50,000 - 99,999 cfu/ml  * MORGANELLA " MORGANII  >100,000 cfu/ml  *  PSEUDOMONAS AERUGINOSA  50,000 - 99,999 cfu/ml  * MORGANELLA MORGANII  >100,000 cfu/ml  * MORGANELLA MORGANII  >100,000 cfu/ml  No other significant isolate  * MORGANELLA MORGANII  >100,000 cfu/ml  *     All pertinent labs within the past 24 hours have been reviewed.    Significant Imaging: I have reviewed all pertinent imaging results/findings within the past 24 hours.

## 2024-08-07 NOTE — ASSESSMENT & PLAN NOTE
--CT a/p this admission reports suprapubic catheter within the bladder. Small amount of bladder gas may be iatrogenic or infectious. Otherwise unrevealing.   --Challenging case due to chronic suprapubic catheter and nonspecific symptoms reported by patient  --Would not consider foul smelling urine an indication of infection   --Suspect chronic abdominal pain due to constipation   --Continue biweekly suprapubic catheter exchange   --Will need to discuss with urology if there are alternatives to suprapubic catheter as patient is currently colonized with GN bacteria   --Continue IV cefepime for now while following newest urine culture (GNR reported so far)   --Will likely need new PICC/midline for IV antibiotics at discharge due to history of MDRO  --If repeat culture shows more susceptible isolate will consider chronic oral suppression   --Needs bowel regimen to prevent constipation which is huge risk factor for recurrent UTI; discussed with PCP  --Above d/w primary team.      08/05- will stop Cefepime- no need for PICC line on discharge .  Will give a dose of ertapenem .  He is colonized  Latest urine culture -08/03- Morganella      08/06-  Will give an extra dose of Ertapenem  He is colonized with bacteremia   Will not use PICC line at this time.  He also has recent PICC associated DVT on the right ,.  Will follow in ID clinic   He was given total of 4 days of antibiotics

## 2024-08-08 ENCOUNTER — PATIENT MESSAGE (OUTPATIENT)
Dept: INTERNAL MEDICINE | Facility: CLINIC | Age: 34
End: 2024-08-08

## 2024-08-08 ENCOUNTER — TELEPHONE (OUTPATIENT)
Dept: GASTROENTEROLOGY | Facility: CLINIC | Age: 34
End: 2024-08-08
Payer: MEDICARE

## 2024-08-08 ENCOUNTER — PATIENT MESSAGE (OUTPATIENT)
Dept: GASTROENTEROLOGY | Facility: CLINIC | Age: 34
End: 2024-08-08
Payer: MEDICARE

## 2024-08-08 ENCOUNTER — TELEPHONE (OUTPATIENT)
Dept: INTERNAL MEDICINE | Facility: CLINIC | Age: 34
End: 2024-08-08
Payer: MEDICARE

## 2024-08-08 ENCOUNTER — OFFICE VISIT (OUTPATIENT)
Dept: INTERNAL MEDICINE | Facility: CLINIC | Age: 34
End: 2024-08-08
Payer: MEDICARE

## 2024-08-08 DIAGNOSIS — Z09 HOSPITAL DISCHARGE FOLLOW-UP: ICD-10-CM

## 2024-08-08 DIAGNOSIS — K59.09 CHRONIC CONSTIPATION: ICD-10-CM

## 2024-08-08 DIAGNOSIS — S14.109S QUADRIPLEGIA, POST-TRAUMATIC: ICD-10-CM

## 2024-08-08 DIAGNOSIS — Z79.899 ENCOUNTER FOR LONG-TERM (CURRENT) USE OF MEDICATIONS: Primary | ICD-10-CM

## 2024-08-08 DIAGNOSIS — G82.50 QUADRIPLEGIA, POST-TRAUMATIC: ICD-10-CM

## 2024-08-08 LAB
BACTERIA BLD CULT: NORMAL
BACTERIA BLD CULT: NORMAL

## 2024-08-08 PROCEDURE — G0179 MD RECERTIFICATION HHA PT: HCPCS | Mod: ,,, | Performed by: FAMILY MEDICINE

## 2024-08-08 RX ORDER — CYCLOBENZAPRINE HCL 10 MG
10 TABLET ORAL 3 TIMES DAILY PRN
Qty: 90 TABLET | Refills: 5 | Status: SHIPPED | OUTPATIENT
Start: 2024-08-08 | End: 2024-08-18

## 2024-08-08 RX ORDER — BACLOFEN 10 MG/1
TABLET ORAL
Qty: 270 TABLET | Refills: 0 | Status: SHIPPED | OUTPATIENT
Start: 2024-08-08

## 2024-08-09 ENCOUNTER — PATIENT MESSAGE (OUTPATIENT)
Dept: INTERNAL MEDICINE | Facility: CLINIC | Age: 34
End: 2024-08-09
Payer: MEDICARE

## 2024-08-10 ENCOUNTER — NURSE TRIAGE (OUTPATIENT)
Dept: ADMINISTRATIVE | Facility: CLINIC | Age: 34
End: 2024-08-10
Payer: MEDICARE

## 2024-08-10 ENCOUNTER — HOSPITAL ENCOUNTER (EMERGENCY)
Facility: HOSPITAL | Age: 34
Discharge: HOME | End: 2024-08-10
Attending: EMERGENCY MEDICINE
Payer: MEDICARE

## 2024-08-10 VITALS
RESPIRATION RATE: 15 BRPM | HEIGHT: 69 IN | TEMPERATURE: 98 F | HEART RATE: 60 BPM | BODY MASS INDEX: 25.96 KG/M2 | WEIGHT: 175.25 LBS | SYSTOLIC BLOOD PRESSURE: 135 MMHG | OXYGEN SATURATION: 99 % | DIASTOLIC BLOOD PRESSURE: 86 MMHG

## 2024-08-10 DIAGNOSIS — R07.9 CHEST PAIN: ICD-10-CM

## 2024-08-10 DIAGNOSIS — K59.00 CONSTIPATION, UNSPECIFIED CONSTIPATION TYPE: ICD-10-CM

## 2024-08-10 DIAGNOSIS — K21.9 GASTROESOPHAGEAL REFLUX DISEASE, UNSPECIFIED WHETHER ESOPHAGITIS PRESENT: Primary | ICD-10-CM

## 2024-08-10 LAB
ALBUMIN SERPL BCP-MCNC: 3.6 G/DL (ref 3.5–5.2)
ALP SERPL-CCNC: 104 U/L (ref 55–135)
ALT SERPL W/O P-5'-P-CCNC: 79 U/L (ref 10–44)
ANION GAP SERPL CALC-SCNC: 10 MMOL/L (ref 8–16)
AST SERPL-CCNC: 40 U/L (ref 10–40)
BASOPHILS # BLD AUTO: 0.02 K/UL (ref 0–0.2)
BASOPHILS NFR BLD: 0.5 % (ref 0–1.9)
BILIRUB SERPL-MCNC: 0.3 MG/DL (ref 0.1–1)
BNP SERPL-MCNC: 27 PG/ML (ref 0–99)
BUN SERPL-MCNC: 6 MG/DL (ref 6–20)
CALCIUM SERPL-MCNC: 9.5 MG/DL (ref 8.7–10.5)
CHLORIDE SERPL-SCNC: 105 MMOL/L (ref 95–110)
CO2 SERPL-SCNC: 26 MMOL/L (ref 23–29)
CREAT SERPL-MCNC: 0.8 MG/DL (ref 0.5–1.4)
DIFFERENTIAL METHOD BLD: ABNORMAL
EOSINOPHIL # BLD AUTO: 0.2 K/UL (ref 0–0.5)
EOSINOPHIL NFR BLD: 4.1 % (ref 0–8)
ERYTHROCYTE [DISTWIDTH] IN BLOOD BY AUTOMATED COUNT: 13 % (ref 11.5–14.5)
EST. GFR  (NO RACE VARIABLE): >60 ML/MIN/1.73 M^2
GLUCOSE SERPL-MCNC: 99 MG/DL (ref 70–110)
HCT VFR BLD AUTO: 41.6 % (ref 40–54)
HGB BLD-MCNC: 14 G/DL (ref 14–18)
IMM GRANULOCYTES # BLD AUTO: 0.01 K/UL (ref 0–0.04)
IMM GRANULOCYTES NFR BLD AUTO: 0.3 % (ref 0–0.5)
LIPASE SERPL-CCNC: 25 U/L (ref 4–60)
LYMPHOCYTES # BLD AUTO: 1.5 K/UL (ref 1–4.8)
LYMPHOCYTES NFR BLD: 39.5 % (ref 18–48)
MCH RBC QN AUTO: 29 PG (ref 27–31)
MCHC RBC AUTO-ENTMCNC: 33.7 G/DL (ref 32–36)
MCV RBC AUTO: 86 FL (ref 82–98)
MONOCYTES # BLD AUTO: 0.4 K/UL (ref 0.3–1)
MONOCYTES NFR BLD: 10.3 % (ref 4–15)
NEUTROPHILS # BLD AUTO: 1.8 K/UL (ref 1.8–7.7)
NEUTROPHILS NFR BLD: 45.3 % (ref 38–73)
NRBC BLD-RTO: 0 /100 WBC
PLATELET # BLD AUTO: 219 K/UL (ref 150–450)
PMV BLD AUTO: 10.9 FL (ref 9.2–12.9)
POTASSIUM SERPL-SCNC: 4.2 MMOL/L (ref 3.5–5.1)
PROT SERPL-MCNC: 7.2 G/DL (ref 6–8.4)
RBC # BLD AUTO: 4.83 M/UL (ref 4.6–6.2)
SODIUM SERPL-SCNC: 141 MMOL/L (ref 136–145)
TROPONIN I SERPL DL<=0.01 NG/ML-MCNC: <0.006 NG/ML (ref 0–0.03)
WBC # BLD AUTO: 3.87 K/UL (ref 3.9–12.7)

## 2024-08-10 PROCEDURE — 25000003 PHARM REV CODE 250: Mod: HCNC | Performed by: EMERGENCY MEDICINE

## 2024-08-10 PROCEDURE — 80053 COMPREHEN METABOLIC PANEL: CPT | Mod: HCNC | Performed by: EMERGENCY MEDICINE

## 2024-08-10 PROCEDURE — 96375 TX/PRO/DX INJ NEW DRUG ADDON: CPT | Mod: HCNC

## 2024-08-10 PROCEDURE — 93005 ELECTROCARDIOGRAM TRACING: CPT | Mod: HCNC

## 2024-08-10 PROCEDURE — 99285 EMERGENCY DEPT VISIT HI MDM: CPT | Mod: 25,HCNC

## 2024-08-10 PROCEDURE — 63600175 PHARM REV CODE 636 W HCPCS: Mod: HCNC | Performed by: EMERGENCY MEDICINE

## 2024-08-10 PROCEDURE — 84484 ASSAY OF TROPONIN QUANT: CPT | Mod: HCNC | Performed by: EMERGENCY MEDICINE

## 2024-08-10 PROCEDURE — 83690 ASSAY OF LIPASE: CPT | Mod: HCNC | Performed by: EMERGENCY MEDICINE

## 2024-08-10 PROCEDURE — 83880 ASSAY OF NATRIURETIC PEPTIDE: CPT | Mod: HCNC | Performed by: EMERGENCY MEDICINE

## 2024-08-10 PROCEDURE — 85025 COMPLETE CBC W/AUTO DIFF WBC: CPT | Mod: HCNC | Performed by: EMERGENCY MEDICINE

## 2024-08-10 PROCEDURE — 96374 THER/PROPH/DIAG INJ IV PUSH: CPT | Mod: HCNC

## 2024-08-10 PROCEDURE — 93010 ELECTROCARDIOGRAM REPORT: CPT | Mod: HCNC,,, | Performed by: INTERNAL MEDICINE

## 2024-08-10 RX ORDER — SUCRALFATE 1 G/10ML
1 SUSPENSION ORAL
Status: COMPLETED | OUTPATIENT
Start: 2024-08-10 | End: 2024-08-10

## 2024-08-10 RX ORDER — MORPHINE SULFATE 4 MG/ML
4 INJECTION, SOLUTION INTRAMUSCULAR; INTRAVENOUS
Status: COMPLETED | OUTPATIENT
Start: 2024-08-10 | End: 2024-08-10

## 2024-08-10 RX ORDER — FAMOTIDINE 10 MG/ML
20 INJECTION INTRAVENOUS
Status: COMPLETED | OUTPATIENT
Start: 2024-08-10 | End: 2024-08-10

## 2024-08-10 RX ORDER — ONDANSETRON HYDROCHLORIDE 2 MG/ML
4 INJECTION, SOLUTION INTRAVENOUS
Status: COMPLETED | OUTPATIENT
Start: 2024-08-10 | End: 2024-08-10

## 2024-08-10 RX ADMIN — MORPHINE SULFATE 4 MG: 4 INJECTION INTRAVENOUS at 11:08

## 2024-08-10 RX ADMIN — SUCRALFATE 1 G: 1 SUSPENSION ORAL at 11:08

## 2024-08-10 RX ADMIN — FAMOTIDINE 20 MG: 10 INJECTION, SOLUTION INTRAVENOUS at 11:08

## 2024-08-10 RX ADMIN — ONDANSETRON 4 MG: 2 INJECTION INTRAMUSCULAR; INTRAVENOUS at 11:08

## 2024-08-10 NOTE — TELEPHONE ENCOUNTER
Spoke with Daayn this am, advised ED. Patient states Dayan did not speak with him, she spoke with his girlfriend. Unclear if abdominal pain is due to constipation, does have bowel program, is passing small amounts of stool. Also C/O testicle pain.  Triage done-dispo ED. Verb understanding.   Reason for Disposition   [1] SEVERE pain (e.g., excruciating) AND [2] present > 1 hour    Additional Information   Negative: Shock suspected (e.g., cold/pale/clammy skin, too weak to stand, low BP, rapid pulse)   Negative: Difficult to awaken or acting confused (e.g., disoriented, slurred speech)   Negative: Passed out (e.g., fainted, lost consciousness, blacked out and was not responding)   Negative: Sounds like a life-threatening emergency to the triager    Protocols used: Abdominal Pain - Male-A-

## 2024-08-10 NOTE — TELEPHONE ENCOUNTER
Caller states pt is experiencing pelvic and abd pain 10/10 x 2 hours.  Pt advised ED per protocol and verbalized understanding.     Reason for Disposition   [1] SEVERE pain (e.g., excruciating) AND [2] present > 1 hour    Additional Information   Negative: Shock suspected (e.g., cold/pale/clammy skin, too weak to stand, low BP, rapid pulse)   Negative: Difficult to awaken or acting confused (e.g., disoriented, slurred speech)   Negative: Passed out (e.g., fainted, lost consciousness, blacked out and was not responding)   Negative: Sounds like a life-threatening emergency to the triager    Protocols used: Abdominal Pain - Male-A-AH
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
## 2024-08-11 LAB
OHS QRS DURATION: 100 MS
OHS QTC CALCULATION: 387 MS

## 2024-08-12 ENCOUNTER — TELEPHONE (OUTPATIENT)
Dept: INTERNAL MEDICINE | Facility: CLINIC | Age: 34
End: 2024-08-12
Payer: MEDICARE

## 2024-08-12 ENCOUNTER — TELEPHONE (OUTPATIENT)
Dept: HEMATOLOGY/ONCOLOGY | Facility: CLINIC | Age: 34
End: 2024-08-12
Payer: MEDICARE

## 2024-08-12 RX ORDER — BACLOFEN 10 MG/1
TABLET ORAL
Qty: 270 TABLET | Refills: 0 | Status: SHIPPED | OUTPATIENT
Start: 2024-08-12

## 2024-08-12 NOTE — TELEPHONE ENCOUNTER
----- Message from Moon Hernández sent at 8/12/2024  8:53 AM CDT -----  Contact: Patient  Type:  Needs Medical Advice    Who Called: Patient       Pharmacy name and phone #:    [facility] [address] - [place] - [address]  [address] [address]  Phone: [phone] Fax: [phone]      Would the patient rather a call back or a response via [facility]? Call    Best Call Back Number: [phone] (home)     Additional Information: Patient is requesting a call regarding medication changes. Please call to advise

## 2024-08-12 NOTE — TELEPHONE ENCOUNTER
Patient wants to let you know that the flexeril is not working and he is requesting to go back to the baclofen (of which, he is completely out of. )

## 2024-08-12 NOTE — TELEPHONE ENCOUNTER
Spoke to patient in reference to Hematology referral from Dr. Glez.  Appointment scheduled per patient's request next available.  Appointment notice via pt portal.

## 2024-08-14 ENCOUNTER — PATIENT MESSAGE (OUTPATIENT)
Dept: INTERNAL MEDICINE | Facility: CLINIC | Age: 34
End: 2024-08-14
Payer: MEDICARE

## 2024-08-15 ENCOUNTER — LAB VISIT (OUTPATIENT)
Dept: LAB | Facility: HOSPITAL | Age: 34
End: 2024-08-15
Attending: STUDENT IN AN ORGANIZED HEALTH CARE EDUCATION/TRAINING PROGRAM
Payer: MEDICARE

## 2024-08-15 ENCOUNTER — OFFICE VISIT (OUTPATIENT)
Dept: GASTROENTEROLOGY | Facility: CLINIC | Age: 34
End: 2024-08-15
Payer: MEDICARE

## 2024-08-15 DIAGNOSIS — E43 ALIMENTARY EDEMA: ICD-10-CM

## 2024-08-15 DIAGNOSIS — I11.0 HYPERTENSIVE HEART DISEASE WITH CONGESTIVE HEART FAILURE: Primary | ICD-10-CM

## 2024-08-15 DIAGNOSIS — Z79.899 ENCOUNTER FOR LONG-TERM (CURRENT) USE OF OTHER MEDICATIONS: ICD-10-CM

## 2024-08-15 DIAGNOSIS — K58.1 IRRITABLE BOWEL SYNDROME WITH CONSTIPATION: Primary | ICD-10-CM

## 2024-08-15 DIAGNOSIS — N31.9 HIGH COMPLIANCE BLADDER: ICD-10-CM

## 2024-08-15 LAB
BACTERIA #/AREA URNS HPF: ABNORMAL /HPF
BILIRUB UR QL STRIP: NEGATIVE
CLARITY UR: ABNORMAL
COLOR UR: COLORLESS
GLUCOSE UR QL STRIP: NEGATIVE
HGB UR QL STRIP: NEGATIVE
KETONES UR QL STRIP: NEGATIVE
LEUKOCYTE ESTERASE UR QL STRIP: ABNORMAL
MICROSCOPIC COMMENT: ABNORMAL
NITRITE UR QL STRIP: POSITIVE
PH UR STRIP: 7 [PH] (ref 5–8)
PROT UR QL STRIP: NEGATIVE
RBC #/AREA URNS HPF: 2 /HPF (ref 0–4)
SP GR UR STRIP: 1.01 (ref 1–1.03)
TSH SERPL DL<=0.005 MIU/L-ACNC: 0.66 UIU/ML (ref 0.4–4)
URN SPEC COLLECT METH UR: ABNORMAL
UROBILINOGEN UR STRIP-ACNC: NEGATIVE EU/DL
WBC #/AREA URNS HPF: 16 /HPF (ref 0–5)
WBC CLUMPS URNS QL MICRO: ABNORMAL

## 2024-08-15 PROCEDURE — 1160F RVW MEDS BY RX/DR IN RCRD: CPT | Mod: HCNC,CPTII,95, | Performed by: NURSE PRACTITIONER

## 2024-08-15 PROCEDURE — 1159F MED LIST DOCD IN RCRD: CPT | Mod: HCNC,CPTII,95, | Performed by: NURSE PRACTITIONER

## 2024-08-15 PROCEDURE — 83036 HEMOGLOBIN GLYCOSYLATED A1C: CPT | Mod: HCNC | Performed by: FAMILY MEDICINE

## 2024-08-15 PROCEDURE — 36415 COLL VENOUS BLD VENIPUNCTURE: CPT | Mod: HCNC | Performed by: FAMILY MEDICINE

## 2024-08-15 PROCEDURE — 80061 LIPID PANEL: CPT | Mod: HCNC | Performed by: FAMILY MEDICINE

## 2024-08-15 PROCEDURE — 99213 OFFICE O/P EST LOW 20 MIN: CPT | Mod: HCNC,95,, | Performed by: NURSE PRACTITIONER

## 2024-08-15 PROCEDURE — 87086 URINE CULTURE/COLONY COUNT: CPT | Mod: HCNC | Performed by: STUDENT IN AN ORGANIZED HEALTH CARE EDUCATION/TRAINING PROGRAM

## 2024-08-15 PROCEDURE — 81000 URINALYSIS NONAUTO W/SCOPE: CPT | Mod: HCNC | Performed by: STUDENT IN AN ORGANIZED HEALTH CARE EDUCATION/TRAINING PROGRAM

## 2024-08-15 PROCEDURE — 87088 URINE BACTERIA CULTURE: CPT | Mod: HCNC | Performed by: STUDENT IN AN ORGANIZED HEALTH CARE EDUCATION/TRAINING PROGRAM

## 2024-08-15 PROCEDURE — 84443 ASSAY THYROID STIM HORMONE: CPT | Mod: HCNC | Performed by: FAMILY MEDICINE

## 2024-08-15 PROCEDURE — 87186 SC STD MICRODIL/AGAR DIL: CPT | Mod: HCNC | Performed by: STUDENT IN AN ORGANIZED HEALTH CARE EDUCATION/TRAINING PROGRAM

## 2024-08-15 PROCEDURE — 1111F DSCHRG MED/CURRENT MED MERGE: CPT | Mod: HCNC,CPTII,95, | Performed by: NURSE PRACTITIONER
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
## 2024-08-16 LAB
CHOLEST SERPL-MCNC: 129 MG/DL (ref 120–199)
CHOLEST/HDLC SERPL: 2.7 {RATIO} (ref 2–5)
ESTIMATED AVG GLUCOSE: 91 MG/DL (ref 68–131)
HBA1C MFR BLD: 4.8 % (ref 4–5.6)
HDLC SERPL-MCNC: 47 MG/DL (ref 40–75)
HDLC SERPL: 36.4 % (ref 20–50)
LDLC SERPL CALC-MCNC: 68.6 MG/DL (ref 63–159)
NONHDLC SERPL-MCNC: 82 MG/DL
TRIGL SERPL-MCNC: 67 MG/DL (ref 30–150)

## 2024-08-16 RX ORDER — BACLOFEN 20 MG/1
20 TABLET ORAL 3 TIMES DAILY PRN
Qty: 90 TABLET | Refills: 11 | Status: SHIPPED | OUTPATIENT
Start: 2024-08-16 | End: 2025-08-16

## 2024-08-19 LAB — BACTERIA UR CULT: ABNORMAL

## 2024-08-22 ENCOUNTER — OFFICE VISIT (OUTPATIENT)
Dept: UROLOGY | Facility: CLINIC | Age: 34
End: 2024-08-22
Payer: MEDICARE

## 2024-08-22 DIAGNOSIS — N39.0 RECURRENT UTI: ICD-10-CM

## 2024-08-22 DIAGNOSIS — N31.9 NEUROGENIC BLADDER: Primary | ICD-10-CM

## 2024-08-22 PROCEDURE — 1111F DSCHRG MED/CURRENT MED MERGE: CPT | Mod: HCNC,CPTII,95, | Performed by: NURSE PRACTITIONER

## 2024-08-22 PROCEDURE — 99214 OFFICE O/P EST MOD 30 MIN: CPT | Mod: HCNC,95,, | Performed by: NURSE PRACTITIONER

## 2024-08-22 PROCEDURE — 3044F HG A1C LEVEL LT 7.0%: CPT | Mod: HCNC,CPTII,95, | Performed by: NURSE PRACTITIONER

## 2024-08-22 PROCEDURE — 1159F MED LIST DOCD IN RCRD: CPT | Mod: HCNC,CPTII,95, | Performed by: NURSE PRACTITIONER

## 2024-08-22 PROCEDURE — 1160F RVW MEDS BY RX/DR IN RCRD: CPT | Mod: HCNC,CPTII,95, | Performed by: NURSE PRACTITIONER
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
## 2024-08-28 ENCOUNTER — DOCUMENT SCAN (OUTPATIENT)
Dept: HOME HEALTH SERVICES | Facility: HOSPITAL | Age: 34
End: 2024-08-28
Payer: MEDICARE

## 2024-08-29 ENCOUNTER — APPOINTMENT (OUTPATIENT)
Dept: LAB | Facility: HOSPITAL | Age: 34
End: 2024-08-29
Attending: STUDENT IN AN ORGANIZED HEALTH CARE EDUCATION/TRAINING PROGRAM
Payer: MEDICARE

## 2024-08-29 DIAGNOSIS — N39.0 URINARY TRACT INFECTION ASSOCIATED WITH CATHETERIZATION OF URINARY TRACT, SUBSEQUENT ENCOUNTER: Primary | ICD-10-CM

## 2024-08-29 DIAGNOSIS — T83.511D URINARY TRACT INFECTION ASSOCIATED WITH CATHETERIZATION OF URINARY TRACT, SUBSEQUENT ENCOUNTER: Primary | ICD-10-CM

## 2024-08-29 LAB
BACTERIA #/AREA URNS HPF: NORMAL /HPF
BILIRUB UR QL STRIP: NEGATIVE
CLARITY UR: CLEAR
COLOR UR: COLORLESS
GLUCOSE UR QL STRIP: NEGATIVE
HGB UR QL STRIP: NEGATIVE
KETONES UR QL STRIP: NEGATIVE
LEUKOCYTE ESTERASE UR QL STRIP: ABNORMAL
MICROSCOPIC COMMENT: NORMAL
NITRITE UR QL STRIP: NEGATIVE
PH UR STRIP: 7 [PH] (ref 5–8)
PROT UR QL STRIP: NEGATIVE
SP GR UR STRIP: <1.005 (ref 1–1.03)
URN SPEC COLLECT METH UR: ABNORMAL
UROBILINOGEN UR STRIP-ACNC: NEGATIVE EU/DL
WBC #/AREA URNS HPF: 4 /HPF (ref 0–5)

## 2024-08-29 PROCEDURE — 81000 URINALYSIS NONAUTO W/SCOPE: CPT | Mod: HCNC | Performed by: STUDENT IN AN ORGANIZED HEALTH CARE EDUCATION/TRAINING PROGRAM

## 2024-09-03 ENCOUNTER — OFFICE VISIT (OUTPATIENT)
Dept: INTERNAL MEDICINE | Facility: CLINIC | Age: 34
End: 2024-09-03
Payer: MEDICARE

## 2024-09-03 VITALS — DIASTOLIC BLOOD PRESSURE: 86 MMHG | SYSTOLIC BLOOD PRESSURE: 135 MMHG

## 2024-09-03 DIAGNOSIS — R79.89 ELEVATED LFTS: ICD-10-CM

## 2024-09-03 DIAGNOSIS — N39.0 RECURRENT UTI: Primary | ICD-10-CM

## 2024-09-03 DIAGNOSIS — R25.2 SPASTICITY: ICD-10-CM

## 2024-09-03 DIAGNOSIS — K21.9 GASTROESOPHAGEAL REFLUX DISEASE, UNSPECIFIED WHETHER ESOPHAGITIS PRESENT: ICD-10-CM

## 2024-09-03 DIAGNOSIS — K58.9 IRRITABLE BOWEL SYNDROME, UNSPECIFIED TYPE: ICD-10-CM

## 2024-09-03 DIAGNOSIS — D72.819 LEUKOPENIA, UNSPECIFIED TYPE: ICD-10-CM

## 2024-09-03 PROCEDURE — 1111F DSCHRG MED/CURRENT MED MERGE: CPT | Mod: HCNC,CPTII,95, | Performed by: FAMILY MEDICINE

## 2024-09-03 PROCEDURE — G2211 COMPLEX E/M VISIT ADD ON: HCPCS | Mod: HCNC,95,, | Performed by: FAMILY MEDICINE

## 2024-09-03 PROCEDURE — 3044F HG A1C LEVEL LT 7.0%: CPT | Mod: HCNC,CPTII,95, | Performed by: FAMILY MEDICINE

## 2024-09-03 PROCEDURE — 3079F DIAST BP 80-89 MM HG: CPT | Mod: HCNC,CPTII,95, | Performed by: FAMILY MEDICINE

## 2024-09-03 PROCEDURE — 3075F SYST BP GE 130 - 139MM HG: CPT | Mod: HCNC,CPTII,95, | Performed by: FAMILY MEDICINE

## 2024-09-03 PROCEDURE — 99214 OFFICE O/P EST MOD 30 MIN: CPT | Mod: HCNC,95,, | Performed by: FAMILY MEDICINE
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
## 2024-09-04 ENCOUNTER — PATIENT MESSAGE (OUTPATIENT)
Dept: INFECTIOUS DISEASES | Facility: CLINIC | Age: 34
End: 2024-09-04
Payer: MEDICARE

## 2024-09-04 DIAGNOSIS — N39.0 COMPLICATED UTI (URINARY TRACT INFECTION): Primary | ICD-10-CM

## 2024-09-05 ENCOUNTER — LAB VISIT (OUTPATIENT)
Dept: LAB | Facility: HOSPITAL | Age: 34
End: 2024-09-05
Attending: INTERNAL MEDICINE
Payer: MEDICARE

## 2024-09-05 DIAGNOSIS — N39.0 COMPLICATED UTI (URINARY TRACT INFECTION): ICD-10-CM

## 2024-09-05 DIAGNOSIS — N39.0 COMPLICATED UTI (URINARY TRACT INFECTION): Primary | ICD-10-CM

## 2024-09-05 LAB
BACTERIA #/AREA URNS HPF: ABNORMAL /HPF
BILIRUB UR QL STRIP: NEGATIVE
CLARITY UR: CLEAR
COLOR UR: YELLOW
GLUCOSE UR QL STRIP: NEGATIVE
HGB UR QL STRIP: NEGATIVE
KETONES UR QL STRIP: NEGATIVE
LEUKOCYTE ESTERASE UR QL STRIP: ABNORMAL
MICROSCOPIC COMMENT: ABNORMAL
NITRITE UR QL STRIP: NEGATIVE
NON-SQ EPI CELLS #/AREA URNS HPF: 1 /HPF
PH UR STRIP: 8 [PH] (ref 5–8)
PROT UR QL STRIP: NEGATIVE
SP GR UR STRIP: 1.01 (ref 1–1.03)
SQUAMOUS #/AREA URNS HPF: 1 /HPF
URN SPEC COLLECT METH UR: ABNORMAL
UROBILINOGEN UR STRIP-ACNC: NEGATIVE EU/DL
WBC #/AREA URNS HPF: 20 /HPF (ref 0–5)

## 2024-09-05 PROCEDURE — 81000 URINALYSIS NONAUTO W/SCOPE: CPT | Mod: HCNC | Performed by: INTERNAL MEDICINE

## 2024-09-05 PROCEDURE — 87086 URINE CULTURE/COLONY COUNT: CPT | Mod: HCNC | Performed by: INTERNAL MEDICINE

## 2024-09-06 ENCOUNTER — TELEPHONE (OUTPATIENT)
Dept: INFECTIOUS DISEASES | Facility: CLINIC | Age: 34
End: 2024-09-06
Payer: MEDICARE

## 2024-09-06 ENCOUNTER — EXTERNAL HOME HEALTH (OUTPATIENT)
Dept: HOME HEALTH SERVICES | Facility: HOSPITAL | Age: 34
End: 2024-09-06
Payer: MEDICARE

## 2024-09-06 NOTE — TELEPHONE ENCOUNTER
----- Message from Penny Doherty LPN sent at 9/6/2024  2:34 PM CDT -----  Call pt and ask his s/s  ----- Message -----  From: Jaden Castro MD, [facility]  Sent: 9/6/2024  12:34 PM CDT  To: Gloria Stanley Staff    We will wait for cultures- what symptoms do you have

## 2024-09-06 NOTE — TELEPHONE ENCOUNTER
Notified the pt the Doctor is wait for the Culture. He voiced understand.  Pt states his s/s are:  Abdomal pain.   Urine foul odor.   Chills and cold sweat  Pls advise.

## 2024-09-07 LAB
BACTERIA UR CULT: NORMAL
BACTERIA UR CULT: NORMAL

## 2024-09-09 ENCOUNTER — TELEPHONE (OUTPATIENT)
Dept: INFECTIOUS DISEASES | Facility: CLINIC | Age: 34
End: 2024-09-09
Payer: MEDICARE

## 2024-09-09 ENCOUNTER — PATIENT MESSAGE (OUTPATIENT)
Dept: INTERNAL MEDICINE | Facility: CLINIC | Age: 34
End: 2024-09-09
Payer: MEDICARE

## 2024-09-10 DIAGNOSIS — R22.42 LOCALIZED SWELLING, MASS AND LUMP, LEFT LOWER LIMB: ICD-10-CM

## 2024-09-10 DIAGNOSIS — M79.89 LEFT LEG SWELLING: Primary | ICD-10-CM

## 2024-09-10 NOTE — TELEPHONE ENCOUNTER
Pt states his alonso was changed the day before the UA was collected or the day after his is not sure.   New alonso changed today by home health.

## 2024-09-12 ENCOUNTER — TELEPHONE (OUTPATIENT)
Dept: HEMATOLOGY/ONCOLOGY | Facility: CLINIC | Age: 34
End: 2024-09-12
Payer: MEDICARE

## 2024-09-12 NOTE — TELEPHONE ENCOUNTER
N/a nurse left vm informing pt to join the call for his virtual visit. Nurse advised the pt to call back if needing to reschedule.

## 2024-09-13 ENCOUNTER — LAB VISIT (OUTPATIENT)
Dept: LAB | Facility: HOSPITAL | Age: 34
End: 2024-09-13
Attending: STUDENT IN AN ORGANIZED HEALTH CARE EDUCATION/TRAINING PROGRAM
Payer: MEDICARE

## 2024-09-13 DIAGNOSIS — N39.0 COMPLICATED UTI (URINARY TRACT INFECTION): ICD-10-CM

## 2024-09-13 LAB
BACTERIA #/AREA URNS HPF: ABNORMAL /HPF
BILIRUB UR QL STRIP: NEGATIVE
CLARITY UR: CLEAR
COLOR UR: YELLOW
GLUCOSE UR QL STRIP: NEGATIVE
HGB UR QL STRIP: NEGATIVE
KETONES UR QL STRIP: NEGATIVE
LEUKOCYTE ESTERASE UR QL STRIP: ABNORMAL
MICROSCOPIC COMMENT: ABNORMAL
NITRITE UR QL STRIP: POSITIVE
PH UR STRIP: 7 [PH] (ref 5–8)
PROT UR QL STRIP: NEGATIVE
RBC #/AREA URNS HPF: 2 /HPF (ref 0–4)
SP GR UR STRIP: 1.01 (ref 1–1.03)
SQUAMOUS #/AREA URNS HPF: 2 /HPF
URN SPEC COLLECT METH UR: ABNORMAL
UROBILINOGEN UR STRIP-ACNC: NEGATIVE EU/DL
WBC #/AREA URNS HPF: 18 /HPF (ref 0–5)

## 2024-09-13 PROCEDURE — 81000 URINALYSIS NONAUTO W/SCOPE: CPT | Mod: HCNC | Performed by: STUDENT IN AN ORGANIZED HEALTH CARE EDUCATION/TRAINING PROGRAM

## 2024-09-13 PROCEDURE — 87186 SC STD MICRODIL/AGAR DIL: CPT | Mod: HCNC | Performed by: STUDENT IN AN ORGANIZED HEALTH CARE EDUCATION/TRAINING PROGRAM

## 2024-09-13 PROCEDURE — 87088 URINE BACTERIA CULTURE: CPT | Mod: HCNC | Performed by: STUDENT IN AN ORGANIZED HEALTH CARE EDUCATION/TRAINING PROGRAM

## 2024-09-13 PROCEDURE — 87086 URINE CULTURE/COLONY COUNT: CPT | Mod: HCNC | Performed by: STUDENT IN AN ORGANIZED HEALTH CARE EDUCATION/TRAINING PROGRAM

## 2024-09-17 ENCOUNTER — TELEPHONE (OUTPATIENT)
Dept: INFECTIOUS DISEASES | Facility: CLINIC | Age: 34
End: 2024-09-17
Payer: MEDICARE

## 2024-09-17 LAB — BACTERIA UR CULT: ABNORMAL

## 2024-09-17 RX ORDER — SULFAMETHOXAZOLE AND TRIMETHOPRIM 800; 160 MG/1; MG/1
1 TABLET ORAL 2 TIMES DAILY
Qty: 14 TABLET | Refills: 0 | Status: SHIPPED | OUTPATIENT
Start: 2024-09-17 | End: 2024-09-24

## 2024-09-17 NOTE — TELEPHONE ENCOUNTER
Called the pt. Notified of reuslts.  Informed the pt of new order sent to his list pharmacy per Dr. Castro. Pt repeated back and voiced understand.

## 2024-09-18 DIAGNOSIS — K21.9 GASTRO-ESOPHAGEAL REFLUX DISEASE WITHOUT ESOPHAGITIS: ICD-10-CM

## 2024-09-18 NOTE — TELEPHONE ENCOUNTER
No care due was identified.  [facility] Embedded Care Due Messages. Reference number: [id number].   9/18/2024 6:41:05 AM CDT

## 2024-09-19 RX ORDER — OMEPRAZOLE 40 MG/1
40 CAPSULE, DELAYED RELEASE ORAL
Qty: 90 CAPSULE | Refills: 3 | Status: SHIPPED | OUTPATIENT
Start: 2024-09-19

## 2024-09-19 NOTE — TELEPHONE ENCOUNTER
Refill Decision Note   Yoancurry Clifton  is requesting a refill authorization.  Brief Assessment and Rationale for Refill:  Approve     Medication Therapy Plan:         Comments:     Note composed:12:05 AM 09/19/2024

## 2024-09-20 ENCOUNTER — HOSPITAL ENCOUNTER (OUTPATIENT)
Dept: RADIOLOGY | Facility: HOSPITAL | Age: 34
Discharge: HOME | End: 2024-09-20
Attending: FAMILY MEDICINE
Payer: MEDICARE

## 2024-09-20 DIAGNOSIS — R22.42 LOCALIZED SWELLING, MASS AND LUMP, LEFT LOWER LIMB: ICD-10-CM

## 2024-09-20 DIAGNOSIS — M79.89 LEFT LEG SWELLING: ICD-10-CM

## 2024-09-20 PROCEDURE — 93971 EXTREMITY STUDY: CPT | Mod: 26,HCNC,LT, | Performed by: RADIOLOGY

## 2024-09-20 PROCEDURE — 93971 EXTREMITY STUDY: CPT | Mod: TC,HCNC,LT

## 2024-09-23 ENCOUNTER — PATIENT MESSAGE (OUTPATIENT)
Dept: INFECTIOUS DISEASES | Facility: CLINIC | Age: 34
End: 2024-09-23
Payer: MEDICARE

## 2024-09-24 ENCOUNTER — PATIENT MESSAGE (OUTPATIENT)
Dept: INFECTIOUS DISEASES | Facility: CLINIC | Age: 34
End: 2024-09-24
Payer: MEDICARE

## 2024-09-24 ENCOUNTER — PATIENT MESSAGE (OUTPATIENT)
Dept: UROLOGY | Facility: CLINIC | Age: 34
End: 2024-09-24
Payer: MEDICARE

## 2024-09-25 ENCOUNTER — PATIENT MESSAGE (OUTPATIENT)
Dept: INTERNAL MEDICINE | Facility: CLINIC | Age: 34
End: 2024-09-25
Payer: MEDICARE

## 2024-09-25 ENCOUNTER — TELEPHONE (OUTPATIENT)
Dept: GASTROENTEROLOGY | Facility: CLINIC | Age: 34
End: 2024-09-25
Payer: MEDICARE

## 2024-09-25 NOTE — TELEPHONE ENCOUNTER
Spoke with patient on [phone] in regard to scheduling an appointment. Patient has been scheduled with Yoko Aaron Np on 11/5/2024 virtually at 2 and voices understanding. Patient has also been added to the wait-list should someone cancel or reschedule.      ----- Message from Edwina Barrera sent at 9/25/2024  2:51 PM CDT -----  .Type:  Same Day Appointment Request    Caller is requesting a same day appointment.  Caller declined first available appointment listed below.    Name of Caller:Esthela   When is the first available appointment?  Symptoms:patient is constipated and bowels haven't moved in a while, hurting and whatever he eats or drinks make it worse.   Best Call Back Number:.[phone]    Additional Information: He would like a call back today if possible.

## 2024-09-26 ENCOUNTER — PATIENT MESSAGE (OUTPATIENT)
Dept: INFECTIOUS DISEASES | Facility: CLINIC | Age: 34
End: 2024-09-26

## 2024-09-26 ENCOUNTER — OFFICE VISIT (OUTPATIENT)
Dept: INFECTIOUS DISEASES | Facility: CLINIC | Age: 34
End: 2024-09-26
Payer: MEDICARE

## 2024-09-26 ENCOUNTER — TELEPHONE (OUTPATIENT)
Dept: GASTROENTEROLOGY | Facility: CLINIC | Age: 34
End: 2024-09-26
Payer: MEDICARE

## 2024-09-26 ENCOUNTER — TELEPHONE (OUTPATIENT)
Dept: INFECTIOUS DISEASES | Facility: CLINIC | Age: 34
End: 2024-09-26
Payer: MEDICARE

## 2024-09-26 DIAGNOSIS — S14.109S QUADRIPLEGIA, POST-TRAUMATIC: ICD-10-CM

## 2024-09-26 DIAGNOSIS — G82.50 QUADRIPLEGIA, POST-TRAUMATIC: ICD-10-CM

## 2024-09-26 DIAGNOSIS — I10 PRIMARY HYPERTENSION: Primary | ICD-10-CM

## 2024-09-26 DIAGNOSIS — N39.0 COMPLICATED UTI (URINARY TRACT INFECTION): ICD-10-CM

## 2024-09-26 DIAGNOSIS — R10.32 LEFT LOWER QUADRANT PAIN: ICD-10-CM

## 2024-09-26 NOTE — TELEPHONE ENCOUNTER
Pt scheduled for vv with Dr. Castro today to discuss UTI symptoms. Pt verbalized understanding and agreed to appt date, time, and location.

## 2024-09-26 NOTE — ASSESSMENT & PLAN NOTE
We had another extensive discussion about UTI and patients with paraplegia.  Will do CT scan of the abd/pelvis as he has constipation for a week without resolution with OTC meds.  No need to treat bacterial colonization

## 2024-09-26 NOTE — PROGRESS NOTES
Subjective     This is a Virtual visit .  Location -[place]  Patient ID: Kate Lazo is a 34 y.o. male.    Chief Complaint:Follow up  HPI    Last ID note-  Last clinic note- 07/2012  Last clinic note-  31 year old man with PMH as listed below. He has history of quadriparesis and has chronic [facility]rap[facility]ic [facility]. He is in a wheelchair with his step Dad    Cultures reviewed-  04/08-Urine culture-pseudomonas  1/14- Pseudomonas   12/11/20- pseudomonas/morganella  09/2020-E coli -ESBL  Case was discussed with Dr Loaiza and Fosfomycin was sent to the pharmacy.-04/13- FOSFOMYCIN po 3gram every 72 hours X2 doses  He is in a wheelchair.  11/30/21-  Latest urine culture-      11/8/21-proteus and was given Augmentin 875 mg for 7 days.   03/08/202-  Since the last clinic visit ,   Ct scan of the abdomen -02/10/2022-  Suprapubic pelvic catheter and bladder wall thickening, somewhat nonspecific in the setting of a nondistended bladder.   Moderate stool burden in the distal colon.  Correlation for constipation.   Cholelithiasis.   Questionable early sacral decubitus ulcer.  Correlation is advised     02.21/22- Urine culture-  PSEUDOMONAS AERUGINOSA   50,000 - 99,999 cfu/ml   He took the fosfomycin without clinical relief -his major symptoms are abdominal pain.  He reports that the urine smell gets better and he feels better .  He denies fever at this time.  The plan was made to start -IV meropenem for 2 weeks but he has not yet started PICC line.      05/05/22  Since the last visit ,he had another episode of UTI-04/19/22  KLEBSIELLA PNEUMONIAE-he was given Levaquin   He was seen by PCP yesterday for malaise.  Cbc WAS NORMAL       07/13/22 -since his last visit, he had recurrent positive urine culture-  Latest urine culture- 06/09/22- Morganella   05/2022- Proteus   04/22- Klebsiella  03/22- Morganella  02/22- Klebsiella  01/22-pseudomoas     12/2021- Morgabella  11/21- Proteus   He reports intermittent dysuria  . Denies fever .  He changes the alonso every 2 weeks.  CT scan of the abdomen/pelvis- 02/22-Suprapubic pelvic catheter and bladder wall thickening, somewhat nonspecific in the setting of a nondistended bladder.   Moderate stool burden in the distal colon.  Correlation for constipation.   Cholelithiasis.   Questionable early sacral decubitus ulcer.  Correlation is advised.     Over the last 6 months - he did monthly Urine cultures whenever he contacts the office about dysuria      03/15- the patient  comes for follow up.     Last urine culture- 02/28- Klebsiella.  He was given Fosfomycin .     Component      Latest Ref Rng & Units 2/28/2023 1/31/2023             1:49 PM   Urine Culture, Routine       KLEBSIELLA PNEUMONIAE (A) . . . clinically necessary.      Component      Latest Ref Rng & Units 1/31/2023           1:49 PM   Urine Culture, Routine       Multiple organisms isolated. None in predominance.  Repeat if      Component      Latest Ref Rng & Units 12/21/2022           2:20 PM   Urine Culture, Routine       PROTEUS MIRABILIS (A) . . .      Component      Latest Ref Rng & Units 12/21/2022           2:20 PM   Urine Culture, Routine       KLEBSIELLA PNEUMONIAE (A) . . .      Component      Latest Ref Rng & Units 11/10/2022              Urine Culture, Routine       PSEUDOMONAS AERUGINOSA (A) . . .      Component      Latest Ref Rng & Units 9/29/2022              Urine Culture, Routine       PSEUDOMONAS AERUGINOSA (A) . . .      Component      Latest Ref Rng & Units 6/9/2022 11/2  He was seen via Tele med.     He has pain over the pelvic region.     Lab test -   10/25-  ESCHERICHIA COLI ESBL   >100,000 cfu/ml      CT scan -09/14-     Right lower lobe opacities consistent with right basilar pneumonia     Suprapubic urinary catheter.  Correlate clinically.  04/30-  He reports fever -101.9  UA- showed wbc 10- done 04/29 09/26- he was recently seen at the hospital -was managed -08/03/24- 08/06/24 and  was treated for PICC line associated DVT .  Latest urine culture- 09/13/24- Proteus  He was given Bactrim.  His major symptom at this time is constipation for 7 days -he has failed out patient regime-enema, laxative        Objective     Physical Exam  Vitals and nursing note reviewed.   HENT:      Head: Normocephalic.   Cardiovascular:      Rate and Rhythm: Normal rate.   Pulmonary:      Effort: Pulmonary effort is normal.   Musculoskeletal:         General: Normal range of motion.   Skin:     General: Skin is warm.   Neurological:      Mental Status: He is alert.      Comments: paraplegia            Assessment and Plan     Problem List Items Addressed This Visit       Quadriplegia, post-traumatic     Supportive care         Hypertension - Primary     BP control as per primary team         Complicated UTI (urinary tract infection)     We had another extensive discussion about UTI and patients with paraplegia.  Will do CT scan of the abd/pelvis as he has constipation for a week without resolution with OTC meds.  No need to treat bacterial colonization               Other Visit Diagnoses       Left lower quadrant pain        Relevant Orders    CT Abdomen Pelvis With IV Contrast Routine Oral Contrast

## 2024-09-26 NOTE — TELEPHONE ENCOUNTER
Spoke with patient in regard to a sooner scheduled appointment. Patient has been made aware of the availability and states he will keep the appointment he has. Patient appointment has been added to the wait-list should someone cancel or reschedule.     ----- Message from Edwina Barrera sent at 9/26/2024  7:13 AM CDT -----    Name of Caller:.Kate Lazo   When is the first available appointment?11/05/2024  with [facility]  Symptoms:constipation  Best Call Back Number:.[phone]    Additional Information: Patient is requesting a call back regarding constipation. His bowels haven't moved in over 5 days. He is experiencing gas pains all over.

## 2024-09-26 NOTE — TELEPHONE ENCOUNTER
----- Message from Edwina Barrera sent at 9/26/2024  7:12 AM CDT -----  .Type:  Sooner Apoointment Request    Caller is requesting a sooner appointment.  Caller declined first available appointment listed below.  Caller will not accept being placed on the waitlist and is requesting a message be sent to doctor.  Name of Caller:.Kate Lazo   When is the first available appointment?10/30/2024  Symptoms:UTI  Would the patient rather a call back or a response via [facility]? Call back  Best Call Back Number:.[phone]    Additional Information: Patient is requesting a call back regarding UTI.

## 2024-09-27 ENCOUNTER — HOSPITAL ENCOUNTER (OUTPATIENT)
Dept: RADIOLOGY | Facility: HOSPITAL | Age: 34
Discharge: HOME | End: 2024-09-27
Attending: INTERNAL MEDICINE
Payer: MEDICARE

## 2024-09-27 ENCOUNTER — TELEPHONE (OUTPATIENT)
Dept: INFECTIOUS DISEASES | Facility: CLINIC | Age: 34
End: 2024-09-27
Payer: MEDICARE

## 2024-09-27 DIAGNOSIS — R10.32 LEFT LOWER QUADRANT PAIN: Primary | ICD-10-CM

## 2024-09-27 DIAGNOSIS — R10.32 LEFT LOWER QUADRANT PAIN: ICD-10-CM

## 2024-09-27 PROCEDURE — 25500020 PHARM REV CODE 255: Mod: HCNC | Performed by: INTERNAL MEDICINE

## 2024-09-27 PROCEDURE — 74177 CT ABD & PELVIS W/CONTRAST: CPT | Mod: 26,HCNC,, | Performed by: RADIOLOGY

## 2024-09-27 PROCEDURE — 74177 CT ABD & PELVIS W/CONTRAST: CPT | Mod: TC,HCNC

## 2024-09-27 RX ADMIN — IOHEXOL 100 ML: 350 INJECTION, SOLUTION INTRAVENOUS at 03:09

## 2024-09-27 NOTE — TELEPHONE ENCOUNTER
----- Message from Camila Bailey sent at 9/27/2024  7:46 AM CDT -----  Contact: self   Patient needs call back. He was told to reach out to his Provider to reschedule his CT Scan for today if possible. Please call to advise

## 2024-09-27 NOTE — TELEPHONE ENCOUNTER
Pt states that he would like CT to be done today. CT r/s today. Pt advised to arrive at hospital for 2 pm for CT. Pt verbalized understanding and agreed to appt date, time, and location.

## 2024-10-01 ENCOUNTER — TELEPHONE (OUTPATIENT)
Dept: UROLOGY | Facility: CLINIC | Age: 34
End: 2024-10-01
Payer: MEDICARE

## 2024-10-01 ENCOUNTER — TELEPHONE (OUTPATIENT)
Dept: INFECTIOUS DISEASES | Facility: CLINIC | Age: 34
End: 2024-10-01
Payer: MEDICARE

## 2024-10-01 NOTE — TELEPHONE ENCOUNTER
Spoke with the pt. He voiced understand next step per Dr. Hubbard. Informed him to pls go to ER if he has a fever for evaluation. Pt repeated back and voiced understand. He states no fever today, but will go to ER if does have one at any time.

## 2024-10-01 NOTE — TELEPHONE ENCOUNTER
.Outgoing call placed to patient, patient verified name and date of birth, patient notified of below, patient verbalized understanding and virtual appointment scheduled as requested.       ----- Message from Beronica Harrison MD sent at 10/1/2024 12:01 PM CDT -----  Regarding: RE: Colonization with suprapubic cath  Absolutely. We will get him in.   Trevin, please schedule him for a virtual visit with me to discuss options.  ----- Message -----  From: London Hubbard DO  Sent: 10/1/2024  11:31 AM CDT  To: Beronica Harrison MD  Subject: Colonization with suprapubic cath                Good morning Beronica. Apologies if I have contacted you about this gentleman before, but I do not believe I have. Everyone starts to blend together. Unfortunate situation as he is young and paraplegic. Every single month he reaches out to us about having a UTI. However, very similar to the 89 yo patient of mine you have seen, the symptoms are vague and include malodorous urine plus abdominal pain. His abdominal pain is due to constipation which he does not want to accept. CT after CT shows no abnormalities. Have tried having suprapubic cath changed as frequently as every 2 weeks to avoid sediment buildup but to no avail. He has received numerous courses of both PO and IV antibiotics. Has gotten to the point that even with fever he refuses to go to ER because the same workup always occurs.     It's that time again, first of a new month, and he is asking about antibiotics. Not sure what else to do. Maybe he would be another candidate for CIC or gent instillation? Would you be able to see him, even if virtually? Appreciate your time.    Ryder

## 2024-10-01 NOTE — TELEPHONE ENCOUNTER
----- Message from [employer] sent at 10/1/2024  1:30 PM CDT -----  Contact: self  ..Type:  Needs Medical Advice    Who Called: .Kate Lazo  Would the patient rather a call back or a response via [facility]? Call back   Best Call Back Number: .[phone]   Additional Information: pt states he forgot to mention a few things when speaking to someone earlier

## 2024-10-02 ENCOUNTER — TELEPHONE (OUTPATIENT)
Dept: INFECTIOUS DISEASES | Facility: CLINIC | Age: 34
End: 2024-10-02
Payer: MEDICARE

## 2024-10-02 ENCOUNTER — TELEPHONE (OUTPATIENT)
Dept: UROLOGY | Facility: CLINIC | Age: 34
End: 2024-10-02
Payer: MEDICARE

## 2024-10-02 ENCOUNTER — LAB VISIT (OUTPATIENT)
Dept: LAB | Facility: HOSPITAL | Age: 34
End: 2024-10-02
Payer: MEDICARE

## 2024-10-02 ENCOUNTER — OFFICE VISIT (OUTPATIENT)
Dept: UROLOGY | Facility: CLINIC | Age: 34
End: 2024-10-02
Payer: MEDICARE

## 2024-10-02 DIAGNOSIS — R50.9 FUO (FEVER OF UNKNOWN ORIGIN): Primary | ICD-10-CM

## 2024-10-02 DIAGNOSIS — S14.109S QUADRIPLEGIA, POST-TRAUMATIC: ICD-10-CM

## 2024-10-02 DIAGNOSIS — N39.0 RECURRENT UTI: Primary | ICD-10-CM

## 2024-10-02 DIAGNOSIS — T83.510A: Primary | ICD-10-CM

## 2024-10-02 DIAGNOSIS — N31.9 NEUROGENIC BLADDER: ICD-10-CM

## 2024-10-02 DIAGNOSIS — G82.50 QUADRIPLEGIA, POST-TRAUMATIC: ICD-10-CM

## 2024-10-02 LAB
ALBUMIN SERPL BCP-MCNC: 4 G/DL (ref 3.5–5.2)
ALP SERPL-CCNC: 100 U/L (ref 55–135)
ALT SERPL W/O P-5'-P-CCNC: 31 U/L (ref 10–44)
ANION GAP SERPL CALC-SCNC: 10 MMOL/L (ref 8–16)
AST SERPL-CCNC: 18 U/L (ref 10–40)
BILIRUB SERPL-MCNC: 0.3 MG/DL (ref 0.1–1)
BUN SERPL-MCNC: 9 MG/DL (ref 6–20)
CALCIUM SERPL-MCNC: 9.2 MG/DL (ref 8.7–10.5)
CHLORIDE SERPL-SCNC: 105 MMOL/L (ref 95–110)
CO2 SERPL-SCNC: 23 MMOL/L (ref 23–29)
CREAT SERPL-MCNC: 0.9 MG/DL (ref 0.5–1.4)
ERYTHROCYTE [DISTWIDTH] IN BLOOD BY AUTOMATED COUNT: 13.3 % (ref 11.5–14.5)
EST. GFR  (NO RACE VARIABLE): >60 ML/MIN/1.73 M^2
GLUCOSE SERPL-MCNC: 86 MG/DL (ref 70–110)
HCT VFR BLD AUTO: 46 % (ref 40–54)
HGB BLD-MCNC: 14.9 G/DL (ref 14–18)
MCH RBC QN AUTO: 28.9 PG (ref 27–31)
MCHC RBC AUTO-ENTMCNC: 32.4 G/DL (ref 32–36)
MCV RBC AUTO: 89 FL (ref 82–98)
PLATELET # BLD AUTO: 243 K/UL (ref 150–450)
PMV BLD AUTO: 11.2 FL (ref 9.2–12.9)
POTASSIUM SERPL-SCNC: 4.7 MMOL/L (ref 3.5–5.1)
PROT SERPL-MCNC: 7.3 G/DL (ref 6–8.4)
RBC # BLD AUTO: 5.15 M/UL (ref 4.6–6.2)
SODIUM SERPL-SCNC: 138 MMOL/L (ref 136–145)
WBC # BLD AUTO: 4.58 K/UL (ref 3.9–12.7)

## 2024-10-02 PROCEDURE — 36415 COLL VENOUS BLD VENIPUNCTURE: CPT | Mod: HCNC | Performed by: STUDENT IN AN ORGANIZED HEALTH CARE EDUCATION/TRAINING PROGRAM

## 2024-10-02 PROCEDURE — 85027 COMPLETE CBC AUTOMATED: CPT | Mod: HCNC | Performed by: STUDENT IN AN ORGANIZED HEALTH CARE EDUCATION/TRAINING PROGRAM

## 2024-10-02 PROCEDURE — 80053 COMPREHEN METABOLIC PANEL: CPT | Mod: HCNC | Performed by: STUDENT IN AN ORGANIZED HEALTH CARE EDUCATION/TRAINING PROGRAM

## 2024-10-02 PROCEDURE — 3044F HG A1C LEVEL LT 7.0%: CPT | Mod: HCNC,CPTII,95, | Performed by: UROLOGY

## 2024-10-02 PROCEDURE — 1159F MED LIST DOCD IN RCRD: CPT | Mod: HCNC,CPTII,95, | Performed by: UROLOGY

## 2024-10-02 PROCEDURE — 1160F RVW MEDS BY RX/DR IN RCRD: CPT | Mod: HCNC,CPTII,95, | Performed by: UROLOGY

## 2024-10-02 PROCEDURE — 99215 OFFICE O/P EST HI 40 MIN: CPT | Mod: HCNC,95,, | Performed by: UROLOGY

## 2024-10-02 NOTE — TELEPHONE ENCOUNTER
----- Message from London Hubbard DO sent at 10/2/2024  1:15 PM CDT -----  Regarding: Lab results  Labs completely normal

## 2024-10-02 NOTE — TELEPHONE ENCOUNTER
Spoke to pt. Informed of order. Called [facility]/ Raquel. Faxed the order to [phone]. Raquel repeated order back; she said the Nurse will be out today to draw the labs. Pt notified as well.

## 2024-10-02 NOTE — TELEPHONE ENCOUNTER
.Outgoing call placed to patient, patient verified name and date of birth, patient notified of below, he was notified of availability and stated he was too early for him and he does not want to go to the [place] [facility] he would prefer [facility]. Patient is willing to see another provider and agreed to appointment at [facility] with Dr. Faustin to establish care related to his recurrent UTIs.    ----- Message from Beronica Harrison MD sent at 10/2/2024  9:19 AM CDT -----  Please schedule pt for cystoscopy
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
## 2024-10-02 NOTE — TELEPHONE ENCOUNTER
I called  yuliana/[facility]  Yuliana with [facility] confirmed receiving the labs orderes faxed to [phone]

## 2024-10-03 ENCOUNTER — PATIENT MESSAGE (OUTPATIENT)
Dept: INTERNAL MEDICINE | Facility: CLINIC | Age: 34
End: 2024-10-03
Payer: MEDICARE

## 2024-10-03 DIAGNOSIS — Z79.899 ENCOUNTER FOR LONG-TERM (CURRENT) USE OF MEDICATIONS: Primary | ICD-10-CM

## 2024-10-03 DIAGNOSIS — N39.0 COMPLICATED UTI (URINARY TRACT INFECTION): Primary | ICD-10-CM

## 2024-10-03 NOTE — TELEPHONE ENCOUNTER
Called [facility]. Spoke with the Pt Nurse Raquel  Informed her of the new order placed per pt request for a U/A.  She states he is seen once a week. He has been seen this week. She will put the order in for next visit. Informed her the order has been faxed over as well to [phone]

## 2024-10-07 ENCOUNTER — OFFICE VISIT (OUTPATIENT)
Dept: UROLOGY | Facility: CLINIC | Age: 34
End: 2024-10-07
Payer: MEDICARE

## 2024-10-07 VITALS
HEIGHT: 69 IN | WEIGHT: 180 LBS | TEMPERATURE: 98 F | SYSTOLIC BLOOD PRESSURE: 89 MMHG | HEART RATE: 86 BPM | BODY MASS INDEX: 26.66 KG/M2 | DIASTOLIC BLOOD PRESSURE: 65 MMHG

## 2024-10-07 DIAGNOSIS — N39.0 RECURRENT UTI: ICD-10-CM

## 2024-10-07 DIAGNOSIS — Z93.59 SUPRAPUBIC CATHETER: ICD-10-CM

## 2024-10-07 DIAGNOSIS — N31.9 NEUROGENIC BLADDER: Primary | ICD-10-CM

## 2024-10-07 PROCEDURE — 1159F MED LIST DOCD IN RCRD: CPT | Mod: HCNC,CPTII,S$GLB, | Performed by: UROLOGY

## 2024-10-07 PROCEDURE — 3044F HG A1C LEVEL LT 7.0%: CPT | Mod: HCNC,CPTII,S$GLB, | Performed by: UROLOGY

## 2024-10-07 PROCEDURE — 3078F DIAST BP <80 MM HG: CPT | Mod: HCNC,CPTII,S$GLB, | Performed by: UROLOGY

## 2024-10-07 PROCEDURE — 3074F SYST BP LT 130 MM HG: CPT | Mod: HCNC,CPTII,S$GLB, | Performed by: UROLOGY

## 2024-10-07 PROCEDURE — 3008F BODY MASS INDEX DOCD: CPT | Mod: HCNC,CPTII,S$GLB, | Performed by: UROLOGY

## 2024-10-07 PROCEDURE — 99999 PR PBB SHADOW E&M-EST. PATIENT-LVL IV: CPT | Mod: PBBFAC,HCNC,, | Performed by: UROLOGY

## 2024-10-07 PROCEDURE — 1160F RVW MEDS BY RX/DR IN RCRD: CPT | Mod: HCNC,CPTII,S$GLB, | Performed by: UROLOGY

## 2024-10-07 PROCEDURE — 99214 OFFICE O/P EST MOD 30 MIN: CPT | Mod: HCNC,S$GLB,, | Performed by: UROLOGY

## 2024-10-07 RX ORDER — METHENAMINE HIPPURATE 1000 MG/1
1 TABLET ORAL 2 TIMES DAILY
Qty: 60 TABLET | Refills: 5 | Status: SHIPPED | OUTPATIENT
Start: 2024-10-07

## 2024-10-07 RX ORDER — MIRABEGRON 50 MG/1
50 TABLET, EXTENDED RELEASE ORAL DAILY
Qty: 30 TABLET | Refills: 5 | Status: SHIPPED | OUTPATIENT
Start: 2024-10-07 | End: 2025-04-05
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
## 2024-10-07 NOTE — PROGRESS NOTES
Called [facility] and gave orders to irrigate pts alonso each time alonso is changed per Dr. Faustin.

## 2024-10-08 ENCOUNTER — PATIENT MESSAGE (OUTPATIENT)
Dept: UROLOGY | Facility: CLINIC | Age: 34
End: 2024-10-08
Payer: MEDICARE

## 2024-10-08 ENCOUNTER — APPOINTMENT (OUTPATIENT)
Dept: LAB | Facility: HOSPITAL | Age: 34
End: 2024-10-08
Payer: MEDICARE

## 2024-10-08 DIAGNOSIS — N39.0 URINARY TRACT INFECTION, SITE NOT SPECIFIED: Primary | ICD-10-CM

## 2024-10-08 LAB
AMORPH CRY URNS QL MICRO: ABNORMAL
BACTERIA #/AREA URNS HPF: ABNORMAL /HPF
BILIRUB UR QL STRIP: NEGATIVE
CLARITY UR: CLEAR
COLOR UR: YELLOW
GLUCOSE UR QL STRIP: NEGATIVE
HGB UR QL STRIP: NEGATIVE
KETONES UR QL STRIP: NEGATIVE
LEUKOCYTE ESTERASE UR QL STRIP: ABNORMAL
MICROSCOPIC COMMENT: ABNORMAL
NITRITE UR QL STRIP: POSITIVE
PH UR STRIP: 6 [PH] (ref 5–8)
PROT UR QL STRIP: NEGATIVE
SP GR UR STRIP: 1.01 (ref 1–1.03)
URN SPEC COLLECT METH UR: ABNORMAL
UROBILINOGEN UR STRIP-ACNC: NEGATIVE EU/DL
WBC #/AREA URNS HPF: 7 /HPF (ref 0–5)

## 2024-10-08 PROCEDURE — 87186 SC STD MICRODIL/AGAR DIL: CPT | Mod: HCNC | Performed by: STUDENT IN AN ORGANIZED HEALTH CARE EDUCATION/TRAINING PROGRAM

## 2024-10-08 PROCEDURE — 81000 URINALYSIS NONAUTO W/SCOPE: CPT | Mod: HCNC | Performed by: STUDENT IN AN ORGANIZED HEALTH CARE EDUCATION/TRAINING PROGRAM

## 2024-10-08 PROCEDURE — 87086 URINE CULTURE/COLONY COUNT: CPT | Mod: HCNC | Performed by: STUDENT IN AN ORGANIZED HEALTH CARE EDUCATION/TRAINING PROGRAM

## 2024-10-08 PROCEDURE — 87088 URINE BACTERIA CULTURE: CPT | Mod: HCNC | Performed by: STUDENT IN AN ORGANIZED HEALTH CARE EDUCATION/TRAINING PROGRAM

## 2024-10-09 ENCOUNTER — PATIENT MESSAGE (OUTPATIENT)
Dept: GASTROENTEROLOGY | Facility: CLINIC | Age: 34
End: 2024-10-09
Payer: MEDICARE

## 2024-10-10 ENCOUNTER — TELEPHONE (OUTPATIENT)
Dept: INFECTIOUS DISEASES | Facility: CLINIC | Age: 34
End: 2024-10-10
Payer: MEDICARE

## 2024-10-10 ENCOUNTER — PATIENT MESSAGE (OUTPATIENT)
Dept: INFECTIOUS DISEASES | Facility: CLINIC | Age: 34
End: 2024-10-10
Payer: MEDICARE

## 2024-10-10 NOTE — TELEPHONE ENCOUNTER
----- Message from Kenrick sent at 10/10/2024  3:57 PM CDT -----  Contact: Pt [phone]  Type: Test Results    What test was performed? Urine sample    Who ordered the test?    When and where were the test performed? 10/8/24

## 2024-10-10 NOTE — TELEPHONE ENCOUNTER
After verifying two pt identifier. Results given. Pt voiced understand results per Dr. Hubbard. He states his cath. Was changed after 10-8-2024 UA.

## 2024-10-11 ENCOUNTER — PATIENT MESSAGE (OUTPATIENT)
Dept: INFECTIOUS DISEASES | Facility: CLINIC | Age: 34
End: 2024-10-11
Payer: MEDICARE

## 2024-10-11 ENCOUNTER — PATIENT MESSAGE (OUTPATIENT)
Dept: UROLOGY | Facility: CLINIC | Age: 34
End: 2024-10-11
Payer: MEDICARE

## 2024-10-11 NOTE — TELEPHONE ENCOUNTER
PA requested, patient notified.       ----- Message from Brea sent at 10/11/2024 11:34 AM CDT -----  Contact: Reed Love @[phone]  PT calling to let the nurse know that the new medication that was given to him for bladder spasms needs a PA request. PT not to sure of the name of the medication. Please call to advise.

## 2024-10-12 LAB
BACTERIA UR CULT: ABNORMAL
BACTERIA UR CULT: ABNORMAL

## 2024-10-14 ENCOUNTER — DOCUMENT SCAN (OUTPATIENT)
Dept: HOME HEALTH SERVICES | Facility: HOSPITAL | Age: 34
End: 2024-10-14
Payer: MEDICARE

## 2024-10-14 ENCOUNTER — PATIENT MESSAGE (OUTPATIENT)
Dept: INFECTIOUS DISEASES | Facility: CLINIC | Age: 34
End: 2024-10-14
Payer: MEDICARE

## 2024-10-14 RX ORDER — GRANULES FOR ORAL 3 G/1
3 POWDER ORAL ONCE
Qty: 3 G | Refills: 0 | Status: SHIPPED | OUTPATIENT
Start: 2024-10-14 | End: 2024-10-14

## 2024-10-15 ENCOUNTER — TELEPHONE (OUTPATIENT)
Dept: INFECTIOUS DISEASES | Facility: CLINIC | Age: 34
End: 2024-10-15
Payer: MEDICARE

## 2024-10-15 ENCOUNTER — DOCUMENTATION ONLY (OUTPATIENT)
Dept: INFECTIOUS DISEASES | Facility: HOSPITAL | Age: 34
End: 2024-10-15
Payer: MEDICARE

## 2024-10-15 NOTE — TELEPHONE ENCOUNTER
----- Message from [employer] sent at 10/15/2024 10:48 AM CDT -----  Contact: self  ..Type:  Needs Medical Advice    Who Called: .Kate Lazo  Would the patient rather a call back or a response via [facility]? Call back   Best Call Back Number: .[phone] (home)   Additional Information: pt states he would like an return call in reference to discussion he had in portal regarding UTI

## 2024-10-15 NOTE — PROGRESS NOTES
Patient declining PO antibiotics. Will set up for IV tobramycin.    Outpatient Antibiotic Therapy Plan:    1) Infection: Polymicrobial complicated UTI     2) Antibiotics:    Intravenous antibiotics:  IV tobramycin 5 mg/kg daily     3) Therapy Duration:  5 doses      Estimated end date of IV antibiotics: 10/22/24     4) Outpatient Weekly Labs:    Order the following labs to be drawn on Mondays:   CBC  CMP     5) Fax Lab Results to Infectious Diseases Provider: Dr. Hubbard     [facility] Fax Number: [phone]     Procedure Component Value Units Date/Time   CULTURE, URINE [[id number]] (Abnormal)  Collected: 10/08/24 1000   Order Status: Completed Specimen: Urine, Clean Catch Updated: 10/12/24 1352    Urine Culture, Routine PROTEUS MIRABILIS  >100,000 cfu/ml   Abnormal      ESCHERICHIA COLI ESBL  > 100,000 cfu/ml   Abnormal    Narrative:     Send normal result to authorizing provider's In Basket if  patient is active on MyChart:->Yes   Susceptibility     Proteus mirabilis Escherichia coli ESBL     CULTURE, URINE CULTURE, URINE     Amp/Sulbactam <=8/4 mcg/mL Sensitive 16/8 mcg/mL Resistant     Ampicillin <=8 mcg/mL Sensitive >16 mcg/mL Resistant     Cefazolin 4 mcg/mL Sensitive       Cefepime <=2 mcg/mL Sensitive >16 mcg/mL Resistant     Ceftriaxone <=1 mcg/mL Sensitive >2 mcg/mL Resistant     Ciprofloxacin >2 mcg/mL Resistant >2 mcg/mL Resistant     Ertapenem <=0.5 mcg/mL Sensitive <=0.5 mcg/mL Sensitive     Gentamicin <=2 mcg/mL Sensitive <=2 mcg/mL Sensitive     Levofloxacin >4 mcg/mL Resistant >4 mcg/mL Resistant     Meropenem <=1 mcg/mL Sensitive <=1 mcg/mL Sensitive     Nitrofurantoin   <=32 mcg/mL Sensitive     Piperacillin/Tazo <=8 mcg/mL Sensitive <=8 mcg/mL Resistant     Tobramycin <=2 mcg/mL Sensitive <=2 mcg/mL Sensitive     Trimeth/Sulfa <=2/38 mcg/mL Sensitive >2/38 mcg/mL Resistant                 Linear View

## 2024-10-16 ENCOUNTER — TELEPHONE (OUTPATIENT)
Dept: UROLOGY | Facility: CLINIC | Age: 34
End: 2024-10-16
Payer: MEDICARE

## 2024-10-16 NOTE — TELEPHONE ENCOUNTER
----- Message from Mariella sent at 10/15/2024  1:58 PM CDT -----  Contact: Patient [phone]  .[email or web address]     Patient is calling for Medical Advice regarding:Patient is asking for a call only due to his catheter    How long has patient had these symptoms:    Pharmacy name and phone#:    Patient wants a call back or thru [facility]:call     Comments:    Please advise patient replies from provider may take up to 48 hours.

## 2024-10-17 ENCOUNTER — OFFICE VISIT (OUTPATIENT)
Dept: INFECTIOUS DISEASES | Facility: CLINIC | Age: 34
End: 2024-10-17
Payer: MEDICARE

## 2024-10-17 DIAGNOSIS — S14.109S QUADRIPLEGIA, POST-TRAUMATIC: ICD-10-CM

## 2024-10-17 DIAGNOSIS — N39.0 RECURRENT UTI: ICD-10-CM

## 2024-10-17 DIAGNOSIS — G82.50 QUADRIPLEGIA, POST-TRAUMATIC: ICD-10-CM

## 2024-10-17 DIAGNOSIS — N31.9 NEUROGENIC BLADDER: ICD-10-CM

## 2024-10-17 DIAGNOSIS — I10 PRIMARY HYPERTENSION: ICD-10-CM

## 2024-10-17 DIAGNOSIS — N39.0 COMPLICATED UTI (URINARY TRACT INFECTION): Primary | ICD-10-CM

## 2024-10-17 DIAGNOSIS — Z93.59 SUPRAPUBIC CATHETER: ICD-10-CM

## 2024-10-17 NOTE — PROGRESS NOTES
The patient location is: Home  The chief complaint leading to consultation is: UTI follow up      Visit type: audiovisual     Notes:     Subjective:     HPI: 34 y.o. male with pertinent PMHx of post traumatic quadriplegia, HTN, urinary incontinence leading to chronic suprapubic catheterization and recurrent UTI, and constipation who was evaluated by Infectious Diseases on 7/21/23 after clean catch U/A showed positive nitrites and 12 WBC. Urine culture grew pan R Morganella morganii and  Pseudomonas. Plan was for 2 weeks of targeted IV antibiotics. PICC placed on 7/28. Plan was then to give dose of tobramycin and a course of pip/tazo via PICC line followed by repeat U/A. 8/24, patient states he gets UTI frequently. Changes catheter every 2 weeks at home. Usually gets chills, sweats, abdominal pain, urinary difficulty, and foul odor with UTI. Had these symptoms when July UTI was diagnosed. Currently having abdominal pain and myalgias. Unsure if this is due to unrelated ailment or another UTI. Tolerated pip/tazo other than intermittent loose stools. Has provided new urine sample today. Will see if any pathogens grow and discuss whether further antibiotics are warranted. Patient with provide update if worsening symptoms occur. Scheduled for home catheter change next week. Agreeable to PICC removal today.     Last ID note 11/2: was recently given Fosfomycin for last urine culture.  He is colonized with bacteria and not all the cultures mean a true infection .  WE went through this issue again .  Continue Lithostat /Azo dye as needed.     12/6/23: Today patient reports constipation and pain on both sides of abdomen for a few weeks. Has also struggled to urinate through Crenshaw. Also pain below neck to feet within past week. Mainly a nerve pain especially in right arm. Left side feels more like gas. Also with trapped gas due to wheelchair.  U/A from urology on 12/5 with 20 WBC. Culture in process. One week ago urine was  sterile. CT a/p has been scheduled for 12/18. HH irrigated Crenshaw and it flowed well yesterday but today the physical flow still bad. Only 600 cc went into bag. Day before had 1500 cc. Has not had any fever. No nausea or vomiting. No shortness of breath or cough. Has appetite but unable to eat or drink much. Given how many symptoms patient is experiencing, advised him to go to ER for imaging and blood work. May also be able to provide pain management and a bowel regimen for his constipation. Will follow urine culture result.      2/28/24: Current UTI symptoms- abdominal pain and foul odor. Crenshaw last changed 1 week ago. Changed every 2-3 weeks. [facility] doing the exchanges. No current fever. Urine appears darker but not abnormal. Discussed treating based on last urine culture using IV tobramycin. Will reach out to [facility]. Will also discuss bowel regimen with PCP. Has been frequently constipated which is huge risk factor. Will ask HH to stick to biweekly exchange of Crenshaw.      3/14: Received 3 doses of tobramycin. Last urine culture from 3/5 no growth. Feels good today. Still battling constipation. On new bowel regimen. Will adjust with help of PCP. Sees GI next month.      6/14: Here for follow up. Per Kash was given large dose of amikacin 2 days ago. Currently no UTI symptoms. Still dealing with constipation but more BM than before. Placed on stronger med than Linzess. Crenshaw changed every 2 weeks. No fever but occasional chills. Continues to have generalized pain from chest to pelvis. Asking for MRI as CT have been negative. Will place order today.      7/3: Patient with another pyuria. Is concerned about recurrent infection. Will use meropenem as aggressive treatment. Then may try an oral suppression regimen. Patient in agreement. PICC ordered and [facility] updated.     10/17: Patient took dose of fosfomycin but concerned it would not be sufficient. Have set up for tobramycin through [facility]. Will plan for 5  doses. Has discussed a few options with urology including surgical reconstruction of ureters and gentamicin irrigation. He is on the fence about surgical approach. Will reach out to urology for further clarification. Do agree with irrigations. Can also discuss antibiotic suppression. Patient voiced understanding. Denies new symptoms currently.      Review of Systems:   Negative unless otherwise noted positive-  Gen- Weakness/ Fatigue  Neuro- Confusion  CV- chest pain   Resp: Cough/ SOB  GI- Nausea/Vomiting; +abdominal discomfort; leads to poor appetite   Extrem- Pain/Swelling        Objective:     Physical Exam:  General- Patient alert and oriented x3 in NAD  HEENT- PERRLA, EOMI, OP clear  Resp- No increased WOB noted. Not using accessory muscles.  Extrem- No cyanosis, clubbing, edema.   Skin-  No Jaundice. No visible skin lesions.        Plan:  Complicated UTI (urinary tract infection)  --Has taken dose of fosfomycin   --Continue biweekly Crenshaw exchange   --Complete 5 doses of tobramycin   --Discuss chronic suppression following current treatment course   --Needs bowel regimen to prevent constipation which is huge risk factor for recurrent UTI  --Will reach out to urology regarding best next steps in management   --Follow up with me virtually in 2 weeks     Outpatient Antibiotic Therapy Plan:     1) Infection: Polymicrobial complicated UTI      2) Antibiotics:     Intravenous antibiotics:  IV tobramycin 5 mg/kg daily      3) Therapy Duration:  5 doses       Estimated end date of IV antibiotics: 10/22/24      4) Outpatient Weekly Labs:     Order the following labs to be drawn on Mondays:   CBC  CMP      5) Fax Lab Results to Infectious Diseases Provider: Dr. Hubbard      [facility] Fax Number: [phone]     Procedure Component Value Units Date/Time   CULTURE, URINE [[id number]] (Abnormal)  Collected: 10/08/24 1000   Order Status: Completed Specimen: Urine, Clean Catch Updated: 10/12/24 1352     Urine Culture,  Routine PROTEUS MIRABILIS  >100,000 cfu/ml   Abnormal        ESCHERICHIA COLI ESBL  > 100,000 cfu/ml   Abnormal    Narrative:     Send normal result to authorizing provider's In Basket if  patient is active on MyChart:->Yes   Susceptibility                Proteus mirabilis Escherichia coli ESBL       CULTURE, URINE CULTURE, URINE       Amp/Sulbactam <=8/4 mcg/mL Sensitive 16/8 mcg/mL Resistant       Ampicillin <=8 mcg/mL Sensitive >16 mcg/mL Resistant       Cefazolin 4 mcg/mL Sensitive           Cefepime <=2 mcg/mL Sensitive >16 mcg/mL Resistant       Ceftriaxone <=1 mcg/mL Sensitive >2 mcg/mL Resistant       Ciprofloxacin >2 mcg/mL Resistant >2 mcg/mL Resistant       Ertapenem <=0.5 mcg/mL Sensitive <=0.5 mcg/mL Sensitive       Gentamicin <=2 mcg/mL Sensitive <=2 mcg/mL Sensitive       Levofloxacin >4 mcg/mL Resistant >4 mcg/mL Resistant       Meropenem <=1 mcg/mL Sensitive <=1 mcg/mL Sensitive       Nitrofurantoin     <=32 mcg/mL Sensitive       Piperacillin/Tazo <=8 mcg/mL Sensitive <=8 mcg/mL Resistant       Tobramycin <=2 mcg/mL Sensitive <=2 mcg/mL Sensitive       Trimeth/Sulfa <=2/38 mcg/mL Sensitive >2/38 mcg/mL Resistant                         Linear View              Suprapubic catheter  --Recommend changing at least every 2 weeks to prevent recurrent UTI   --Follow up with urology      Hypertension  Continue current medications. Follow with PCP.         Face to Face time with patient: 10 minutes   25 minutes of total time spent on the encounter, which includes face to face time and non-face to face time preparing to see the patient (eg, review of tests), Obtaining and/or reviewing separately obtained history, Documenting clinical information in the electronic or other health record, Independently interpreting results (not separately reported) and communicating results to the patient/family/caregiver, or Care coordination (not separately reported).      Each patient to whom he or she provides medical  services by telemedicine is:  (1) informed of the relationship between the physician and patient and the respective role of any other health care provider with respect to management of the patient; and (2) notified that he or she may decline to receive medical services by telemedicine and may withdraw from such care at any time.

## 2024-10-21 ENCOUNTER — PATIENT MESSAGE (OUTPATIENT)
Dept: UROLOGY | Facility: CLINIC | Age: 34
End: 2024-10-21
Payer: MEDICARE

## 2024-10-21 ENCOUNTER — OFFICE VISIT (OUTPATIENT)
Dept: INTERNAL MEDICINE | Facility: CLINIC | Age: 34
End: 2024-10-21
Payer: MEDICARE

## 2024-10-21 ENCOUNTER — PATIENT MESSAGE (OUTPATIENT)
Dept: INTERNAL MEDICINE | Facility: CLINIC | Age: 34
End: 2024-10-21

## 2024-10-21 VITALS — SYSTOLIC BLOOD PRESSURE: 120 MMHG | DIASTOLIC BLOOD PRESSURE: 60 MMHG

## 2024-10-21 DIAGNOSIS — N39.0 COMPLICATED UTI (URINARY TRACT INFECTION): ICD-10-CM

## 2024-10-21 DIAGNOSIS — Z86.718 HISTORY OF DVT (DEEP VEIN THROMBOSIS): ICD-10-CM

## 2024-10-21 DIAGNOSIS — N39.0 RECURRENT UTI: ICD-10-CM

## 2024-10-21 DIAGNOSIS — R19.7 DIARRHEA, UNSPECIFIED TYPE: Primary | ICD-10-CM

## 2024-10-21 DIAGNOSIS — K58.9 IRRITABLE BOWEL SYNDROME, UNSPECIFIED TYPE: ICD-10-CM

## 2024-10-21 PROCEDURE — 3074F SYST BP LT 130 MM HG: CPT | Mod: HCNC,CPTII,95, | Performed by: FAMILY MEDICINE

## 2024-10-21 PROCEDURE — 3044F HG A1C LEVEL LT 7.0%: CPT | Mod: HCNC,CPTII,95, | Performed by: FAMILY MEDICINE

## 2024-10-21 PROCEDURE — 3078F DIAST BP <80 MM HG: CPT | Mod: HCNC,CPTII,95, | Performed by: FAMILY MEDICINE

## 2024-10-21 PROCEDURE — G2211 COMPLEX E/M VISIT ADD ON: HCPCS | Mod: HCNC,95,, | Performed by: FAMILY MEDICINE

## 2024-10-21 PROCEDURE — 99214 OFFICE O/P EST MOD 30 MIN: CPT | Mod: HCNC,95,, | Performed by: FAMILY MEDICINE
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
## 2024-10-23 ENCOUNTER — PATIENT MESSAGE (OUTPATIENT)
Dept: INFECTIOUS DISEASES | Facility: CLINIC | Age: 34
End: 2024-10-23
Payer: MEDICARE

## 2024-10-23 RX ORDER — CEFDINIR 300 MG/1
300 CAPSULE ORAL DAILY
Qty: 90 CAPSULE | Refills: 1 | Status: SHIPPED | OUTPATIENT
Start: 2024-10-23 | End: 2025-04-21

## 2024-10-24 ENCOUNTER — PATIENT MESSAGE (OUTPATIENT)
Dept: ADMINISTRATIVE | Facility: CLINIC | Age: 34
End: 2024-10-24
Payer: MEDICARE

## 2024-10-24 ENCOUNTER — LAB VISIT (OUTPATIENT)
Dept: LAB | Facility: HOSPITAL | Age: 34
End: 2024-10-24
Attending: STUDENT IN AN ORGANIZED HEALTH CARE EDUCATION/TRAINING PROGRAM
Payer: MEDICARE

## 2024-10-24 ENCOUNTER — EXTERNAL HOME HEALTH (OUTPATIENT)
Dept: HOME HEALTH SERVICES | Facility: HOSPITAL | Age: 34
End: 2024-10-24
Payer: MEDICARE

## 2024-10-24 DIAGNOSIS — N39.0 COMPLICATED UTI (URINARY TRACT INFECTION): ICD-10-CM

## 2024-10-24 PROCEDURE — 87324 CLOSTRIDIUM AG IA: CPT | Mod: HCNC | Performed by: FAMILY MEDICINE

## 2024-10-24 PROCEDURE — 87449 NOS EACH ORGANISM AG IA: CPT | Mod: HCNC | Performed by: FAMILY MEDICINE

## 2024-10-25 ENCOUNTER — TELEPHONE (OUTPATIENT)
Dept: INFECTIOUS DISEASES | Facility: CLINIC | Age: 34
End: 2024-10-25
Payer: MEDICARE

## 2024-10-25 LAB
C DIFF GDH STL QL: NEGATIVE
C DIFF TOX A+B STL QL IA: NEGATIVE

## 2024-10-25 NOTE — TELEPHONE ENCOUNTER
----- Message from London Hubbard DO sent at 10/25/2024  3:09 PM CDT -----  Regarding: RE: Urine Testing  Patient is on a daily antibiotic. No indication to keep checking urine studies. Please ask if he has started bladder irrigations with gentamicin.  ----- Message -----  From: Lorena Guadalupe LPN  Sent: 10/25/2024   3:02 PM CDT  To: London Hubbard DO  Subject: FW: Urine Testing                                Pls advise.  ----- Message -----  From: Hadley Pringle  Sent: 10/25/2024   2:53 PM CDT  To: [facility] Staff  Subject: Urine Testing                                    Patient unable to provide urine at time of lab appointment. Please reorder and reschedule patient for testing.

## 2024-10-25 NOTE — TELEPHONE ENCOUNTER
Spoke with the pt. He said Not as of yet on the irrigations. He has been wilber prater to get in touch with Dr. Beronica Harrison, but no return calls. Pt asked is Sr. Hubbard can reach out to Dr. Beronica Harrison. Routing message to the Doctor as pt request.

## 2024-10-28 ENCOUNTER — TELEPHONE (OUTPATIENT)
Dept: ADMINISTRATIVE | Facility: CLINIC | Age: 34
End: 2024-10-28
Payer: MEDICARE

## 2024-10-29 ENCOUNTER — OFFICE VISIT (OUTPATIENT)
Dept: HOME HEALTH SERVICES | Facility: CLINIC | Age: 34
End: 2024-10-29
Payer: MEDICARE

## 2024-10-29 ENCOUNTER — TELEPHONE (OUTPATIENT)
Dept: ADMINISTRATIVE | Facility: CLINIC | Age: 34
End: 2024-10-29
Payer: MEDICARE

## 2024-10-29 DIAGNOSIS — R26.9 ABNORMALITY OF GAIT AND MOBILITY: ICD-10-CM

## 2024-10-29 DIAGNOSIS — S14.109S QUADRIPLEGIA, POST-TRAUMATIC: ICD-10-CM

## 2024-10-29 DIAGNOSIS — G82.50 QUADRIPLEGIA, POST-TRAUMATIC: ICD-10-CM

## 2024-10-29 DIAGNOSIS — R63.4 WEIGHT LOSS, ABNORMAL: ICD-10-CM

## 2024-10-29 DIAGNOSIS — Z00.00 ENCOUNTER FOR PREVENTIVE HEALTH EXAMINATION: Primary | ICD-10-CM

## 2024-10-29 DIAGNOSIS — Z99.3 DEPENDENCE ON WHEELCHAIR: ICD-10-CM

## 2024-10-29 PROCEDURE — 3044F HG A1C LEVEL LT 7.0%: CPT | Mod: CPTII,95,, | Performed by: NURSE PRACTITIONER

## 2024-10-29 PROCEDURE — 1159F MED LIST DOCD IN RCRD: CPT | Mod: CPTII,95,, | Performed by: NURSE PRACTITIONER

## 2024-10-29 PROCEDURE — G0439 PPPS, SUBSEQ VISIT: HCPCS | Mod: 95,,, | Performed by: NURSE PRACTITIONER

## 2024-10-29 PROCEDURE — 1160F RVW MEDS BY RX/DR IN RCRD: CPT | Mod: CPTII,95,, | Performed by: NURSE PRACTITIONER

## 2024-10-29 PROCEDURE — G9919 SCRN ND POS ND PROV OF REC: HCPCS | Mod: CPTII,NDTC,, | Performed by: NURSE PRACTITIONER

## 2024-10-31 ENCOUNTER — OFFICE VISIT (OUTPATIENT)
Dept: INFECTIOUS DISEASES | Facility: CLINIC | Age: 34
End: 2024-10-31
Payer: MEDICARE

## 2024-10-31 ENCOUNTER — TELEPHONE (OUTPATIENT)
Dept: INFECTIOUS DISEASES | Facility: CLINIC | Age: 34
End: 2024-10-31

## 2024-10-31 DIAGNOSIS — N39.0 COMPLICATED UTI (URINARY TRACT INFECTION): Primary | ICD-10-CM

## 2024-10-31 DIAGNOSIS — G82.50 QUADRIPLEGIA, POST-TRAUMATIC: ICD-10-CM

## 2024-10-31 DIAGNOSIS — N31.9 NEUROGENIC BLADDER: ICD-10-CM

## 2024-10-31 DIAGNOSIS — S14.109S QUADRIPLEGIA, POST-TRAUMATIC: ICD-10-CM

## 2024-10-31 DIAGNOSIS — I10 PRIMARY HYPERTENSION: ICD-10-CM

## 2024-11-04 ENCOUNTER — PATIENT MESSAGE (OUTPATIENT)
Dept: INTERNAL MEDICINE | Facility: CLINIC | Age: 34
End: 2024-11-04
Payer: MEDICARE

## 2024-11-04 ENCOUNTER — TELEPHONE (OUTPATIENT)
Dept: INFECTIOUS DISEASES | Facility: CLINIC | Age: 34
End: 2024-11-04
Payer: MEDICARE

## 2024-11-04 DIAGNOSIS — N39.0 COMPLICATED UTI (URINARY TRACT INFECTION): Primary | ICD-10-CM

## 2024-11-04 DIAGNOSIS — N31.9 NEUROGENIC BLADDER: ICD-10-CM

## 2024-11-04 NOTE — TELEPHONE ENCOUNTER
----- Message from London Hubbard DO sent at 11/4/2024  8:15 AM CST -----  Contact: self   Please call HH nurse and ask when they are coming and if they ever received orders for gentamicin bladder irrigation.  ----- Message -----  From: Penny Doherty LPN  Sent: 11/4/2024   8:09 AM CST  To: London Hubbard, DO    How do you want to advise patient?  ----- Message -----  From: Florinda Ramirez  Sent: 11/4/2024   7:59 AM CST  To: [facility] Staff    .Type: Patient Call Back        Who called:   Patient      What is the request in detail:    Called in needing an appt scheduled . Patient started back having abdominal pain in lower bladder /genital area . Patient states the urine has a very foul order again. Patient states that the urine is dark even though he's been drinking plenty of water . Patient also states that a home health nurse never came for his cath.   Please call back     Can the clinic reply by [facility]?           Would the patient rather a call back or a response via [facility]?   call      Best call back number:  [phone]

## 2024-11-04 NOTE — TELEPHONE ENCOUNTER
Per Dr. Hubbard, contacted [facility]. Pt is currently not due for a visit until next week and cath change is scheduled for next week.  states that they never received orders for gentamicin bladder irrigation. Per Dr. Hubbard, last office note from 10/31/24 faxed to [facility] [phone]

## 2024-11-05 ENCOUNTER — PATIENT MESSAGE (OUTPATIENT)
Dept: GASTROENTEROLOGY | Facility: CLINIC | Age: 34
End: 2024-11-05
Payer: MEDICARE

## 2024-11-05 ENCOUNTER — TELEPHONE (OUTPATIENT)
Dept: GASTROENTEROLOGY | Facility: CLINIC | Age: 34
End: 2024-11-05
Payer: MEDICARE

## 2024-11-05 NOTE — TELEPHONE ENCOUNTER
----- Message from Jerardontfior sent at 11/5/2024  4:14 PM CST -----  Contact: Kate  Type:  Sooner Apoointment Request    Caller is requesting a sooner appointment.  Caller declined first available appointment listed below.  Caller will not accept being placed on the waitlist and is requesting a message be sent to doctor.  Name of Caller:Kate  When is the first available appointment? 12/17/24  Symptoms:constipation, GI issues   Would the patient rather a call back or a response via [facility]?  Call back  Best Call Back Number:[phone]  Additional Information: patient is requesting a sooner appointment due to needing to being a quadriplegic and hard to help with the issues and needing assistance as soon as possible.     Thanks   Am

## 2024-11-06 ENCOUNTER — PATIENT MESSAGE (OUTPATIENT)
Dept: INFECTIOUS DISEASES | Facility: CLINIC | Age: 34
End: 2024-11-06
Payer: MEDICARE

## 2024-11-06 ENCOUNTER — PATIENT MESSAGE (OUTPATIENT)
Dept: GASTROENTEROLOGY | Facility: CLINIC | Age: 34
End: 2024-11-06
Payer: MEDICARE

## 2024-11-06 ENCOUNTER — TELEPHONE (OUTPATIENT)
Dept: INFECTIOUS DISEASES | Facility: CLINIC | Age: 34
End: 2024-11-06
Payer: MEDICARE

## 2024-11-08 ENCOUNTER — DOCUMENT SCAN (OUTPATIENT)
Dept: HOME HEALTH SERVICES | Facility: HOSPITAL | Age: 34
End: 2024-11-08
Payer: MEDICARE

## 2024-11-11 ENCOUNTER — PATIENT MESSAGE (OUTPATIENT)
Dept: INTERNAL MEDICINE | Facility: CLINIC | Age: 34
End: 2024-11-11
Payer: COMMERCIAL

## 2024-11-12 ENCOUNTER — TELEPHONE (OUTPATIENT)
Dept: INFECTIOUS DISEASES | Facility: CLINIC | Age: 34
End: 2024-11-12
Payer: MEDICARE

## 2024-11-12 ENCOUNTER — PATIENT MESSAGE (OUTPATIENT)
Dept: INFECTIOUS DISEASES | Facility: CLINIC | Age: 34
End: 2024-11-12
Payer: MEDICARE

## 2024-11-12 NOTE — TELEPHONE ENCOUNTER
Sent message to [facility]. I see a note of them stating they did not get the order for bladder irrigation. Notified Ellen via secure chat and faxed order as well to intake

## 2024-11-12 NOTE — TELEPHONE ENCOUNTER
Spoke with the pt. He asked for a follow up on irrigations by Kash. Faxed the order to [facility] and notified Yoly of order sent to [facility] as well.  
im ok

## 2024-11-13 ENCOUNTER — TELEPHONE (OUTPATIENT)
Dept: INFECTIOUS DISEASES | Facility: CLINIC | Age: 34
End: 2024-11-13
Payer: MEDICARE

## 2024-11-13 NOTE — TELEPHONE ENCOUNTER
"Following up with previous message: bladder irrigations: per Dr. Hubbard, "  If Home Health teaches him how to do the irrigations that would be ideal."  Communicated message with [facility] Candida HOWARD RN and faxed order to [facility].     "

## 2024-11-13 NOTE — TELEPHONE ENCOUNTER
"Message per Shayy [place] secure chat:       "We were scheduled to do it today. However the patient has not received the solution yet and states [facility] called him yesterday about it but they did not give a date for delivery. Will continue to follow up and reschedule nurse to go back out when solution delivered."          I will contact [facility].  for update.  "

## 2024-11-14 ENCOUNTER — OFFICE VISIT (OUTPATIENT)
Dept: INTERNAL MEDICINE | Facility: CLINIC | Age: 34
End: 2024-11-14
Payer: MEDICARE

## 2024-11-14 ENCOUNTER — TELEPHONE (OUTPATIENT)
Dept: INFECTIOUS DISEASES | Facility: CLINIC | Age: 34
End: 2024-11-14
Payer: MEDICARE

## 2024-11-14 DIAGNOSIS — K59.09 CHRONIC CONSTIPATION: Primary | ICD-10-CM

## 2024-11-14 DIAGNOSIS — R10.9 ACUTE ABDOMINAL PAIN: ICD-10-CM

## 2024-11-14 PROCEDURE — 3044F HG A1C LEVEL LT 7.0%: CPT | Mod: HCNC,CPTII,95, | Performed by: FAMILY MEDICINE

## 2024-11-14 PROCEDURE — G2211 COMPLEX E/M VISIT ADD ON: HCPCS | Mod: HCNC,95,, | Performed by: FAMILY MEDICINE

## 2024-11-14 PROCEDURE — 99214 OFFICE O/P EST MOD 30 MIN: CPT | Mod: HCNC,95,, | Performed by: FAMILY MEDICINE

## 2024-11-14 NOTE — TELEPHONE ENCOUNTER
Spoke to the patient. He states no fever, but feels full really fast when he try to eat anything. Sending message to the Doctor. Informed the pt I will call him back as soon as Dr. Hubbard responds.

## 2024-11-14 NOTE — TELEPHONE ENCOUNTER
----- Message from Ruba sent at 11/14/2024  7:37 AM CST -----  Contact: Kate  .Patient is calling to speak with the nurse regarding concerns. Reports having issues with abdomen, lower back to the kidney  and little bowl movement. Please give patient a call back at   [phone]
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
## 2024-11-14 NOTE — TELEPHONE ENCOUNTER
----- Message from Ruba sent at 11/14/2024  7:37 AM CST -----  Contact: Kate  .Patient is calling to speak with the nurse regarding concerns. Reports having issues with abdomen, lower back to the kidney  and little bowl movement. Please give patient a call back at   [phone]

## 2024-11-15 ENCOUNTER — TELEPHONE (OUTPATIENT)
Dept: INTERNAL MEDICINE | Facility: CLINIC | Age: 34
End: 2024-11-15
Payer: MEDICARE

## 2024-11-15 ENCOUNTER — HOSPITAL ENCOUNTER (EMERGENCY)
Facility: HOSPITAL | Age: 34
Discharge: HOME | End: 2024-11-15
Attending: EMERGENCY MEDICINE
Payer: COMMERCIAL

## 2024-11-15 ENCOUNTER — PATIENT MESSAGE (OUTPATIENT)
Dept: INTERNAL MEDICINE | Facility: CLINIC | Age: 34
End: 2024-11-15
Payer: COMMERCIAL

## 2024-11-15 VITALS
DIASTOLIC BLOOD PRESSURE: 70 MMHG | TEMPERATURE: 98 F | RESPIRATION RATE: 18 BRPM | SYSTOLIC BLOOD PRESSURE: 110 MMHG | OXYGEN SATURATION: 96 % | HEART RATE: 53 BPM

## 2024-11-15 DIAGNOSIS — R10.84 GENERALIZED ABDOMINAL PAIN: Primary | ICD-10-CM

## 2024-11-15 DIAGNOSIS — R10.9 ABDOMINAL PAIN, UNSPECIFIED ABDOMINAL LOCATION: Primary | ICD-10-CM

## 2024-11-15 LAB
ALBUMIN SERPL BCP-MCNC: 4 G/DL (ref 3.5–5.2)
ALP SERPL-CCNC: 113 U/L (ref 40–150)
ALT SERPL W/O P-5'-P-CCNC: 27 U/L (ref 10–44)
ANION GAP SERPL CALC-SCNC: 8 MMOL/L (ref 8–16)
AST SERPL-CCNC: 18 U/L (ref 10–40)
BASOPHILS # BLD AUTO: 0.02 K/UL (ref 0–0.2)
BASOPHILS NFR BLD: 0.4 % (ref 0–1.9)
BILIRUB SERPL-MCNC: 0.5 MG/DL (ref 0.1–1)
BUN SERPL-MCNC: 9 MG/DL (ref 6–20)
CALCIUM SERPL-MCNC: 9 MG/DL (ref 8.7–10.5)
CHLORIDE SERPL-SCNC: 104 MMOL/L (ref 95–110)
CO2 SERPL-SCNC: 26 MMOL/L (ref 23–29)
CREAT SERPL-MCNC: 0.8 MG/DL (ref 0.5–1.4)
DIFFERENTIAL METHOD BLD: NORMAL
EOSINOPHIL # BLD AUTO: 0.2 K/UL (ref 0–0.5)
EOSINOPHIL NFR BLD: 3.7 % (ref 0–8)
ERYTHROCYTE [DISTWIDTH] IN BLOOD BY AUTOMATED COUNT: 13 % (ref 11.5–14.5)
EST. GFR  (NO RACE VARIABLE): >60 ML/MIN/1.73 M^2
GLUCOSE SERPL-MCNC: 106 MG/DL (ref 70–110)
HCT VFR BLD AUTO: 45.3 % (ref 40–54)
HGB BLD-MCNC: 15.3 G/DL (ref 14–18)
IMM GRANULOCYTES # BLD AUTO: 0.01 K/UL (ref 0–0.04)
IMM GRANULOCYTES NFR BLD AUTO: 0.2 % (ref 0–0.5)
LIPASE SERPL-CCNC: 26 U/L (ref 4–60)
LYMPHOCYTES # BLD AUTO: 2.1 K/UL (ref 1–4.8)
LYMPHOCYTES NFR BLD: 38.5 % (ref 18–48)
MCH RBC QN AUTO: 29.3 PG (ref 27–31)
MCHC RBC AUTO-ENTMCNC: 33.8 G/DL (ref 32–36)
MCV RBC AUTO: 87 FL (ref 82–98)
MONOCYTES # BLD AUTO: 0.5 K/UL (ref 0.3–1)
MONOCYTES NFR BLD: 9 % (ref 4–15)
NEUTROPHILS # BLD AUTO: 2.6 K/UL (ref 1.8–7.7)
NEUTROPHILS NFR BLD: 48.2 % (ref 38–73)
NRBC BLD-RTO: 0 /100 WBC
PLATELET # BLD AUTO: 210 K/UL (ref 150–450)
PMV BLD AUTO: 10.5 FL (ref 9.2–12.9)
POTASSIUM SERPL-SCNC: 3.9 MMOL/L (ref 3.5–5.1)
PROT SERPL-MCNC: 7.4 G/DL (ref 6–8.4)
RBC # BLD AUTO: 5.23 M/UL (ref 4.6–6.2)
SODIUM SERPL-SCNC: 138 MMOL/L (ref 136–145)
WBC # BLD AUTO: 5.35 K/UL (ref 3.9–12.7)

## 2024-11-15 PROCEDURE — 80053 COMPREHEN METABOLIC PANEL: CPT | Mod: HCNC | Performed by: EMERGENCY MEDICINE

## 2024-11-15 PROCEDURE — 85025 COMPLETE CBC W/AUTO DIFF WBC: CPT | Mod: HCNC | Performed by: EMERGENCY MEDICINE

## 2024-11-15 PROCEDURE — 25500020 PHARM REV CODE 255: Mod: HCNC | Performed by: EMERGENCY MEDICINE

## 2024-11-15 PROCEDURE — 83690 ASSAY OF LIPASE: CPT | Mod: HCNC | Performed by: EMERGENCY MEDICINE

## 2024-11-15 PROCEDURE — 99285 EMERGENCY DEPT VISIT HI MDM: CPT | Mod: 25,HCNC

## 2024-11-15 PROCEDURE — A9698 NON-RAD CONTRAST MATERIALNOC: HCPCS | Mod: HCNC | Performed by: EMERGENCY MEDICINE

## 2024-11-15 RX ORDER — DICYCLOMINE HYDROCHLORIDE 20 MG/1
20 TABLET ORAL 4 TIMES DAILY PRN
Qty: 20 TABLET | Refills: 0 | Status: SHIPPED | OUTPATIENT
Start: 2024-11-15

## 2024-11-15 RX ADMIN — IOHEXOL 100 ML: 350 INJECTION, SOLUTION INTRAVENOUS at 07:11

## 2024-11-15 RX ADMIN — IOHEXOL 1000 ML: 12 SOLUTION ORAL at 07:11
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
## 2024-11-15 NOTE — TELEPHONE ENCOUNTER
----- Message from Edwina sent at 11/15/2024  1:17 PM CST -----  .Type: Orders Request    What orders/ testing are being requested? CT scan    Is there a future appointment scheduled for the patient with PCP? yes    When? 01/15/2025    Would you prefer a response via [facility]? Call back    Comments: Patient is requesting an order put in for a CT scan so he can go in Monday.

## 2024-11-15 NOTE — TELEPHONE ENCOUNTER
Called and spoke with the patient. Informed the lab and CT order has placed but patient is getting ready to go to the ER as stated. I verbally agreed and he was reminded it was also Dr. Burton recommendation and advise to proceed to the ER if no progress from linzess, patient agreed and stated that was what he said earlier from the other nurse. Patient informed will put LAB and CT order on hold as of right now. Patient verbally understood and agreed.

## 2024-11-15 NOTE — TELEPHONE ENCOUNTER
Spoke with the patient states there is no improvement after taking, linzess, miralax and prune juice. Informed to proceed to the ER today as advised by Dr. Burton. Patient verbally agreed.

## 2024-11-15 NOTE — TELEPHONE ENCOUNTER
----- Message from Reinaldo sent at 11/15/2024  7:50 AM CST -----  Contact: Pt  [phone]  Pt called in regards to getting an order for a CT scan pt would like to speak with staff please advise.

## 2024-11-15 NOTE — TELEPHONE ENCOUNTER
----- Message from Edwina sent at 11/15/2024  1:17 PM CST -----  .Type: Orders Request    What orders/ testing are being requested? CT scan    Is there a future appointment scheduled for the patient with PCP? yes    When? 01/15/2025    Would you prefer a response via [facility]? Call back    Comments: Patient is requesting an order put in for a CT scan so he can go in Monday.

## 2024-11-16 NOTE — ED NOTES
Pt is fully dressed and not connected to medical devices to monitor vital signs (Per ED policy all patients change into hospital gowns and V/S are monitored throughout stay).     Kate said he refused to get in the bed and put on a gown because he doesn't plan on staying.     CT Dept. Notified that PO contrast has been consumed for CT Scan.    He said he will get V/S post CT Scan.

## 2024-11-19 ENCOUNTER — PATIENT MESSAGE (OUTPATIENT)
Dept: INFECTIOUS DISEASES | Facility: CLINIC | Age: 34
End: 2024-11-19
Payer: COMMERCIAL

## 2024-11-19 ENCOUNTER — TELEPHONE (OUTPATIENT)
Dept: INFECTIOUS DISEASES | Facility: CLINIC | Age: 34
End: 2024-11-19
Payer: COMMERCIAL

## 2024-11-19 NOTE — TELEPHONE ENCOUNTER
After speaking with [facility]. Katie states [facility] will set up bladder irrigation teaching with the pt.  Contact Sunshine with [facility] to update and sent order to [facility] as well.

## 2024-11-20 ENCOUNTER — TELEPHONE (OUTPATIENT)
Dept: INTERNAL MEDICINE | Facility: CLINIC | Age: 34
End: 2024-11-20
Payer: COMMERCIAL

## 2024-11-20 NOTE — TELEPHONE ENCOUNTER
----- Message from Kendrick sent at 11/20/2024 10:52 AM CST -----  Contact: [phone]@ from [employer]  Good morning   from [employer] would like a call back to discuss the status of a detail order that was sent over for the patient on 11/5 and 11/20. This order needs to be signed,dated, by the doc and fax back to them at [phone]. Any questions please call  to advise  [phone]

## 2024-11-22 DIAGNOSIS — L21.9 SEBORRHEIC DERMATITIS: ICD-10-CM

## 2024-11-22 NOTE — TELEPHONE ENCOUNTER
----- Message from Jerardo sent at 11/22/2024 11:57 AM CST -----  Regarding: Refill  Contact: Pt [phone]  Requesting an RX refill or new RX.    Is this a refill or new RX: Refill    RX name and strength (copy/paste from chart):  gabapentin (NEURONTIN) 400 MG capsule    Is this a 30 day or 90 day RX:     Pharmacy name and phone # (copy/paste from chart):      [address] [place] [address] [address] [address]  [address] [address] [place] [address]  Phone: [phone] Fax: [phone]    Requesting an RX refill or new RX.    Is this a refill or new RX: Refill    RX name and strength (copy/paste from chart):  triamcinolone acetonide 0.025% (KENALOG) 0.025 % cream    Is this a 30 day or 90 day RX:     Pharmacy name and phone # (copy/paste from chart):      [facility] [address] [facility] [place], [place] - [address] [address]  [address] [address] [place] [address]  Phone: [phone] Fax: [phone]

## 2024-11-22 NOTE — TELEPHONE ENCOUNTER
No care due was identified.  [facility] Embedded Care Due Messages. Reference number: [id number].   11/22/2024 1:14:02 PM CST

## 2024-11-25 ENCOUNTER — TELEPHONE (OUTPATIENT)
Dept: NEPHROLOGY | Facility: CLINIC | Age: 34
End: 2024-11-25
Payer: COMMERCIAL

## 2024-11-25 DIAGNOSIS — R63.0 LOSS OF APPETITE: ICD-10-CM

## 2024-11-25 DIAGNOSIS — K21.9 GASTROESOPHAGEAL REFLUX DISEASE, UNSPECIFIED WHETHER ESOPHAGITIS PRESENT: Primary | ICD-10-CM

## 2024-11-25 RX ORDER — TRIAMCINOLONE ACETONIDE 0.25 MG/G
CREAM TOPICAL
Qty: 80 G | Refills: 0 | Status: SHIPPED | OUTPATIENT
Start: 2024-11-25

## 2024-11-25 RX ORDER — GABAPENTIN 400 MG/1
400 CAPSULE ORAL 3 TIMES DAILY
Qty: 270 CAPSULE | Refills: 1 | Status: SHIPPED | OUTPATIENT
Start: 2024-11-25 | End: 2025-11-25

## 2024-11-25 NOTE — TELEPHONE ENCOUNTER
Returned call to pt. Who states that irrigation regimen is not working. States that he feels his symptoms are getting worse. Reports abd pain, flank pain, loss of apetite, chills(no fever), not passing urine. Pt asked that I send to ID, Urology, and GI. Please advise.

## 2024-11-25 NOTE — TELEPHONE ENCOUNTER
----- Message from Summer sent at 11/25/2024  7:45 AM CST -----  Contact: Kate Quinonez called asking for advice about medication. Please call patient at [phone]. Thanks!

## 2024-11-26 ENCOUNTER — TELEPHONE (OUTPATIENT)
Dept: INFECTIOUS DISEASES | Facility: CLINIC | Age: 34
End: 2024-11-26
Payer: COMMERCIAL

## 2024-11-26 NOTE — TELEPHONE ENCOUNTER
----- Message from Willie sent at 11/26/2024 11:14 AM CST -----  Contact: brandon  Type:  Sooner Apoointment Request    Caller is requesting a sooner appointment.  Caller declined first available appointment listed below.  Caller will not accept being placed on the waitlist and is requesting a message be sent to doctor.  Name of Caller:radha  When is the first available appointment?=osbaldo  Symptoms: uti  Would the patient rather a call back or a response via [facility]? =call  Best Call Back Number: [phone]   Additional Information: request virtual/or audio

## 2024-11-26 NOTE — TELEPHONE ENCOUNTER
----- Message from Willie sent at 11/26/2024 11:14 AM CST -----  Contact: brandon  Type:  Sooner Apoointment Request    Caller is requesting a sooner appointment.  Caller declined first available appointment listed below.  Caller will not accept being placed on the waitlist and is requesting a message be sent to doctor.  Name of Caller:radha  When is the first available appointment?=osbaldo  Symptoms: uti  Would the patient rather a call back or a response via [facility]? =call  Best Call Back Number: [phone]   Additional Information: request virtual/or audio

## 2024-11-27 ENCOUNTER — DOCUMENT SCAN (OUTPATIENT)
Dept: HOME HEALTH SERVICES | Facility: HOSPITAL | Age: 34
End: 2024-11-27
Payer: COMMERCIAL

## 2024-11-27 ENCOUNTER — APPOINTMENT (OUTPATIENT)
Dept: LAB | Facility: HOSPITAL | Age: 34
End: 2024-11-27
Attending: INTERNAL MEDICINE
Payer: COMMERCIAL

## 2024-11-27 ENCOUNTER — OFFICE VISIT (OUTPATIENT)
Dept: INFECTIOUS DISEASES | Facility: CLINIC | Age: 34
End: 2024-11-27
Payer: COMMERCIAL

## 2024-11-27 DIAGNOSIS — N39.0 URINARY TRACT INFECTION ASSOCIATED WITH CATHETERIZATION OF URINARY TRACT, SUBSEQUENT ENCOUNTER: Primary | ICD-10-CM

## 2024-11-27 DIAGNOSIS — N39.0 COMPLICATED UTI (URINARY TRACT INFECTION): Primary | ICD-10-CM

## 2024-11-27 DIAGNOSIS — T83.511D URINARY TRACT INFECTION ASSOCIATED WITH CATHETERIZATION OF URINARY TRACT, SUBSEQUENT ENCOUNTER: Primary | ICD-10-CM

## 2024-11-27 DIAGNOSIS — I10 PRIMARY HYPERTENSION: ICD-10-CM

## 2024-11-27 DIAGNOSIS — G82.50 QUADRIPLEGIA, POST-TRAUMATIC: ICD-10-CM

## 2024-11-27 DIAGNOSIS — Z93.59 SUPRAPUBIC CATHETER: ICD-10-CM

## 2024-11-27 DIAGNOSIS — S14.109S QUADRIPLEGIA, POST-TRAUMATIC: ICD-10-CM

## 2024-11-27 LAB
BACTERIA #/AREA URNS HPF: NORMAL /HPF
BILIRUB UR QL STRIP: NEGATIVE
CLARITY UR: CLEAR
COLOR UR: COLORLESS
GLUCOSE UR QL STRIP: NEGATIVE
HGB UR QL STRIP: NEGATIVE
KETONES UR QL STRIP: ABNORMAL
LEUKOCYTE ESTERASE UR QL STRIP: ABNORMAL
MICROSCOPIC COMMENT: NORMAL
NITRITE UR QL STRIP: NEGATIVE
PH UR STRIP: 7 [PH] (ref 5–8)
PROT UR QL STRIP: NEGATIVE
RBC #/AREA URNS HPF: 1 /HPF (ref 0–4)
SP GR UR STRIP: 1 (ref 1–1.03)
SQUAMOUS #/AREA URNS HPF: 1 /HPF
URN SPEC COLLECT METH UR: ABNORMAL
UROBILINOGEN UR STRIP-ACNC: NEGATIVE EU/DL
WBC #/AREA URNS HPF: 4 /HPF (ref 0–5)

## 2024-11-27 PROCEDURE — 81000 URINALYSIS NONAUTO W/SCOPE: CPT | Performed by: INTERNAL MEDICINE

## 2024-11-27 NOTE — TELEPHONE ENCOUNTER
Pt has a vitural appt at 11am today.  ABD hurting and lower back pain since starting the bladder irrigations. He states he is not elyse. Well the med. Well. Feeling nausea and not able eat since. Pelvic pain on the right side.   Pls advise.

## 2024-11-27 NOTE — PROGRESS NOTES
Subjective     This is a virtual visit   Location - [place].    Patient ID: Kate Lazo is a 34 y.o. male.    Chief Complaint: No chief complaint on file.    HPI  Last ID note-  Last clinic note- 07/2012  Last clinic note-  31 year old man with PMH as listed below. He has history of quadriparesis and has chronic [facility]ic [facility]. He is in a wheelchair with his step Dad    Cultures reviewed-  04/08-Urine culture-pseudomonas  1/14- Pseudomonas   12/11/20- pseudomonas/morganella  09/2020-E coli -ESBL  Case was discussed with Dr Loaiza and Fosfomycin was sent to the pharmacy.-04/13- FOSFOMYCIN po 3gram every 72 hours X2 doses  He is in a wheelchair.  11/30/21-  Latest urine culture-      11/8/21-proteus and was given Augmentin 875 mg for 7 days.   03/08/202-  Since the last clinic visit ,   Ct scan of the abdomen -02/10/2022-  Suprapubic pelvic catheter and bladder wall thickening, somewhat nonspecific in the setting of a nondistended bladder.   Moderate stool burden in the distal colon.  Correlation for constipation.   Cholelithiasis.   Questionable early sacral decubitus ulcer.  Correlation is advised     02.21/22- Urine culture-  PSEUDOMONAS AERUGINOSA   50,000 - 99,999 cfu/ml   He took the fosfomycin without clinical relief -his major symptoms are abdominal pain.  He reports that the urine smell gets better and he feels better .  He denies fever at this time.  The plan was made to start -IV meropenem for 2 weeks but he has not yet started PICC line.      05/05/22  Since the last visit ,he had another episode of UTI-04/19/22  KLEBSIELLA PNEUMONIAE-he was given Levaquin   He was seen by PCP yesterday for malaise.  Cbc WAS NORMAL       07/13/22 -since his last visit, he had recurrent positive urine culture-  Latest urine culture- 06/09/22- Morganella   05/2022- Proteus   04/22- Klebsiella  03/22- Morganella  02/22- Klebsiella  01/22-pseudomoas     12/2021- Morgabella  11/21- Proteus   He  reports intermittent dysuria . Denies fever .  He changes the alonso every 2 weeks.  CT scan of the abdomen/pelvis- 02/22-Suprapubic pelvic catheter and bladder wall thickening, somewhat nonspecific in the setting of a nondistended bladder.   Moderate stool burden in the distal colon.  Correlation for constipation.   Cholelithiasis.   Questionable early sacral decubitus ulcer.  Correlation is advised.     Over the last 6 months - he did monthly Urine cultures whenever he contacts the office about dysuria      03/15- the patient  comes for follow up.     Last urine culture- 02/28- Klebsiella.  He was given Fosfomycin .     Component      Latest Ref Rng & Units 2/28/2023 1/31/2023             1:49 PM   Urine Culture, Routine       KLEBSIELLA PNEUMONIAE (A) . . . clinically necessary.      Component      Latest Ref Rng & Units 1/31/2023           1:49 PM   Urine Culture, Routine       Multiple organisms isolated. None in predominance.  Repeat if      Component      Latest Ref Rng & Units 12/21/2022           2:20 PM   Urine Culture, Routine       PROTEUS MIRABILIS (A) . . .      Component      Latest Ref Rng & Units 12/21/2022           2:20 PM   Urine Culture, Routine       KLEBSIELLA PNEUMONIAE (A) . . .      Component      Latest Ref Rng & Units 11/10/2022              Urine Culture, Routine       PSEUDOMONAS AERUGINOSA (A) . . .      Component      Latest Ref Rng & Units 9/29/2022              Urine Culture, Routine       PSEUDOMONAS AERUGINOSA (A) . . .      Component      Latest Ref Rng & Units 6/9/2022 11/2  He was seen via Tele med.     He has pain over the pelvic region.     Lab test -   10/25-  ESCHERICHIA COLI ESBL   >100,000 cfu/ml      CT scan -09/14-     Right lower lobe opacities consistent with right basilar pneumonia     Suprapubic urinary catheter.  Correlate clinically.  04/30-  He reports fever -101.9  UA- showed wbc 10- done 04/29 09/26- he was recently seen at the hospital -was  managed -08/03/24- 08/06/24 and was treated for PICC line associated DVT .  Latest urine culture- 09/13/24- Proteus  He was given Bactrim.  His major symptom at this time is constipation for 7 days -he has failed out patient regime-enema, laxative         11/27/24    He says he did not think the gentamicin made any difference  CT abdomen- 11/15/24    No acute finding significantly degraded imaging     Review of Systems   Constitutional:  Negative for activity change, appetite change, chills and diaphoresis.          Objective     Physical Exam  Musculoskeletal:      Cervical back: Normal range of motion.   Neurological:      Mental Status: Mental status is at baseline.            Assessment and Plan     1. Complicated UTI (urinary tract infection)  Assessment & Plan:  We had another extensive discussion about UTI and patients with paraplegia.  No need to treat bacterial colonization     Will do UA for now.             2. Primary hypertension  Assessment & Plan:  BP control as per primary team      3. Suprapubic catheter  Overview:  Last Assessment & Plan:   Formatting of this note might be different from the original.  History & Physical Continue catheter care. Catheter was changed out in the ED.    Discharge Summary Patient does have suprapubic catheter.  Urine culture growing gram-negative rods which is likely colonization.  For outpatient follow-up.    Follow-up    Assessment & Plan:  Follow Urology       4. Quadriplegia, post-traumatic  Assessment & Plan:  Supportive care

## 2024-11-27 NOTE — ASSESSMENT & PLAN NOTE
We had another extensive discussion about UTI and patients with paraplegia.  No need to treat bacterial colonization     Will do UA for now.

## 2024-11-29 ENCOUNTER — TELEPHONE (OUTPATIENT)
Dept: INFECTIOUS DISEASES | Facility: CLINIC | Age: 34
End: 2024-11-29
Payer: COMMERCIAL

## 2024-11-29 NOTE — TELEPHONE ENCOUNTER
----- Message from Jaden Castro MD, MARIBELSA sent at 11/29/2024  7:53 AM CST -----  Very good news- UA does not show any infection

## 2024-11-29 NOTE — TELEPHONE ENCOUNTER
Called the pt/ After verifying two pt identifier. Results given. Pt still complain of Symptoms: trouble passing urine, chills, ABD and lower back pain around kidney area and pelvic area. Urine has foul odor.

## 2024-12-03 ENCOUNTER — OFFICE VISIT (OUTPATIENT)
Dept: INFECTIOUS DISEASES | Facility: CLINIC | Age: 34
End: 2024-12-03
Payer: COMMERCIAL

## 2024-12-03 DIAGNOSIS — R53.2 FUNCTIONAL QUADRIPLEGIA: ICD-10-CM

## 2024-12-03 DIAGNOSIS — I10 PRIMARY HYPERTENSION: ICD-10-CM

## 2024-12-03 DIAGNOSIS — R32 URINARY INCONTINENCE, UNSPECIFIED TYPE: ICD-10-CM

## 2024-12-03 DIAGNOSIS — N39.0 COMPLICATED UTI (URINARY TRACT INFECTION): Primary | ICD-10-CM

## 2024-12-03 PROCEDURE — 3044F HG A1C LEVEL LT 7.0%: CPT | Mod: CPTII,95,, | Performed by: STUDENT IN AN ORGANIZED HEALTH CARE EDUCATION/TRAINING PROGRAM

## 2024-12-03 PROCEDURE — 99213 OFFICE O/P EST LOW 20 MIN: CPT | Mod: 95,,, | Performed by: STUDENT IN AN ORGANIZED HEALTH CARE EDUCATION/TRAINING PROGRAM

## 2024-12-03 NOTE — PROGRESS NOTES
The patient location is: [place]   The chief complaint leading to consultation is: UTI follow up      Visit type: audiovisual     Notes:     Subjective:     HPI: 34 y.o. male with pertinent PMHx of post traumatic quadriplegia, HTN, urinary incontinence leading to chronic suprapubic catheterization and recurrent UTI, and constipation who was evaluated by Infectious Diseases on 7/21/23 after clean catch U/A showed positive nitrites and 12 WBC. Urine culture grew pan R Morganella morganii and  Pseudomonas. Plan was for 2 weeks of targeted IV antibiotics. PICC placed on 7/28. Plan was then to give dose of tobramycin and a course of pip/tazo via PICC line followed by repeat U/A. 8/24, patient states he gets UTI frequently. Changes catheter every 2 weeks at home. Usually gets chills, sweats, abdominal pain, urinary difficulty, and foul odor with UTI. Had these symptoms when July UTI was diagnosed. Currently having abdominal pain and myalgias. Unsure if this is due to unrelated ailment or another UTI. Tolerated pip/tazo other than intermittent loose stools. Has provided new urine sample today. Will see if any pathogens grow and discuss whether further antibiotics are warranted. Patient with provide update if worsening symptoms occur. Scheduled for home catheter change next week. Agreeable to PICC removal today.     Last ID note 11/2: was recently given Fosfomycin for last urine culture.  He is colonized with bacteria and not all the cultures mean a true infection .  WE went through this issue again .  Continue Lithostat /Azo dye as needed.     12/6/23: Today patient reports constipation and pain on both sides of abdomen for a few weeks. Has also struggled to urinate through Crenshaw. Also pain below neck to feet within past week. Mainly a nerve pain especially in right arm. Left side feels more like gas. Also with trapped gas due to wheelchair.  U/A from urology on 12/5 with 20 WBC. Culture in process. One week ago  urine was sterile. CT a/p has been scheduled for 12/18. HH irrigated Crenshaw and it flowed well yesterday but today the physical flow still bad. Only 600 cc went into bag. Day before had 1500 cc. Has not had any fever. No nausea or vomiting. No shortness of breath or cough. Has appetite but unable to eat or drink much. Given how many symptoms patient is experiencing, advised him to go to ER for imaging and blood work. May also be able to provide pain management and a bowel regimen for his constipation. Will follow urine culture result.      2/28/24: Current UTI symptoms- abdominal pain and foul odor. Crenshaw last changed 1 week ago. Changed every 2-3 weeks. [facility] doing the exchanges. No current fever. Urine appears darker but not abnormal. Discussed treating based on last urine culture using IV tobramycin. Will reach out to [facility]. Will also discuss bowel regimen with PCP. Has been frequently constipated which is huge risk factor. Will ask HH to stick to biweekly exchange of Crenshaw.      3/14: Received 3 doses of tobramycin. Last urine culture from 3/5 no growth. Feels good today. Still battling constipation. On new bowel regimen. Will adjust with help of PCP. Sees GI next month.      6/14: Here for follow up. Per Kash was given large dose of amikacin 2 days ago. Currently no UTI symptoms. Still dealing with constipation but more BM than before. Placed on stronger med than Linzess. Crenshaw changed every 2 weeks. No fever but occasional chills. Continues to have generalized pain from chest to pelvis. Asking for MRI as CT have been negative. Will place order today.      7/3: Patient with another pyuria. Is concerned about recurrent infection. Will use meropenem as aggressive treatment. Then may try an oral suppression regimen. Patient in agreement. PICC ordered and [facility] updated.      10/17: Patient took dose of fosfomycin but concerned it would not be sufficient. Have set up for tobramycin through [facility]. Will  "plan for 5 doses. Has discussed a few options with urology including surgical reconstruction of ureters and gentamicin irrigation. He is on the fence about surgical approach. Will reach out to urology for further clarification. Do agree with irrigations. Can also discuss antibiotic suppression. Patient voiced understanding. Denies new symptoms currently.      10/31: Last /70. Has started chronic prophylaxis with low dose Omnicef and tolerating. Has not yet received bladder irrigation with gentamicin, only sterile water. Will see if  is able to obtain the gentamicin. Overall feels well. Says "nothing has gotten worse". Has an appetite but has lost weight. New wheelchair may help with bowel movements.     12/3: Going to see GI soon about irregular bowels. Had urinary incontinence past 2 nights. Typically empties bag before bed but when he woke up there was only 200 cc in bag but floor was covered in urine. Changed catheter and same thing occurred the next day. Will discuss with urology. Last U/A sterile. Will plan to give holiday from gentamicin flushes after this month.      Review of Systems:   Negative unless otherwise noted positive-  Gen- Weakness/ Fatigue  Neuro- Confusion  CV- chest pain   Resp: Cough/ SOB  GI- Nausea/Vomiting  - sudden incontinence; urine all over the floor  Extrem- Pain/Swelling     Objective:     Physical Exam:  General- Patient alert and oriented x3 in NAD  HEENT- PERRLA, EOMI, OP clear  Resp- No increased WOB noted. Not using accessory muscles.  Extrem- No cyanosis, clubbing, edema.   Skin-  No Jaundice. No visible skin lesions.        Plan:  Complicated UTI (urinary tract infection)  --Continue biweekly Crenshaw exchange   --Continue prophylaxis with PO cefdinir 300 mg daily  --Needs bowel regimen to prevent constipation which is huge risk factor for recurrent UTI  --Continue gentamicin bladder irrigation this month; will plan for holiday in January   --Follow up with me virtually " "in 3 months      "Gentamicin Bladder Instillations:      Would perform twice daily for 7 days following every catheter change      1. Using a 60 cc catheter tip syringe, draw up 60 mL Gentamicin Solution (480 mg Gentamicin in 1 Liter 0.9 Normal Saline) into syringe and gently push all the air out. Set aside until ready to connect to catheter and insert into bladder. 2. Disconnect catheter from bag or unplug catheter and allow all the urine to drain from the bladder. 3. Once bladder is empty, attach syringe (with Gentamicin in it already) to end of the catheter and slowly push solution into bladder. 4. Once syringe is empty, leave it attached to the catheter for 60 minutes. Alternatively, you might have a plug or clamp that you can use to prevent the fluid from draining out of the catheter. 5. At the end of the dwell time, uncap the catheter and let the fluid drain out on its own into your drainage bag. 6. Rinse the syringe out with warm soapy water and allow to dry. Do not put syringe into boiling water or the microwave to disinfect syringe as it may melt or come apart. 7. The Gentamicin solution can be stored in room air. It does not have to be refrigerated." -[facility]       Procedure Component Value Units Date/Time   CULTURE, URINE [[id number]] (Abnormal)  Collected: 10/08/24 1000   Order Status: Completed Specimen: Urine, Clean Catch Updated: 10/12/24 1352     Urine Culture, Routine PROTEUS MIRABILIS  >100,000 cfu/ml   Abnormal        ESCHERICHIA COLI ESBL  > 100,000 cfu/ml   Abnormal    Narrative:     Send normal result to authorizing provider's In Basket if  patient is active on MyChart:->Yes   Susceptibility                       Proteus mirabilis Escherichia coli ESBL       CULTURE, URINE CULTURE, URINE       Amp/Sulbactam <=8/4 mcg/mL Sensitive 16/8 mcg/mL Resistant       Ampicillin <=8 mcg/mL Sensitive >16 mcg/mL Resistant       Cefazolin 4 mcg/mL Sensitive           Cefepime <=2 mcg/mL " Sensitive >16 mcg/mL Resistant       Ceftriaxone <=1 mcg/mL Sensitive >2 mcg/mL Resistant       Ciprofloxacin >2 mcg/mL Resistant >2 mcg/mL Resistant       Ertapenem <=0.5 mcg/mL Sensitive <=0.5 mcg/mL Sensitive       Gentamicin <=2 mcg/mL Sensitive <=2 mcg/mL Sensitive       Levofloxacin >4 mcg/mL Resistant >4 mcg/mL Resistant       Meropenem <=1 mcg/mL Sensitive <=1 mcg/mL Sensitive       Nitrofurantoin     <=32 mcg/mL Sensitive       Piperacillin/Tazo <=8 mcg/mL Sensitive <=8 mcg/mL Resistant       Tobramycin <=2 mcg/mL Sensitive <=2 mcg/mL Sensitive       Trimeth/Sulfa <=2/38 mcg/mL Sensitive >2/38 mcg/mL Resistant                         Linear View               Suprapubic catheter  --Recommend changing at least every 2 weeks to prevent recurrent UTI   --Follow up with urology      Hypertension  Continue current medications. Follow with PCP.         Face to Face time with patient: 12 minutes   25 minutes of total time spent on the encounter, which includes face to face time and non-face to face time preparing to see the patient (eg, review of tests), Obtaining and/or reviewing separately obtained history, Documenting clinical information in the electronic or other health record, Independently interpreting results (not separately reported) and communicating results to the patient/family/caregiver, or Care coordination (not separately reported).      Each patient to whom he or she provides medical services by telemedicine is:  (1) informed of the relationship between the physician and patient and the respective role of any other health care provider with respect to management of the patient; and (2) notified that he or she may decline to receive medical services by telemedicine and may withdraw from such care at any time.

## 2024-12-04 ENCOUNTER — TELEPHONE (OUTPATIENT)
Dept: INTERNAL MEDICINE | Facility: CLINIC | Age: 34
End: 2024-12-04
Payer: COMMERCIAL

## 2024-12-04 ENCOUNTER — TELEPHONE (OUTPATIENT)
Dept: UROLOGY | Facility: CLINIC | Age: 34
End: 2024-12-04
Payer: COMMERCIAL

## 2024-12-04 ENCOUNTER — TELEPHONE (OUTPATIENT)
Dept: INFECTIOUS DISEASES | Facility: CLINIC | Age: 34
End: 2024-12-04
Payer: COMMERCIAL

## 2024-12-04 NOTE — TELEPHONE ENCOUNTER
----- Message from Dorys sent at 12/4/2024 10:59 AM CST -----  Contact: Patient, [phone]  .[email or web address]     Patient is calling for Medical Advice regarding: Diarrhea, urine dark    How long has patient had these symptoms:    Pharmacy name and phone#:   [facility] [address] - [place], [place] - [address] [address]  [address] [address] [address]  Phone: [phone] Fax: [phone]    Patient wants a call back or thru [facility]: Call back    Comments: Calling to request a hydration drip. Please call him. Thanks.    Please advise patient replies from provider may take up to 48 hours.

## 2024-12-04 NOTE — TELEPHONE ENCOUNTER
----- Message from Nurse Francis sent at 12/4/2024 11:31 AM CST -----  Contact: Nurse Walters [phone]    ----- Message -----  From: London Hubbard DO  Sent: 12/4/2024  11:11 AM CST  To: Penny Doherty LPN; Lorena Guadalupe LPN    I told him to continue irrigation this month and hold off in January. If it is becoming a hassle can stop now.  ----- Message -----  From: Penny Doherty LPN  Sent: 12/4/2024  11:04 AM CST  To: London Hubbard DO; Lorena Guadalupe LPN      ----- Message -----  From: Maryam Morrison  Sent: 12/4/2024  10:37 AM CST  To: Gloria Stanley Staff    1MEDICALADVICE     Patient is calling for Medical Advice regarding:    Patient wants a call back or thru [facility]:Call back    Comments:Pt home health nurse want to know if pt should keep irrigation in. Pt nurse would like a call back from nurse in office.    Please advise patient replies from provider may take up to 48 hours.

## 2024-12-04 NOTE — TELEPHONE ENCOUNTER
"I called patient and notified him. I offered to schedule him an appointment today, pt denied stating that today is not a good day for him. Pt mentioned that he will give us a call back when he is ready to schedule.     ----- Message from Pineda James MD sent at 12/4/2024 11:16 AM CST -----  Regarding: RE: Orders  Contact: Kate  Can do pvr as nurse visit, but based on ct his bladder was empty and gas is related to having the spt in place  ----- Message -----  From: Tamiko Sawyer MA  Sent: 12/4/2024   9:51 AM CST  To: Pineda James MD  Subject: FW: Orders                                       Patient stated that he feels like when he is in-taking fluids its not matching his output with his suprapubic tube. Pt reports he feels bloated. Pt mentioned that he reviewed the CT scan and he wanted to know if there is anything like "bladder gas" that could be a concern?  ----- Message -----  From: Pineda James MD  Sent: 12/4/2024   9:42 AM CST  To: Tamiko Sawyer MA  Subject: RE: Orders                                       Is he just wanting a PVR?  We could do that as a nurse visit.  Otherwise looks like he is now followed by Ramin, could get him an appt with him sooner probably  ----- Message -----  From: Tamiko Sawyer MA  Sent: 12/4/2024   8:15 AM CST  To: Mandy Justice NP  Subject: Orders                                           Any advice?  ----- Message -----  From: Raymond Au  Sent: 12/3/2024  10:25 AM CST  To: Vinh Galvan Staff    Contact: First and Last Name if other than the patient involved: Kate   Requests an order for:  Ultrasound  Reason for request:  check how much urine he's retaining   Callback expected: yes  [phone]  Best time to call back:  anytime  Patient is wanting to see if he can get a virtual visit set up if possible in order to get the order, but the next available isn't until 1/6/25  Thanks   Am  "

## 2024-12-04 NOTE — TELEPHONE ENCOUNTER
Return call to [facility] Nurse. No answer. Lvm informing of Dr. Hubbard orders and that Dr. Hubbard spoke with the patient as well. Pls return call to assist.

## 2024-12-06 PROCEDURE — G0179 MD RECERTIFICATION HHA PT: HCPCS | Mod: ,,, | Performed by: FAMILY MEDICINE

## 2024-12-12 ENCOUNTER — HOSPITAL ENCOUNTER (OUTPATIENT)
Dept: RADIOLOGY | Facility: HOSPITAL | Age: 34
Discharge: HOME | End: 2024-12-12
Attending: NURSE PRACTITIONER
Payer: COMMERCIAL

## 2024-12-12 DIAGNOSIS — K21.9 GASTROESOPHAGEAL REFLUX DISEASE, UNSPECIFIED WHETHER ESOPHAGITIS PRESENT: ICD-10-CM

## 2024-12-12 DIAGNOSIS — R63.0 LOSS OF APPETITE: ICD-10-CM

## 2024-12-12 PROCEDURE — 78264 GASTRIC EMPTYING IMG STUDY: CPT | Mod: 26,,, | Performed by: RADIOLOGY

## 2024-12-12 PROCEDURE — 78264 GASTRIC EMPTYING IMG STUDY: CPT | Mod: TC

## 2024-12-12 PROCEDURE — A9541 TC99M SULFUR COLLOID: HCPCS | Performed by: NURSE PRACTITIONER

## 2024-12-12 RX ORDER — TECHNETIUM TC 99M SULFUR COLLOID 2 MG
0.9 KIT MISCELLANEOUS
Status: COMPLETED | OUTPATIENT
Start: 2024-12-12 | End: 2024-12-12

## 2024-12-12 RX ADMIN — TECHNETIUM TC 99M SULFUR COLLOID 0.9 MILLICURIE: KIT at 10:12

## 2024-12-17 ENCOUNTER — OFFICE VISIT (OUTPATIENT)
Dept: GASTROENTEROLOGY | Facility: CLINIC | Age: 34
End: 2024-12-17
Payer: MEDICARE

## 2024-12-17 DIAGNOSIS — K58.1 IRRITABLE BOWEL SYNDROME WITH CONSTIPATION: Primary | ICD-10-CM

## 2024-12-17 DIAGNOSIS — K21.9 GASTRO-ESOPHAGEAL REFLUX DISEASE WITHOUT ESOPHAGITIS: ICD-10-CM

## 2024-12-17 PROCEDURE — 1159F MED LIST DOCD IN RCRD: CPT | Mod: HCNC,CPTII,95, | Performed by: NURSE PRACTITIONER

## 2024-12-17 PROCEDURE — 1160F RVW MEDS BY RX/DR IN RCRD: CPT | Mod: HCNC,CPTII,95, | Performed by: NURSE PRACTITIONER

## 2024-12-17 PROCEDURE — 99214 OFFICE O/P EST MOD 30 MIN: CPT | Mod: HCNC,95,, | Performed by: NURSE PRACTITIONER

## 2024-12-17 PROCEDURE — 3044F HG A1C LEVEL LT 7.0%: CPT | Mod: HCNC,CPTII,95, | Performed by: NURSE PRACTITIONER

## 2024-12-17 RX ORDER — PANTOPRAZOLE SODIUM 40 MG/1
40 TABLET, DELAYED RELEASE ORAL 2 TIMES DAILY
Qty: 180 TABLET | Refills: 0 | Status: SHIPPED | OUTPATIENT
Start: 2024-12-17 | End: 2025-03-17
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
## 2024-12-18 DIAGNOSIS — M79.89 LEG SWELLING: ICD-10-CM

## 2024-12-18 RX ORDER — OXYBUTYNIN CHLORIDE 5 MG/1
5 TABLET ORAL 2 TIMES DAILY
Qty: 180 TABLET | Refills: 3 | OUTPATIENT
Start: 2024-12-18

## 2024-12-18 RX ORDER — FUROSEMIDE 20 MG/1
20 TABLET ORAL 2 TIMES DAILY
Qty: 180 TABLET | Refills: 3 | Status: SHIPPED | OUTPATIENT
Start: 2024-12-18

## 2024-12-18 NOTE — TELEPHONE ENCOUNTER
No care due was identified.  [facility] Embedded Care Due Messages. Reference number: [id number].   12/18/2024 6:41:02 AM CST

## 2024-12-18 NOTE — TELEPHONE ENCOUNTER
Refill Routing Note   Medication(s) are not appropriate for processing by [facility] for the following reason(s):        No active prescription written by provider  New or recently adjusted medication  ED/Hospital Visit since last OV with provider    [facility] action(s):  Defer        Medication Therapy Plan: ED documentation reviewed. No changes to therapy note; pharmacy requesting old dose of furosemide once daily; will repend as bid with the recent dose change for your review    Extended chart review required: Yes     Appointments  past 12m or future 3m with PCP    Date Provider   Last Visit   11/14/2024 Troy Burton MD   Next Visit   1/15/2025 Troy Burton MD   ED visits in past 90 days: 1        Note composed:9:06 AM 12/18/2024

## 2024-12-19 ENCOUNTER — TELEPHONE (OUTPATIENT)
Dept: INFECTIOUS DISEASES | Facility: CLINIC | Age: 34
End: 2024-12-19
Payer: COMMERCIAL

## 2024-12-19 ENCOUNTER — PATIENT MESSAGE (OUTPATIENT)
Dept: INFECTIOUS DISEASES | Facility: CLINIC | Age: 34
End: 2024-12-19
Payer: COMMERCIAL

## 2024-12-19 ENCOUNTER — OFFICE VISIT (OUTPATIENT)
Dept: INFECTIOUS DISEASES | Facility: CLINIC | Age: 34
End: 2024-12-19
Payer: COMMERCIAL

## 2024-12-19 DIAGNOSIS — N39.0 COMPLICATED UTI (URINARY TRACT INFECTION): Primary | ICD-10-CM

## 2024-12-19 DIAGNOSIS — N31.9 NEUROGENIC BLADDER: ICD-10-CM

## 2024-12-19 DIAGNOSIS — I10 PRIMARY HYPERTENSION: ICD-10-CM

## 2024-12-19 DIAGNOSIS — R32 URINARY INCONTINENCE, UNSPECIFIED TYPE: ICD-10-CM

## 2024-12-19 DIAGNOSIS — G82.50 QUADRIPLEGIA, POST-TRAUMATIC: ICD-10-CM

## 2024-12-19 DIAGNOSIS — S14.109S QUADRIPLEGIA, POST-TRAUMATIC: ICD-10-CM

## 2024-12-19 DIAGNOSIS — Z93.59 SUPRAPUBIC CATHETER: ICD-10-CM

## 2024-12-19 NOTE — TELEPHONE ENCOUNTER
----- Message from [employer] sent at 12/19/2024  8:05 AM CST -----  Contact: self  ..Type:  Needs Medical Advice    Who Called: .Kate Lazo  Symptoms (please be specific):  chills, cold sweats, strong smell of urine, blood in bag, cloudy urine, urge to urinate only a little comes out , lower back and abdomen pain   How long has patient had these symptoms:  a week   Pharmacy name and phone #:  .  [facility] [address] [place] [address] [address] [address]  [address] [address] [facility] [address]  Phone: [phone] Fax: [phone]  Would the patient rather a call back or a response via [facility]? Call back   Best Call Back Number:[phone] .  Additional Information: pt has an apt scheduled on 12/30 but would like to be seen on today or tomorrow.

## 2024-12-19 NOTE — PROGRESS NOTES
The patient location is: [place]   The chief complaint leading to consultation is: UTI follow up      Visit type: audiovisual     Notes:     Subjective:     HPI: 34 y.o. male with pertinent PMHx of post traumatic quadriplegia, HTN, urinary incontinence leading to chronic suprapubic catheterization and recurrent UTI, and constipation who was evaluated by Infectious Diseases on 7/21/23 after clean catch U/A showed positive nitrites and 12 WBC. Urine culture grew pan R Morganella morganii and  Pseudomonas. Plan was for 2 weeks of targeted IV antibiotics. PICC placed on 7/28. Plan was then to give dose of tobramycin and a course of pip/tazo via PICC line followed by repeat U/A. 8/24, patient states he gets UTI frequently. Changes catheter every 2 weeks at home. Usually gets chills, sweats, abdominal pain, urinary difficulty, and foul odor with UTI. Had these symptoms when July UTI was diagnosed. Currently having abdominal pain and myalgias. Unsure if this is due to unrelated ailment or another UTI. Tolerated pip/tazo other than intermittent loose stools. Has provided new urine sample today. Will see if any pathogens grow and discuss whether further antibiotics are warranted. Patient with provide update if worsening symptoms occur. Scheduled for home catheter change next week. Agreeable to PICC removal today.     Last ID note 11/2: was recently given Fosfomycin for last urine culture.  He is colonized with bacteria and not all the cultures mean a true infection .  WE went through this issue again .  Continue Lithostat /Azo dye as needed.     12/6/23: Today patient reports constipation and pain on both sides of abdomen for a few weeks. Has also struggled to urinate through Crenshaw. Also pain below neck to feet within past week. Mainly a nerve pain especially in right arm. Left side feels more like gas. Also with trapped gas due to wheelchair.  U/A from urology on 12/5 with 20 WBC. Culture in process. One week ago  urine was sterile. CT a/p has been scheduled for 12/18. HH irrigated Crenshaw and it flowed well yesterday but today the physical flow still bad. Only 600 cc went into bag. Day before had 1500 cc. Has not had any fever. No nausea or vomiting. No shortness of breath or cough. Has appetite but unable to eat or drink much. Given how many symptoms patient is experiencing, advised him to go to ER for imaging and blood work. May also be able to provide pain management and a bowel regimen for his constipation. Will follow urine culture result.      2/28/24: Current UTI symptoms- abdominal pain and foul odor. Crenshaw last changed 1 week ago. Changed every 2-3 weeks. [facility] doing the exchanges. No current fever. Urine appears darker but not abnormal. Discussed treating based on last urine culture using IV tobramycin. Will reach out to \[facility]". Will also discuss bowel regimen with PCP. Has been frequently constipated which is huge risk factor. Will ask HH to stick to biweekly exchange of Crenshaw.      3/14: Received 3 doses of tobramycin. Last urine culture from 3/5 no growth. Feels good today. Still battling constipation. On new bowel regimen. Will adjust with help of PCP. Sees GI next month.      6/14: Here for follow up. Per Kash was given large dose of amikacin 2 days ago. Currently no UTI symptoms. Still dealing with constipation but more BM than before. Placed on stronger med than Linzess. Crenshaw changed every 2 weeks. No fever but occasional chills. Continues to have generalized pain from chest to pelvis. Asking for MRI as CT have been negative. Will place order today.      7/3: Patient with another pyuria. Is concerned about recurrent infection. Will use meropenem as aggressive treatment. Then may try an oral suppression regimen. Patient in agreement. PICC ordered and \[facility]\"" updated.      10/17: Patient took dose of fosfomycin but concerned it would not be sufficient. Have set up for tobramycin through \[facility]\"". Will  "plan for 5 doses. Has discussed a few options with urology including surgical reconstruction of ureters and gentamicin irrigation. He is on the fence about surgical approach. Will reach out to urology for further clarification. Do agree with irrigations. Can also discuss antibiotic suppression. Patient voiced understanding. Denies new symptoms currently.      10/31: Last /70. Has started chronic prophylaxis with low dose Omnicef and tolerating. Has not yet received bladder irrigation with gentamicin, only sterile water. Will see if  is able to obtain the gentamicin. Overall feels well. Says "nothing has gotten worse". Has an appetite but has lost weight. New wheelchair may help with bowel movements.      12/3: Going to see GI soon about irregular bowels. Had urinary incontinence past 2 nights. Typically empties bag before bed but when he woke up there was only 200 cc in bag but floor was covered in urine. Changed catheter and same thing occurred the next day. Will discuss with urology. Last U/A sterile. Will plan to give holiday from gentamicin flushes after this month.     12/19: Asked for sooner follow up today. Still performing bladder irrigation. Ongoing since October. Explained that any bacteria present is urine is likely colonization at this point. Bladder has been sterilized. Needs ongoing bowel care. Can trial Metamucil. Will give break from irrigation until next year. To continue with daily Omnicef until April. Voiced understanding.      Review of Systems:   Negative unless otherwise noted positive-  Gen- Weakness/ Fatigue  Neuro- Confusion  CV- chest pain   Resp: Cough/ SOB  GI- Nausea/Vomiting; +inconsistent stool texture, volume differs each day   - Unclear symptoms at this time   Extrem- Pain/Swelling     Objective:     Physical Exam:  General- Patient alert and oriented x3 in NAD  HEENT- PERRLA, EOMI, OP clear  Resp- No increased WOB noted. Not using accessory muscles.  Extrem- No cyanosis, " clubbing, edema.   Skin-  No Jaundice. No visible skin lesions.        Plan:  Complicated UTI (urinary tract infection)  --Continue biweekly Crenshaw exchange   --Continue prophylaxis with PO cefdinir 300 mg daily (planned stop date April 2025)   --Needs bowel regimen to prevent constipation which is huge risk factor for recurrent UTI  --Would hold bladder irrigation for now; [place] updated   --Follow up with me virtually in 3 months          Procedure Component Value Units Date/Time   CULTURE, URINE [[id number]] (Abnormal)  Collected: 10/08/24 1000   Order Status: Completed Specimen: Urine, Clean Catch Updated: 10/12/24 1352     Urine Culture, Routine PROTEUS MIRABILIS  >100,000 cfu/ml   Abnormal        ESCHERICHIA COLI ESBL  > 100,000 cfu/ml   Abnormal    Narrative:     Send normal result to authorizing provider's In Basket if  patient is active on MyChart:->Yes   Susceptibility                       Proteus mirabilis Escherichia coli ESBL       CULTURE, URINE CULTURE, URINE       Amp/Sulbactam <=8/4 mcg/mL Sensitive 16/8 mcg/mL Resistant       Ampicillin <=8 mcg/mL Sensitive >16 mcg/mL Resistant       Cefazolin 4 mcg/mL Sensitive           Cefepime <=2 mcg/mL Sensitive >16 mcg/mL Resistant       Ceftriaxone <=1 mcg/mL Sensitive >2 mcg/mL Resistant       Ciprofloxacin >2 mcg/mL Resistant >2 mcg/mL Resistant       Ertapenem <=0.5 mcg/mL Sensitive <=0.5 mcg/mL Sensitive       Gentamicin <=2 mcg/mL Sensitive <=2 mcg/mL Sensitive       Levofloxacin >4 mcg/mL Resistant >4 mcg/mL Resistant       Meropenem <=1 mcg/mL Sensitive <=1 mcg/mL Sensitive       Nitrofurantoin     <=32 mcg/mL Sensitive       Piperacillin/Tazo <=8 mcg/mL Sensitive <=8 mcg/mL Resistant       Tobramycin <=2 mcg/mL Sensitive <=2 mcg/mL Sensitive       Trimeth/Sulfa <=2/38 mcg/mL Sensitive >2/38 mcg/mL Resistant                         Linear View               Suprapubic catheter  --Recommend changing at least every 2 weeks to prevent recurrent UTI    --Follow up with urology      Hypertension  Continue current medications. Follow with PCP.         Face to Face time with patient: 18 minutes   30 minutes of total time spent on the encounter, which includes face to face time and non-face to face time preparing to see the patient (eg, review of tests), Obtaining and/or reviewing separately obtained history, Documenting clinical information in the electronic or other health record, Independently interpreting results (not separately reported) and communicating results to the patient/family/caregiver, or Care coordination (not separately reported).      Each patient to whom he or she provides medical services by telemedicine is:  (1) informed of the relationship between the physician and patient and the respective role of any other health care provider with respect to management of the patient; and (2) notified that he or she may decline to receive medical services by telemedicine and may withdraw from such care at any time.

## 2024-12-20 ENCOUNTER — DOCUMENT SCAN (OUTPATIENT)
Dept: HOME HEALTH SERVICES | Facility: HOSPITAL | Age: 34
End: 2024-12-20
Payer: COMMERCIAL

## 2024-12-23 ENCOUNTER — TELEPHONE (OUTPATIENT)
Dept: INFECTIOUS DISEASES | Facility: CLINIC | Age: 34
End: 2024-12-23
Payer: COMMERCIAL

## 2024-12-23 NOTE — TELEPHONE ENCOUNTER
----- Message from [employer] sent at 12/23/2024  8:52 AM CST -----  Contact: self  ..Type:  Sooner Apoointment Request    Caller is requesting a sooner appointment.  Caller declined first available appointment listed below.  Caller will not accept being placed on the waitlist and is requesting a message be sent to doctor.  Name of Caller:.Kate aLzo  When is the first available appointment?  Symptoms:UTI symptoms   Would the patient rather a call back or a response via [facility]? Call back   Best Call Back Number:.[phone] (home)   Additional Information: pt states he would like to schedule an virtual apt or discuss concerns.

## 2024-12-23 NOTE — TELEPHONE ENCOUNTER
Patient states that he was told to f/u with Dr. Hubbard if he still was having issues and would like a sooner appointment. Patient states that he has been drinking plenty of fluids but he is not having much urine output and his urine is becoming dark in color. Pt states that he is also experiencing abdominal pain and dysuria. Message relayed to Dr. Hubbard. Per Dr. Hubbard, pt advised to f/u with urology and continue to take the Cefdinir. Pt is to f/u with Dr. Hubbard in January. Pt verbalized understanding.

## 2024-12-24 DIAGNOSIS — L21.9 SEBORRHEIC DERMATITIS: ICD-10-CM

## 2024-12-24 RX ORDER — TRIAMCINOLONE ACETONIDE 0.25 MG/G
CREAM TOPICAL
Qty: 80 G | Refills: 0 | Status: SHIPPED | OUTPATIENT
Start: 2024-12-24

## 2024-12-24 NOTE — TELEPHONE ENCOUNTER
No care due was identified.  [facility] Embedded Care Due Messages. Reference number: [id number].   12/24/2024 6:41:39 AM CST

## 2024-12-27 ENCOUNTER — PATIENT MESSAGE (OUTPATIENT)
Dept: GASTROENTEROLOGY | Facility: CLINIC | Age: 34
End: 2024-12-27
Payer: COMMERCIAL

## 2024-12-27 ENCOUNTER — PATIENT MESSAGE (OUTPATIENT)
Dept: INTERNAL MEDICINE | Facility: CLINIC | Age: 34
End: 2024-12-27
Payer: COMMERCIAL

## 2024-12-27 ENCOUNTER — TELEPHONE (OUTPATIENT)
Dept: INFECTIOUS DISEASES | Facility: CLINIC | Age: 34
End: 2024-12-27
Payer: COMMERCIAL

## 2024-12-31 ENCOUNTER — OFFICE VISIT (OUTPATIENT)
Dept: INTERNAL MEDICINE | Facility: CLINIC | Age: 34
End: 2024-12-31
Payer: COMMERCIAL

## 2024-12-31 VITALS
HEIGHT: 69 IN | OXYGEN SATURATION: 97 % | BODY MASS INDEX: 26.58 KG/M2 | DIASTOLIC BLOOD PRESSURE: 74 MMHG | SYSTOLIC BLOOD PRESSURE: 106 MMHG | HEART RATE: 62 BPM

## 2024-12-31 DIAGNOSIS — R10.9 CHRONIC ABDOMINAL PAIN: ICD-10-CM

## 2024-12-31 DIAGNOSIS — N39.0 RECURRENT UTI: ICD-10-CM

## 2024-12-31 DIAGNOSIS — G89.29 CHRONIC ABDOMINAL PAIN: ICD-10-CM

## 2024-12-31 DIAGNOSIS — M79.89 LEG SWELLING: Primary | ICD-10-CM

## 2024-12-31 DIAGNOSIS — R10.9 ABDOMINAL PAIN, UNSPECIFIED ABDOMINAL LOCATION: ICD-10-CM

## 2024-12-31 PROCEDURE — 1159F MED LIST DOCD IN RCRD: CPT | Mod: CPTII,S$GLB,, | Performed by: FAMILY MEDICINE

## 2024-12-31 PROCEDURE — G2211 COMPLEX E/M VISIT ADD ON: HCPCS | Mod: S$GLB,,, | Performed by: FAMILY MEDICINE

## 2024-12-31 PROCEDURE — 99214 OFFICE O/P EST MOD 30 MIN: CPT | Mod: S$GLB,,, | Performed by: FAMILY MEDICINE

## 2024-12-31 PROCEDURE — 3078F DIAST BP <80 MM HG: CPT | Mod: CPTII,S$GLB,, | Performed by: FAMILY MEDICINE

## 2024-12-31 PROCEDURE — 3074F SYST BP LT 130 MM HG: CPT | Mod: CPTII,S$GLB,, | Performed by: FAMILY MEDICINE

## 2024-12-31 PROCEDURE — 3008F BODY MASS INDEX DOCD: CPT | Mod: CPTII,S$GLB,, | Performed by: FAMILY MEDICINE

## 2024-12-31 PROCEDURE — 3044F HG A1C LEVEL LT 7.0%: CPT | Mod: CPTII,S$GLB,, | Performed by: FAMILY MEDICINE

## 2024-12-31 PROCEDURE — 99999 PR PBB SHADOW E&M-EST. PATIENT-LVL III: CPT | Mod: PBBFAC,,, | Performed by: FAMILY MEDICINE

## 2024-12-31 RX ORDER — ONDANSETRON 4 MG/1
4 TABLET, FILM COATED ORAL EVERY 6 HOURS PRN
Qty: 90 TABLET | Refills: 1 | Status: SHIPPED | OUTPATIENT
Start: 2024-12-31

## 2024-12-31 RX ORDER — DICYCLOMINE HYDROCHLORIDE 20 MG/1
20 TABLET ORAL 4 TIMES DAILY PRN
Qty: 60 TABLET | Refills: 1 | Status: SHIPPED | OUTPATIENT
Start: 2024-12-31
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
## 2025-01-01 ENCOUNTER — PATIENT MESSAGE (OUTPATIENT)
Dept: INTERNAL MEDICINE | Facility: CLINIC | Age: 35
End: 2025-01-01
Payer: COMMERCIAL

## 2025-01-01 ENCOUNTER — LAB VISIT (OUTPATIENT)
Dept: LAB | Facility: HOSPITAL | Age: 35
End: 2025-01-01
Attending: FAMILY MEDICINE
Payer: COMMERCIAL

## 2025-01-01 DIAGNOSIS — N39.0 URINARY TRACT INFECTION ASSOCIATED WITH CATHETERIZATION OF URINARY TRACT, SUBSEQUENT ENCOUNTER: ICD-10-CM

## 2025-01-01 DIAGNOSIS — T83.511D URINARY TRACT INFECTION ASSOCIATED WITH CATHETERIZATION OF URINARY TRACT, UNSPECIFIED INDWELLING URINARY CATHETER TYPE, SUBSEQUENT ENCOUNTER: ICD-10-CM

## 2025-01-01 DIAGNOSIS — T83.511D URINARY TRACT INFECTION ASSOCIATED WITH CATHETERIZATION OF URINARY TRACT, SUBSEQUENT ENCOUNTER: ICD-10-CM

## 2025-01-01 DIAGNOSIS — T83.511A INFECTION DUE TO URINARY INDWELLING CATHETER: ICD-10-CM

## 2025-01-01 DIAGNOSIS — N31.9 HIGH COMPLIANCE BLADDER: Primary | ICD-10-CM

## 2025-01-01 DIAGNOSIS — N39.0 URINARY TRACT INFECTION ASSOCIATED WITH CATHETERIZATION OF URINARY TRACT, UNSPECIFIED INDWELLING URINARY CATHETER TYPE, SUBSEQUENT ENCOUNTER: ICD-10-CM

## 2025-01-01 DIAGNOSIS — N39.0 INFECTION DUE TO URINARY INDWELLING CATHETER: ICD-10-CM

## 2025-01-01 LAB
BACTERIA #/AREA URNS HPF: ABNORMAL /HPF
BILIRUB UR QL STRIP: NEGATIVE
CLARITY UR: CLEAR
COLOR UR: YELLOW
GLUCOSE UR QL STRIP: NEGATIVE
HGB UR QL STRIP: NEGATIVE
KETONES UR QL STRIP: ABNORMAL
LEUKOCYTE ESTERASE UR QL STRIP: ABNORMAL
MICROSCOPIC COMMENT: ABNORMAL
NITRITE UR QL STRIP: NEGATIVE
PH UR STRIP: 6 [PH] (ref 5–8)
PROT UR QL STRIP: NEGATIVE
RBC #/AREA URNS HPF: 2 /HPF (ref 0–4)
SP GR UR STRIP: 1.01 (ref 1–1.03)
SQUAMOUS #/AREA URNS HPF: 2 /HPF
URN SPEC COLLECT METH UR: ABNORMAL
UROBILINOGEN UR STRIP-ACNC: NEGATIVE EU/DL
WBC #/AREA URNS HPF: 20 /HPF (ref 0–5)
WBC CLUMPS URNS QL MICRO: ABNORMAL

## 2025-01-01 PROCEDURE — 87086 URINE CULTURE/COLONY COUNT: CPT | Performed by: FAMILY MEDICINE

## 2025-01-01 PROCEDURE — 81000 URINALYSIS NONAUTO W/SCOPE: CPT | Performed by: FAMILY MEDICINE

## 2025-01-01 RX ORDER — NITROFURANTOIN 25; 75 MG/1; MG/1
100 CAPSULE ORAL 2 TIMES DAILY
Qty: 14 CAPSULE | Refills: 0 | Status: SHIPPED | OUTPATIENT
Start: 2025-01-01

## 2025-01-02 ENCOUNTER — TELEPHONE (OUTPATIENT)
Dept: INTERNAL MEDICINE | Facility: CLINIC | Age: 35
End: 2025-01-02
Payer: COMMERCIAL

## 2025-01-02 ENCOUNTER — TELEPHONE (OUTPATIENT)
Dept: UROLOGY | Facility: CLINIC | Age: 35
End: 2025-01-02
Payer: COMMERCIAL

## 2025-01-02 ENCOUNTER — PATIENT MESSAGE (OUTPATIENT)
Dept: INTERNAL MEDICINE | Facility: CLINIC | Age: 35
End: 2025-01-02
Payer: COMMERCIAL

## 2025-01-02 DIAGNOSIS — T83.511D URINARY TRACT INFECTION ASSOCIATED WITH CATHETERIZATION OF URINARY TRACT, UNSPECIFIED INDWELLING URINARY CATHETER TYPE, SUBSEQUENT ENCOUNTER: Primary | ICD-10-CM

## 2025-01-02 DIAGNOSIS — N39.0 URINARY TRACT INFECTION ASSOCIATED WITH CATHETERIZATION OF URINARY TRACT, UNSPECIFIED INDWELLING URINARY CATHETER TYPE, SUBSEQUENT ENCOUNTER: Primary | ICD-10-CM

## 2025-01-02 NOTE — TELEPHONE ENCOUNTER
----- Message from Troy Burton MD sent at 1/1/2025  2:05 PM CST -----  Hey there - he is having urine collected via home health - I have placed orders for urine studies - please ensure these are obtained and run.

## 2025-01-02 NOTE — TELEPHONE ENCOUNTER
Outgoing call placed to patient in regards to following up in office. Patient has been scheduled for an in office visit as well as virtual with NP, Mandy Justice. Patient will contact us to cancel or reschedule as necessary.       ----- Message from Edwina sent at 1/2/2025  8:55 AM CST -----  Name of Caller:Stephanie Lazo   When is the first available appointment?  Symptoms:UTI, bladder spasms, urine flow and follow up  Best Call Back Number:.[phone]     Additional Information: Patient stated he talked to someone and was advised he have a 9:15 virtual appointment today. Please call the patient back to advise at the number listed above.

## 2025-01-02 NOTE — TELEPHONE ENCOUNTER
Called and added culture to the UA previously ordered. Called and told patient to hold the ABT until we hear back from culture.

## 2025-01-02 NOTE — TELEPHONE ENCOUNTER
----- Message from Selin sent at 1/2/2025  8:02 AM CST -----  Type:  Needs Medical Advice    Who Called: pt  Symptoms (please be specific): chronic issues   How long has patient had these symptoms:  ongoing  Pharmacy name and phone #:  na  Would the patient rather a call back or a response via [facility]? call  Best Call Back Number: [phone]    Additional Information: pt spoke with provider on 01/01 and requesting to touch base with provider today regarding phone call as he forgot to mention something.

## 2025-01-02 NOTE — TELEPHONE ENCOUNTER
----- Message from Troy Burton MD sent at 1/2/2025 11:10 AM CST -----  Please call. Tell him to hold abx until we get cutlure back . (Recommendation of specialist)  ----- Message -----  From: Ambrosio Faustin MD  Sent: 1/2/2025   7:42 AM CST  To: Troy Burton MD    We typically dont recommend treatment of positive urine culture with indwelling catheter unless patient has fever/chills, unexplained leukocytosis, or AMS.  We typically will see fairly resistant organisms colonizing the urinary tract with an indwelling catheter. Would recommend treatment of spasms as needed. Levsin can be used short term. He should already be maintained on an anticholinergic or B3 agonist.  ----- Message -----  From: Troy Burton MD  Sent: 1/1/2025   2:10 PM CST  To: London Hubbard DO; MD Gideon Garciarobert,  I saw patient yesterday with concerns of ongoing chronic abdominal pain, but with a new onset/ recurrence of diarrhea/ nausea. Those symptoms have improved somehwat but spoke to him today (1/1/25) and he reports foul odor to catheter site and similar abdominal pain present with previous urinary tract infections (lower abdomen/spasms). I have placed orders for repeat urine studies and cultures - additionally based off his last culture I have prescribed macrobid - however I have significant resistance concerns and uncertain if sufficient given his complexity. Would appreciate any input or thoughts into the matter. Thanks much -  Rufino

## 2025-01-03 ENCOUNTER — OFFICE VISIT (OUTPATIENT)
Dept: INTERNAL MEDICINE | Facility: CLINIC | Age: 35
End: 2025-01-03
Payer: COMMERCIAL

## 2025-01-03 DIAGNOSIS — S14.109S QUADRIPLEGIA, POST-TRAUMATIC: ICD-10-CM

## 2025-01-03 DIAGNOSIS — Z93.59 SUPRAPUBIC CATHETER: ICD-10-CM

## 2025-01-03 DIAGNOSIS — N39.0 URINARY TRACT INFECTION ASSOCIATED WITH CATHETERIZATION OF URINARY TRACT, UNSPECIFIED INDWELLING URINARY CATHETER TYPE, SUBSEQUENT ENCOUNTER: Primary | ICD-10-CM

## 2025-01-03 DIAGNOSIS — R10.9 ABDOMINAL PAIN, UNSPECIFIED ABDOMINAL LOCATION: ICD-10-CM

## 2025-01-03 DIAGNOSIS — N39.0 RECURRENT UTI: ICD-10-CM

## 2025-01-03 DIAGNOSIS — T83.511D URINARY TRACT INFECTION ASSOCIATED WITH CATHETERIZATION OF URINARY TRACT, UNSPECIFIED INDWELLING URINARY CATHETER TYPE, SUBSEQUENT ENCOUNTER: Primary | ICD-10-CM

## 2025-01-03 DIAGNOSIS — K59.09 CHRONIC CONSTIPATION: ICD-10-CM

## 2025-01-03 DIAGNOSIS — G82.50 QUADRIPLEGIA, POST-TRAUMATIC: ICD-10-CM

## 2025-01-03 LAB
BACTERIA UR CULT: NORMAL
BACTERIA UR CULT: NORMAL
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
## 2025-01-04 ENCOUNTER — HOSPITAL ENCOUNTER (EMERGENCY)
Facility: HOSPITAL | Age: 35
Discharge: HOME | End: 2025-01-04
Attending: EMERGENCY MEDICINE
Payer: COMMERCIAL

## 2025-01-04 VITALS
SYSTOLIC BLOOD PRESSURE: 113 MMHG | OXYGEN SATURATION: 100 % | TEMPERATURE: 98 F | RESPIRATION RATE: 18 BRPM | HEART RATE: 58 BPM | DIASTOLIC BLOOD PRESSURE: 79 MMHG

## 2025-01-04 DIAGNOSIS — R14.0 ABDOMINAL DISTENSION: Primary | ICD-10-CM

## 2025-01-04 DIAGNOSIS — R19.4 DECREASED STOOLING: ICD-10-CM

## 2025-01-04 LAB
ALBUMIN SERPL BCP-MCNC: 3.9 G/DL (ref 3.5–5.2)
ALP SERPL-CCNC: 100 U/L (ref 40–150)
ALT SERPL W/O P-5'-P-CCNC: 15 U/L (ref 10–44)
ANION GAP SERPL CALC-SCNC: 14 MMOL/L (ref 8–16)
AST SERPL-CCNC: 15 U/L (ref 10–40)
BACTERIA #/AREA URNS HPF: ABNORMAL /HPF
BASOPHILS # BLD AUTO: 0.02 K/UL (ref 0–0.2)
BASOPHILS NFR BLD: 0.4 % (ref 0–1.9)
BILIRUB SERPL-MCNC: 0.8 MG/DL (ref 0.1–1)
BILIRUB UR QL STRIP: NEGATIVE
BUN SERPL-MCNC: 10 MG/DL (ref 6–20)
CALCIUM SERPL-MCNC: 9.5 MG/DL (ref 8.7–10.5)
CHLORIDE SERPL-SCNC: 101 MMOL/L (ref 95–110)
CLARITY UR: ABNORMAL
CO2 SERPL-SCNC: 24 MMOL/L (ref 23–29)
COLOR UR: YELLOW
CREAT SERPL-MCNC: 0.7 MG/DL (ref 0.5–1.4)
DIFFERENTIAL METHOD BLD: NORMAL
EOSINOPHIL # BLD AUTO: 0.1 K/UL (ref 0–0.5)
EOSINOPHIL NFR BLD: 2.2 % (ref 0–8)
ERYTHROCYTE [DISTWIDTH] IN BLOOD BY AUTOMATED COUNT: 12.6 % (ref 11.5–14.5)
EST. GFR  (NO RACE VARIABLE): >60 ML/MIN/1.73 M^2
GLUCOSE SERPL-MCNC: 94 MG/DL (ref 70–110)
GLUCOSE UR QL STRIP: NEGATIVE
HCT VFR BLD AUTO: 45 % (ref 40–54)
HGB BLD-MCNC: 14.8 G/DL (ref 14–18)
HGB UR QL STRIP: NEGATIVE
HYALINE CASTS #/AREA URNS LPF: 0 /LPF
IMM GRANULOCYTES # BLD AUTO: 0.01 K/UL (ref 0–0.04)
IMM GRANULOCYTES NFR BLD AUTO: 0.2 % (ref 0–0.5)
KETONES UR QL STRIP: ABNORMAL
LEUKOCYTE ESTERASE UR QL STRIP: ABNORMAL
LIPASE SERPL-CCNC: 26 U/L (ref 4–60)
LYMPHOCYTES # BLD AUTO: 1.6 K/UL (ref 1–4.8)
LYMPHOCYTES NFR BLD: 29.8 % (ref 18–48)
MCH RBC QN AUTO: 29 PG (ref 27–31)
MCHC RBC AUTO-ENTMCNC: 32.9 G/DL (ref 32–36)
MCV RBC AUTO: 88 FL (ref 82–98)
MICROSCOPIC COMMENT: ABNORMAL
MONOCYTES # BLD AUTO: 0.6 K/UL (ref 0.3–1)
MONOCYTES NFR BLD: 10.2 % (ref 4–15)
NEUTROPHILS # BLD AUTO: 3.2 K/UL (ref 1.8–7.7)
NEUTROPHILS NFR BLD: 57.2 % (ref 38–73)
NITRITE UR QL STRIP: POSITIVE
NRBC BLD-RTO: 0 /100 WBC
PH UR STRIP: 7 [PH] (ref 5–8)
PLATELET # BLD AUTO: 229 K/UL (ref 150–450)
PMV BLD AUTO: 10.8 FL (ref 9.2–12.9)
POTASSIUM SERPL-SCNC: 3.8 MMOL/L (ref 3.5–5.1)
PROT SERPL-MCNC: 7.8 G/DL (ref 6–8.4)
PROT UR QL STRIP: ABNORMAL
RBC # BLD AUTO: 5.11 M/UL (ref 4.6–6.2)
RBC #/AREA URNS HPF: 5 /HPF (ref 0–4)
SODIUM SERPL-SCNC: 139 MMOL/L (ref 136–145)
SP GR UR STRIP: 1.03 (ref 1–1.03)
SQUAMOUS #/AREA URNS HPF: 8 /HPF
URN SPEC COLLECT METH UR: ABNORMAL
UROBILINOGEN UR STRIP-ACNC: ABNORMAL EU/DL
WBC # BLD AUTO: 5.51 K/UL (ref 3.9–12.7)
WBC #/AREA URNS HPF: 65 /HPF (ref 0–5)

## 2025-01-04 PROCEDURE — 85025 COMPLETE CBC W/AUTO DIFF WBC: CPT | Performed by: PHYSICIAN ASSISTANT

## 2025-01-04 PROCEDURE — 87086 URINE CULTURE/COLONY COUNT: CPT | Performed by: PHYSICIAN ASSISTANT

## 2025-01-04 PROCEDURE — 87088 URINE BACTERIA CULTURE: CPT | Performed by: PHYSICIAN ASSISTANT

## 2025-01-04 PROCEDURE — A9698 NON-RAD CONTRAST MATERIALNOC: HCPCS | Performed by: EMERGENCY MEDICINE

## 2025-01-04 PROCEDURE — 81000 URINALYSIS NONAUTO W/SCOPE: CPT | Performed by: PHYSICIAN ASSISTANT

## 2025-01-04 PROCEDURE — 80053 COMPREHEN METABOLIC PANEL: CPT | Performed by: PHYSICIAN ASSISTANT

## 2025-01-04 PROCEDURE — 99285 EMERGENCY DEPT VISIT HI MDM: CPT | Mod: 25

## 2025-01-04 PROCEDURE — 25500020 PHARM REV CODE 255: Performed by: EMERGENCY MEDICINE

## 2025-01-04 PROCEDURE — 83690 ASSAY OF LIPASE: CPT | Performed by: PHYSICIAN ASSISTANT

## 2025-01-04 PROCEDURE — 87186 SC STD MICRODIL/AGAR DIL: CPT | Performed by: PHYSICIAN ASSISTANT

## 2025-01-04 RX ADMIN — IOHEXOL 100 ML: 350 INJECTION, SOLUTION INTRAVENOUS at 06:01

## 2025-01-04 RX ADMIN — IOHEXOL 1000 ML: 12 SOLUTION ORAL at 06:01
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
## 2025-01-04 NOTE — FIRST PROVIDER EVALUATION
Emergency Department TeleTriage Encounter Note      CHIEF COMPLAINT    Chief Complaint   Patient presents with    Abdominal Pain     Abd pain x 1 week. No BM x 5 days. Sent by PCP for CT of abd for possible bowel obstruction.       VITAL SIGNS   Initial Vitals [01/04/25 1526]   BP Pulse Resp Temp SpO2   111/78 68 18 97.6 °F (36.4 °C) 97 %      MAP       --            ALLERGIES    Review of patient's allergies indicates:  No Known Allergies    PROVIDER TRIAGE NOTE  Patient presents with complaint of abdominal pain with nausea. Sent for possible SBO. Reports decreased urine output. States no BM in five days.      Phy:   Constitutional: well nourished, well developed, appearing stated age, NAD        Initial orders will be placed and care will be transferred to an alternate provider when patient is roomed for a full evaluation. Any additional orders and the final disposition will be determined by that provider.        ORDERS  Labs Reviewed - No data to display    ED Orders (720h ago, onward)      None              Virtual Visit Note: The provider triage portion of this emergency department evaluation and documentation was performed via [employer], a HIPAA-compliant telemedicine application, in concert with a tele-presenter in the room. A face to face patient evaluation with one of my colleagues will occur once the patient is placed in an emergency department room.      DISCLAIMER: This note was prepared with [employer] voice recognition transcription software. Garbled syntax, mangled pronouns, and other bizarre constructions may be attributed to that software system.

## 2025-01-06 ENCOUNTER — PATIENT MESSAGE (OUTPATIENT)
Dept: INTERNAL MEDICINE | Facility: CLINIC | Age: 35
End: 2025-01-06
Payer: COMMERCIAL

## 2025-01-07 ENCOUNTER — TELEPHONE (OUTPATIENT)
Dept: UROLOGY | Facility: CLINIC | Age: 35
End: 2025-01-07
Payer: COMMERCIAL

## 2025-01-07 ENCOUNTER — PATIENT MESSAGE (OUTPATIENT)
Dept: INTERNAL MEDICINE | Facility: CLINIC | Age: 35
End: 2025-01-07
Payer: COMMERCIAL

## 2025-01-07 LAB — BACTERIA UR CULT: ABNORMAL

## 2025-01-07 NOTE — TELEPHONE ENCOUNTER
----- Message from Chrissy sent at 1/7/2025 12:56 PM CST -----  Contact: GORDO AC [[id number]]  .Type:  Sooner Apoointment Request    Caller is requesting a sooner appointment.  Caller declined first available appointment listed below.  Caller will not accept being placed on the waitlist and is requesting a message be sent to doctor.  Name of Caller:GORDO AC [[id number]]  When is the first available appointment?Feb 7  Symptoms:UTI  Would the patient rather a call back or a response via [facility]? Call back  Best Call Back Number: .[phone] (home)    Additional Information: Patient states he is now at a Long term care consultation appointment and wont be able to make his appointment at 2:00

## 2025-01-09 ENCOUNTER — TELEPHONE (OUTPATIENT)
Dept: INTERNAL MEDICINE | Facility: CLINIC | Age: 35
End: 2025-01-09
Payer: COMMERCIAL

## 2025-01-09 NOTE — TELEPHONE ENCOUNTER
Called. Spoke.  No fever. Some chills. No AMS.   Macrobid .  Has been prescribed but held.    Advise continue to hold.  Advise initiate antibiotics if experiences true fevers, altered mental status, starts feeling sick.  Resistance as a significant concerns    Patient reports darker urine with some decreased output  I suspect dehydration playing a role.  Encouraged increased hydration    Keep in touch and let us know any changes

## 2025-01-13 ENCOUNTER — PATIENT MESSAGE (OUTPATIENT)
Dept: INFECTIOUS DISEASES | Facility: CLINIC | Age: 35
End: 2025-01-13

## 2025-01-13 ENCOUNTER — OFFICE VISIT (OUTPATIENT)
Dept: INFECTIOUS DISEASES | Facility: CLINIC | Age: 35
End: 2025-01-13
Payer: COMMERCIAL

## 2025-01-13 DIAGNOSIS — R32 URINARY INCONTINENCE, UNSPECIFIED TYPE: ICD-10-CM

## 2025-01-13 DIAGNOSIS — S14.109S QUADRIPLEGIA, POST-TRAUMATIC: Primary | ICD-10-CM

## 2025-01-13 DIAGNOSIS — N39.0 COMPLICATED UTI (URINARY TRACT INFECTION): ICD-10-CM

## 2025-01-13 DIAGNOSIS — I10 PRIMARY HYPERTENSION: ICD-10-CM

## 2025-01-13 DIAGNOSIS — G82.50 QUADRIPLEGIA, POST-TRAUMATIC: Primary | ICD-10-CM

## 2025-01-13 RX ORDER — APIXABAN 5 MG/1
5 TABLET, FILM COATED ORAL 2 TIMES DAILY
Qty: 60 TABLET | Refills: 0 | Status: SHIPPED | OUTPATIENT
Start: 2025-01-13

## 2025-01-13 NOTE — PROGRESS NOTES
Sent new orders to intake with [facility]. Notified [place] with [facility] infusion as well.

## 2025-01-13 NOTE — TELEPHONE ENCOUNTER
Refill Routing Note   Medication(s) are not appropriate for processing by [facility] for the following reason(s):        Outside of protocol    [facility] action(s):  Route               Appointments  past 12m or future 3m with PCP    Date Provider   Last Visit   1/3/2025 Troy Burton MD   Next Visit   1/15/2025 Troy Burton MD   ED visits in past 90 days: 2        Note composed:8:31 AM 01/13/2025

## 2025-01-15 ENCOUNTER — OFFICE VISIT (OUTPATIENT)
Dept: INTERNAL MEDICINE | Facility: CLINIC | Age: 35
End: 2025-01-15
Payer: MEDICARE

## 2025-01-15 ENCOUNTER — OFFICE VISIT (OUTPATIENT)
Dept: UROLOGY | Facility: CLINIC | Age: 35
End: 2025-01-15
Payer: COMMERCIAL

## 2025-01-15 ENCOUNTER — PATIENT MESSAGE (OUTPATIENT)
Dept: HEMATOLOGY/ONCOLOGY | Facility: CLINIC | Age: 35
End: 2025-01-15
Payer: COMMERCIAL

## 2025-01-15 ENCOUNTER — PATIENT MESSAGE (OUTPATIENT)
Dept: INTERNAL MEDICINE | Facility: CLINIC | Age: 35
End: 2025-01-15

## 2025-01-15 DIAGNOSIS — N39.0 RECURRENT UTI: Primary | ICD-10-CM

## 2025-01-15 DIAGNOSIS — K58.2 IRRITABLE BOWEL SYNDROME WITH BOTH CONSTIPATION AND DIARRHEA: ICD-10-CM

## 2025-01-15 DIAGNOSIS — Z93.59 SUPRAPUBIC CATHETER: ICD-10-CM

## 2025-01-15 DIAGNOSIS — Z79.01 ANTICOAGULATED: ICD-10-CM

## 2025-01-15 DIAGNOSIS — K52.9 CHRONIC DIARRHEA: ICD-10-CM

## 2025-01-15 DIAGNOSIS — N39.0 COMPLICATED UTI (URINARY TRACT INFECTION): ICD-10-CM

## 2025-01-15 DIAGNOSIS — K59.09 CHRONIC CONSTIPATION: ICD-10-CM

## 2025-01-15 DIAGNOSIS — S14.109S QUADRIPLEGIA, POST-TRAUMATIC: ICD-10-CM

## 2025-01-15 DIAGNOSIS — N31.9 NEUROGENIC BLADDER: Primary | ICD-10-CM

## 2025-01-15 DIAGNOSIS — G82.50 QUADRIPLEGIA, POST-TRAUMATIC: ICD-10-CM
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
## 2025-01-18 ENCOUNTER — TELEPHONE (OUTPATIENT)
Dept: EMERGENCY MEDICINE | Facility: HOSPITAL | Age: 35
End: 2025-01-18
Payer: COMMERCIAL

## 2025-01-18 NOTE — TELEPHONE ENCOUNTER
----- Message from Pineda Sheriff MD sent at 1/18/2025  2:56 AM CST -----  Please call the patient regarding his abnormal result. He is colonized with bacteria. Needs to f/u with ID. If feeling ill, welcome to return to the ED.

## 2025-01-22 ENCOUNTER — TELEPHONE (OUTPATIENT)
Dept: UROLOGY | Facility: CLINIC | Age: 35
End: 2025-01-22
Payer: COMMERCIAL

## 2025-01-23 RX ORDER — OXYBUTYNIN CHLORIDE 5 MG/1
5 TABLET ORAL 2 TIMES DAILY
Qty: 180 TABLET | Refills: 0 | Status: SHIPPED | OUTPATIENT
Start: 2025-01-23

## 2025-01-23 NOTE — TELEPHONE ENCOUNTER
No care due was identified.  [facility] Embedded Care Due Messages. Reference number: [id number].   1/23/2025 9:14:00 AM CST

## 2025-01-23 NOTE — TELEPHONE ENCOUNTER
Refill Routing Note   Medication(s) are not appropriate for processing by [facility] for the following reason(s):        No active prescription written by provider    [facility] action(s):  Defer             Appointments  past 12m or future 3m with PCP    Date Provider   Last Visit   1/15/2025 Troy Burton MD   Next Visit   Visit date not found Troy Burton MD   ED visits in past 90 days: 2        Note composed:9:16 AM 01/23/2025

## 2025-01-24 ENCOUNTER — EXTERNAL HOME HEALTH (OUTPATIENT)
Dept: HOME HEALTH SERVICES | Facility: HOSPITAL | Age: 35
End: 2025-01-24
Payer: COMMERCIAL

## 2025-01-25 DIAGNOSIS — L21.9 SEBORRHEIC DERMATITIS: ICD-10-CM

## 2025-01-25 RX ORDER — TRIAMCINOLONE ACETONIDE 0.25 MG/G
CREAM TOPICAL
Qty: 80 G | Refills: 0 | Status: SHIPPED | OUTPATIENT
Start: 2025-01-25

## 2025-01-25 NOTE — TELEPHONE ENCOUNTER
No care due was identified.  [facility] Embedded Care Due Messages. Reference number: [id number].   1/25/2025 6:41:04 AM CST

## 2025-01-26 NOTE — TELEPHONE ENCOUNTER
Refill Decision Note   [facility]  is requesting a refill authorization.  Brief Assessment and Rationale for Refill:  Approve     Medication Therapy Plan:        Comments:     Note composed:6:43 PM 01/25/2025

## 2025-01-28 ENCOUNTER — OFFICE VISIT (OUTPATIENT)
Dept: UROLOGY | Facility: CLINIC | Age: 35
End: 2025-01-28
Payer: COMMERCIAL

## 2025-01-28 DIAGNOSIS — S14.109S QUADRIPLEGIA, POST-TRAUMATIC: ICD-10-CM

## 2025-01-28 DIAGNOSIS — Z93.59 SUPRAPUBIC CATHETER: ICD-10-CM

## 2025-01-28 DIAGNOSIS — G82.50 QUADRIPLEGIA, POST-TRAUMATIC: ICD-10-CM

## 2025-01-28 DIAGNOSIS — N31.9 NEUROGENIC BLADDER: Primary | ICD-10-CM

## 2025-01-29 ENCOUNTER — PATIENT MESSAGE (OUTPATIENT)
Dept: GASTROENTEROLOGY | Facility: CLINIC | Age: 35
End: 2025-01-29
Payer: COMMERCIAL

## 2025-01-29 DIAGNOSIS — K58.1 IRRITABLE BOWEL SYNDROME WITH CONSTIPATION: Primary | ICD-10-CM
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
## 2025-01-30 ENCOUNTER — OFFICE VISIT (OUTPATIENT)
Dept: GASTROENTEROLOGY | Facility: CLINIC | Age: 35
End: 2025-01-30
Payer: COMMERCIAL

## 2025-01-30 ENCOUNTER — APPOINTMENT (OUTPATIENT)
Dept: LAB | Facility: HOSPITAL | Age: 35
End: 2025-01-30
Attending: FAMILY MEDICINE
Payer: COMMERCIAL

## 2025-01-30 DIAGNOSIS — K62.89 RECTAL PAIN: Primary | ICD-10-CM

## 2025-01-30 DIAGNOSIS — N39.0 URINARY TRACT INFECTION ASSOCIATED WITH CATHETERIZATION OF URINARY TRACT, SUBSEQUENT ENCOUNTER: Primary | ICD-10-CM

## 2025-01-30 DIAGNOSIS — T83.511D URINARY TRACT INFECTION ASSOCIATED WITH CATHETERIZATION OF URINARY TRACT, SUBSEQUENT ENCOUNTER: Primary | ICD-10-CM

## 2025-01-30 DIAGNOSIS — K58.1 IRRITABLE BOWEL SYNDROME WITH CONSTIPATION: ICD-10-CM

## 2025-01-30 DIAGNOSIS — R10.84 GENERALIZED ABDOMINAL PAIN: ICD-10-CM

## 2025-01-30 LAB
BACTERIA #/AREA URNS HPF: ABNORMAL /HPF
BILIRUB UR QL STRIP: NEGATIVE
CLARITY UR: CLEAR
COLOR UR: YELLOW
GLUCOSE UR QL STRIP: NEGATIVE
HGB UR QL STRIP: NEGATIVE
HYALINE CASTS #/AREA URNS LPF: 1 /LPF
KETONES UR QL STRIP: ABNORMAL
LEUKOCYTE ESTERASE UR QL STRIP: ABNORMAL
MICROSCOPIC COMMENT: ABNORMAL
NITRITE UR QL STRIP: NEGATIVE
PH UR STRIP: 6 [PH] (ref 5–8)
PROT UR QL STRIP: ABNORMAL
RBC #/AREA URNS HPF: 3 /HPF (ref 0–4)
SP GR UR STRIP: >1.03 (ref 1–1.03)
SQUAMOUS #/AREA URNS HPF: 3 /HPF
URN SPEC COLLECT METH UR: ABNORMAL
UROBILINOGEN UR STRIP-ACNC: ABNORMAL EU/DL
WBC #/AREA URNS HPF: 17 /HPF (ref 0–5)

## 2025-01-30 PROCEDURE — 98006 SYNCH AUDIO-VIDEO EST MOD 30: CPT | Mod: 95,,, | Performed by: NURSE PRACTITIONER

## 2025-01-30 PROCEDURE — 1159F MED LIST DOCD IN RCRD: CPT | Mod: CPTII,95,, | Performed by: NURSE PRACTITIONER

## 2025-01-30 PROCEDURE — 81000 URINALYSIS NONAUTO W/SCOPE: CPT | Performed by: FAMILY MEDICINE

## 2025-01-30 PROCEDURE — 1160F RVW MEDS BY RX/DR IN RCRD: CPT | Mod: CPTII,95,, | Performed by: NURSE PRACTITIONER

## 2025-01-30 RX ORDER — SODIUM, POTASSIUM,MAG SULFATES 17.5-3.13G
SOLUTION, RECONSTITUTED, ORAL ORAL
Qty: 354 ML | Refills: 1 | Status: SHIPPED | OUTPATIENT
Start: 2025-01-30

## 2025-01-30 RX ORDER — LUBIPROSTONE 24 UG/1
24 CAPSULE ORAL 2 TIMES DAILY
Qty: 60 CAPSULE | Refills: 11 | Status: SHIPPED | OUTPATIENT
Start: 2025-01-30

## 2025-01-30 RX ORDER — POLYETHYLENE GLYCOL 3350, SODIUM SULFATE ANHYDROUS, SODIUM BICARBONATE, SODIUM CHLORIDE, POTASSIUM CHLORIDE 236; 22.74; 6.74; 5.86; 2.97 G/4L; G/4L; G/4L; G/4L; G/4L
4 POWDER, FOR SOLUTION ORAL ONCE
Qty: 4000 ML | Refills: 1 | Status: SHIPPED | OUTPATIENT
Start: 2025-01-30 | End: 2025-01-30
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
## 2025-01-31 ENCOUNTER — PATIENT MESSAGE (OUTPATIENT)
Dept: INFECTIOUS DISEASES | Facility: CLINIC | Age: 35
End: 2025-01-31
Payer: COMMERCIAL

## 2025-01-31 ENCOUNTER — TELEPHONE (OUTPATIENT)
Dept: INFECTIOUS DISEASES | Facility: CLINIC | Age: 35
End: 2025-01-31
Payer: COMMERCIAL

## 2025-01-31 ENCOUNTER — TELEPHONE (OUTPATIENT)
Dept: PREADMISSION TESTING | Facility: HOSPITAL | Age: 35
End: 2025-01-31
Payer: COMMERCIAL

## 2025-01-31 ENCOUNTER — HOSPITAL ENCOUNTER (OUTPATIENT)
Dept: PREADMISSION TESTING | Facility: HOSPITAL | Age: 35
Discharge: HOME | End: 2025-01-31
Attending: INTERNAL MEDICINE
Payer: COMMERCIAL

## 2025-01-31 DIAGNOSIS — K58.1 IRRITABLE BOWEL SYNDROME WITH CONSTIPATION: ICD-10-CM

## 2025-01-31 DIAGNOSIS — K62.89 RECTAL PAIN: Primary | ICD-10-CM

## 2025-01-31 NOTE — TELEPHONE ENCOUNTER
After verifying two pt identifier. Results given. Pt voiced understand. Informed of yesterday UA is negative for UTI. But he still has any s/s as mentioned to pls give a call Monday. Pt voiced understand.

## 2025-01-31 NOTE — TELEPHONE ENCOUNTER
Dr. Burton,  This patient is scheduled for a Colonoscopy 2/13. Is he cleared to hold his Eliquis 2 days for the procedure?  Please advise.  Thank you,   Trang Ritter RN  Endoscopy Scheduling/Pre-admit Department

## 2025-02-04 PROCEDURE — G0179 MD RECERTIFICATION HHA PT: HCPCS | Mod: ,,, | Performed by: FAMILY MEDICINE

## 2025-02-07 ENCOUNTER — PATIENT MESSAGE (OUTPATIENT)
Dept: INFECTIOUS DISEASES | Facility: CLINIC | Age: 35
End: 2025-02-07
Payer: COMMERCIAL

## 2025-02-07 DIAGNOSIS — Z79.01 ANTICOAGULATED: Primary | ICD-10-CM

## 2025-02-07 NOTE — PROGRESS NOTES
Subjective:       Patient ID: Kate Lazo is a 34 y.o. male.    Colonoscopy has been ordered.  Question about anticoagulation hold versus discontinuation now pressing  I had placed consultation for Hematology for guidance on duration of anticoagulation but that not yet has been arranged    I suspect he is likely able to safely discontinue the anticoagulation but given his chronic paraplegia and my uncertainty I am placing E consultation task guidance from Hematology on how to proceed for upcoming colonoscopy    Called patient to discuss.  He reports this is his only known blood clot.  He denies any active unilateral extremity swelling presently.    Upon Hematology input we will discuss with endoscopy

## 2025-02-10 ENCOUNTER — PATIENT MESSAGE (OUTPATIENT)
Dept: INFECTIOUS DISEASES | Facility: CLINIC | Age: 35
End: 2025-02-10
Payer: COMMERCIAL

## 2025-02-10 ENCOUNTER — E-CONSULT (OUTPATIENT)
Dept: HEMATOLOGY/ONCOLOGY | Facility: CLINIC | Age: 35
End: 2025-02-10
Payer: COMMERCIAL

## 2025-02-10 ENCOUNTER — TELEPHONE (OUTPATIENT)
Dept: INFECTIOUS DISEASES | Facility: CLINIC | Age: 35
End: 2025-02-10
Payer: COMMERCIAL

## 2025-02-10 DIAGNOSIS — N39.0 COMPLICATED UTI (URINARY TRACT INFECTION): Primary | ICD-10-CM

## 2025-02-10 DIAGNOSIS — I82.621 ACUTE DEEP VEIN THROMBOSIS (DVT) OF OTHER VEIN OF RIGHT UPPER EXTREMITY: Primary | ICD-10-CM

## 2025-02-10 NOTE — TELEPHONE ENCOUNTER
----- Message from Maryam sent at 2/10/2025 11:39 AM CST -----  Contact: [phone] Pt  1MEDICALADVICE     Patient is calling for Medical Advice regarding:questions     Patient wants a call back or thru [facility]:Call back    Comments:Pt would like a call back from nurse regarding pt UTI     Please advise patient replies from provider may take up to 48 hours.

## 2025-02-10 NOTE — CONSULTS
[facility] - Hematology [facility]  Response for E-Consult     Patient Name: Kate Lazo  MRN: [id number]  Primary Care Provider: Troy Burton MD   Requesting Provider: Troy Burton MD  Consults    Recommendation: Thank you for the e-consult. 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 Has been on Eliquis 5mg bid since that time. Question is duration of anticoagulation, particularly in setting of upcoming colonoscopy.    Ok to stop Eliquis and do not need to restart. Duration of anticoagulation for a line associated DVT is 1 month, therefore treatment has been sufficient. Do not need to hold and restart for upcoming colonoscopy.    Additional future steps to consider: Risk of recurrence of a line associated DVT is less than 3%.    Total time of Consultation: 5 minute    I did not speak to the requesting provider verbally about this.     *This eConsult is based on the clinical data available to me and is furnished without benefit of a physical examination. The eConsult will need to be interpreted in light of any clinical issues or changes in patient status not available to me at the time of filing this eConsults. Significant changes in patient condition or level of acuity should result in immediate formal consultation and reevaluation. Please alert me if you have further questions.    Thank you for this eConsult referral.     Pema Sandhu MD  [facility] - Hematology [facility]

## 2025-02-10 NOTE — TELEPHONE ENCOUNTER
Called and spoke with patient. He states that he would like to know if he can do a mid line with IV antibiotics. Patient also would like to do a UA/culture. Can I place order?

## 2025-02-11 RX ORDER — BACLOFEN 10 MG/1
TABLET ORAL
Qty: 270 TABLET | Refills: 0 | Status: SHIPPED | OUTPATIENT
Start: 2025-02-11

## 2025-02-12 ENCOUNTER — TELEPHONE (OUTPATIENT)
Dept: INFECTIOUS DISEASES | Facility: CLINIC | Age: 35
End: 2025-02-12
Payer: COMMERCIAL

## 2025-02-12 ENCOUNTER — PATIENT MESSAGE (OUTPATIENT)
Dept: INTERNAL MEDICINE | Facility: CLINIC | Age: 35
End: 2025-02-12
Payer: COMMERCIAL

## 2025-02-12 ENCOUNTER — APPOINTMENT (OUTPATIENT)
Dept: LAB | Facility: HOSPITAL | Age: 35
End: 2025-02-12
Attending: STUDENT IN AN ORGANIZED HEALTH CARE EDUCATION/TRAINING PROGRAM
Payer: COMMERCIAL

## 2025-02-12 DIAGNOSIS — N39.0 COMPLICATED UTI (URINARY TRACT INFECTION): Primary | ICD-10-CM

## 2025-02-12 DIAGNOSIS — T83.511A INFECTION DUE TO URINARY INDWELLING CATHETER: Primary | ICD-10-CM

## 2025-02-12 DIAGNOSIS — N39.0 INFECTION DUE TO URINARY INDWELLING CATHETER: Primary | ICD-10-CM

## 2025-02-12 LAB
BACTERIA #/AREA URNS HPF: ABNORMAL /HPF
BILIRUB UR QL STRIP: NEGATIVE
CLARITY UR: ABNORMAL
COLOR UR: YELLOW
GLUCOSE UR QL STRIP: NEGATIVE
HGB UR QL STRIP: NEGATIVE
HYALINE CASTS #/AREA URNS LPF: 0 /LPF
KETONES UR QL STRIP: NEGATIVE
LEUKOCYTE ESTERASE UR QL STRIP: ABNORMAL
MICROSCOPIC COMMENT: ABNORMAL
NITRITE UR QL STRIP: POSITIVE
PH UR STRIP: 8 [PH] (ref 5–8)
PROT UR QL STRIP: ABNORMAL
RBC #/AREA URNS HPF: 13 /HPF (ref 0–4)
SP GR UR STRIP: 1.02 (ref 1–1.03)
SQUAMOUS #/AREA URNS HPF: 14 /HPF
TRI-PHOS CRY URNS QL MICRO: ABNORMAL
URN SPEC COLLECT METH UR: ABNORMAL
UROBILINOGEN UR STRIP-ACNC: NEGATIVE EU/DL
WBC #/AREA URNS HPF: 97 /HPF (ref 0–5)

## 2025-02-12 PROCEDURE — 87086 URINE CULTURE/COLONY COUNT: CPT | Performed by: STUDENT IN AN ORGANIZED HEALTH CARE EDUCATION/TRAINING PROGRAM

## 2025-02-12 PROCEDURE — 87186 SC STD MICRODIL/AGAR DIL: CPT | Performed by: STUDENT IN AN ORGANIZED HEALTH CARE EDUCATION/TRAINING PROGRAM

## 2025-02-12 PROCEDURE — 87088 URINE BACTERIA CULTURE: CPT | Performed by: STUDENT IN AN ORGANIZED HEALTH CARE EDUCATION/TRAINING PROGRAM

## 2025-02-12 PROCEDURE — 81000 URINALYSIS NONAUTO W/SCOPE: CPT | Performed by: STUDENT IN AN ORGANIZED HEALTH CARE EDUCATION/TRAINING PROGRAM

## 2025-02-12 RX ORDER — GRANULES FOR ORAL 3 G/1
3 POWDER ORAL ONCE
Qty: 3 G | Refills: 0 | Status: SHIPPED | OUTPATIENT
Start: 2025-02-12 | End: 2025-02-12

## 2025-02-12 NOTE — TELEPHONE ENCOUNTER
Returned call to patient. He would like to know if something can be called in form him until the culture comes back.  Also would like to know if he can have a mid-line instead of a picc line if IV antibiotics are still a treatment option.

## 2025-02-12 NOTE — TELEPHONE ENCOUNTER
----- Message from Chrissy sent at 2/12/2025 10:52 AM CST -----  Contact: [facility]  .Type:  Patient Requesting Call    Who Called:[facility]  Does the patient know what this is regarding?:Requesting a call back to see if a urine culture needs to be done on the patient  Would the patient rather a call back or a response via [facility]? Call back  Best Call Back Number:[phone]  Additional Information:

## 2025-02-12 NOTE — TELEPHONE ENCOUNTER
----- Message from Tia sent at 2/12/2025  1:07 PM CST -----  Who Called: Pt    What is the request in detail: Requesting call back to discuss urine dropped off and results. Please advise    Can the clinic reply by [facility]? No    Best Call Back Number: [phone]      Additional Information:

## 2025-02-13 ENCOUNTER — TELEPHONE (OUTPATIENT)
Dept: INTERNAL MEDICINE | Facility: CLINIC | Age: 35
End: 2025-02-13
Payer: COMMERCIAL

## 2025-02-13 NOTE — TELEPHONE ENCOUNTER
Patient has a new pressure wound and they are seeking eval and treat orders from Dr. Burton.   Verbal given.

## 2025-02-13 NOTE — TELEPHONE ENCOUNTER
----- Message from Florinda sent at 2/13/2025 10:10 AM CST -----  Contact: Yuliana  .Type: Patient Call Back        Who called: Yuliana from [facility]        What is the request in detail:  Yulinaa call in concerning healing wound care orders    Can the clinic reply by [facility]?           Would the patient rather a call back or a response via [facility]?call        Best call back number:[phone]

## 2025-02-16 ENCOUNTER — PATIENT MESSAGE (OUTPATIENT)
Dept: INFECTIOUS DISEASES | Facility: CLINIC | Age: 35
End: 2025-02-16
Payer: COMMERCIAL

## 2025-02-16 LAB — BACTERIA UR CULT: ABNORMAL

## 2025-02-17 ENCOUNTER — DOCUMENTATION ONLY (OUTPATIENT)
Dept: INFECTIOUS DISEASES | Facility: CLINIC | Age: 35
End: 2025-02-17
Payer: COMMERCIAL

## 2025-02-17 ENCOUNTER — TELEPHONE (OUTPATIENT)
Dept: INFECTIOUS DISEASES | Facility: CLINIC | Age: 35
End: 2025-02-17
Payer: COMMERCIAL

## 2025-02-17 DIAGNOSIS — A09 INFECTIOUS DIARRHEA: Primary | ICD-10-CM

## 2025-02-17 DIAGNOSIS — N39.0 COMPLICATED UTI (URINARY TRACT INFECTION): Primary | ICD-10-CM

## 2025-02-17 NOTE — TELEPHONE ENCOUNTER
Called and spoke with patient. Dr. Hubbard put in orders for PICC and Iv antibiotics. Patient is wanting to have midline instead. Informed that we have to check to see who will be able to place it. He expressed understanding.

## 2025-02-17 NOTE — TELEPHONE ENCOUNTER
----- Message from Patti sent at 2/17/2025 11:31 AM CST -----  Contact: Kate  Type:  Patient Requesting a call back Who Called:Kate What is the call back request regarding?:pt calling to speak to MD or nurse about his urine sample and what has been discussed in [facility] Would the patient rather a call back or a response via [facility]?call Best Call Back Number:[phone] Additional Information: Please call for additional information

## 2025-02-17 NOTE — PROGRESS NOTES
Urine culture from 2/12 reports quinolone resistant Proteus mirabilis. Patient requesting midline. Will place order and plan for 14 day course of ceftriaxone IV. Will maintain midline with daily line care for a week following treatment. If patient feels he becomes symptomatic during that week will plan for repeat U/A.     Outpatient Antibiotic Therapy Plan:    1) Infection: Complicated UTI     2) Antibiotics:    Intravenous antibiotics:  IV ceftriaxone 2 g daily     3) Therapy Duration:  14 days    Estimated end date of IV antibiotics: 3/4/25     4) Outpatient Weekly Labs:    Order the following labs to be drawn on Mondays:   CBC  CMP   CRP    5) Fax Lab Results to Infectious Diseases Provider: Dr. Hubbard     [facility] Fax Number: [phone]     [facility]

## 2025-02-18 ENCOUNTER — LAB VISIT (OUTPATIENT)
Dept: LAB | Facility: HOSPITAL | Age: 35
End: 2025-02-18
Attending: STUDENT IN AN ORGANIZED HEALTH CARE EDUCATION/TRAINING PROGRAM
Payer: COMMERCIAL

## 2025-02-18 DIAGNOSIS — A09 INFECTIOUS DIARRHEA: ICD-10-CM

## 2025-02-18 PROCEDURE — 87324 CLOSTRIDIUM AG IA: CPT | Performed by: STUDENT IN AN ORGANIZED HEALTH CARE EDUCATION/TRAINING PROGRAM

## 2025-02-19 ENCOUNTER — PATIENT MESSAGE (OUTPATIENT)
Dept: GASTROENTEROLOGY | Facility: CLINIC | Age: 35
End: 2025-02-19
Payer: COMMERCIAL

## 2025-02-19 ENCOUNTER — TELEPHONE (OUTPATIENT)
Dept: GASTROENTEROLOGY | Facility: CLINIC | Age: 35
End: 2025-02-19
Payer: COMMERCIAL

## 2025-02-19 LAB
C DIFF GDH STL QL: NEGATIVE
C DIFF TOX A+B STL QL IA: NEGATIVE

## 2025-02-19 RX ORDER — OXYBUTYNIN CHLORIDE 5 MG/1
5 TABLET ORAL 2 TIMES DAILY
Qty: 180 TABLET | Refills: 3 | Status: SHIPPED | OUTPATIENT
Start: 2025-02-19

## 2025-02-19 NOTE — TELEPHONE ENCOUNTER
Refill Routing Note   Medication(s) are not appropriate for processing by [facility] for the following reason(s):        New or recently adjusted medication    [facility] action(s):  Defer             Appointments  past 12m or future 3m with PCP    Date Provider   Last Visit   1/15/2025 Troy Burton MD   Next Visit   Visit date not found Troy Burton MD   ED visits in past 90 days: 1        Note composed:12:11 PM 02/19/2025

## 2025-02-19 NOTE — TELEPHONE ENCOUNTER
No care due was identified.  [employer] Embedded Care Due Messages. Reference number: [id number].   2/19/2025 9:19:59 AM CST

## 2025-02-19 NOTE — TELEPHONE ENCOUNTER
----- Message from Yoko Aaron NP sent at 2/19/2025 10:52 AM CST -----  Contact: Kate  Can we set him up with a virtual as I have several questions for him. The C. Diff came back negative. I would like him to hold the Amitiza (which is the new medication I switched him to) until the appt. He can try Metamucil daily as this can help bulk up the stool to help it form. I also want him to start a probiotic if not taking one already.  ----- Message -----  From: Joseph Helm MA  Sent: 2/19/2025  10:41 AM CST  To: Yoko Aaron NP    Please advise  ----- Message -----  From: Jacque Cooper  Sent: 2/19/2025   8:19 AM CST  To: Agnieszka Babcock Staff    Type:  Patient Call Back RequestWho Called:Kate Message for Patient: Nurse What this is regarding?:Colonoscopy Would the patient rather a call back or a response via [facility]? Call back Best Call Back Number: [phone] Information: He colonoscopy was cancel due to his has a UTI, he has to get a pickline done this Friday, he has had diarrhea for about 4 days now, he has been eating, sent in a stool sample to be tested for C.DIFF and he is stress and wants to know where to go from here.

## 2025-02-21 ENCOUNTER — HOSPITAL ENCOUNTER (OUTPATIENT)
Dept: RADIOLOGY | Facility: HOSPITAL | Age: 35
Discharge: HOME | End: 2025-02-21
Attending: STUDENT IN AN ORGANIZED HEALTH CARE EDUCATION/TRAINING PROGRAM
Payer: COMMERCIAL

## 2025-02-21 DIAGNOSIS — N39.0 COMPLICATED UTI (URINARY TRACT INFECTION): ICD-10-CM

## 2025-02-21 PROCEDURE — 36573 INSJ PICC RS&I 5 YR+: CPT | Mod: ,,, | Performed by: RADIOLOGY

## 2025-02-21 PROCEDURE — C1751 CATH, INF, PER/CENT/MIDLINE: HCPCS

## 2025-02-21 NOTE — DISCHARGE SUMMARY
[facility]  Discharge Note  Short Stay    FL PICC Line Placement w/o Port w/ Img > 5 Y/O      OUTCOME: Patient tolerated treatment/procedure well without complication and is now ready for discharge.    DISPOSITION: Home or Self Care    FINAL DIAGNOSIS:  <principal problem not specified>    FOLLOWUP: In clinic    DISCHARGE INSTRUCTIONS:  No discharge procedures on file.     TIME SPENT ON DISCHARGE: 15 minutes    Pre Op Diagnosis: infection     Post Op Diagnosis: same     Procedure:  PICC line     Procedure performed by: Pop ALEJANDRA, Mark DURAN     Written Informed Consent Obtained: Yes     Specimen Removed:  no    Estimated Blood Loss:  minimal     Findings: no     The patient tolerated the procedure well and there were no complications.      Disposition:  F/U in clinic or with ordering physician    Discharge Instructions:  Keep PICC clean and dry.    Sterile technique was performed in the LUE, lidocaine was used as a local anesthetic.  A 4 Pashto DLPP 36 cm was inserted into the brachial and into the SVC/Right atrium junction.  Pt tolerated the procedure well without immediate complications.  Please see radiologist report for details. F/u with PCP and/or ordering physician. PICC is ready to use

## 2025-02-28 ENCOUNTER — LAB VISIT (OUTPATIENT)
Dept: LAB | Facility: HOSPITAL | Age: 35
End: 2025-02-28
Attending: STUDENT IN AN ORGANIZED HEALTH CARE EDUCATION/TRAINING PROGRAM
Payer: COMMERCIAL

## 2025-02-28 DIAGNOSIS — N39.0 URINARY TRACT INFECTION, SITE NOT SPECIFIED: Primary | ICD-10-CM

## 2025-02-28 PROCEDURE — 86140 C-REACTIVE PROTEIN: CPT | Performed by: STUDENT IN AN ORGANIZED HEALTH CARE EDUCATION/TRAINING PROGRAM

## 2025-02-28 PROCEDURE — 80053 COMPREHEN METABOLIC PANEL: CPT | Performed by: STUDENT IN AN ORGANIZED HEALTH CARE EDUCATION/TRAINING PROGRAM

## 2025-02-28 PROCEDURE — 85025 COMPLETE CBC W/AUTO DIFF WBC: CPT | Performed by: STUDENT IN AN ORGANIZED HEALTH CARE EDUCATION/TRAINING PROGRAM

## 2025-03-01 LAB
ALBUMIN SERPL BCP-MCNC: 3.5 G/DL (ref 3.5–5.2)
ALP SERPL-CCNC: 107 U/L (ref 40–150)
ALT SERPL W/O P-5'-P-CCNC: 21 U/L (ref 10–44)
ANION GAP SERPL CALC-SCNC: 8 MMOL/L (ref 8–16)
AST SERPL-CCNC: 14 U/L (ref 10–40)
BASOPHILS # BLD AUTO: 0.01 K/UL (ref 0–0.2)
BASOPHILS NFR BLD: 0.2 % (ref 0–1.9)
BILIRUB SERPL-MCNC: 0.2 MG/DL (ref 0.1–1)
BUN SERPL-MCNC: 8 MG/DL (ref 6–20)
CALCIUM SERPL-MCNC: 9 MG/DL (ref 8.7–10.5)
CHLORIDE SERPL-SCNC: 104 MMOL/L (ref 95–110)
CO2 SERPL-SCNC: 29 MMOL/L (ref 23–29)
CREAT SERPL-MCNC: 0.7 MG/DL (ref 0.5–1.4)
CRP SERPL-MCNC: 3 MG/L (ref 0–8.2)
DIFFERENTIAL METHOD BLD: ABNORMAL
EOSINOPHIL # BLD AUTO: 0.2 K/UL (ref 0–0.5)
EOSINOPHIL NFR BLD: 4.7 % (ref 0–8)
ERYTHROCYTE [DISTWIDTH] IN BLOOD BY AUTOMATED COUNT: 13.5 % (ref 11.5–14.5)
EST. GFR  (NO RACE VARIABLE): >60 ML/MIN/1.73 M^2
GLUCOSE SERPL-MCNC: 111 MG/DL (ref 70–110)
HCT VFR BLD AUTO: 42.7 % (ref 40–54)
HGB BLD-MCNC: 13.7 G/DL (ref 14–18)
IMM GRANULOCYTES # BLD AUTO: 0.01 K/UL (ref 0–0.04)
IMM GRANULOCYTES NFR BLD AUTO: 0.2 % (ref 0–0.5)
LYMPHOCYTES # BLD AUTO: 1.8 K/UL (ref 1–4.8)
LYMPHOCYTES NFR BLD: 36.4 % (ref 18–48)
MCH RBC QN AUTO: 28.7 PG (ref 27–31)
MCHC RBC AUTO-ENTMCNC: 32.1 G/DL (ref 32–36)
MCV RBC AUTO: 89 FL (ref 82–98)
MONOCYTES # BLD AUTO: 0.4 K/UL (ref 0.3–1)
MONOCYTES NFR BLD: 8.3 % (ref 4–15)
NEUTROPHILS # BLD AUTO: 2.5 K/UL (ref 1.8–7.7)
NEUTROPHILS NFR BLD: 50.2 % (ref 38–73)
NRBC BLD-RTO: 0 /100 WBC
PLATELET # BLD AUTO: 217 K/UL (ref 150–450)
PMV BLD AUTO: 11.3 FL (ref 9.2–12.9)
POTASSIUM SERPL-SCNC: 3.8 MMOL/L (ref 3.5–5.1)
PROT SERPL-MCNC: 6.6 G/DL (ref 6–8.4)
RBC # BLD AUTO: 4.78 M/UL (ref 4.6–6.2)
SODIUM SERPL-SCNC: 141 MMOL/L (ref 136–145)
WBC # BLD AUTO: 5.06 K/UL (ref 3.9–12.7)

## 2025-03-03 ENCOUNTER — OFFICE VISIT (OUTPATIENT)
Dept: INFECTIOUS DISEASES | Facility: CLINIC | Age: 35
End: 2025-03-03
Payer: COMMERCIAL

## 2025-03-03 DIAGNOSIS — G82.50 QUADRIPLEGIA, POST-TRAUMATIC: ICD-10-CM

## 2025-03-03 DIAGNOSIS — Z93.59 SUPRAPUBIC CATHETER: ICD-10-CM

## 2025-03-03 DIAGNOSIS — N31.9 NEUROGENIC BLADDER: ICD-10-CM

## 2025-03-03 DIAGNOSIS — S14.109S QUADRIPLEGIA, POST-TRAUMATIC: ICD-10-CM

## 2025-03-03 DIAGNOSIS — I10 PRIMARY HYPERTENSION: ICD-10-CM

## 2025-03-03 DIAGNOSIS — N39.0 COMPLICATED UTI (URINARY TRACT INFECTION): Primary | ICD-10-CM

## 2025-03-03 DIAGNOSIS — R32 URINARY INCONTINENCE, UNSPECIFIED TYPE: ICD-10-CM

## 2025-03-03 PROCEDURE — 98006 SYNCH AUDIO-VIDEO EST MOD 30: CPT | Mod: 95,,, | Performed by: STUDENT IN AN ORGANIZED HEALTH CARE EDUCATION/TRAINING PROGRAM

## 2025-03-03 NOTE — PROGRESS NOTES
The patient location is: [place]   The chief complaint leading to consultation is: UTI follow up      Visit type: audiovisual     Notes:     Subjective:     HPI: 34 y.o. male with pertinent PMHx of post traumatic quadriplegia, HTN, urinary incontinence leading to chronic suprapubic catheterization and recurrent UTI, and constipation who was evaluated by Infectious Diseases on 7/21/23 after clean catch U/A showed positive nitrites and 12 WBC. Urine culture grew pan R Morganella morganii and  Pseudomonas. Plan was for 2 weeks of targeted IV antibiotics. PICC placed on 7/28. Plan was then to give dose of tobramycin and a course of pip/tazo via PICC line followed by repeat U/A. 8/24, patient states he gets UTI frequently. Changes catheter every 2 weeks at home. Usually gets chills, sweats, abdominal pain, urinary difficulty, and foul odor with UTI. Had these symptoms when July UTI was diagnosed. Currently having abdominal pain and myalgias. Unsure if this is due to unrelated ailment or another UTI. Tolerated pip/tazo other than intermittent loose stools. Has provided new urine sample today. Will see if any pathogens grow and discuss whether further antibiotics are warranted. Patient with provide update if worsening symptoms occur. Scheduled for home catheter change next week. Agreeable to PICC removal today.     Last ID note 11/2: was recently given Fosfomycin for last urine culture.  He is colonized with bacteria and not all the cultures mean a true infection .  WE went through this issue again .  Continue Lithostat /Azo dye as needed.     12/6/23: Today patient reports constipation and pain on both sides of abdomen for a few weeks. Has also struggled to urinate through Crenshaw. Also pain below neck to feet within past week. Mainly a nerve pain especially in right arm. Left side feels more like gas. Also with trapped gas due to wheelchair.  U/A from urology on 12/5 with 20 WBC. Culture in process. One week ago  urine was sterile. CT a/p has been scheduled for 12/18. HH irrigated Crenshaw and it flowed well yesterday but today the physical flow still bad. Only 600 cc went into bag. Day before had 1500 cc. Has not had any fever. No nausea or vomiting. No shortness of breath or cough. Has appetite but unable to eat or drink much. Given how many symptoms patient is experiencing, advised him to go to ER for imaging and blood work. May also be able to provide pain management and a bowel regimen for his constipation. Will follow urine culture result.      2/28/24: Current UTI symptoms- abdominal pain and foul odor. Crenshaw last changed 1 week ago. Changed every 2-3 weeks. [facility] doing the exchanges. No current fever. Urine appears darker but not abnormal. Discussed treating based on last urine culture using IV tobramycin. Will reach out to \[facility]". Will also discuss bowel regimen with PCP. Has been frequently constipated which is huge risk factor. Will ask HH to stick to biweekly exchange of Crenshaw.      3/14: Received 3 doses of tobramycin. Last urine culture from 3/5 no growth. Feels good today. Still battling constipation. On new bowel regimen. Will adjust with help of PCP. Sees GI next month.      6/14: Here for follow up. Per Kash was given large dose of amikacin 2 days ago. Currently no UTI symptoms. Still dealing with constipation but more BM than before. Placed on stronger med than Linzess. Crenshaw changed every 2 weeks. No fever but occasional chills. Continues to have generalized pain from chest to pelvis. Asking for MRI as CT have been negative. Will place order today.      7/3: Patient with another pyuria. Is concerned about recurrent infection. Will use meropenem as aggressive treatment. Then may try an oral suppression regimen. Patient in agreement. PICC ordered and \[facility]\"" updated.      10/17: Patient took dose of fosfomycin but concerned it would not be sufficient. Have set up for tobramycin through \[facility]\"". Will  "plan for 5 doses. Has discussed a few options with urology including surgical reconstruction of ureters and gentamicin irrigation. He is on the fence about surgical approach. Will reach out to urology for further clarification. Do agree with irrigations. Can also discuss antibiotic suppression. Patient voiced understanding. Denies new symptoms currently.      10/31: Last /70. Has started chronic prophylaxis with low dose Omnicef and tolerating. Has not yet received bladder irrigation with gentamicin, only sterile water. Will see if  is able to obtain the gentamicin. Overall feels well. Says "nothing has gotten worse". Has an appetite but has lost weight. New wheelchair may help with bowel movements.      12/3: Going to see GI soon about irregular bowels. Had urinary incontinence past 2 nights. Typically empties bag before bed but when he woke up there was only 200 cc in bag but floor was covered in urine. Changed catheter and same thing occurred the next day. Will discuss with urology. Last U/A sterile. Will plan to give holiday from gentamicin flushes after this month.      12/19: Asked for sooner follow up today. Still performing bladder irrigation. Ongoing since October. Explained that any bacteria present is urine is likely colonization at this point. Bladder has been sterilized. Needs ongoing bowel care. Can trial Metamucil. Will give break from irrigation until next year. To continue with daily Omnicef until April. Voiced understanding.      1/13/25: Will stop cefdinir. ESBL on last culture. Treated with Macrobid but stopped. Was having nausea, chills, and cold sweats. Unable to vomit since paralysis. Unsure if he had fever. Notes low urine output in Crenshaw bag vs how much fluid he takes in. On average output 600 cc. Requesting IV abx via [facility]. Will set up. However, explained that resistance is a huge barrier to eradication of bacteria. Will likely remained colonized lifelong due to need for " catheter. Patient mentioned surgical rerouting of bladder which he is not interested in at this time. Also discussed risk of frequent CT scanning due to radiation. Discussed last results showed stool in colon. No stones/blockage.     3/3: Follow up. Completing course of Rocephin. Urine has been clear. Bowels improved. Overall feels really good. Sounds and appears probably the best he has since following with me. Discussed keeping PICC line intact for an additional week after finishing Rocephin. Will perform line care. If feeling well at that point will plan to remove the line. Patient in agreement.     Urine culture [[id number]] (Abnormal)  Collected: 02/12/25 0955   Order Status: Completed Specimen: Urine Updated: 02/16/25 0844    Urine Culture, Routine PROTEUS MIRABILIS  >100,000 cfu/ml   Abnormal    Narrative:     Specimen Source->Urine  Send normal result to authorizing provider's In Basket if  patient is active on MyChart:->Yes   Susceptibility     Proteus mirabilis     CULTURE, URINE     Amp/Sulbactam <=8/4 mcg/mL Sensitive     Ampicillin <=8 mcg/mL Sensitive     Cefazolin <=2 mcg/mL Sensitive     Cefepime <=2 mcg/mL Sensitive     Ceftriaxone <=1 mcg/mL Sensitive     Ciprofloxacin >2 mcg/mL Resistant     Ertapenem <=0.5 mcg/mL Sensitive     Gentamicin <=2 mcg/mL Sensitive     Levofloxacin >4 mcg/mL Resistant     Meropenem <=1 mcg/mL Sensitive     Piperacillin/Tazo <=8 mcg/mL Sensitive     Tobramycin <=2 mcg/mL Sensitive     Trimeth/Sulfa <=2/38 mcg/mL Sensitive                                Urine culture [[id number]] (Abnormal)  Collected: 01/04/25 1649   Order Status: Completed Specimen: Urine Updated: 01/07/25 1224     Urine Culture, Routine ESCHERICHIA COLI ESBL  >100,000 cfu/ml  No other significant isolate   Abnormal    Narrative:     Specimen Source->Urine   Susceptibility              Escherichia coli ESBL       CULTURE, URINE       Amp/Sulbactam >16/8 mcg/mL Resistant       Ampicillin >16 mcg/mL  Resistant       Cefepime >16 mcg/mL Resistant       Ceftriaxone >2 mcg/mL Resistant       Ciprofloxacin >2 mcg/mL Resistant       Ertapenem <=0.5 mcg/mL Sensitive       Gentamicin <=2 mcg/mL Sensitive       Levofloxacin >4 mcg/mL Resistant       Meropenem <=1 mcg/mL Sensitive       Nitrofurantoin <=32 mcg/mL Sensitive       Piperacillin/Tazo <=8 mcg/mL Resistant       Tobramycin <=2 mcg/mL Sensitive       Trimeth/Sulfa >2/38 mcg/mL Resistant                     Linear View         CULTURE, URINE [[id number]]     Order Status: Sent Specimen: Urine, Clean Catch     CULTURE, URINE [[id number]] Collected: 01/01/25 1600   Order Status: Completed Specimen: Urine Updated: 01/03/25 1745     Urine Culture, Routine Multiple organisms isolated. None in predominance.  Repeat if       clinically necessary.   Narrative:     Send normal result to authorizing provider's In Basket if  patient is active on MyChart:->Yes   CULTURE, URINE [[id number]]     Order Status: No result Specimen: Urine, Catheterized     Clostridium difficile EIA [[id number]] Collected: 10/24/24 1324   Order Status: Completed Specimen: Stool Updated: 10/25/24 0615     C. diff Antigen Negative     C difficile Toxins A+B, EIA Negative     Comment: Testing not recommended for children <24 months old.      Narrative:     Send normal result to authorizing provider's In Basket if  patient is active on MyChart:->Yes      EXAMINATION:  CT ABDOMEN PELVIS WITH IV CONTRAST     CLINICAL HISTORY:  Bowel obstruction suspected;     TECHNIQUE:  Low dose axial images, sagittal and coronal reformations were obtained from the lung bases to the pubic symphysis following the IV administration of 100 mL of Omnipaque 350.     COMPARISON:  None     FINDINGS:  Heart: Normal size as far as seen. No effusion as far as seen.     Lung Bases: Granuloma right lung base.     Liver: Normal size and attenuation. No focal lesions.     Gallbladder: The gallbladder is not identified.     Bile  Ducts: No dilatation.     Pancreas: No mass. No peripancreatic fat stranding.     Spleen: Normal.     Adrenals: Normal.     Kidneys/Ureters: Normal enhancement.  No mass or  hydroureteronephrosis.     Bladder: Suprapubic catheter.     Reproductive organs: Normal.     GI Tract/Mesentery: No evidence of bowel obstruction or inflammation.  No evidence of acute appendicitis.  Some fluid and stool in the colon.  Correlate to history of diarrhea.     Peritoneal Space: No ascites or free air.     Retroperitoneum: No significant adenopathy.     Abdominal wall: Small fat containing umbilical hernia..     Vasculature: No aneurysm.     Bones: No acute fracture. No suspicious lytic or sclerotic lesions.     Impression:     Fluid and stool within the colon may relate to diarrheal state.  Small fat containing umbilical hernia.  Suprapubic bladder catheter.     All CT scans at this facility use dose modulation, iterative reconstruction, and/or weight based dosing when appropriate to reduce radiation dose to as low as reasonably achievable.        Electronically signed by:Leland Simon  Date:                                            01/04/2025  Time:                                           19:20              Exam Ended: 01/04/25 18:45 CST Last Resulted: 01/04/25 19:20 CST         Review of Systems:   Negative unless otherwise noted positive-  Gen- Weakness/ Fatigue  Neuro- Confusion  CV- chest pain   Resp: Cough/ SOB  GI- Nausea/Vomiting; +inconsistent stool texture, volume differs each day   - Unclear symptoms at this time; poor urine output   Extrem- Pain/Swelling     Objective:     Physical Exam:  General- Patient alert and oriented x3 in NAD  HEENT- PERRLA, EOMI, OP clear  Resp- No increased WOB noted. Not using accessory muscles.  Extrem- No cyanosis, clubbing, edema.   Skin-  No Jaundice. No visible skin lesions.        Plan:  Complicated UTI (urinary tract infection)  --Continue biweekly Crenshaw exchange   --Off chronic  prophylaxis  --Completing 2 week course of ceftriaxone IV (see below); last urine cx with meneses S Proteus   --Explained that it is challenging to distinguish between colonization and infection  --Having catheter poses risk of bacteria never being eradicated due to biofilm formation   --Do not want to continue exposing patient to antibiotics given risk of long term adverse effects   --Risk of treatment currently outweighs long term benefit; patient voiced understanding   --Please keep PICC line intact an additional week after completing ceftriaxone and perform daily line care  --If patient asymptomatic after one week off antibiotics will schedule PICC removal  --If still symptomatic will obtain new U/A and treat accordingly  --Needs bowel regimen to prevent constipation which is huge risk factor for recurrent UTI  --Follow up with me virtually in 4 weeks     Urine culture from 2/12 reports quinolone resistant Proteus mirabilis. Patient requesting midline. Will place order and plan for 14 day course of ceftriaxone IV. Will maintain midline with daily line care for a week following treatment. If patient feels he becomes symptomatic during that week will plan for repeat U/A.      Outpatient Antibiotic Therapy Plan:     1) Infection: Complicated UTI      2) Antibiotics:     Intravenous antibiotics:  IV ceftriaxone 2 g daily      3) Therapy Duration:  14 days     Estimated end date of IV antibiotics: 3/4/25      4) Outpatient Weekly Labs:     Order the following labs to be drawn on Mondays:   CBC  CMP   CRP     5) Fax Lab Results to Infectious Diseases Provider: Dr. Hubbard      [facility] Fax Number: [phone]     Please perform labs through [facility]      Suprapubic catheter  --Recommend changing at least every 2 weeks to prevent recurrent UTI   --Follow up with urology      Hypertension  Continue current medications. Follow with PCP.         Face to Face time with patient: 8 minutes   25 minutes of total time spent on  the encounter, which includes face to face time and non-face to face time preparing to see the patient (eg, review of tests), Obtaining and/or reviewing separately obtained history, Documenting clinical information in the electronic or other health record, Independently interpreting results (not separately reported) and communicating results to the patient/family/caregiver, or Care coordination (not separately reported).      Each patient to whom he or she provides medical services by telemedicine is: (1) informed of the relationship between the physician and patient and the respective role of any other health care provider with respect to management of the patient; and (2) notified that he or she may decline to receive medical services by telemedicine and may withdraw from such care at any time.

## 2025-03-04 ENCOUNTER — TELEPHONE (OUTPATIENT)
Dept: INFECTIOUS DISEASES | Facility: CLINIC | Age: 35
End: 2025-03-04
Payer: COMMERCIAL

## 2025-03-04 NOTE — TELEPHONE ENCOUNTER
"----- Message from Nyla sent at 3/4/2025  1:00 PM CST -----  ..Type:  Patient Requesting CallWho Called:Beth ([facility])Would the patient rather a call back or a response via [facility]? CallBe Call Back Number:.[phone] Information: Caller wanted to know if they could get "end of care" paper work started or if provider wanted to extend therapy  "

## 2025-03-06 ENCOUNTER — PATIENT MESSAGE (OUTPATIENT)
Dept: GASTROENTEROLOGY | Facility: CLINIC | Age: 35
End: 2025-03-06
Payer: COMMERCIAL

## 2025-03-06 ENCOUNTER — PATIENT MESSAGE (OUTPATIENT)
Dept: UROLOGY | Facility: CLINIC | Age: 35
End: 2025-03-06
Payer: COMMERCIAL

## 2025-03-06 ENCOUNTER — PATIENT MESSAGE (OUTPATIENT)
Dept: INTERNAL MEDICINE | Facility: CLINIC | Age: 35
End: 2025-03-06
Payer: COMMERCIAL

## 2025-03-06 DIAGNOSIS — K58.1 IRRITABLE BOWEL SYNDROME WITH CONSTIPATION: Primary | ICD-10-CM

## 2025-03-06 DIAGNOSIS — K59.04 CHRONIC IDIOPATHIC CONSTIPATION: Primary | ICD-10-CM

## 2025-03-06 DIAGNOSIS — R10.84 GENERALIZED ABDOMINAL PAIN: ICD-10-CM

## 2025-03-06 DIAGNOSIS — R14.0 ABDOMINAL BLOATING: ICD-10-CM

## 2025-03-06 DIAGNOSIS — K62.89 RECTAL PAIN: ICD-10-CM

## 2025-03-13 ENCOUNTER — TELEPHONE (OUTPATIENT)
Dept: INFECTIOUS DISEASES | Facility: CLINIC | Age: 35
End: 2025-03-13
Payer: COMMERCIAL

## 2025-03-18 ENCOUNTER — HOSPITAL ENCOUNTER (OUTPATIENT)
Dept: PREADMISSION TESTING | Facility: HOSPITAL | Age: 35
Discharge: HOME | End: 2025-03-18
Attending: COLON & RECTAL SURGERY
Payer: COMMERCIAL

## 2025-03-18 DIAGNOSIS — K62.89 RECTAL PAIN: ICD-10-CM

## 2025-03-18 DIAGNOSIS — K58.1 IRRITABLE BOWEL SYNDROME WITH CONSTIPATION: Primary | ICD-10-CM

## 2025-03-18 DIAGNOSIS — R10.84 GENERALIZED ABDOMINAL PAIN: ICD-10-CM

## 2025-03-21 ENCOUNTER — EXTERNAL HOME HEALTH (OUTPATIENT)
Dept: HOME HEALTH SERVICES | Facility: HOSPITAL | Age: 35
End: 2025-03-21
Payer: COMMERCIAL

## 2025-03-31 ENCOUNTER — OFFICE VISIT (OUTPATIENT)
Dept: INFECTIOUS DISEASES | Facility: CLINIC | Age: 35
End: 2025-03-31
Payer: COMMERCIAL

## 2025-03-31 DIAGNOSIS — I10 PRIMARY HYPERTENSION: ICD-10-CM

## 2025-03-31 DIAGNOSIS — S14.109S QUADRIPLEGIA, POST-TRAUMATIC: ICD-10-CM

## 2025-03-31 DIAGNOSIS — Z93.59 SUPRAPUBIC CATHETER: ICD-10-CM

## 2025-03-31 DIAGNOSIS — N31.9 NEUROGENIC BLADDER: ICD-10-CM

## 2025-03-31 DIAGNOSIS — N39.0 COMPLICATED UTI (URINARY TRACT INFECTION): Primary | ICD-10-CM

## 2025-03-31 DIAGNOSIS — G82.50 QUADRIPLEGIA, POST-TRAUMATIC: ICD-10-CM

## 2025-03-31 PROCEDURE — 98006 SYNCH AUDIO-VIDEO EST MOD 30: CPT | Mod: 95,,, | Performed by: STUDENT IN AN ORGANIZED HEALTH CARE EDUCATION/TRAINING PROGRAM

## 2025-03-31 RX ORDER — GRANULES FOR ORAL 3 G/1
3 POWDER ORAL EVERY OTHER DAY
Qty: 9 G | Refills: 0 | Status: SHIPPED | OUTPATIENT
Start: 2025-03-31 | End: 2025-04-05

## 2025-03-31 NOTE — PROGRESS NOTES
The patient location is: [place]   The chief complaint leading to consultation is: UTI follow up      Visit type: audiovisual     Notes:     Subjective:     HPI: 35 y.o. male with pertinent PMHx of post traumatic quadriplegia, HTN, urinary incontinence leading to chronic suprapubic catheterization and recurrent UTI, and constipation who was evaluated by Infectious Diseases on 7/21/23 after clean catch U/A showed positive nitrites and 12 WBC. Urine culture grew pan R Morganella morganii and  Pseudomonas. Plan was for 2 weeks of targeted IV antibiotics. PICC placed on 7/28. Plan was then to give dose of tobramycin and a course of pip/tazo via PICC line followed by repeat U/A. 8/24, patient states he gets UTI frequently. Changes catheter every 2 weeks at home. Usually gets chills, sweats, abdominal pain, urinary difficulty, and foul odor with UTI. Had these symptoms when July UTI was diagnosed. Currently having abdominal pain and myalgias. Unsure if this is due to unrelated ailment or another UTI. Tolerated pip/tazo other than intermittent loose stools. Has provided new urine sample today. Will see if any pathogens grow and discuss whether further antibiotics are warranted. Patient with provide update if worsening symptoms occur. Scheduled for home catheter change next week. Agreeable to PICC removal today.     Last ID note 11/2: was recently given Fosfomycin for last urine culture.  He is colonized with bacteria and not all the cultures mean a true infection .  WE went through this issue again .  Continue Lithostat /Azo dye as needed.     12/6/23: Today patient reports constipation and pain on both sides of abdomen for a few weeks. Has also struggled to urinate through Crenshaw. Also pain below neck to feet within past week. Mainly a nerve pain especially in right arm. Left side feels more like gas. Also with trapped gas due to wheelchair.  U/A from urology on 12/5 with 20 WBC. Culture in process. One week ago  urine was sterile. CT a/p has been scheduled for 12/18. HH irrigated Crenshaw and it flowed well yesterday but today the physical flow still bad. Only 600 cc went into bag. Day before had 1500 cc. Has not had any fever. No nausea or vomiting. No shortness of breath or cough. Has appetite but unable to eat or drink much. Given how many symptoms patient is experiencing, advised him to go to ER for imaging and blood work. May also be able to provide pain management and a bowel regimen for his constipation. Will follow urine culture result.      2/28/24: Current UTI symptoms- abdominal pain and foul odor. Crenshaw last changed 1 week ago. Changed every 2-3 weeks. [facility] doing the exchanges. No current fever. Urine appears darker but not abnormal. Discussed treating based on last urine culture using IV tobramycin. Will reach out to [facility]. Will also discuss bowel regimen with PCP. Has been frequently constipated which is huge risk factor. Will ask HH to stick to biweekly exchange of Crenshaw.      3/14: Received 3 doses of tobramycin. Last urine culture from 3/5 no growth. Feels good today. Still battling constipation. On new bowel regimen. Will adjust with help of PCP. Sees GI next month.      6/14: Here for follow up. Per Kash was given large dose of amikacin 2 days ago. Currently no UTI symptoms. Still dealing with constipation but more BM than before. Placed on stronger med than Linzess. Crenshaw changed every 2 weeks. No fever but occasional chills. Continues to have generalized pain from chest to pelvis. Asking for MRI as CT have been negative. Will place order today.      7/3: Patient with another pyuria. Is concerned about recurrent infection. Will use meropenem as aggressive treatment. Then may try an oral suppression regimen. Patient in agreement. PICC ordered and [facility] updated.      10/17: Patient took dose of fosfomycin but concerned it would not be sufficient. Have set up for tobramycin through [facility]. Will  "plan for 5 doses. Has discussed a few options with urology including surgical reconstruction of ureters and gentamicin irrigation. He is on the fence about surgical approach. Will reach out to urology for further clarification. Do agree with irrigations. Can also discuss antibiotic suppression. Patient voiced understanding. Denies new symptoms currently.      10/31: Last /70. Has started chronic prophylaxis with low dose Omnicef and tolerating. Has not yet received bladder irrigation with gentamicin, only sterile water. Will see if  is able to obtain the gentamicin. Overall feels well. Says "nothing has gotten worse". Has an appetite but has lost weight. New wheelchair may help with bowel movements.      12/3: Going to see GI soon about irregular bowels. Had urinary incontinence past 2 nights. Typically empties bag before bed but when he woke up there was only 200 cc in bag but floor was covered in urine. Changed catheter and same thing occurred the next day. Will discuss with urology. Last U/A sterile. Will plan to give holiday from gentamicin flushes after this month.      12/19: Asked for sooner follow up today. Still performing bladder irrigation. Ongoing since October. Explained that any bacteria present is urine is likely colonization at this point. Bladder has been sterilized. Needs ongoing bowel care. Can trial Metamucil. Will give break from irrigation until next year. To continue with daily Omnicef until April. Voiced understanding.      1/13/25: Will stop cefdinir. ESBL on last culture. Treated with Macrobid but stopped. Was having nausea, chills, and cold sweats. Unable to vomit since paralysis. Unsure if he had fever. Notes low urine output in Crenshaw bag vs how much fluid he takes in. On average output 600 cc. Requesting IV abx via [facility]. Will set up. However, explained that resistance is a huge barrier to eradication of bacteria. Will likely remained colonized lifelong due to need for " catheter. Patient mentioned surgical rerouting of bladder which he is not interested in at this time. Also discussed risk of frequent CT scanning due to radiation. Discussed last results showed stool in colon. No stones/blockage.      3/3: Follow up. Completing course of Rocephin. Urine has been clear. Bowels improved. Overall feels really good. Sounds and appears probably the best he has since following with me. Discussed keeping PICC line intact for an additional week after finishing Rocephin. Will perform line care. If feeling well at that point will plan to remove the line. Patient in agreement.     3/31: Since completing course of Rocephin, patient feels chills, foul smelling urine, and sediment in urine. Stool soft with some diarrhea. Before yesterday was forming. Taking Senna + OTC stool softener. Watching what he eats. More baked foods. Had catheter exchanged last week. Prior to exchange had foul odor and sediment. During exchange smell resolved. Has been back past few days. Urinating more frequently. Mild burning sensation. Has colonoscopy next week. Will not perform unless infection eradicated. Will give 3 doses of fosfomycin taken every other day. Patient in agreement. Reiterated the long term risk of antibiotics. Needs to discuss with urology an alternative to suprapubic cath. Sees them this week.            Urine culture [[id number]] (Abnormal)  Collected: 02/12/25 0955   Order Status: Completed Specimen: Urine Updated: 02/16/25 0844     Urine Culture, Routine PROTEUS MIRABILIS  >100,000 cfu/ml   Abnormal    Narrative:     Specimen Source->Urine  Send normal result to authorizing provider's In Basket if  patient is active on MyChart:->Yes   Susceptibility              Proteus mirabilis       CULTURE, URINE       Amp/Sulbactam <=8/4 mcg/mL Sensitive       Ampicillin <=8 mcg/mL Sensitive       Cefazolin <=2 mcg/mL Sensitive       Cefepime <=2 mcg/mL Sensitive       Ceftriaxone <=1 mcg/mL Sensitive        Ciprofloxacin >2 mcg/mL Resistant       Ertapenem <=0.5 mcg/mL Sensitive       Gentamicin <=2 mcg/mL Sensitive       Levofloxacin >4 mcg/mL Resistant       Meropenem <=1 mcg/mL Sensitive       Piperacillin/Tazo <=8 mcg/mL Sensitive       Tobramycin <=2 mcg/mL Sensitive       Trimeth/Sulfa <=2/38 mcg/mL Sensitive                                           Urine culture [[id number]] (Abnormal)  Collected: 01/04/25 1649   Order Status: Completed Specimen: Urine Updated: 01/07/25 1224     Urine Culture, Routine ESCHERICHIA COLI ESBL  >100,000 cfu/ml  No other significant isolate   Abnormal    Narrative:     Specimen Source->Urine   Susceptibility                   Escherichia coli ESBL       CULTURE, URINE       Amp/Sulbactam >16/8 mcg/mL Resistant       Ampicillin >16 mcg/mL Resistant       Cefepime >16 mcg/mL Resistant       Ceftriaxone >2 mcg/mL Resistant       Ciprofloxacin >2 mcg/mL Resistant       Ertapenem <=0.5 mcg/mL Sensitive       Gentamicin <=2 mcg/mL Sensitive       Levofloxacin >4 mcg/mL Resistant       Meropenem <=1 mcg/mL Sensitive       Nitrofurantoin <=32 mcg/mL Sensitive       Piperacillin/Tazo <=8 mcg/mL Resistant       Tobramycin <=2 mcg/mL Sensitive       Trimeth/Sulfa >2/38 mcg/mL Resistant                     Linear View         CULTURE, URINE [[id number]]     Order Status: Sent Specimen: Urine, Clean Catch     CULTURE, URINE [[id number]] Collected: 01/01/25 1600   Order Status: Completed Specimen: Urine Updated: 01/03/25 1745     Urine Culture, Routine Multiple organisms isolated. None in predominance.  Repeat if       clinically necessary.   Narrative:     Send normal result to authorizing provider's In Basket if  patient is active on MyChart:->Yes   CULTURE, URINE [[id number]]     Order Status: No result Specimen: Urine, Catheterized     Clostridium difficile EIA [[id number]] Collected: 10/24/24 1324   Order Status: Completed Specimen: Stool Updated: 10/25/24 0615     C. diff Antigen Negative      C difficile Toxins A+B, EIA Negative     Comment: Testing not recommended for children <24 months old.      Narrative:     Send normal result to authorizing provider's In Basket if  patient is active on MyChart:->Yes      EXAMINATION:  CT ABDOMEN PELVIS WITH IV CONTRAST     CLINICAL HISTORY:  Bowel obstruction suspected;     TECHNIQUE:  Low dose axial images, sagittal and coronal reformations were obtained from the lung bases to the pubic symphysis following the IV administration of 100 mL of Omnipaque 350.     COMPARISON:  None     FINDINGS:  Heart: Normal size as far as seen. No effusion as far as seen.     Lung Bases: Granuloma right lung base.     Liver: Normal size and attenuation. No focal lesions.     Gallbladder: The gallbladder is not identified.     Bile Ducts: No dilatation.     Pancreas: No mass. No peripancreatic fat stranding.     Spleen: Normal.     Adrenals: Normal.     Kidneys/Ureters: Normal enhancement.  No mass or  hydroureteronephrosis.     Bladder: Suprapubic catheter.     Reproductive organs: Normal.     GI Tract/Mesentery: No evidence of bowel obstruction or inflammation.  No evidence of acute appendicitis.  Some fluid and stool in the colon.  Correlate to history of diarrhea.     Peritoneal Space: No ascites or free air.     Retroperitoneum: No significant adenopathy.     Abdominal wall: Small fat containing umbilical hernia..     Vasculature: No aneurysm.     Bones: No acute fracture. No suspicious lytic or sclerotic lesions.     Impression:     Fluid and stool within the colon may relate to diarrheal state.  Small fat containing umbilical hernia.  Suprapubic bladder catheter.     All CT scans at this facility use dose modulation, iterative reconstruction, and/or weight based dosing when appropriate to reduce radiation dose to as low as reasonably achievable.        Electronically signed by:Leland Simon  Date:                                            01/04/2025  Time:                                            19:20                Exam Ended: 01/04/25 18:45 CST Last Resulted: 01/04/25 19:20 CST         Review of Systems:   Negative unless otherwise noted positive-  Gen- Weakness/ Fatigue  Neuro- Confusion  CV- chest pain   Resp: Cough/ SOB  GI- Nausea/Vomiting; +inconsistent stool texture, slight diarrhea  - Foul smelling urine, burning, incontinence   Extrem- Pain/Swelling     Objective:     Physical Exam:  General- Patient alert and oriented x3 in NAD  HEENT- PERRLA, EOMI, OP clear  Resp- No increased WOB noted. Not using accessory muscles.  Extrem- No cyanosis, clubbing, edema.   Skin-  No Jaundice. No visible skin lesions.        Plan:  Complicated UTI (urinary tract infection)  --Continue biweekly Crenshaw exchange   --Off chronic prophylaxis  --Urine culture from 2/12 reported quinolone resistant Proteus mirabilis  --Has now completed 14 day course of ceftriaxone IV  --Midline was maintained following treatment and patient remained asymptomatic  --Now with burning, incontinence, and foul smelling urine  --Has colonoscopy next week; to ensure sterile urine will give 3 doses of fosfomycin taken every other day   --Recommend continued follow up with urology; sees them this week; consider alternative to suprapubic catheterization   --Not much to offer from ID standpoint as he is too young to be on chronic antibiotic therapy.   --Explained that it is challenging to distinguish between colonization and infection  --Having catheter poses risk of bacteria never being eradicated due to biofilm formation   --Do not want to continue exposing patient to antibiotics given risk of long term adverse effects   --Risk of treatment currently outweighs long term benefit  --Needs bowel regimen to prevent constipation which is huge risk factor for recurrent UTI  --Follow up with me virtually in 2 weeks      Suprapubic catheter  --Recommend changing at least every 2 weeks to prevent recurrent UTI   --Follow up  with urology      Hypertension  Continue current medications. Follow with PCP.         Face to Face time with patient: 12 minutes   30 minutes of total time spent on the encounter, which includes face to face time and non-face to face time preparing to see the patient (eg, review of tests), Obtaining and/or reviewing separately obtained history, Documenting clinical information in the electronic or other health record, Independently interpreting results (not separately reported) and communicating results to the patient/family/caregiver, or Care coordination (not separately reported).      Each patient to whom he or she provides medical services by telemedicine is: (1) informed of the relationship between the physician and patient and the respective role of any other health care provider with respect to management of the patient; and (2) notified that he or she may decline to receive medical services by telemedicine and may withdraw from such care at any time.

## 2025-04-07 ENCOUNTER — HOSPITAL ENCOUNTER (OUTPATIENT)
Dept: ENDOSCOPY | Facility: HOSPITAL | Age: 35
Discharge: HOME | End: 2025-04-07
Attending: NURSE PRACTITIONER

## 2025-04-14 ENCOUNTER — OFFICE VISIT (OUTPATIENT)
Dept: INFECTIOUS DISEASES | Facility: CLINIC | Age: 35
End: 2025-04-14
Payer: COMMERCIAL

## 2025-04-14 DIAGNOSIS — N31.9 NEUROGENIC BLADDER: ICD-10-CM

## 2025-04-14 DIAGNOSIS — Z93.59 SUPRAPUBIC CATHETER: ICD-10-CM

## 2025-04-14 DIAGNOSIS — G82.50 QUADRIPLEGIA, POST-TRAUMATIC: ICD-10-CM

## 2025-04-14 DIAGNOSIS — I10 PRIMARY HYPERTENSION: ICD-10-CM

## 2025-04-14 DIAGNOSIS — N39.0 COMPLICATED UTI (URINARY TRACT INFECTION): Primary | ICD-10-CM

## 2025-04-14 DIAGNOSIS — R32 URINARY INCONTINENCE, UNSPECIFIED TYPE: ICD-10-CM

## 2025-04-14 DIAGNOSIS — S14.109S QUADRIPLEGIA, POST-TRAUMATIC: ICD-10-CM

## 2025-04-14 NOTE — H&P (VIEW-ONLY)
The patient location is: [place]   The chief complaint leading to consultation is: UTI follow up      Visit type: audiovisual     Notes:     Subjective:     HPI: 35 y.o. male with pertinent PMHx of post traumatic quadriplegia, HTN, urinary incontinence leading to chronic suprapubic catheterization and recurrent UTI, and constipation who was evaluated by Infectious Diseases on 7/21/23 after clean catch U/A showed positive nitrites and 12 WBC. Urine culture grew pan R Morganella morganii and  Pseudomonas. Plan was for 2 weeks of targeted IV antibiotics. PICC placed on 7/28. Plan was then to give dose of tobramycin and a course of pip/tazo via PICC line followed by repeat U/A. 8/24, patient states he gets UTI frequently. Changes catheter every 2 weeks at home. Usually gets chills, sweats, abdominal pain, urinary difficulty, and foul odor with UTI. Had these symptoms when July UTI was diagnosed. Currently having abdominal pain and myalgias. Unsure if this is due to unrelated ailment or another UTI. Tolerated pip/tazo other than intermittent loose stools. Has provided new urine sample today. Will see if any pathogens grow and discuss whether further antibiotics are warranted. Patient with provide update if worsening symptoms occur. Scheduled for home catheter change next week. Agreeable to PICC removal today.     Last ID note 11/2: was recently given Fosfomycin for last urine culture.  He is colonized with bacteria and not all the cultures mean a true infection .  WE went through this issue again .  Continue Lithostat /Azo dye as needed.     12/6/23: Today patient reports constipation and pain on both sides of abdomen for a few weeks. Has also struggled to urinate through C[place]shaw. Also pain below neck to feet within past week. Mainly a nerve pain especially in right arm. Left side feels more like gas. Also with trapped gas due to wheelchair.  U/A from urology on 12/5 with 20 WBC. Culture in process. One week ago  urine was sterile. CT a/p has been scheduled for 12/18. HH irrigated Crenshaw and it flowed well yesterday but today the physical flow still bad. Only 600 cc went into bag. Day before had 1500 cc. Has not had any fever. No nausea or vomiting. No shortness of breath or cough. Has appetite but unable to eat or drink much. Given how many symptoms patient is experiencing, advised him to go to ER for imaging and blood work. May also be able to provide pain management and a bowel regimen for his constipation. Will follow urine culture result.      2/28/24: Current UTI symptoms- abdominal pain and foul odor. Crenshaw last changed 1 week ago. Changed every 2-3 weeks. [facility] doing the exchanges. No current fever. Urine appears darker but not abnormal. Discussed treating based on last urine culture using IV tobramycin. Will reach out to [facility]. Will also discuss bowel regimen with PCP. Has been frequently constipated which is huge risk factor. Will ask HH to stick to biweekly exchange of Crenshaw.      3/14: Received 3 doses of tobramycin. Last urine culture from 3/5 no growth. Feels good today. Still battling constipation. On new bowel regimen. Will adjust with help of PCP. Sees GI next month.      6/14: Here for follow up. Per Kash was given large dose of amikacin 2 days ago. Currently no UTI symptoms. Still dealing with constipation but more BM than before. Placed on stronger med than Linzess. Crenshaw changed every 2 weeks. No fever but occasional chills. Continues to have generalized pain from chest to pelvis. Asking for MRI as CT have been negative. Will place order today.      7/3: Patient with another pyuria. Is concerned about recurrent infection. Will use meropenem as aggressive treatment. Then may try an oral suppression regimen. Patient in agreement. PICC ordered and [facility] updated.      10/17: Patient took dose of fosfomycin but concerned it would not be sufficient. Have set up for tobramycin through [facility]. Will  "plan for 5 doses. Has discussed a few options with urology including surgical reconstruction of ureters and gentamicin irrigation. He is on the fence about surgical approach. Will reach out to urology for further clarification. Do agree with irrigations. Can also discuss antibiotic suppression. Patient voiced understanding. Denies new symptoms currently.      10/31: Last /70. Has started chronic prophylaxis with low dose Omnicef and tolerating. Has not yet received bladder irrigation with gentamicin, only sterile water. Will see if  is able to obtain the gentamicin. Overall feels well. Says "nothing has gotten worse". Has an appetite but has lost weight. New wheelchair may help with bowel movements.      12/3: Going to see GI soon about irregular bowels. Had urinary incontinence past 2 nights. Typically empties bag before bed but when he woke up there was only 200 cc in bag but floor was covered in urine. Changed catheter and same thing occurred the next day. Will discuss with urology. Last U/A sterile. Will plan to give holiday from gentamicin flushes after this month.      12/19: Asked for sooner follow up today. Still performing bladder irrigation. Ongoing since October. Explained that any bacteria present is urine is likely colonization at this point. Bladder has been sterilized. Needs ongoing bowel care. Can trial Metamucil. Will give break from irrigation until next year. To continue with daily Omnicef until April. Voiced understanding.      1/13/25: Will stop cefdinir. ESBL on last culture. Treated with Macrobid but stopped. Was having nausea, chills, and cold sweats. Unable to vomit since paralysis. Unsure if he had fever. Notes low urine output in Crenshaw bag vs how much fluid he takes in. On average output 600 cc. Requesting IV abx via [facility]. Will set up. However, explained that resistance is a huge barrier to eradication of bacteria. Will likely remained colonized lifelong due to need for " catheter. Patient mentioned surgical rerouting of bladder which he is not interested in at this time. Also discussed risk of frequent CT scanning due to radiation. Discussed last results showed stool in colon. No stones/blockage.      3/3: Follow up. Completing course of Rocephin. Urine has been clear. Bowels improved. Overall feels really good. Sounds and appears probably the best he has since following with me. Discussed keeping PICC line intact for an additional week after finishing Rocephin. Will perform line care. If feeling well at that point will plan to remove the line. Patient in agreement.      3/31: Since completing course of Rocephin, patient feels chills, foul smelling urine, and sediment in urine. Stool soft with some diarrhea. Before yesterday was forming. Taking Senna + OTC stool softener. Watching what he eats. More baked foods. Had catheter exchanged last week. Prior to exchange had foul odor and sediment. During exchange smell resolved. Has been back past few days. Urinating more frequently. Mild burning sensation. Has colonoscopy next week. Will not perform unless infection eradicated. Will give 3 doses of fosfomycin taken every other day. Patient in agreement. Reiterated the long term risk of antibiotics. Needs to discuss with urology an alternative to suprapubic cath. Sees them this week.     4/14: Patient completed fosfomycin course. Colonoscopy rescheduled till end of the month. No showed urology appointment. Has been dealing with frequency and incontinence. Foul smelling urine and bladder spasms. Discussed possibility of IC on differential as symptoms do not josh with antibiotics. Will discuss with urology. Will check new U/A.                Urine culture [[id number]] (Abnormal)  Collected: 02/12/25 8703   Order Status: Completed Specimen: Urine Updated: 02/16/25 0863     Urine Culture, Routine PROTEUS MIRABILIS  >100,000 cfu/ml   Abnormal    Narrative:     Specimen Source->Urine  Send  normal result to authorizing provider's In Basket if  patient is active on MyChart:->Yes   Susceptibility                   Proteus mirabilis       CULTURE, URINE       Amp/Sulbactam <=8/4 mcg/mL Sensitive       Ampicillin <=8 mcg/mL Sensitive       Cefazolin <=2 mcg/mL Sensitive       Cefepime <=2 mcg/mL Sensitive       Ceftriaxone <=1 mcg/mL Sensitive       Ciprofloxacin >2 mcg/mL Resistant       Ertapenem <=0.5 mcg/mL Sensitive       Gentamicin <=2 mcg/mL Sensitive       Levofloxacin >4 mcg/mL Resistant       Meropenem <=1 mcg/mL Sensitive       Piperacillin/Tazo <=8 mcg/mL Sensitive       Tobramycin <=2 mcg/mL Sensitive       Trimeth/Sulfa <=2/38 mcg/mL Sensitive                                           Urine culture [[id number]] (Abnormal)  Collected: 01/04/25 1649   Order Status: Completed Specimen: Urine Updated: 01/07/25 1224     Urine Culture, Routine ESCHERICHIA COLI ESBL  >100,000 cfu/ml  No other significant isolate   Abnormal    Narrative:     Specimen Source->Urine   Susceptibility                   Escherichia coli ESBL       CULTURE, URINE       Amp/Sulbactam >16/8 mcg/mL Resistant       Ampicillin >16 mcg/mL Resistant       Cefepime >16 mcg/mL Resistant       Ceftriaxone >2 mcg/mL Resistant       Ciprofloxacin >2 mcg/mL Resistant       Ertapenem <=0.5 mcg/mL Sensitive       Gentamicin <=2 mcg/mL Sensitive       Levofloxacin >4 mcg/mL Resistant       Meropenem <=1 mcg/mL Sensitive       Nitrofurantoin <=32 mcg/mL Sensitive       Piperacillin/Tazo <=8 mcg/mL Resistant       Tobramycin <=2 mcg/mL Sensitive       Trimeth/Sulfa >2/38 mcg/mL Resistant                     Linear View         CULTURE, URINE [[id number]]     Order Status: Sent Specimen: Urine, Clean Catch     CULTURE, URINE [[id number]] Collected: 01/01/25 1600   Order Status: Completed Specimen: Urine Updated: 01/03/25 1745     Urine Culture, Routine Multiple organisms isolated. None in predominance.  Repeat if       clinically necessary.    Narrative:     Send normal result to authorizing provider's In Basket if  patient is active on MyChart:->Yes   CULTURE, URINE [[id number]]     Order Status: No result Specimen: Urine, Catheterized     Clostridium difficile EIA [[id number]] Collected: 10/24/24 1324   Order Status: Completed Specimen: Stool Updated: 10/25/24 0615     C. diff Antigen Negative     C difficile Toxins A+B, EIA Negative     Comment: Testing not recommended for children <24 months old.      Narrative:     Send normal result to authorizing provider's In Basket if  patient is active on MyChart:->Yes      EXAMINATION:  CT ABDOMEN PELVIS WITH IV CONTRAST     CLINICAL HISTORY:  Bowel obstruction suspected;     TECHNIQUE:  Low dose axial images, sagittal and coronal reformations were obtained from the lung bases to the pubic symphysis following the IV administration of 100 mL of Omnipaque 350.     COMPARISON:  None     FINDINGS:  Heart: Normal size as far as seen. No effusion as far as seen.     Lung Bases: Granuloma right lung base.     Liver: Normal size and attenuation. No focal lesions.     Gallbladder: The gallbladder is not identified.     Bile Ducts: No dilatation.     Pancreas: No mass. No peripancreatic fat stranding.     Spleen: Normal.     Adrenals: Normal.     Kidneys/Ureters: Normal enhancement.  No mass or  hydroureteronephrosis.     Bladder: Suprapubic catheter.     Reproductive organs: Normal.     GI Tract/Mesentery: No evidence of bowel obstruction or inflammation.  No evidence of acute appendicitis.  Some fluid and stool in the colon.  Correlate to history of diarrhea.     Peritoneal Space: No ascites or free air.     Retroperitoneum: No significant adenopathy.     Abdominal wall: Small fat containing umbilical hernia..     Vasculature: No aneurysm.     Bones: No acute fracture. No suspicious lytic or sclerotic lesions.     Impression:     Fluid and stool within the colon may relate to diarrheal state.  Small fat containing  umbilical hernia.  Suprapubic bladder catheter.     All CT scans at this facility use dose modulation, iterative reconstruction, and/or weight based dosing when appropriate to reduce radiation dose to as low as reasonably achievable.        Electronically signed by:Leland Simon  Date:                                            01/04/2025  Time:                                           19:20                Exam Ended: 01/04/25 18:45 CST Last Resulted: 01/04/25 19:20 CST         Review of Systems:   Negative unless otherwise noted positive-  Gen- Weakness/ Fatigue  Neuro- Confusion  CV- Chest pain   Resp: Cough/ SOB  GI- Nausea/Vomiting; +diarrhea on laxatives   - +Foul smelling urine, burning, incontinence, frequency    Extrem- Pain/Swelling     Objective:     Physical Exam:  General- Patient alert and oriented x3 in NAD  HEENT- PERRLA, EOMI, OP clear  Resp- No increased WOB noted. Not using accessory muscles.  Extrem- No cyanosis, clubbing, edema.   Skin-  No Jaundice. No visible skin lesions.        Plan:  Complicated UTI (urinary tract infection)  --Continue biweekly Cren[place]aw exchange   --Off chronic prophylaxis  --Urine culture from 2/12 reported quinolone resistant Proteus mirabilis  --Has now completed 14 day course of ceftriaxone IV  --Midline was maintained following treatment and patient remained asymptomatic  --3/31 reported burning, incontinence, and foul smelling urine  --To ensure sterile urine prior to colonoscopy, prescribed 3 doses of fosfomycin taken every other day; however, colonoscopy was rescheduled anyway   --Recommend continued follow up with urology; will discuss possibility of interstitial cystitis with them; may need cystoscopy with biopsies of the bladder to rule out   --Will check new U/A and set up for gentamicin irrigation (see below)   --Not much to offer from ID standpoint as he is too young to be on chronic antibiotic therapy.   --Explained that it is challenging to distinguish  between colonization and infection  --Having catheter poses risk of bacteria never being eradicated due to biofilm formation   --Do not want to continue exposing patient to antibiotics given risk of long term adverse effects   --Risk of treatment currently outweighs long term benefit  --Needs bowel regimen to prevent constipation which is huge risk factor for recurrent UTI  --Follow up with me virtually in 2 weeks     Outpatient Antibiotic Therapy Plan:     1) Infection: Recurrent UTI      2) Antibiotics:     Intravenous antibiotics:  · Gentamicin bladder irrigation daily (24 mg in 50 mL NS instilled and clamped for 30-60 minutes then let drain)      3) Therapy Duration:  14 days       Suprapubic catheter  --Recommend changing at least every 2 weeks to prevent recurrent UTI   --Follow up with urology      Hypertension  Continue current medications. Follow with PCP.         Face to Face time with patient: 7 minutes   25 minutes of total time spent on the encounter, which includes face to face time and non-face to face time preparing to see the patient (eg, review of tests), Obtaining and/or reviewing separately obtained history, Documenting clinical information in the electronic or other health record, Independently interpreting results (not separately reported) and communicating results to the patient/family/caregiver, or Care coordination (not separately reported).      Each patient to whom he or she provides medical services by telemedicine is: (1) informed of the relationship between the physician and patient and the respective role of any other health care provider with respect to management of the patient; and (2) notified that he or she may decline to receive medical services by telemedicine and may withdraw from such care at any time.

## 2025-04-14 NOTE — PROGRESS NOTES
The patient location is: [place]   The chief complaint leading to consultation is: UTI follow up      Visit type: audiovisual     Notes:     Subjective:     HPI: 35 y.o. male with pertinent PMHx of post traumatic quadriplegia, HTN, urinary incontinence leading to chronic suprapubic catheterization and recurrent UTI, and constipation who was evaluated by Infectious Diseases on 7/21/23 after clean catch U/A showed positive nitrites and 12 WBC. Urine culture grew pan R Morganella morganii and  Pseudomonas. Plan was for 2 weeks of targeted IV antibiotics. PICC placed on 7/28. Plan was then to give dose of tobramycin and a course of pip/tazo via PICC line followed by repeat U/A. 8/24, patient states he gets UTI frequently. Changes catheter every 2 weeks at home. Usually gets chills, sweats, abdominal pain, urinary difficulty, and foul odor with UTI. Had these symptoms when July UTI was diagnosed. Currently having abdominal pain and myalgias. Unsure if this is due to unrelated ailment or another UTI. Tolerated pip/tazo other than intermittent loose stools. Has provided new urine sample today. Will see if any pathogens grow and discuss whether further antibiotics are warranted. Patient with provide update if worsening symptoms occur. Scheduled for home catheter change next week. Agreeable to PICC removal today.     Last ID note 11/2: was recently given Fosfomycin for last urine culture.  He is colonized with bacteria and not all the cultures mean a true infection .  WE went through this issue again .  Continue Lithostat /Azo dye as needed.     12/6/23: Today patient reports constipation and pain on both sides of abdomen for a few weeks. Has also struggled to urinate through Crenshaw. Also pain below neck to feet within past week. Mainly a nerve pain especially in right arm. Left side feels more like gas. Also with trapped gas due to wheelchair.  U/A from urology on 12/5 with 20 WBC. Culture in process. One week ago  urine was sterile. CT a/p has been scheduled for 12/18. HH irrigated Crenshaw and it flowed well yesterday but today the physical flow still bad. Only 600 cc went into bag. Day before had 1500 cc. Has not had any fever. No nausea or vomiting. No shortness of breath or cough. Has appetite but unable to eat or drink much. Given how many symptoms patient is experiencing, advised him to go to ER for imaging and blood work. May also be able to provide pain management and a bowel regimen for his constipation. Will follow urine culture result.      2/28/24: Current UTI symptoms- abdominal pain and foul odor. Crenshaw last changed 1 week ago. Changed every 2-3 weeks. [facility] doing the exchanges. No current fever. Urine appears darker but not abnormal. Discussed treating based on last urine culture using IV tobramycin. Will reach out to \[facility]\"". Will also discuss bowel regimen with PCP. Has been frequently constipated which is huge risk factor. Will ask HH to stick to biweekly exchange of Crenshaw.      3/14: Received 3 doses of tobramycin. Last urine culture from 3/5 no growth. Feels good today. Still battling constipation. On new bowel regimen. Will adjust with help of PCP. Sees GI next month.      6/14: Here for follow up. Per Kash was given large dose of amikacin 2 days ago. Currently no UTI symptoms. Still dealing with constipation but more BM than before. Placed on stronger med than Linzess. Crenshaw changed every 2 weeks. No fever but occasional chills. Continues to have generalized pain from chest to pelvis. Asking for MRI as CT have been negative. Will place order today.      7/3: Patient with another pyuria. Is concerned about recurrent infection. Will use meropenem as aggressive treatment. Then may try an oral suppression regimen. Patient in agreement. PICC ordered and \[facility]\"" updated.      10/17: Patient took dose of fosfomycin but concerned it would not be sufficient. Have set up for tobramycin through \[facility]\"". Will  "plan for 5 doses. Has discussed a few options with urology including surgical reconstruction of ureters and gentamicin irrigation. He is on the fence about surgical approach. Will reach out to urology for further clarification. Do agree with irrigations. Can also discuss antibiotic suppression. Patient voiced understanding. Denies new symptoms currently.      10/31: Last /70. Has started chronic prophylaxis with low dose Omnicef and tolerating. Has not yet received bladder irrigation with gentamicin, only sterile water. Will see if  is able to obtain the gentamicin. Overall feels well. Says "nothing has gotten worse". Has an appetite but has lost weight. New wheelchair may help with bowel movements.      12/3: Going to see GI soon about irregular bowels. Had urinary incontinence past 2 nights. Typically empties bag before bed but when he woke up there was only 200 cc in bag but floor was covered in urine. Changed catheter and same thing occurred the next day. Will discuss with urology. Last U/A sterile. Will plan to give holiday from gentamicin flushes after this month.      12/19: Asked for sooner follow up today. Still performing bladder irrigation. Ongoing since October. Explained that any bacteria present is urine is likely colonization at this point. Bladder has been sterilized. Needs ongoing bowel care. Can trial Metamucil. Will give break from irrigation until next year. To continue with daily Omnicef until April. Voiced understanding.      1/13/25: Will stop cefdinir. ESBL on last culture. Treated with Macrobid but stopped. Was having nausea, chills, and cold sweats. Unable to vomit since paralysis. Unsure if he had fever. Notes low urine output in Crenshaw bag vs how much fluid he takes in. On average output 600 cc. Requesting IV abx via [facility]. Will set up. However, explained that resistance is a huge barrier to eradication of bacteria. Will likely remained colonized lifelong due to need for " catheter. Patient mentioned surgical rerouting of bladder which he is not interested in at this time. Also discussed risk of frequent CT scanning due to radiation. Discussed last results showed stool in colon. No stones/blockage.      3/3: Follow up. Completing course of Rocephin. Urine has been clear. Bowels improved. Overall feels really good. Sounds and appears probably the best he has since following with me. Discussed keeping PICC line intact for an additional week after finishing Rocephin. Will perform line care. If feeling well at that point will plan to remove the line. Patient in agreement.      3/31: Since completing course of Rocephin, patient feels chills, foul smelling urine, and sediment in urine. Stool soft with some diarrhea. Before yesterday was forming. Taking Senna + OTC stool softener. Watching what he eats. More baked foods. Had catheter exchanged last week. Prior to exchange had foul odor and sediment. During exchange smell resolved. Has been back past few days. Urinating more frequently. Mild burning sensation. Has colonoscopy next week. Will not perform unless infection eradicated. Will give 3 doses of fosfomycin taken every other day. Patient in agreement. Reiterated the long term risk of antibiotics. Needs to discuss with urology an alternative to suprapubic cath. Sees them this week.     4/14: Patient completed fosfomycin course. Colonoscopy rescheduled till end of the month. No showed urology appointment. Has been dealing with frequency and incontinence. Foul smelling urine and bladder spasms. Discussed possibility of IC on differential as symptoms do not josh with antibiotics. Will discuss with urology. Will check new U/A.                Urine culture [[id number]] (Abnormal)  Collected: 02/12/25 4700   Order Status: Completed Specimen: Urine Updated: 02/16/25 0889     Urine Culture, Routine PROTEUS MIRABILIS  >100,000 cfu/ml   Abnormal    Narrative:     Specimen Source->Urine  Send  normal result to authorizing provider's In Basket if  patient is active on MyChart:->Yes   Susceptibility                   Proteus mirabilis       CULTURE, URINE       Amp/Sulbactam <=8/4 mcg/mL Sensitive       Ampicillin <=8 mcg/mL Sensitive       Cefazolin <=2 mcg/mL Sensitive       Cefepime <=2 mcg/mL Sensitive       Ceftriaxone <=1 mcg/mL Sensitive       Ciprofloxacin >2 mcg/mL Resistant       Ertapenem <=0.5 mcg/mL Sensitive       Gentamicin <=2 mcg/mL Sensitive       Levofloxacin >4 mcg/mL Resistant       Meropenem <=1 mcg/mL Sensitive       Piperacillin/Tazo <=8 mcg/mL Sensitive       Tobramycin <=2 mcg/mL Sensitive       Trimeth/Sulfa <=2/38 mcg/mL Sensitive                                           Urine culture [[id number]] (Abnormal)  Collected: 01/04/25 1649   Order Status: Completed Specimen: Urine Updated: 01/07/25 1224     Urine Culture, Routine ESCHERICHIA COLI ESBL  >100,000 cfu/ml  No other significant isolate   Abnormal    Narrative:     Specimen Source->Urine   Susceptibility                   Escherichia coli ESBL       CULTURE, URINE       Amp/Sulbactam >16/8 mcg/mL Resistant       Ampicillin >16 mcg/mL Resistant       Cefepime >16 mcg/mL Resistant       Ceftriaxone >2 mcg/mL Resistant       Ciprofloxacin >2 mcg/mL Resistant       Ertapenem <=0.5 mcg/mL Sensitive       Gentamicin <=2 mcg/mL Sensitive       Levofloxacin >4 mcg/mL Resistant       Meropenem <=1 mcg/mL Sensitive       Nitrofurantoin <=32 mcg/mL Sensitive       Piperacillin/Tazo <=8 mcg/mL Resistant       Tobramycin <=2 mcg/mL Sensitive       Trimeth/Sulfa >2/38 mcg/mL Resistant                     Linear View         CULTURE, URINE [[id number]]     Order Status: Sent Specimen: Urine, Clean Catch     CULTURE, URINE [[id number]] Collected: 01/01/25 1600   Order Status: Completed Specimen: Urine Updated: 01/03/25 1745     Urine Culture, Routine Multiple organisms isolated. None in predominance.  Repeat if       clinically necessary.    Narrative:     Send normal result to authorizing provider's In Basket if  patient is active on MyChart:->Yes   CULTURE, URINE [[id number]]     Order Status: No result Specimen: Urine, Catheterized     Clostridium difficile EIA [[id number]] Collected: 10/24/24 1324   Order Status: Completed Specimen: Stool Updated: 10/25/24 0615     C. diff Antigen Negative     C difficile Toxins A+B, EIA Negative     Comment: Testing not recommended for children <24 months old.      Narrative:     Send normal result to authorizing provider's In Basket if  patient is active on MyChart:->Yes      EXAMINATION:  CT ABDOMEN PELVIS WITH IV CONTRAST     CLINICAL HISTORY:  Bowel obstruction suspected;     TECHNIQUE:  Low dose axial images, sagittal and coronal reformations were obtained from the lung bases to the pubic symphysis following the IV administration of 100 mL of Omnipaque 350.     COMPARISON:  None     FINDINGS:  Heart: Normal size as far as seen. No effusion as far as seen.     Lung Bases: Granuloma right lung base.     Liver: Normal size and attenuation. No focal lesions.     Gallbladder: The gallbladder is not identified.     Bile Ducts: No dilatation.     Pancreas: No mass. No peripancreatic fat stranding.     Spleen: Normal.     Adrenals: Normal.     Kidneys/Ureters: Normal enhancement.  No mass or  hydroureteronephrosis.     Bladder: Suprapubic catheter.     Reproductive organs: Normal.     GI Tract/Mesentery: No evidence of bowel obstruction or inflammation.  No evidence of acute appendicitis.  Some fluid and stool in the colon.  Correlate to history of diarrhea.     Peritoneal Space: No ascites or free air.     Retroperitoneum: No significant adenopathy.     Abdominal wall: Small fat containing umbilical hernia..     Vasculature: No aneurysm.     Bones: No acute fracture. No suspicious lytic or sclerotic lesions.     Impression:     Fluid and stool within the colon may relate to diarrheal state.  Small fat containing  umbilical hernia.  Suprapubic bladder catheter.     All CT scans at this facility use dose modulation, iterative reconstruction, and/or weight based dosing when appropriate to reduce radiation dose to as low as reasonably achievable.        Electronically signed by:Leland Simon  Date:                                            01/04/2025  Time:                                           19:20                Exam Ended: 01/04/25 18:45 CST Last Resulted: 01/04/25 19:20 CST         Review of Systems:   Negative unless otherwise noted positive-  Gen- Weakness/ Fatigue  Neuro- Confusion  CV- Chest pain   Resp: Cough/ SOB  GI- Nausea/Vomiting; +diarrhea on laxatives   - +Foul smelling urine, burning, incontinence, frequency    Extrem- Pain/Swelling     Objective:     Physical Exam:  General- Patient alert and oriented x3 in NAD  HEENT- PERRLA, EOMI, OP clear  Resp- No increased WOB noted. Not using accessory muscles.  Extrem- No cyanosis, clubbing, edema.   Skin-  No Jaundice. No visible skin lesions.        Plan:  Complicated UTI (urinary tract infection)  --Continue biweekly Crenshaw exchange   --Off chronic prophylaxis  --Urine culture from 2/12 reported quinolone resistant Proteus mirabilis  --Has now completed 14 day course of ceftriaxone IV  --Midline was maintained following treatment and patient remained asymptomatic  --3/31 reported burning, incontinence, and foul smelling urine  --To ensure sterile urine prior to colonoscopy, prescribed 3 doses of fosfomycin taken every other day; however, colonoscopy was rescheduled anyway   --Recommend continued follow up with urology; will discuss possibility of interstitial cystitis with them; may need cystoscopy with biopsies of the bladder to rule out   --Will check new U/A and set up for gentamicin irrigation (see below)   --Not much to offer from ID standpoint as he is too young to be on chronic antibiotic therapy.   --Explained that it is challenging to distinguish  between colonization and infection  --Having catheter poses risk of bacteria never being eradicated due to biofilm formation   --Do not want to continue exposing patient to antibiotics given risk of long term adverse effects   --Risk of treatment currently outweighs long term benefit  --Needs bowel regimen to prevent constipation which is huge risk factor for recurrent UTI  --Follow up with me virtually in 2 weeks     Outpatient Antibiotic Therapy Plan:     1) Infection: Recurrent UTI      2) Antibiotics:     Intravenous antibiotics:  · Gentamicin bladder irrigation daily (24 mg in 50 mL NS instilled and clamped for 30-60 minutes then let drain)      3) Therapy Duration:  14 days       Suprapubic catheter  --Recommend changing at least every 2 weeks to prevent recurrent UTI   --Follow up with urology      Hypertension  Continue current medications. Follow with PCP.         Face to Face time with patient: 7 minutes   25 minutes of total time spent on the encounter, which includes face to face time and non-face to face time preparing to see the patient (eg, review of tests), Obtaining and/or reviewing separately obtained history, Documenting clinical information in the electronic or other health record, Independently interpreting results (not separately reported) and communicating results to the patient/family/caregiver, or Care coordination (not separately reported).      Each patient to whom he or she provides medical services by telemedicine is: (1) informed of the relationship between the physician and patient and the respective role of any other health care provider with respect to management of the patient; and (2) notified that he or she may decline to receive medical services by telemedicine and may withdraw from such care at any time.

## 2025-04-17 ENCOUNTER — TELEPHONE (OUTPATIENT)
Dept: INFECTIOUS DISEASES | Facility: CLINIC | Age: 35
End: 2025-04-17
Payer: COMMERCIAL

## 2025-04-17 ENCOUNTER — LAB REQUISITION (OUTPATIENT)
Dept: LAB | Facility: HOSPITAL | Age: 35
End: 2025-04-17
Payer: COMMERCIAL

## 2025-04-17 DIAGNOSIS — N39.0 URINARY TRACT INFECTION, SITE NOT SPECIFIED: ICD-10-CM

## 2025-04-17 LAB
AMORPH CRY URNS QL MICRO: ABNORMAL
BACTERIA #/AREA URNS HPF: ABNORMAL /HPF
BILIRUB UR QL STRIP.AUTO: NEGATIVE
CLARITY UR: CLEAR
COLOR UR AUTO: YELLOW
GLUCOSE UR QL STRIP: NEGATIVE
HGB UR QL STRIP: ABNORMAL
KETONES UR QL STRIP: NEGATIVE
LEUKOCYTE ESTERASE UR QL STRIP: ABNORMAL
MICROSCOPIC COMMENT: ABNORMAL
NITRITE UR QL STRIP: POSITIVE
PH UR STRIP: 8 [PH]
PROT UR QL STRIP: ABNORMAL
RBC #/AREA URNS HPF: 3 /HPF (ref 0–4)
SP GR UR STRIP: 1.02
SQUAMOUS #/AREA URNS HPF: 6 /HPF
WBC #/AREA URNS HPF: 27 /HPF (ref 0–5)

## 2025-04-17 PROCEDURE — 81003 URINALYSIS AUTO W/O SCOPE: CPT | Performed by: FAMILY MEDICINE

## 2025-04-17 PROCEDURE — 87088 URINE BACTERIA CULTURE: CPT | Performed by: FAMILY MEDICINE

## 2025-04-17 NOTE — TELEPHONE ENCOUNTER
Called patient regarding the UA that Dr. Hubbard wanted him to do. Patient states that the home health nurse came today. Looked at the orders and it was put under Dr. Burton's name. Patient was advised when he get the results on the portal to send us a message. He expressed understanding.

## 2025-04-17 NOTE — TELEPHONE ENCOUNTER
----- Message from Selinaneelamethan sent at 4/17/2025  4:53 PM CDT -----  Contact: GORDO AC [[id number]]  ..Type:  Patient Requesting CallWho Called:GORDO AC [[id number]]Does the patient know what this is regarding?:missed call Would the patient rather a call back or a response via [email or web address]? Call Best Call Back Number:[phone] Information:

## 2025-04-20 ENCOUNTER — PATIENT MESSAGE (OUTPATIENT)
Dept: INFECTIOUS DISEASES | Facility: CLINIC | Age: 35
End: 2025-04-20
Payer: COMMERCIAL

## 2025-04-20 DIAGNOSIS — G82.50 QUADRIPLEGIA, POST-TRAUMATIC: Primary | ICD-10-CM

## 2025-04-20 DIAGNOSIS — S14.109S QUADRIPLEGIA, POST-TRAUMATIC: Primary | ICD-10-CM

## 2025-04-20 NOTE — TELEPHONE ENCOUNTER
No care due was identified.  [facility] Embedded Care Due Messages. Reference number: [id number].   4/20/2025 10:29:37 AM CDT

## 2025-04-21 ENCOUNTER — RESULTS FOLLOW-UP (OUTPATIENT)
Dept: INTERNAL MEDICINE | Facility: CLINIC | Age: 35
End: 2025-04-21

## 2025-04-21 ENCOUNTER — TELEPHONE (OUTPATIENT)
Dept: INFECTIOUS DISEASES | Facility: CLINIC | Age: 35
End: 2025-04-21
Payer: COMMERCIAL

## 2025-04-21 DIAGNOSIS — N39.0 COMPLICATED UTI (URINARY TRACT INFECTION): Primary | ICD-10-CM

## 2025-04-21 NOTE — TELEPHONE ENCOUNTER
Called and spoke with patient. Informed that the new orders for his IV antibiotics are sent to [facility]. He would like to discuss with Dr. Hubbard. Message sent to the provider.

## 2025-04-21 NOTE — PROGRESS NOTES
Patient reports urine has same odor as stool. Suspect it is being contaminated by stool. Urine cx from 4/17 growing resistant strain of Morganella. So long as infection remains his colonoscopy cannot be performed. Will therefore plan for PICC placement. Will use ertapenem and plan for extended course.     Outpatient Antibiotic Therapy Plan:    1) Infection: Pan resistant Morganella UTI (complicated)     2) Antibiotics:    Intravenous antibiotics:  IV ertapenem 1 g daily     3) Therapy Duration:  28 days     Estimated end date of IV antibiotics: 5/19/25     4) Outpatient Weekly Labs:    Order the following labs to be drawn on Mondays:   CBC  CMP   CRP    5) Fax Lab Results to Infectious Diseases Provider: Dr. Hubbard     [facility] Fax Number: [phone]    Please perform labs through [facility]

## 2025-04-21 NOTE — TELEPHONE ENCOUNTER
----- Message from Mariella sent at 4/21/2025 11:51 AM CDT -----  Contact: Patient  [phone]  .[email or web address] Patient is calling for Medical Advice regarding:Good Afternoon, Patient is calling to speak to office due to his UAHow long has patient had these symptoms:Pharmacy name and phone#:Patient wants a call back or thru [facility]:Please call and advise Comments:Please advise patient replies from provider may take up to 48 hours.

## 2025-04-22 ENCOUNTER — HOSPITAL ENCOUNTER (OUTPATIENT)
Dept: RADIOLOGY | Facility: HOSPITAL | Age: 35
Discharge: HOME | End: 2025-04-22
Attending: STUDENT IN AN ORGANIZED HEALTH CARE EDUCATION/TRAINING PROGRAM
Payer: COMMERCIAL

## 2025-04-22 DIAGNOSIS — N39.0 COMPLICATED UTI (URINARY TRACT INFECTION): ICD-10-CM

## 2025-04-22 LAB
BACTERIA UR CULT: ABNORMAL
BACTERIA UR CULT: ABNORMAL

## 2025-04-22 PROCEDURE — C1751 CATH, INF, PER/CENT/MIDLINE: HCPCS

## 2025-04-22 RX ORDER — GABAPENTIN 400 MG/1
400 CAPSULE ORAL 3 TIMES DAILY
Qty: 270 CAPSULE | Refills: 0 | Status: SHIPPED | OUTPATIENT
Start: 2025-04-22

## 2025-04-22 NOTE — DISCHARGE SUMMARY
[facility]  Discharge Note  Short Stay    FL PICC Line Placement w/o Port w/ Img > 5 Y/O      OUTCOME: Patient tolerated treatment/procedure well without complication and is now ready for discharge.    DISPOSITION: Home or Self Care    FINAL DIAGNOSIS:  <principal problem not specified>    FOLLOWUP: In clinic    DISCHARGE INSTRUCTIONS:  No discharge procedures on file.     TIME SPENT ON DISCHARGE: 15 minutes    Pre Op Diagnosis: complicated UTI     Post Op Diagnosis: same     Procedure:  PICC line     Procedure performed by: Delmis ALEJANDRA, Mark DURAN     Written Informed Consent Obtained: Yes     Specimen Removed:  no    Estimated Blood Loss:  minimal     Findings: no     The patient tolerated the procedure well and there were no complications.      Disposition:  F/U in clinic or with ordering physician    Discharge Instructions:  Keep PICC clean and dry.    Sterile technique was performed in the left upper extremity, lidocaine was used as a local anesthetic.  A 4 [place] DLPP 40 cm was inserted into the brachial vein and into the SVC/Right atrium junction.  Pt tolerated the procedure well without immediate complications.  Please see radiologist report for details. F/u with PCP and/or ordering physician. PICC is ready to use

## 2025-04-28 ENCOUNTER — HOSPITAL ENCOUNTER (OUTPATIENT)
Dept: ENDOSCOPY | Facility: HOSPITAL | Age: 35
Discharge: HOME | End: 2025-04-28
Attending: NURSE PRACTITIONER

## 2025-05-18 RX ORDER — AMITRIPTYLINE HYDROCHLORIDE 25 MG/1
25 TABLET, FILM COATED ORAL NIGHTLY
Qty: 90 TABLET | Refills: 2 | Status: SHIPPED | OUTPATIENT
Start: 2025-05-18

## 2025-05-18 NOTE — TELEPHONE ENCOUNTER
Refill Decision Note   Juan Pabloraúl Cilfton  is requesting a refill authorization.  Brief Assessment and Rationale for Refill:  Approve     Medication Therapy Plan:        Comments:     Note composed:8:39 AM 05/18/2025

## 2025-05-18 NOTE — TELEPHONE ENCOUNTER
No care due was identified.  [facility] Embedded Care Due Messages. Reference number: [id number].   5/18/2025 7:41:18 AM CDT

## 2025-05-20 RX ORDER — BACLOFEN 10 MG/1
TABLET ORAL
Qty: 270 TABLET | Refills: 0 | Status: SHIPPED | OUTPATIENT
Start: 2025-05-20

## 2025-06-02 ENCOUNTER — PATIENT MESSAGE (OUTPATIENT)
Dept: INFECTIOUS DISEASES | Facility: CLINIC | Age: 35
End: 2025-06-02
Payer: COMMERCIAL

## 2025-06-02 ENCOUNTER — HOSPITAL ENCOUNTER (OUTPATIENT)
Dept: ENDOSCOPY | Facility: HOSPITAL | Age: 35
Discharge: HOME | End: 2025-06-02
Attending: NURSE PRACTITIONER

## 2025-06-02 DIAGNOSIS — N39.0 COMPLICATED UTI (URINARY TRACT INFECTION): Primary | ICD-10-CM

## 2025-06-03 ENCOUNTER — LAB VISIT (OUTPATIENT)
Dept: LAB | Facility: HOSPITAL | Age: 35
End: 2025-06-03
Attending: STUDENT IN AN ORGANIZED HEALTH CARE EDUCATION/TRAINING PROGRAM
Payer: COMMERCIAL

## 2025-06-03 DIAGNOSIS — N39.0 COMPLICATED UTI (URINARY TRACT INFECTION): ICD-10-CM

## 2025-06-03 LAB
BACTERIA #/AREA URNS HPF: ABNORMAL /HPF
BILIRUB UR QL STRIP.AUTO: NEGATIVE
CLARITY UR: ABNORMAL
COLOR UR AUTO: YELLOW
GLUCOSE UR QL STRIP: NEGATIVE
HGB UR QL STRIP: ABNORMAL
HOLD SPECIMEN: NORMAL
KETONES UR QL STRIP: ABNORMAL
LEUKOCYTE ESTERASE UR QL STRIP: ABNORMAL
MICROSCOPIC COMMENT: ABNORMAL
NITRITE UR QL STRIP: NEGATIVE
NON-SQ EPI CELLS #/AREA URNS HPF: 4 /HPF
PH UR STRIP: 7 [PH]
PROT UR QL STRIP: NEGATIVE
RBC #/AREA URNS HPF: 5 /HPF (ref 0–4)
SP GR UR STRIP: 1.01
SQUAMOUS #/AREA URNS HPF: 3 /HPF
WBC #/AREA URNS HPF: 53 /HPF (ref 0–5)
WBC CLUMPS URNS QL MICRO: ABNORMAL

## 2025-06-03 PROCEDURE — 81003 URINALYSIS AUTO W/O SCOPE: CPT

## 2025-06-03 PROCEDURE — 87088 URINE BACTERIA CULTURE: CPT | Mod: 91

## 2025-06-04 PROCEDURE — G0179 MD RECERTIFICATION HHA PT: HCPCS | Mod: ,,, | Performed by: FAMILY MEDICINE

## 2025-06-05 ENCOUNTER — EXTERNAL HOME HEALTH (OUTPATIENT)
Dept: HOME HEALTH SERVICES | Facility: HOSPITAL | Age: 35
End: 2025-06-05
Payer: COMMERCIAL

## 2025-06-07 LAB
BACTERIA UR CULT: ABNORMAL
BACTERIA UR CULT: ABNORMAL

## 2025-06-09 ENCOUNTER — OFFICE VISIT (OUTPATIENT)
Dept: UROLOGY | Facility: CLINIC | Age: 35
End: 2025-06-09
Payer: COMMERCIAL

## 2025-06-09 ENCOUNTER — PATIENT MESSAGE (OUTPATIENT)
Dept: UROLOGY | Facility: CLINIC | Age: 35
End: 2025-06-09

## 2025-06-09 DIAGNOSIS — N31.9 NEUROGENIC BLADDER: Primary | ICD-10-CM

## 2025-06-09 DIAGNOSIS — S14.109S QUADRIPLEGIA, POST-TRAUMATIC: ICD-10-CM

## 2025-06-09 DIAGNOSIS — N39.0 RECURRENT UTI: ICD-10-CM

## 2025-06-09 DIAGNOSIS — G82.50 QUADRIPLEGIA, POST-TRAUMATIC: ICD-10-CM

## 2025-06-09 DIAGNOSIS — Z93.59 SUPRAPUBIC CATHETER: ICD-10-CM

## 2025-06-09 PROCEDURE — 98006 SYNCH AUDIO-VIDEO EST MOD 30: CPT | Mod: 95,,, | Performed by: NURSE PRACTITIONER
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
## 2025-06-17 ENCOUNTER — TELEPHONE (OUTPATIENT)
Dept: UROLOGY | Facility: CLINIC | Age: 35
End: 2025-06-17
Payer: COMMERCIAL

## 2025-06-17 NOTE — TELEPHONE ENCOUNTER
----- Message from Zach Valentin MD sent at 6/17/2025  3:09 PM CDT -----  I am fine seeing him virtually. It seems like he is being referred for simple cystectomy and urinary diversion. I doubt the patient understands this based on the notes I see.   I am happy to discuss it with him.    Thanks  ----- Message -----  From: Sharmila Holm LPN  Sent: 6/17/2025   2:34 PM CDT  To: Zach Valentin MD    Please advise  ----- Message -----  From: Katy Lucero  Sent: 6/17/2025   1:57 PM CDT  To: Barbie De La Rosa    Good Afternoon    Mr. Lazo has been seeing our office in [place] and may be interested in a procedure for bladder reconstruction. Patient is asking if he can schedule a Virtual visit with Dr. Valentin. Would this be okay? I am happy to get the patient scheduled but was unsure if Dr. Valentin does Virtual visits.

## 2025-06-19 ENCOUNTER — TELEPHONE (OUTPATIENT)
Dept: INFECTIOUS DISEASES | Facility: CLINIC | Age: 35
End: 2025-06-19

## 2025-06-20 ENCOUNTER — PATIENT MESSAGE (OUTPATIENT)
Dept: INFECTIOUS DISEASES | Facility: CLINIC | Age: 35
End: 2025-06-20
Payer: COMMERCIAL

## 2025-06-20 DIAGNOSIS — R19.7 DIARRHEA, UNSPECIFIED TYPE: Primary | ICD-10-CM

## 2025-06-26 ENCOUNTER — PATIENT MESSAGE (OUTPATIENT)
Dept: INFECTIOUS DISEASES | Facility: CLINIC | Age: 35
End: 2025-06-26
Payer: COMMERCIAL

## 2025-06-26 ENCOUNTER — LAB VISIT (OUTPATIENT)
Dept: LAB | Facility: HOSPITAL | Age: 35
End: 2025-06-26
Attending: STUDENT IN AN ORGANIZED HEALTH CARE EDUCATION/TRAINING PROGRAM
Payer: COMMERCIAL

## 2025-06-26 DIAGNOSIS — N39.0 COMPLICATED UTI (URINARY TRACT INFECTION): ICD-10-CM

## 2025-06-26 LAB
BACTERIA #/AREA URNS HPF: ABNORMAL /HPF
BILIRUB UR QL STRIP.AUTO: NEGATIVE
CLARITY UR: ABNORMAL
COLOR UR AUTO: YELLOW
GLUCOSE UR QL STRIP: NEGATIVE
HGB UR QL STRIP: NEGATIVE
KETONES UR QL STRIP: ABNORMAL
LEUKOCYTE ESTERASE UR QL STRIP: ABNORMAL
MICROSCOPIC COMMENT: ABNORMAL
NITRITE UR QL STRIP: POSITIVE
PH UR STRIP: 7 [PH]
PROT UR QL STRIP: NEGATIVE
RBC #/AREA URNS HPF: 2 /HPF (ref 0–4)
SP GR UR STRIP: 1.01
SQUAMOUS #/AREA URNS HPF: 1 /HPF
WBC #/AREA URNS HPF: 28 /HPF (ref 0–5)
WBC CLUMPS URNS QL MICRO: ABNORMAL

## 2025-06-26 PROCEDURE — 87088 URINE BACTERIA CULTURE: CPT | Mod: 91

## 2025-06-26 PROCEDURE — 81003 URINALYSIS AUTO W/O SCOPE: CPT

## 2025-06-27 LAB — HOLD SPECIMEN: NORMAL

## 2025-06-29 LAB
BACTERIA UR CULT: ABNORMAL
BACTERIA UR CULT: ABNORMAL

## 2025-07-01 ENCOUNTER — PATIENT MESSAGE (OUTPATIENT)
Dept: INFECTIOUS DISEASES | Facility: CLINIC | Age: 35
End: 2025-07-01
Payer: COMMERCIAL

## 2025-07-02 ENCOUNTER — TELEPHONE (OUTPATIENT)
Dept: INFECTIOUS DISEASES | Facility: CLINIC | Age: 35
End: 2025-07-02
Payer: COMMERCIAL

## 2025-07-02 NOTE — TELEPHONE ENCOUNTER
Patient states that he has been calling but has not been able to get through to Dr. Castro's office. Advised patient that all messages were received regarding urine culture results and all messages have been routed to Dr. Castro to respond. Advised that another message will be routed to Dr. Castro to advise urine culture results. Pt believes he will receive a PICC line. Advised that we will know once Dr. Castro advises. Please allow him time. Pt states that he will give him until after 12 pm to respond.

## 2025-07-03 ENCOUNTER — TELEPHONE (OUTPATIENT)
Dept: INFECTIOUS DISEASES | Facility: CLINIC | Age: 35
End: 2025-07-03
Payer: COMMERCIAL

## 2025-07-03 NOTE — TELEPHONE ENCOUNTER
Patient is calling for results of urine culture. Patient has been advised that message has been routed to Dr. Castro.      Copied from CRM #[id number]. Topic: General Inquiry - Patient Advice  >> Jul 3, 2025 11:33 AM Nick wrote:  Type:  Needs Medical Advice    Who Called: Kate  Symptoms (please be specific): personal;   How long has patient had these symptoms:    Pharmacy name and phone #:    Would the patient rather a call back or a response via [facility]? Call back  Best Call Back Number: [phone] (home)  Additional Information:

## 2025-07-08 ENCOUNTER — PATIENT MESSAGE (OUTPATIENT)
Dept: INFECTIOUS DISEASES | Facility: CLINIC | Age: 35
End: 2025-07-08
Payer: MEDICARE

## 2025-07-09 ENCOUNTER — OFFICE VISIT (OUTPATIENT)
Dept: INFECTIOUS DISEASES | Facility: CLINIC | Age: 35
End: 2025-07-09
Payer: COMMERCIAL

## 2025-07-09 ENCOUNTER — DOCUMENTATION ONLY (OUTPATIENT)
Dept: INFECTIOUS DISEASES | Facility: CLINIC | Age: 35
End: 2025-07-09
Payer: COMMERCIAL

## 2025-07-09 DIAGNOSIS — N39.0 COMPLICATED UTI (URINARY TRACT INFECTION): Primary | ICD-10-CM

## 2025-07-09 RX ORDER — LEVOFLOXACIN 500 MG/1
500 TABLET, FILM COATED ORAL DAILY
Qty: 7 TABLET | Refills: 0 | Status: SHIPPED | OUTPATIENT
Start: 2025-07-09 | End: 2025-07-16

## 2025-07-09 RX ORDER — GRANULES FOR ORAL 3 G/1
3 POWDER ORAL ONCE
Qty: 3 G | Refills: 0 | Status: SHIPPED | OUTPATIENT
Start: 2025-07-09 | End: 2025-07-09

## 2025-07-09 NOTE — TELEPHONE ENCOUNTER
Pt scheduled for vv today with Dr. Castro to discuss treatment. Pt v/u and agreed to appt date, time, and location.

## 2025-07-09 NOTE — PROGRESS NOTES
Urine culture-06/26  >100,000 cfu/ml Klebsiella pneumoniae Abnormal    >100,000 cfu/ml Morganella morganii Abnormal      This likely rep colonization- will send Levaquin and one dose of Fosfomycin

## 2025-07-11 NOTE — ASSESSMENT & PLAN NOTE
We had another extensive discussion about UTI and patients with paraplegia.  Will use empiric Levaquin/fosfomycin    This is likely colonization

## 2025-07-11 NOTE — PROGRESS NOTES
Subjective   THIS IS A TELE INFECTIOUS DISEASE VISIT  Location- [place]  Audio and video connection used  Time spent 15 minutes      Patient ID: Kate Lazo is a 35 y.o. male.    Chief Complaint:Follow up     HPI    Last ID note-  Last clinic note- 07/2012  Last clinic note-  31 year old man with PMH as listed below. He has history of quadriparesis and has chronic [facility]ic [facility]. He is in a wheelchair with his step Dad    Cultures reviewed-  04/08-Urine culture-pseudomonas  1/14- Pseudomonas   12/11/20- pseudomonas/morganella  09/2020-E coli -ESBL  Case was discussed with Dr Loaiza and Fosfomycin was sent to the pharmacy.-04/13- FOSFOMYCIN po 3gram every 72 hours X2 doses  He is in a wheelchair.  11/30/21-  Latest urine culture-      11/8/21-proteus and was given Augmentin 875 mg for 7 days.   03/08/202-  Since the last clinic visit ,   Ct scan of the abdomen -02/10/2022-  Suprapubic pelvic catheter and bladder wall thickening, somewhat nonspecific in the setting of a nondistended bladder.   Moderate stool burden in the distal colon.  Correlation for constipation.   Cholelithiasis.   Questionable early sacral decubitus ulcer.  Correlation is advised     02.21/22- Urine culture-  PSEUDOMONAS AERUGINOSA   50,000 - 99,999 cfu/ml   He took the fosfomycin without clinical relief -his major symptoms are abdominal pain.  He reports that the urine smell gets better and he feels better .  He denies fever at this time.  The plan was made to start -IV meropenem for 2 weeks but he has not yet started PICC line.      05/05/22  Since the last visit ,he had another episode of UTI-04/19/22  KLEBSIELLA PNEUMONIAE-he was given Levaquin   He was seen by PCP yesterday for malaise.  Cbc WAS NORMAL       07/13/22 -since his last visit, he had recurrent positive urine culture-  Latest urine culture- 06/09/22- Morganella   05/2022- Proteus   04/22- Klebsiella  03/22- Morganella  02/22-  Klebsiella  01/22-pseudomoas     12/2021- Morgabella  11/21- Proteus   He reports intermittent dysuria . Denies fever .  He changes the alonso every 2 weeks.  CT scan of the abdomen/pelvis- 02/22-Suprapubic pelvic catheter and bladder wall thickening, somewhat nonspecific in the setting of a nondistended bladder.   Moderate stool burden in the distal colon.  Correlation for constipation.   Cholelithiasis.   Questionable early sacral decubitus ulcer.  Correlation is advised.     Over the last 6 months - he did monthly Urine cultures whenever he contacts the office about dysuria      03/15- the patient  comes for follow up.     Last urine culture- 02/28- Klebsiella.  He was given Fosfomycin .     Component      Latest Ref Rng & Units 2/28/2023 1/31/2023             1:49 PM   Urine Culture, Routine       KLEBSIELLA PNEUMONIAE (A) . . . clinically necessary.      Component      Latest Ref Rng & Units 1/31/2023           1:49 PM   Urine Culture, Routine       Multiple organisms isolated. None in predominance.  Repeat if      Component      Latest Ref Rng & Units 12/21/2022           2:20 PM   Urine Culture, Routine       PROTEUS MIRABILIS (A) . . .      Component      Latest Ref Rng & Units 12/21/2022           2:20 PM   Urine Culture, Routine       KLEBSIELLA PNEUMONIAE (A) . . .      Component      Latest Ref Rng & Units 11/10/2022              Urine Culture, Routine       PSEUDOMONAS AERUGINOSA (A) . . .      Component      Latest Ref Rng & Units 9/29/2022              Urine Culture, Routine       PSEUDOMONAS AERUGINOSA (A) . . .      Component      Latest Ref Rng & Units 6/9/2022 11/2  He was seen via Tele med.     He has pain over the pelvic region.     Lab test -   10/25-  ESCHERICHIA COLI ESBL   >100,000 cfu/ml      CT scan -09/14-     Right lower lobe opacities consistent with right basilar pneumonia     Suprapubic urinary catheter.  Correlate clinically.  04/30-  He reports fever -101.9  UA- showed  wbc 10- done 04/29 09/26- he was recently seen at the hospital -was managed -08/03/24- 08/06/24 and was treated for PICC line associated DVT .  Latest urine culture- 09/13/24- Proteus  He was given Bactrim.  His major symptom at this time is constipation for 7 days -he has failed out patient regime-enema, laxative         11/27/24     He says he did not think the gentamicin made any difference  CT abdomen- 11/15/24    No acute finding significantly degraded imaging       07/10/25  He was seen for abnormal lab test .  He has Fosfomycin at home.    07/14- Urine culture   >100,000 cfu/ml Klebsiella pneumoniae Abnormal    >100,000 cfu/ml Morganella morganii Abnormal        He wants treatment for the UTI,reports intermittent fevers        Review of Systems       Objective     Physical Exam       Assessment and Plan     1. Complicated UTI (urinary tract infection)  Assessment & Plan:  We had another extensive discussion about UTI and patients with paraplegia.  Will use empiric Levaquin/fosfomycin    This is likely colonization

## 2025-07-11 NOTE — PROGRESS NOTES
The patient location is: [place]  The chief complaint leading to consultation is:     Visit type: audiovisual    Face to Face time with patient: 5  10 minutes of total time spent on the encounter, which includes face to face time and non-face to face time preparing to see the patient (eg, review of tests), Obtaining and/or reviewing separately obtained history, Documenting clinical information in the electronic or other health record, Independently interpreting results (not separately reported) and communicating results to the patient/family/caregiver, or Care coordination (not separately reported).         Each patient to whom he or she provides medical services by telemedicine is:  (1) informed of the relationship between the physician and patient and the respective role of any other health care provider with respect to management of the patient; and (2) notified that he or she may decline to receive medical services by telemedicine and may withdraw from such care at any time.    Notes:

## 2025-07-14 ENCOUNTER — OFFICE VISIT (OUTPATIENT)
Dept: UROLOGY | Facility: CLINIC | Age: 35
End: 2025-07-14
Payer: MEDICARE

## 2025-07-14 DIAGNOSIS — Z93.59 SUPRAPUBIC CATHETER: ICD-10-CM

## 2025-07-14 DIAGNOSIS — N39.0 RECURRENT UTI: ICD-10-CM

## 2025-07-14 DIAGNOSIS — N31.9 NEUROGENIC BLADDER: Primary | ICD-10-CM

## 2025-07-14 PROCEDURE — 98006 SYNCH AUDIO-VIDEO EST MOD 30: CPT | Mod: 95,,, | Performed by: STUDENT IN AN ORGANIZED HEALTH CARE EDUCATION/TRAINING PROGRAM

## 2025-07-14 RX ORDER — POLYETHYLENE GLYCOL 3350 17 G/17G
17 POWDER, FOR SOLUTION ORAL DAILY
Qty: 510 G | Refills: 11 | Status: SHIPPED | OUTPATIENT
Start: 2025-07-14 | End: 2026-07-14

## 2025-07-15 DIAGNOSIS — N39.0 COMPLICATED UTI (URINARY TRACT INFECTION): Primary | ICD-10-CM
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
## 2025-07-16 ENCOUNTER — DOCUMENTATION ONLY (OUTPATIENT)
Dept: INFECTIOUS DISEASES | Facility: CLINIC | Age: 35
End: 2025-07-16
Payer: MEDICARE

## 2025-07-16 ENCOUNTER — LAB VISIT (OUTPATIENT)
Dept: LAB | Facility: HOSPITAL | Age: 35
End: 2025-07-16
Attending: STUDENT IN AN ORGANIZED HEALTH CARE EDUCATION/TRAINING PROGRAM
Payer: MEDICARE

## 2025-07-16 DIAGNOSIS — N39.0 COMPLICATED UTI (URINARY TRACT INFECTION): ICD-10-CM

## 2025-07-16 PROCEDURE — 87088 URINE BACTERIA CULTURE: CPT | Mod: HCNC

## 2025-07-16 NOTE — PROGRESS NOTES
Outpatient Antibiotic Therapy Plan:     1) Infection: Recurrent UTI      2) Antibiotics:  · Gentamicin bladder irrigation (24 mg in 50 mL NS instilled and clamped for 30-60 minutes then let drain)      3) Therapy Duration:  Would perform twice daily for 7 days following every catheter change over the next 3 months

## 2025-07-19 LAB — BACTERIA UR CULT: ABNORMAL

## 2025-07-21 ENCOUNTER — PATIENT MESSAGE (OUTPATIENT)
Dept: INFECTIOUS DISEASES | Facility: CLINIC | Age: 35
End: 2025-07-21
Payer: MEDICARE

## 2025-07-21 ENCOUNTER — TELEPHONE (OUTPATIENT)
Dept: INFECTIOUS DISEASES | Facility: CLINIC | Age: 35
End: 2025-07-21
Payer: MEDICARE

## 2025-07-21 ENCOUNTER — DOCUMENTATION ONLY (OUTPATIENT)
Dept: INFECTIOUS DISEASES | Facility: HOSPITAL | Age: 35
End: 2025-07-21
Payer: MEDICARE

## 2025-07-21 NOTE — PROGRESS NOTES
Outpatient Antibiotic Therapy Plan:    1) Infection:  Complicated UTI    2) Antibiotics:    Intravenous antibiotics:  IV tobramycin 5 mg/kilogram daily    3) Therapy Duration:  5 days    4) Outpatient Weekly Labs:    Order the following labs to be drawn on final day of antibiotics:   CBC  CMP     5) Fax Lab Results to Infectious Diseases Provider: Dr. Hubbard     [facility] Fax Number: [phone]    Please perform labs through [facility]

## 2025-07-21 NOTE — TELEPHONE ENCOUNTER
Copied from CRM #[id number]. Topic: General Inquiry - Patient Advice  >> Jul 21, 2025 10:03 AM Chiki wrote:  Name of Who is Calling: Kate HUMPHRIES        What is the request in detail: Pt has some concerns about his urine culture        Can the clinic reply by [facility]: no        [facility] [phone]

## 2025-07-28 ENCOUNTER — PATIENT MESSAGE (OUTPATIENT)
Dept: INFECTIOUS DISEASES | Facility: CLINIC | Age: 35
End: 2025-07-28
Payer: MEDICARE

## 2025-07-31 ENCOUNTER — OFFICE VISIT (OUTPATIENT)
Dept: INFECTIOUS DISEASES | Facility: CLINIC | Age: 35
End: 2025-07-31
Payer: MEDICARE

## 2025-07-31 DIAGNOSIS — N39.0 COMPLICATED UTI (URINARY TRACT INFECTION): Primary | ICD-10-CM

## 2025-07-31 DIAGNOSIS — S14.109S QUADRIPLEGIA, POST-TRAUMATIC: ICD-10-CM

## 2025-07-31 DIAGNOSIS — I10 PRIMARY HYPERTENSION: ICD-10-CM

## 2025-07-31 DIAGNOSIS — Z93.59 SUPRAPUBIC CATHETER: ICD-10-CM

## 2025-07-31 DIAGNOSIS — G82.50 QUADRIPLEGIA, POST-TRAUMATIC: ICD-10-CM

## 2025-07-31 DIAGNOSIS — R19.7 DIARRHEA, UNSPECIFIED TYPE: ICD-10-CM

## 2025-07-31 RX ORDER — GRANULES FOR ORAL 3 G/1
3 POWDER ORAL
Qty: 27 G | Refills: 1 | Status: SHIPPED | OUTPATIENT
Start: 2025-07-31 | End: 2026-01-27

## 2025-07-31 NOTE — PROGRESS NOTES
The patient location is: [place]  The chief complaint leading to consultation is: Recurrent UTI    Visit type: audiovisual    Face to Face time with patient: 8 minutes   30 minutes of total time spent on the encounter, which includes face to face time and non-face to face time preparing to see the patient (eg, review of tests), Obtaining and/or reviewing separately obtained history, Documenting clinical information in the electronic or other health record, Independently interpreting results (not separately reported) and communicating results to the patient/family/caregiver, or Care coordination (not separately reported).     Each patient to whom he or she provides medical services by telemedicine is:  (1) informed of the relationship between the physician and patient and the respective role of any other health care provider with respect to management of the patient; and (2) notified that he or she may decline to receive medical services by telemedicine and may withdraw from such care at any time.    Notes:     The patient location is: [place]   The chief complaint leading to consultation is: UTI follow up      Visit type: audiovisual     Notes:     Subjective:     HPI: 35 y.o. male with pertinent PMHx of post traumatic quadriplegia, HTN, urinary incontinence leading to chronic suprapubic catheterization and recurrent UTI, and constipation who was evaluated by Infectious Diseases on 7/21/23 after clean catch U/A showed positive nitrites and 12 WBC. Urine culture grew pan R Morganella morganii and  Pseudomonas. Plan was for 2 weeks of targeted IV antibiotics. PICC placed on 7/28. Plan was then to give dose of tobramycin and a course of pip/tazo via PICC line followed by repeat U/A. 8/24, patient states he gets UTI frequently. Changes catheter every 2 weeks at home. Usually gets chills, sweats, abdominal pain, urinary difficulty, and foul odor with UTI. Had these symptoms when July UTI was diagnosed. Currently  having abdominal pain and myalgias. Unsure if this is due to unrelated ailment or another UTI. Tolerated pip/tazo other than intermittent loose stools. Has provided new urine sample today. Will see if any pathogens grow and discuss whether further antibiotics are warranted. Patient with provide update if worsening symptoms occur. Scheduled for home catheter change next week. Agreeable to PICC removal today.     Last ID note 11/2: was recently given Fosfomycin for last urine culture.  He is colonized with bacteria and not all the cultures mean a true infection .  WE went through this issue again .  Continue Lithostat /Azo dye as needed.     12/6/23: Today patient reports constipation and pain on both sides of abdomen for a few weeks. Has also struggled to urinate through Crenshaw. Also pain below neck to feet within past week. Mainly a nerve pain especially in right arm. Left side feels more like gas. Also with trapped gas due to wheelchair.  U/A from urology on 12/5 with 20 WBC. Culture in process. One week ago urine was sterile. CT a/p has been scheduled for 12/18. HH irrigated Crenshaw and it flowed well yesterday but today the physical flow still bad. Only 600 cc went into bag. Day before had 1500 cc. Has not had any fever. No nausea or vomiting. No shortness of breath or cough. Has appetite but unable to eat or drink much. Given how many symptoms patient is experiencing, advised him to go to ER for imaging and blood work. May also be able to provide pain management and a bowel regimen for his constipation. Will follow urine culture result.      2/28/24: Current UTI symptoms- abdominal pain and foul odor. Crenshaw last changed 1 week ago. Changed every 2-3 weeks. [facility] doing the exchanges. No current fever. Urine appears darker but not abnormal. Discussed treating based on last urine culture using IV tobramycin. Will reach out to [facility]. Will also discuss bowel regimen with PCP. Has been frequently constipated  "which is huge risk factor. Will ask HH to stick to biweekly exchange of Crenshaw.      3/14: Received 3 doses of tobramycin. Last urine culture from 3/5 no growth. Feels good today. Still battling constipation. On new bowel regimen. Will adjust with help of PCP. Sees GI next month.      6/14: Here for follow up. Per [facility] was given large dose of amikacin 2 days ago. Currently no UTI symptoms. Still dealing with constipation but more BM than before. Placed on stronger med than Linzess. Crenshaw changed every 2 weeks. No fever but occasional chills. Continues to have generalized pain from chest to pelvis. Asking for MRI as CT have been negative. Will place order today.      7/3: Patient with another pyuria. Is concerned about recurrent infection. Will use meropenem as aggressive treatment. Then may try an oral suppression regimen. Patient in agreement. PICC ordered and [facility] updated.      10/17: Patient took dose of fosfomycin but concerned it would not be sufficient. Have set up for tobramycin through [facility]. Will plan for 5 doses. Has discussed a few options with urology including surgical reconstruction of ureters and gentamicin irrigation. He is on the fence about surgical approach. Will reach out to urology for further clarification. Do agree with irrigations. Can also discuss antibiotic suppression. Patient voiced understanding. Denies new symptoms currently.      10/31: Last /70. Has started chronic prophylaxis with low dose Omnicef and tolerating. Has not yet received bladder irrigation with gentamicin, only sterile water. Will see if HH is able to obtain the gentamicin. Overall feels well. Says "nothing has gotten worse". Has an appetite but has lost weight. New wheelchair may help with bowel movements.      12/3: Going to see GI soon about irregular bowels. Had urinary incontinence past 2 nights. Typically empties bag before bed but when he woke up there was only 200 cc in bag but floor was covered in " urine. Changed catheter and same thing occurred the next day. Will discuss with urology. Last U/A sterile. Will plan to give holiday from gentamicin flushes after this month.      12/19: Asked for sooner follow up today. Still performing bladder irrigation. Ongoing since October. Explained that any bacteria present is urine is likely colonization at this point. Bladder has been sterilized. Needs ongoing bowel care. Can trial Metamucil. Will give break from irrigation until next year. To continue with daily Omnicef until April. Voiced understanding.      1/13/25: Will stop cefdinir. ESBL on last culture. Treated with Macrobid but stopped. Was having nausea, chills, and cold sweats. Unable to vomit since paralysis. Unsure if he had fever. Notes low urine output in Crenshaw bag vs how much fluid he takes in. On average output 600 cc. Requesting IV abx via [facility]. Will set up. However, explained that resistance is a huge barrier to eradication of bacteria. Will likely remained colonized lifelong due to need for catheter. Patient mentioned surgical rerouting of bladder which he is not interested in at this time. Also discussed risk of frequent CT scanning due to radiation. Discussed last results showed stool in colon. No stones/blockage.      3/3: Follow up. Completing course of Rocephin. Urine has been clear. Bowels improved. Overall feels really good. Sounds and appears probably the best he has since following with me. Discussed keeping PICC line intact for an additional week after finishing Rocephin. Will perform line care. If feeling well at that point will plan to remove the line. Patient in agreement.      3/31: Since completing course of Rocephin, patient feels chills, foul smelling urine, and sediment in urine. Stool soft with some diarrhea. Before yesterday was forming. Taking Senna + OTC stool softener. Watching what he eats. More baked foods. Had catheter exchanged last week. Prior to exchange had foul odor and  sediment. During exchange smell resolved. Has been back past few days. Urinating more frequently. Mild burning sensation. Has colonoscopy next week. Will not perform unless infection eradicated. Will give 3 doses of fosfomycin taken every other day. Patient in agreement. Reiterated the long term risk of antibiotics. Needs to discuss with urology an alternative to suprapubic cath. Sees them this week.      4/14: Patient completed fosfomycin course. Colonoscopy rescheduled till end of the month. No showed urology appointment. Has been dealing with frequency and incontinence. Foul smelling urine and bladder spasms. Discussed possibility of IC on differential as symptoms do not josh with antibiotics. Will discuss with urology. Will check new U/A.     7/31: Follow up.  Currently asymptomatic for UTI.  Has completed 5 day course of tobramycin.  Receiving gentamicin flushes.  Declined urologic procedure with [facility] due to risk that UTI continues to occur and he is so young for an invasive procedure with no definite outcomes.  For now discussed trial of fosfomycin prophylaxis.  Patient in agreement.               Urine culture [[id number]] (Abnormal)  Collected: 02/12/25 0955   Order Status: Completed Specimen: Urine Updated: 02/16/25 0844     Urine Culture, Routine PROTEUS MIRABILIS  >100,000 cfu/ml   Abnormal    Narrative:     Specimen Source->Urine  Send normal result to authorizing provider's In Basket if  patient is active on MyChart:->Yes   Susceptibility                   Proteus mirabilis       CULTURE, URINE       Amp/Sulbactam <=8/4 mcg/mL Sensitive       Ampicillin <=8 mcg/mL Sensitive       Cefazolin <=2 mcg/mL Sensitive       Cefepime <=2 mcg/mL Sensitive       Ceftriaxone <=1 mcg/mL Sensitive       Ciprofloxacin >2 mcg/mL Resistant       Ertapenem <=0.5 mcg/mL Sensitive       Gentamicin <=2 mcg/mL Sensitive       Levofloxacin >4 mcg/mL Resistant       Meropenem <=1 mcg/mL Sensitive        Piperacillin/Tazo <=8 mcg/mL Sensitive       Tobramycin <=2 mcg/mL Sensitive       Trimeth/Sulfa <=2/38 mcg/mL Sensitive                                           Urine culture [[id number]] (Abnormal)  Collected: 01/04/25 1649   Order Status: Completed Specimen: Urine Updated: 01/07/25 1224     Urine Culture, Routine ESCHERICHIA COLI ESBL  >100,000 cfu/ml  No other significant isolate   Abnormal    Narrative:     Specimen Source->Urine   Susceptibility                   Escherichia coli ESBL       CULTURE, URINE       Amp/Sulbactam >16/8 mcg/mL Resistant       Ampicillin >16 mcg/mL Resistant       Cefepime >16 mcg/mL Resistant       Ceftriaxone >2 mcg/mL Resistant       Ciprofloxacin >2 mcg/mL Resistant       Ertapenem <=0.5 mcg/mL Sensitive       Gentamicin <=2 mcg/mL Sensitive       Levofloxacin >4 mcg/mL Resistant       Meropenem <=1 mcg/mL Sensitive       Nitrofurantoin <=32 mcg/mL Sensitive       Piperacillin/Tazo <=8 mcg/mL Resistant       Tobramycin <=2 mcg/mL Sensitive       Trimeth/Sulfa >2/38 mcg/mL Resistant                     Linear View         CULTURE, URINE [[id number]]     Order Status: Sent Specimen: Urine, Clean Catch     CULTURE, URINE [[id number]] Collected: 01/01/25 1600   Order Status: Completed Specimen: Urine Updated: 01/03/25 1745     Urine Culture, Routine Multiple organisms isolated. None in predominance.  Repeat if       clinically necessary.   Narrative:     Send normal result to authorizing provider's In Basket if  patient is active on MyChart:->Yes   CULTURE, URINE [[id number]]     Order Status: No result Specimen: Urine, Catheterized     Clostridium difficile EIA [[id number]] Collected: 10/24/24 1324   Order Status: Completed Specimen: Stool Updated: 10/25/24 0615     C. diff Antigen Negative     C difficile Toxins A+B, EIA Negative     Comment: Testing not recommended for children <24 months old.      Narrative:     Send normal result to authorizing provider's In Basket if  patient  is active on MyChart:->Yes      EXAMINATION:  CT ABDOMEN PELVIS WITH IV CONTRAST     CLINICAL HISTORY:  Bowel obstruction suspected;     TECHNIQUE:  Low dose axial images, sagittal and coronal reformations were obtained from the lung bases to the pubic symphysis following the IV administration of 100 mL of Omnipaque 350.     COMPARISON:  None     FINDINGS:  Heart: Normal size as far as seen. No effusion as far as seen.     Lung Bases: Granuloma right lung base.     Liver: Normal size and attenuation. No focal lesions.     Gallbladder: The gallbladder is not identified.     Bile Ducts: No dilatation.     Pancreas: No mass. No peripancreatic fat stranding.     Spleen: Normal.     Adrenals: Normal.     Kidneys/Ureters: Normal enhancement.  No mass or  hydroureteronephrosis.     Bladder: Suprapubic catheter.     Reproductive organs: Normal.     GI Tract/Mesentery: No evidence of bowel obstruction or inflammation.  No evidence of acute appendicitis.  Some fluid and stool in the colon.  Correlate to history of diarrhea.     Peritoneal Space: No ascites or free air.     Retroperitoneum: No significant adenopathy.     Abdominal wall: Small fat containing umbilical hernia..     Vasculature: No aneurysm.     Bones: No acute fracture. No suspicious lytic or sclerotic lesions.     Impression:     Fluid and stool within the colon may relate to diarrheal state.  Small fat containing umbilical hernia.  Suprapubic bladder catheter.     All CT scans at this facility use dose modulation, iterative reconstruction, and/or weight based dosing when appropriate to reduce radiation dose to as low as reasonably achievable.        Electronically signed by:Leland Simon  Date:                                            01/04/2025  Time:                                           19:20                Exam Ended: 01/04/25 18:45 CST Last Resulted: 01/04/25 19:20 CST         Review of Systems:   Negative unless otherwise noted positive-  Gen-  Weakness/ Fatigue  Neuro- Confusion  CV- Chest pain   Resp: Cough/ SOB  GI- Nausea/Vomiting; +diarrhea on laxatives   - Negative today  Extrem- Pain/Swelling     Objective:     Physical Exam:  General- Patient alert and oriented x3 in NAD  HEENT- PERRLA, EOMI, OP clear  Resp- No increased WOB noted. Not using accessory muscles.  Extrem- No cyanosis, clubbing, edema.   Skin-  No Jaundice. No visible skin lesions.        Plan:  Complicated UTI (urinary tract infection)  --Continue biweekly Crenshaw exchange   --Off chronic prophylaxis  --Will trial fosfomycin 3 g every 10 days x 6 months   --Continue bladder irrigations with gentamicin   --Recommend continued follow up with urology; will discuss possibility of interstitial cystitis with them; may need cystoscopy with biopsies of the bladder to rule out   --Not much to offer from ID standpoint as he is too young to be on chronic antibiotic therapy but currently no other option as urologic revision has been declined   --Explained that it is challenging to distinguish between colonization and infection  --Having catheter poses risk of bacteria never being eradicated due to biofilm formation   --Needs bowel regimen to prevent constipation which is huge risk factor for recurrent UTI  --Follow up with me virtually in 2 weeks      Outpatient Antibiotic Therapy Plan:     1) Infection: Recurrent UTI      2) Antibiotics:     Intravenous antibiotics:  ·           Gentamicin bladder irrigation daily (24 mg in 50 mL NS instilled and clamped for 30-60 minutes then let drain)      3) Therapy Duration:  14 days       Suprapubic catheter  --Recommend changing at least every 2 weeks to prevent recurrent UTI   --Follow up with urology      Hypertension  Continue current medications. Follow with PCP.

## 2025-08-07 ENCOUNTER — OFFICE VISIT (OUTPATIENT)
Dept: INTERNAL MEDICINE | Facility: CLINIC | Age: 35
End: 2025-08-07
Payer: MEDICARE

## 2025-08-07 VITALS
SYSTOLIC BLOOD PRESSURE: 90 MMHG | OXYGEN SATURATION: 97 % | TEMPERATURE: 97 F | DIASTOLIC BLOOD PRESSURE: 70 MMHG | HEART RATE: 83 BPM

## 2025-08-07 DIAGNOSIS — G82.50 QUADRIPLEGIA, POST-TRAUMATIC: ICD-10-CM

## 2025-08-07 DIAGNOSIS — G47.00 INSOMNIA, UNSPECIFIED TYPE: ICD-10-CM

## 2025-08-07 DIAGNOSIS — S14.109S QUADRIPLEGIA, POST-TRAUMATIC: ICD-10-CM

## 2025-08-07 DIAGNOSIS — Z86.718 HISTORY OF DVT (DEEP VEIN THROMBOSIS): ICD-10-CM

## 2025-08-07 DIAGNOSIS — K59.09 CHRONIC CONSTIPATION: ICD-10-CM

## 2025-08-07 DIAGNOSIS — N39.0 RECURRENT UTI: Primary | ICD-10-CM

## 2025-08-07 DIAGNOSIS — Z79.899 ENCOUNTER FOR LONG-TERM (CURRENT) USE OF MEDICATIONS: ICD-10-CM

## 2025-08-07 DIAGNOSIS — K21.9 GASTROESOPHAGEAL REFLUX DISEASE, UNSPECIFIED WHETHER ESOPHAGITIS PRESENT: ICD-10-CM

## 2025-08-07 DIAGNOSIS — M79.89 LOCALIZED SWELLING OF LOWER EXTREMITY: ICD-10-CM

## 2025-08-07 PROCEDURE — 1159F MED LIST DOCD IN RCRD: CPT | Mod: CPTII,HCNC,S$GLB, | Performed by: FAMILY MEDICINE

## 2025-08-07 PROCEDURE — 99214 OFFICE O/P EST MOD 30 MIN: CPT | Mod: HCNC,S$GLB,, | Performed by: FAMILY MEDICINE

## 2025-08-07 PROCEDURE — 3074F SYST BP LT 130 MM HG: CPT | Mod: CPTII,HCNC,S$GLB, | Performed by: FAMILY MEDICINE

## 2025-08-07 PROCEDURE — G2211 COMPLEX E/M VISIT ADD ON: HCPCS | Mod: HCNC,S$GLB,, | Performed by: FAMILY MEDICINE

## 2025-08-07 PROCEDURE — 99999 PR PBB SHADOW E&M-EST. PATIENT-LVL III: CPT | Mod: PBBFAC,HCNC,, | Performed by: FAMILY MEDICINE

## 2025-08-07 PROCEDURE — 3078F DIAST BP <80 MM HG: CPT | Mod: CPTII,HCNC,S$GLB, | Performed by: FAMILY MEDICINE

## 2025-08-07 RX ORDER — BACLOFEN 10 MG/1
TABLET ORAL
Qty: 270 TABLET | Refills: 3 | Status: SHIPPED | OUTPATIENT
Start: 2025-08-07

## 2025-08-07 RX ORDER — OXYBUTYNIN CHLORIDE 5 MG/1
5 TABLET ORAL 2 TIMES DAILY
COMMUNITY
Start: 2025-07-27

## 2025-08-07 RX ORDER — GENTAMICIN SULFATE 1 MG/G
0.1 OINTMENT TOPICAL 3 TIMES DAILY
COMMUNITY
Start: 2025-07-16

## 2025-08-07 RX ORDER — GABAPENTIN 400 MG/1
400 CAPSULE ORAL 3 TIMES DAILY
Qty: 270 CAPSULE | Refills: 3 | Status: SHIPPED | OUTPATIENT
Start: 2025-08-07

## 2025-08-07 RX ORDER — BACLOFEN 20 MG/1
20 TABLET ORAL 3 TIMES DAILY PRN
Qty: 270 TABLET | Refills: 3 | Status: SHIPPED | OUTPATIENT
Start: 2025-08-07 | End: 2026-08-07
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
## 2025-08-08 ENCOUNTER — PATIENT MESSAGE (OUTPATIENT)
Dept: INFECTIOUS DISEASES | Facility: CLINIC | Age: 35
End: 2025-08-08
Payer: MEDICARE

## 2025-08-08 DIAGNOSIS — A09 INFECTIOUS DIARRHEA: Primary | ICD-10-CM

## 2025-08-11 ENCOUNTER — PATIENT MESSAGE (OUTPATIENT)
Dept: INTERNAL MEDICINE | Facility: CLINIC | Age: 35
End: 2025-08-11
Payer: MEDICARE

## 2025-08-13 ENCOUNTER — OFFICE VISIT (OUTPATIENT)
Dept: INTERNAL MEDICINE | Facility: CLINIC | Age: 35
End: 2025-08-13
Payer: MEDICARE

## 2025-08-13 DIAGNOSIS — R19.7 ACUTE DIARRHEA: Primary | ICD-10-CM

## 2025-08-13 PROCEDURE — G2211 COMPLEX E/M VISIT ADD ON: HCPCS | Mod: HCNC,95,, | Performed by: FAMILY MEDICINE

## 2025-08-13 PROCEDURE — 98005 SYNCH AUDIO-VIDEO EST LOW 20: CPT | Mod: HCNC,95,, | Performed by: FAMILY MEDICINE

## 2025-08-14 ENCOUNTER — LAB VISIT (OUTPATIENT)
Dept: LAB | Facility: HOSPITAL | Age: 35
End: 2025-08-14
Attending: STUDENT IN AN ORGANIZED HEALTH CARE EDUCATION/TRAINING PROGRAM
Payer: MEDICARE

## 2025-08-14 DIAGNOSIS — A09 INFECTIOUS DIARRHEA: ICD-10-CM

## 2025-08-14 LAB
C DIFF GDH STL QL: NEGATIVE
C DIFF TOX A+B STL QL IA: NEGATIVE

## 2025-08-14 PROCEDURE — 87449 NOS EACH ORGANISM AG IA: CPT | Mod: HCNC

## 2025-08-15 ENCOUNTER — OFFICE VISIT (OUTPATIENT)
Dept: INFECTIOUS DISEASES | Facility: CLINIC | Age: 35
End: 2025-08-15
Payer: MEDICARE

## 2025-08-15 DIAGNOSIS — Z93.59 SUPRAPUBIC CATHETER: ICD-10-CM

## 2025-08-15 DIAGNOSIS — S14.109S QUADRIPLEGIA, POST-TRAUMATIC: ICD-10-CM

## 2025-08-15 DIAGNOSIS — N39.0 COMPLICATED UTI (URINARY TRACT INFECTION): Primary | ICD-10-CM

## 2025-08-15 DIAGNOSIS — N31.9 NEUROGENIC BLADDER: ICD-10-CM

## 2025-08-15 DIAGNOSIS — G82.50 QUADRIPLEGIA, POST-TRAUMATIC: ICD-10-CM

## 2025-08-15 DIAGNOSIS — I10 PRIMARY HYPERTENSION: ICD-10-CM

## 2025-08-19 ENCOUNTER — TELEPHONE (OUTPATIENT)
Dept: INTERNAL MEDICINE | Facility: CLINIC | Age: 35
End: 2025-08-19
Payer: MEDICARE

## 2025-08-19 DIAGNOSIS — L21.9 SEBORRHEIC DERMATITIS: ICD-10-CM

## 2025-08-19 RX ORDER — TRIAMCINOLONE ACETONIDE 0.25 MG/G
CREAM TOPICAL
Qty: 80 G | Refills: 3 | Status: SHIPPED | OUTPATIENT
Start: 2025-08-19

## 2025-08-20 ENCOUNTER — TELEPHONE (OUTPATIENT)
Dept: INFECTIOUS DISEASES | Facility: CLINIC | Age: 35
End: 2025-08-20
Payer: MEDICARE

## 2025-08-20 ENCOUNTER — PATIENT MESSAGE (OUTPATIENT)
Dept: INFECTIOUS DISEASES | Facility: CLINIC | Age: 35
End: 2025-08-20
Payer: MEDICARE

## 2025-08-21 ENCOUNTER — PATIENT MESSAGE (OUTPATIENT)
Dept: INTERNAL MEDICINE | Facility: CLINIC | Age: 35
End: 2025-08-21
Payer: MEDICARE

## 2025-08-21 ENCOUNTER — TELEPHONE (OUTPATIENT)
Dept: INTERNAL MEDICINE | Facility: CLINIC | Age: 35
End: 2025-08-21
Payer: MEDICARE

## 2025-08-22 ENCOUNTER — TELEPHONE (OUTPATIENT)
Dept: INTERNAL MEDICINE | Facility: CLINIC | Age: 35
End: 2025-08-22
Payer: MEDICARE

## 2025-08-22 RX ORDER — GRANULES FOR ORAL 3 G/1
3 POWDER ORAL ONCE
Qty: 3 G | Refills: 0 | Status: SHIPPED | OUTPATIENT
Start: 2025-08-22 | End: 2025-08-22

## 2025-08-25 ENCOUNTER — EXTERNAL HOME HEALTH (OUTPATIENT)
Dept: HOME HEALTH SERVICES | Facility: HOSPITAL | Age: 35
End: 2025-08-25
Payer: MEDICARE

## 2025-08-26 ENCOUNTER — PATIENT MESSAGE (OUTPATIENT)
Dept: INFECTIOUS DISEASES | Facility: CLINIC | Age: 35
End: 2025-08-26
Payer: MEDICARE

## 2025-08-26 ENCOUNTER — LAB VISIT (OUTPATIENT)
Dept: LAB | Facility: HOSPITAL | Age: 35
End: 2025-08-26
Attending: FAMILY MEDICINE
Payer: MEDICARE

## 2025-08-26 DIAGNOSIS — N39.0 RECURRENT UTI: Primary | ICD-10-CM

## 2025-08-28 ENCOUNTER — LAB REQUISITION (OUTPATIENT)
Dept: LAB | Facility: HOSPITAL | Age: 35
End: 2025-08-28
Payer: MEDICARE

## 2025-08-28 DIAGNOSIS — N39.0 URINARY TRACT INFECTION, SITE NOT SPECIFIED: ICD-10-CM

## 2025-08-28 LAB — PROCALCITONIN SERPL-MCNC: <0.02 NG/ML

## 2025-08-28 PROCEDURE — 84145 PROCALCITONIN (PCT): CPT | Mod: HCNC | Performed by: FAMILY MEDICINE

## 2025-08-31 ENCOUNTER — ON-DEMAND VIRTUAL (OUTPATIENT)
Dept: URGENT CARE | Facility: CLINIC | Age: 35
End: 2025-08-31
Payer: MEDICARE

## 2025-08-31 DIAGNOSIS — N39.0 URINARY TRACT INFECTION WITHOUT HEMATURIA, SITE UNSPECIFIED: ICD-10-CM

## 2025-08-31 DIAGNOSIS — R14.0 ABDOMINAL BLOATING: Primary | ICD-10-CM

## 2025-08-31 DIAGNOSIS — Z93.59 SUPRAPUBIC CATHETER: ICD-10-CM

## 2025-08-31 DIAGNOSIS — G82.50 QUADRIPLEGIA: ICD-10-CM

## 2025-08-31 PROCEDURE — 98006 SYNCH AUDIO-VIDEO EST MOD 30: CPT | Mod: 95,,, | Performed by: NURSE PRACTITIONER

## 2025-08-31 RX ORDER — CEFDINIR 300 MG/1
300 CAPSULE ORAL EVERY 12 HOURS
Qty: 10 CAPSULE | Refills: 0 | Status: SHIPPED | OUTPATIENT
Start: 2025-08-31 | End: 2025-09-05

## 2025-09-02 ENCOUNTER — OFFICE VISIT (OUTPATIENT)
Dept: INFECTIOUS DISEASES | Facility: CLINIC | Age: 35
End: 2025-09-02
Payer: MEDICARE

## 2025-09-02 DIAGNOSIS — N39.0 COMPLICATED UTI (URINARY TRACT INFECTION): Primary | ICD-10-CM

## 2025-09-02 PROCEDURE — 98006 SYNCH AUDIO-VIDEO EST MOD 30: CPT | Mod: HCNC,95,, | Performed by: INTERNAL MEDICINE

## 2025-09-05 DIAGNOSIS — N39.0 COMPLICATED UTI (URINARY TRACT INFECTION): Primary | ICD-10-CM

## (undated) DEVICE — CLIP HEMO-LOK ML

## (undated) DEVICE — DRAPE COLUMN DAVINCI XI

## (undated) DEVICE — OBTURATOR BLADELESS 8MM XI CLR

## (undated) DEVICE — DRAPE ARM DAVINCI XI

## (undated) DEVICE — GLOVE SURGICAL LATEX SZ 6.5

## (undated) DEVICE — SOL NS 1000CC

## (undated) DEVICE — MANIFOLD 4 PORT

## (undated) DEVICE — ADHESIVE DERMABOND ADVANCED

## (undated) DEVICE — ELECTRODE REM PLYHSV RETURN 9

## (undated) DEVICE — APPLICATOR CHLORAPREP ORN 26ML

## (undated) DEVICE — HEADREST ROUND DISP FOAM 9IN

## (undated) DEVICE — TAPE SILK 3IN

## (undated) DEVICE — DRAPE THREE-QTR REINF 53X77IN

## (undated) DEVICE — SUT CTD VICRYL 0 UND BR SUT

## (undated) DEVICE — DRAPE ABDOMINAL TIBURON 14X11

## (undated) DEVICE — COVER LIGHT HANDLE 80/CA

## (undated) DEVICE — PACK BASIC SETUP SC BR

## (undated) DEVICE — DEVICE CLOSURE DISP 14G

## (undated) DEVICE — SUPPORT ULNA NERVE PROTECTOR

## (undated) DEVICE — BAG TISSUE RETRIEVAL 5MM

## (undated) DEVICE — TOWEL OR DISP STRL BLUE 4/PK

## (undated) DEVICE — TOWEL OR NONABSORB ADH 17X26

## (undated) DEVICE — SUT MONOCRYL 4.0 PS2 CP496G

## (undated) DEVICE — KIT ANTIFOG W/SPONG & FLUID

## (undated) DEVICE — SET PNEUMOCLEAR HEAT HUM SE HF

## (undated) DEVICE — SOL STRL WATER INJ 1000ML BG

## (undated) DEVICE — UNDERGLOVE BIOGEL PI SZ 6.5 LF

## (undated) DEVICE — Device

## (undated) DEVICE — COVER TIP CURVED SCISSORS XI

## (undated) DEVICE — SYR 10CC LUER LOCK

## (undated) DEVICE — NDL SAFETY 22G X 1.5 ECLIPSE

## (undated) DEVICE — SYR 3CC LUER LOC

## (undated) DEVICE — GOWN POLY REINF BRTH SLV XL